# Patient Record
Sex: MALE | Race: WHITE | NOT HISPANIC OR LATINO
[De-identification: names, ages, dates, MRNs, and addresses within clinical notes are randomized per-mention and may not be internally consistent; named-entity substitution may affect disease eponyms.]

---

## 2017-02-09 ENCOUNTER — RESULT REVIEW (OUTPATIENT)
Age: 67
End: 2017-02-09

## 2017-02-09 PROBLEM — Z87.39 HISTORY OF GOUT: Status: RESOLVED | Noted: 2017-02-09 | Resolved: 2017-02-09

## 2017-02-09 PROBLEM — Z86.39 HISTORY OF HYPOTHYROIDISM: Status: RESOLVED | Noted: 2017-02-09 | Resolved: 2017-02-09

## 2017-02-09 PROBLEM — Z87.39 HISTORY OF OSTEOARTHRITIS: Status: RESOLVED | Noted: 2017-02-09 | Resolved: 2017-02-09

## 2017-02-09 PROBLEM — Z86.59 HISTORY OF ANXIETY DISORDER: Status: RESOLVED | Noted: 2017-02-09 | Resolved: 2017-02-09

## 2017-02-09 PROBLEM — Z86.39 HISTORY OF TYPE 2 DIABETES MELLITUS: Status: RESOLVED | Noted: 2017-02-09 | Resolved: 2017-02-09

## 2017-02-10 ENCOUNTER — OUTPATIENT (OUTPATIENT)
Dept: OUTPATIENT SERVICES | Facility: HOSPITAL | Age: 67
LOS: 1 days | Discharge: HOME | End: 2017-02-10

## 2017-02-10 ENCOUNTER — APPOINTMENT (OUTPATIENT)
Dept: HEMATOLOGY ONCOLOGY | Facility: CLINIC | Age: 67
End: 2017-02-10

## 2017-02-10 VITALS
OXYGEN SATURATION: 96 % | HEIGHT: 74 IN | TEMPERATURE: 98 F | BODY MASS INDEX: 40.43 KG/M2 | HEART RATE: 70 BPM | SYSTOLIC BLOOD PRESSURE: 118 MMHG | RESPIRATION RATE: 16 BRPM | WEIGHT: 315 LBS | DIASTOLIC BLOOD PRESSURE: 81 MMHG

## 2017-02-10 DIAGNOSIS — Z86.59 PERSONAL HISTORY OF OTHER MENTAL AND BEHAVIORAL DISORDERS: ICD-10-CM

## 2017-02-10 DIAGNOSIS — D72.89 OTHER SPECIFIED DISORDERS OF WHITE BLOOD CELLS: ICD-10-CM

## 2017-02-10 DIAGNOSIS — Z80.6 FAMILY HISTORY OF LEUKEMIA: ICD-10-CM

## 2017-02-10 DIAGNOSIS — Z87.39 PERSONAL HISTORY OF OTHER DISEASES OF THE MUSCULOSKELETAL SYSTEM AND CONNECTIVE TISSUE: ICD-10-CM

## 2017-02-10 DIAGNOSIS — D72.829 ELEVATED WHITE BLOOD CELL COUNT, UNSPECIFIED: ICD-10-CM

## 2017-02-10 DIAGNOSIS — Z86.39 PERSONAL HISTORY OF OTHER ENDOCRINE, NUTRITIONAL AND METABOLIC DISEASE: ICD-10-CM

## 2017-02-10 DIAGNOSIS — Z87.891 PERSONAL HISTORY OF NICOTINE DEPENDENCE: ICD-10-CM

## 2017-02-10 LAB
BASOPHILS # BLD AUTO: 0.1 K/UL — SIGNIFICANT CHANGE UP (ref 0–0.2)
BASOPHILS NFR BLD AUTO: 2 % — SIGNIFICANT CHANGE UP (ref 0–2)
EOSINOPHIL # BLD AUTO: 0.1 K/UL — SIGNIFICANT CHANGE UP (ref 0–0.5)
EOSINOPHIL NFR BLD AUTO: 1 % — SIGNIFICANT CHANGE UP (ref 0–6)
HCT VFR BLD CALC: 53.8 % — HIGH (ref 39–50)
HGB BLD-MCNC: 17.3 G/DL — HIGH (ref 13–17)
LYMPHOCYTES # BLD AUTO: 2.5 K/UL — SIGNIFICANT CHANGE UP (ref 1–3.3)
LYMPHOCYTES # BLD AUTO: 23 % — SIGNIFICANT CHANGE UP (ref 13–44)
MANUAL DIF COMMENT BLD-IMP: SIGNIFICANT CHANGE UP
MCHC RBC-ENTMCNC: 26.2 PG — LOW (ref 27–34)
MCHC RBC-ENTMCNC: 32.2 GM/DL — SIGNIFICANT CHANGE UP (ref 32–36)
MCV RBC AUTO: 81.6 FL — SIGNIFICANT CHANGE UP (ref 80–100)
MONOCYTES # BLD AUTO: 0.6 K/UL — SIGNIFICANT CHANGE UP (ref 0–0.9)
MONOCYTES NFR BLD AUTO: 6 % — SIGNIFICANT CHANGE UP (ref 2–14)
NEUTROPHILS # BLD AUTO: 6.1 K/UL — SIGNIFICANT CHANGE UP (ref 1.8–7.4)
NEUTROPHILS NFR BLD AUTO: 63 % — SIGNIFICANT CHANGE UP (ref 43–77)
NRBC # BLD: 1 /100 — HIGH (ref 0–0)
PLAT MORPH BLD: NORMAL — SIGNIFICANT CHANGE UP
PLATELET # BLD AUTO: 196 K/UL — SIGNIFICANT CHANGE UP (ref 150–400)
RBC # BLD: 6.6 M/UL — HIGH (ref 4.2–5.8)
RBC # FLD: 13.8 % — SIGNIFICANT CHANGE UP (ref 10.3–14.5)
RBC BLD AUTO: NORMAL — SIGNIFICANT CHANGE UP
VARIANT LYMPHS # BLD: 5 % — SIGNIFICANT CHANGE UP (ref 0–6)
WBC # BLD: 9.5 K/UL — SIGNIFICANT CHANGE UP (ref 3.8–10.5)
WBC # FLD AUTO: 9.5 K/UL — SIGNIFICANT CHANGE UP (ref 3.8–10.5)

## 2017-04-11 ENCOUNTER — APPOINTMENT (OUTPATIENT)
Dept: OTOLARYNGOLOGY | Facility: CLINIC | Age: 67
End: 2017-04-11

## 2017-05-03 ENCOUNTER — APPOINTMENT (OUTPATIENT)
Dept: OTOLARYNGOLOGY | Facility: CLINIC | Age: 67
End: 2017-05-03

## 2018-06-29 ENCOUNTER — APPOINTMENT (OUTPATIENT)
Dept: GASTROENTEROLOGY | Facility: CLINIC | Age: 68
End: 2018-06-29

## 2018-07-19 ENCOUNTER — APPOINTMENT (OUTPATIENT)
Dept: INTERNAL MEDICINE | Facility: CLINIC | Age: 68
End: 2018-07-19

## 2018-07-23 ENCOUNTER — INPATIENT (INPATIENT)
Facility: HOSPITAL | Age: 68
LOS: 5 days | Discharge: HOME HEALTH | DRG: 309 | End: 2018-07-29
Attending: GENERAL ACUTE CARE HOSPITAL | Admitting: GENERAL ACUTE CARE HOSPITAL
Payer: COMMERCIAL

## 2018-07-23 VITALS
WEIGHT: 315 LBS | SYSTOLIC BLOOD PRESSURE: 134 MMHG | OXYGEN SATURATION: 94 % | DIASTOLIC BLOOD PRESSURE: 87 MMHG | RESPIRATION RATE: 20 BRPM | HEART RATE: 88 BPM | TEMPERATURE: 98 F

## 2018-07-23 DIAGNOSIS — R06.02 SHORTNESS OF BREATH: ICD-10-CM

## 2018-07-23 LAB
ALBUMIN SERPL ELPH-MCNC: 4.1 G/DL — SIGNIFICANT CHANGE UP (ref 3.3–5)
ALP SERPL-CCNC: 86 U/L — SIGNIFICANT CHANGE UP (ref 40–120)
ALT FLD-CCNC: 19 U/L — SIGNIFICANT CHANGE UP (ref 10–45)
ANION GAP SERPL CALC-SCNC: 15 MMOL/L — SIGNIFICANT CHANGE UP (ref 5–17)
APTT BLD: 53.2 SEC — HIGH (ref 27.5–37.4)
AST SERPL-CCNC: 19 U/L — SIGNIFICANT CHANGE UP (ref 10–40)
BASE EXCESS BLDV CALC-SCNC: -1 MMOL/L — SIGNIFICANT CHANGE UP (ref -2–2)
BASOPHILS # BLD AUTO: 0 K/UL — SIGNIFICANT CHANGE UP (ref 0–0.2)
BASOPHILS NFR BLD AUTO: 0.2 % — SIGNIFICANT CHANGE UP (ref 0–2)
BILIRUB SERPL-MCNC: 0.6 MG/DL — SIGNIFICANT CHANGE UP (ref 0.2–1.2)
BUN SERPL-MCNC: 16 MG/DL — SIGNIFICANT CHANGE UP (ref 7–23)
CA-I SERPL-SCNC: 1.27 MMOL/L — SIGNIFICANT CHANGE UP (ref 1.12–1.3)
CALCIUM SERPL-MCNC: 9.8 MG/DL — SIGNIFICANT CHANGE UP (ref 8.4–10.5)
CHLORIDE BLDV-SCNC: 106 MMOL/L — SIGNIFICANT CHANGE UP (ref 96–108)
CHLORIDE SERPL-SCNC: 100 MMOL/L — SIGNIFICANT CHANGE UP (ref 96–108)
CO2 BLDV-SCNC: 24 MMOL/L — SIGNIFICANT CHANGE UP (ref 22–30)
CO2 SERPL-SCNC: 22 MMOL/L — SIGNIFICANT CHANGE UP (ref 22–31)
CREAT SERPL-MCNC: 0.87 MG/DL — SIGNIFICANT CHANGE UP (ref 0.5–1.3)
EOSINOPHIL # BLD AUTO: 0 K/UL — SIGNIFICANT CHANGE UP (ref 0–0.5)
EOSINOPHIL NFR BLD AUTO: 0.3 % — SIGNIFICANT CHANGE UP (ref 0–6)
GAS PNL BLDV: 134 MMOL/L — LOW (ref 136–145)
GAS PNL BLDV: SIGNIFICANT CHANGE UP
GAS PNL BLDV: SIGNIFICANT CHANGE UP
GLUCOSE BLDC GLUCOMTR-MCNC: 142 MG/DL — HIGH (ref 70–99)
GLUCOSE BLDV-MCNC: 201 MG/DL — HIGH (ref 70–99)
GLUCOSE SERPL-MCNC: 212 MG/DL — HIGH (ref 70–99)
HCO3 BLDV-SCNC: 23 MMOL/L — SIGNIFICANT CHANGE UP (ref 21–29)
HCT VFR BLD CALC: 48.7 % — SIGNIFICANT CHANGE UP (ref 39–50)
HCT VFR BLDA CALC: 51 % — HIGH (ref 39–50)
HGB BLD CALC-MCNC: 16.7 G/DL — SIGNIFICANT CHANGE UP (ref 13–17)
HGB BLD-MCNC: 16.1 G/DL — SIGNIFICANT CHANGE UP (ref 13–17)
INR BLD: 2.51 RATIO — HIGH (ref 0.88–1.16)
LACTATE BLDV-MCNC: 3.9 MMOL/L — HIGH (ref 0.7–2)
LYMPHOCYTES # BLD AUTO: 1.7 K/UL — SIGNIFICANT CHANGE UP (ref 1–3.3)
LYMPHOCYTES # BLD AUTO: 14.6 % — SIGNIFICANT CHANGE UP (ref 13–44)
MCHC RBC-ENTMCNC: 27.1 PG — SIGNIFICANT CHANGE UP (ref 27–34)
MCHC RBC-ENTMCNC: 33.2 GM/DL — SIGNIFICANT CHANGE UP (ref 32–36)
MCV RBC AUTO: 81.6 FL — SIGNIFICANT CHANGE UP (ref 80–100)
MONOCYTES # BLD AUTO: 0.6 K/UL — SIGNIFICANT CHANGE UP (ref 0–0.9)
MONOCYTES NFR BLD AUTO: 5.5 % — SIGNIFICANT CHANGE UP (ref 2–14)
NEUTROPHILS # BLD AUTO: 9.4 K/UL — HIGH (ref 1.8–7.4)
NEUTROPHILS NFR BLD AUTO: 79.4 % — HIGH (ref 43–77)
NT-PROBNP SERPL-SCNC: 529 PG/ML — HIGH (ref 0–300)
OTHER CELLS CSF MANUAL: 21 ML/DL — SIGNIFICANT CHANGE UP (ref 18–22)
PCO2 BLDV: 39 MMHG — SIGNIFICANT CHANGE UP (ref 35–50)
PH BLDV: 7.4 — SIGNIFICANT CHANGE UP (ref 7.35–7.45)
PLATELET # BLD AUTO: 231 K/UL — SIGNIFICANT CHANGE UP (ref 150–400)
PO2 BLDV: 63 MMHG — HIGH (ref 25–45)
POTASSIUM BLDV-SCNC: 4.2 MMOL/L — SIGNIFICANT CHANGE UP (ref 3.5–5.3)
POTASSIUM SERPL-MCNC: 4.5 MMOL/L — SIGNIFICANT CHANGE UP (ref 3.5–5.3)
POTASSIUM SERPL-SCNC: 4.5 MMOL/L — SIGNIFICANT CHANGE UP (ref 3.5–5.3)
PROT SERPL-MCNC: 8.3 G/DL — SIGNIFICANT CHANGE UP (ref 6–8.3)
PROTHROM AB SERPL-ACNC: 27.6 SEC — HIGH (ref 9.8–12.7)
RAPID RVP RESULT: SIGNIFICANT CHANGE UP
RBC # BLD: 5.96 M/UL — HIGH (ref 4.2–5.8)
RBC # FLD: 15.2 % — HIGH (ref 10.3–14.5)
SAO2 % BLDV: 91 % — HIGH (ref 67–88)
SODIUM SERPL-SCNC: 137 MMOL/L — SIGNIFICANT CHANGE UP (ref 135–145)
TROPONIN T, HIGH SENSITIVITY RESULT: 8 NG/L — SIGNIFICANT CHANGE UP (ref 0–51)
TROPONIN T, HIGH SENSITIVITY RESULT: 8 NG/L — SIGNIFICANT CHANGE UP (ref 0–51)
WBC # BLD: 11.9 K/UL — HIGH (ref 3.8–10.5)
WBC # FLD AUTO: 11.9 K/UL — HIGH (ref 3.8–10.5)

## 2018-07-23 PROCEDURE — 99285 EMERGENCY DEPT VISIT HI MDM: CPT

## 2018-07-23 PROCEDURE — 71275 CT ANGIOGRAPHY CHEST: CPT | Mod: 26

## 2018-07-23 PROCEDURE — 71045 X-RAY EXAM CHEST 1 VIEW: CPT | Mod: 26

## 2018-07-23 RX ORDER — ATORVASTATIN CALCIUM 80 MG/1
20 TABLET, FILM COATED ORAL AT BEDTIME
Qty: 0 | Refills: 0 | Status: DISCONTINUED | OUTPATIENT
Start: 2018-07-23 | End: 2018-07-29

## 2018-07-23 RX ORDER — DEXTROSE 50 % IN WATER 50 %
25 SYRINGE (ML) INTRAVENOUS ONCE
Qty: 0 | Refills: 0 | Status: DISCONTINUED | OUTPATIENT
Start: 2018-07-23 | End: 2018-07-29

## 2018-07-23 RX ORDER — DEXTROSE 50 % IN WATER 50 %
15 SYRINGE (ML) INTRAVENOUS ONCE
Qty: 0 | Refills: 0 | Status: DISCONTINUED | OUTPATIENT
Start: 2018-07-23 | End: 2018-07-29

## 2018-07-23 RX ORDER — ALLOPURINOL 300 MG
300 TABLET ORAL DAILY
Qty: 0 | Refills: 0 | Status: DISCONTINUED | OUTPATIENT
Start: 2018-07-23 | End: 2018-07-29

## 2018-07-23 RX ORDER — INSULIN LISPRO 100/ML
VIAL (ML) SUBCUTANEOUS
Qty: 0 | Refills: 0 | Status: DISCONTINUED | OUTPATIENT
Start: 2018-07-23 | End: 2018-07-29

## 2018-07-23 RX ORDER — DEXTROSE 50 % IN WATER 50 %
12.5 SYRINGE (ML) INTRAVENOUS ONCE
Qty: 0 | Refills: 0 | Status: DISCONTINUED | OUTPATIENT
Start: 2018-07-23 | End: 2018-07-29

## 2018-07-23 RX ORDER — SODIUM CHLORIDE 9 MG/ML
1000 INJECTION INTRAMUSCULAR; INTRAVENOUS; SUBCUTANEOUS ONCE
Qty: 0 | Refills: 0 | Status: COMPLETED | OUTPATIENT
Start: 2018-07-23 | End: 2018-07-23

## 2018-07-23 RX ORDER — WARFARIN SODIUM 2.5 MG/1
13 TABLET ORAL ONCE
Qty: 0 | Refills: 0 | Status: COMPLETED | OUTPATIENT
Start: 2018-07-23 | End: 2018-07-23

## 2018-07-23 RX ORDER — TAMSULOSIN HYDROCHLORIDE 0.4 MG/1
0.4 CAPSULE ORAL AT BEDTIME
Qty: 0 | Refills: 0 | Status: DISCONTINUED | OUTPATIENT
Start: 2018-07-23 | End: 2018-07-29

## 2018-07-23 RX ORDER — SODIUM CHLORIDE 9 MG/ML
1000 INJECTION, SOLUTION INTRAVENOUS
Qty: 0 | Refills: 0 | Status: DISCONTINUED | OUTPATIENT
Start: 2018-07-23 | End: 2018-07-29

## 2018-07-23 RX ORDER — SODIUM CHLORIDE 9 MG/ML
1000 INJECTION INTRAMUSCULAR; INTRAVENOUS; SUBCUTANEOUS
Qty: 0 | Refills: 0 | Status: DISCONTINUED | OUTPATIENT
Start: 2018-07-23 | End: 2018-07-29

## 2018-07-23 RX ORDER — INSULIN LISPRO 100/ML
VIAL (ML) SUBCUTANEOUS AT BEDTIME
Qty: 0 | Refills: 0 | Status: DISCONTINUED | OUTPATIENT
Start: 2018-07-23 | End: 2018-07-29

## 2018-07-23 RX ORDER — GLUCAGON INJECTION, SOLUTION 0.5 MG/.1ML
1 INJECTION, SOLUTION SUBCUTANEOUS ONCE
Qty: 0 | Refills: 0 | Status: DISCONTINUED | OUTPATIENT
Start: 2018-07-23 | End: 2018-07-29

## 2018-07-23 RX ORDER — NIFEDIPINE 30 MG
60 TABLET, EXTENDED RELEASE 24 HR ORAL DAILY
Qty: 0 | Refills: 0 | Status: DISCONTINUED | OUTPATIENT
Start: 2018-07-23 | End: 2018-07-29

## 2018-07-23 RX ORDER — METOPROLOL TARTRATE 50 MG
200 TABLET ORAL
Qty: 0 | Refills: 0 | Status: DISCONTINUED | OUTPATIENT
Start: 2018-07-23 | End: 2018-07-29

## 2018-07-23 RX ORDER — LEVOTHYROXINE SODIUM 125 MCG
150 TABLET ORAL DAILY
Qty: 0 | Refills: 0 | Status: DISCONTINUED | OUTPATIENT
Start: 2018-07-23 | End: 2018-07-29

## 2018-07-23 RX ORDER — CHOLECALCIFEROL (VITAMIN D3) 125 MCG
1000 CAPSULE ORAL DAILY
Qty: 0 | Refills: 0 | Status: DISCONTINUED | OUTPATIENT
Start: 2018-07-23 | End: 2018-07-29

## 2018-07-23 RX ADMIN — Medication 200 MILLIGRAM(S): at 23:45

## 2018-07-23 RX ADMIN — WARFARIN SODIUM 13 MILLIGRAM(S): 2.5 TABLET ORAL at 23:46

## 2018-07-23 RX ADMIN — Medication 60 MILLIGRAM(S): at 23:45

## 2018-07-23 RX ADMIN — ATORVASTATIN CALCIUM 20 MILLIGRAM(S): 80 TABLET, FILM COATED ORAL at 23:45

## 2018-07-23 RX ADMIN — SODIUM CHLORIDE 2000 MILLILITER(S): 9 INJECTION INTRAMUSCULAR; INTRAVENOUS; SUBCUTANEOUS at 17:30

## 2018-07-23 NOTE — ED ADULT NURSE REASSESSMENT NOTE - NS ED NURSE REASSESS COMMENT FT1
pts repeat trop obtained and sent to lab, results pending. VS stable, afebrile. denies any cp, sob, weakness, dizziness, pain or discomfort. pt tba for elevated lactate and WBC. pt pending tele bed assignment. safety and fall precautions maintained. son remains at the bedside.

## 2018-07-23 NOTE — ED ADULT NURSE NOTE - PMH
Afib    Anxiety    BPH (benign prostatic hyperplasia)    Chronic Gout    Dyslipidemia    Gout    History of Obesity    History of Transient Ischemic Attack (TIA)    Hypertension    Hypothyroid    Obstructive Sleep Apnea

## 2018-07-23 NOTE — H&P ADULT - HISTORY OF PRESENT ILLNESS
68 M w Afib, BPH, HLD, TIA, Hypothyroidism, DANIELLE, HTN sent to the ED by Dr Johnson for complaint of dyspnea for 2 days. pt is poor hisotrian and reports multiple different complaints.  he reports taht his SOB is not new and has had it on and off for sometime.. No CP /palpitaitons .. not always related to exertion or position..  and has not changed over the past two days .. when asked what changed he reports generalized weakness acutely yesterday /episode of sweating but no fever or chills ( though he says his temp was " higher than his baselin "). denies cough , but reports Rhinorrhea, mild N but no vomitting and has been having non bloody diarreah over the past few days.. no abd pain but reports frequency and urgency.  he was exposed to family members with strep but he denies any sore throat /HA or congestion .  reports occasional abd discomfort after "heavy meal " but not consistent   in ED : mild leukocytosis noted .  trop neg ,  , CTA done and pending. INR theraputic..   elevated lactate

## 2018-07-23 NOTE — H&P ADULT - ASSESSMENT
68 M w Afib, BPH, HLD, TIA, Hypothyroidism, gout , DANIELLE, HTN sent to the ED by Dr Johnson for complaint of dyspnea for 2 days. pt is poor hisotrian and reports multiple different complaints.  he reports taht his SOB is not new and has had it on and off for sometime.. No CP /palpitaitons .. not always related to exertion or position..  and has not changed over the past two days .. when asked what changed he reports generalized weakness acutely yesterday /episode of sweating but no fever or chills ( though he says his temp was " higher than his baselin "). denies cough , but reports Rhinorrhea, mild N but no vomitting and has been having non bloody diarreah over the past few days.. no abd pain but reports frequency and urgency.  he was exposed to family members with strep but he denies any sore throat /HA or congestion .  reports occasional abd discomfort after "heavy meal " but not consistent   in ED : mild leukocytosis noted .  trop neg ,  , CTA done and pending. INR theraputic..   elevated lactate   1- SOB : seems to be at baseline   doubt PE given theraputic INR   f/u CTA   SOB seems to be longstanding and not acute  .. would consider afib with rvr that pt is not able to feel .. ? consider 30 day monitor : will d/w    2- lactic acidosis : will r/o infectious etiology and still consider hypoperfusion sec to afib with rvr ( pt reports mild abd discomfort at times after food /in the lower abd )   gentle hydration for now and will monitor lactic acid   3- DANIELLE: cont CPAP   4- hypothyroid : check TSH and free 4   5- afib cont tele and cont coumadin

## 2018-07-23 NOTE — ED PROVIDER NOTE - OBJECTIVE STATEMENT
68 M w Afib, BPH, HLD, TIA, Hypothyroidism, DANIELLE, HTN sent to the ED by Dr Johnson for complaint of dyspnea for 2 days. He reports rhinorrhea this morning and a cough twice. He explains that he had a fever that he measured at 89.6 which is high for him. 68 M w Afib, BPH, HLD, TIA, Hypothyroidism, DANIELLE, HTN sent to the ED by Dr Johnson for complaint of dyspnea for 2 days. He reports rhinorrhea this morning and a cough twice. He explains that he had a fever that he measured at 98.6 which is high for him.

## 2018-07-23 NOTE — ED ADULT NURSE NOTE - OBJECTIVE STATEMENT
This grossly obese male in ED with c/o difficulty breathing and exhaustion onset last night associated with cough, runny nose, abdominal pain 4/10 and loose stool X5 episodes. Pt also reports burning on urination, denies nausea or vomiting.

## 2018-07-23 NOTE — ED PROVIDER NOTE - ATTENDING CONTRIBUTION TO CARE
67yo M with morbid obesity with sob since last pm.  NAD, morbidly obese, nontoxic, afebrile, NCAT, PERRL, CN grossly intact, S1S2, CTAB, abd soft nontender with no rebound/guarding/masses, no LE edema, no calf ttp.  EKG, cxr, labs, reassess.

## 2018-07-24 LAB
ALBUMIN SERPL ELPH-MCNC: 3.3 G/DL — SIGNIFICANT CHANGE UP (ref 3.3–5)
ALP SERPL-CCNC: 68 U/L — SIGNIFICANT CHANGE UP (ref 40–120)
ALT FLD-CCNC: 17 U/L — SIGNIFICANT CHANGE UP (ref 10–45)
ANION GAP SERPL CALC-SCNC: 15 MMOL/L — SIGNIFICANT CHANGE UP (ref 5–17)
APPEARANCE UR: CLEAR — SIGNIFICANT CHANGE UP
APTT BLD: 42.6 SEC — HIGH (ref 27.5–37.4)
AST SERPL-CCNC: 20 U/L — SIGNIFICANT CHANGE UP (ref 10–40)
BASOPHILS # BLD AUTO: 0.1 K/UL — SIGNIFICANT CHANGE UP (ref 0–0.2)
BASOPHILS NFR BLD AUTO: 0.5 % — SIGNIFICANT CHANGE UP (ref 0–2)
BILIRUB SERPL-MCNC: 0.9 MG/DL — SIGNIFICANT CHANGE UP (ref 0.2–1.2)
BILIRUB UR-MCNC: NEGATIVE — SIGNIFICANT CHANGE UP
BUN SERPL-MCNC: 16 MG/DL — SIGNIFICANT CHANGE UP (ref 7–23)
CALCIUM SERPL-MCNC: 9.5 MG/DL — SIGNIFICANT CHANGE UP (ref 8.4–10.5)
CHLORIDE SERPL-SCNC: 100 MMOL/L — SIGNIFICANT CHANGE UP (ref 96–108)
CO2 SERPL-SCNC: 22 MMOL/L — SIGNIFICANT CHANGE UP (ref 22–31)
COLOR SPEC: YELLOW — SIGNIFICANT CHANGE UP
CREAT SERPL-MCNC: 0.95 MG/DL — SIGNIFICANT CHANGE UP (ref 0.5–1.3)
DIFF PNL FLD: NEGATIVE — SIGNIFICANT CHANGE UP
EOSINOPHIL # BLD AUTO: 0.1 K/UL — SIGNIFICANT CHANGE UP (ref 0–0.5)
EOSINOPHIL NFR BLD AUTO: 0.6 % — SIGNIFICANT CHANGE UP (ref 0–6)
GLUCOSE BLDC GLUCOMTR-MCNC: 157 MG/DL — HIGH (ref 70–99)
GLUCOSE BLDC GLUCOMTR-MCNC: 158 MG/DL — HIGH (ref 70–99)
GLUCOSE BLDC GLUCOMTR-MCNC: 175 MG/DL — HIGH (ref 70–99)
GLUCOSE BLDC GLUCOMTR-MCNC: 207 MG/DL — HIGH (ref 70–99)
GLUCOSE SERPL-MCNC: 186 MG/DL — HIGH (ref 70–99)
GLUCOSE UR QL: NEGATIVE — SIGNIFICANT CHANGE UP
HBA1C BLD-MCNC: 7.5 % — HIGH (ref 4–5.6)
HCT VFR BLD CALC: 44 % — SIGNIFICANT CHANGE UP (ref 39–50)
HGB BLD-MCNC: 14.8 G/DL — SIGNIFICANT CHANGE UP (ref 13–17)
INR BLD: 2.02 RATIO — HIGH (ref 0.88–1.16)
KETONES UR-MCNC: NEGATIVE — SIGNIFICANT CHANGE UP
LACTATE SERPL-SCNC: 2.6 MMOL/L — HIGH (ref 0.7–2)
LEUKOCYTE ESTERASE UR-ACNC: NEGATIVE — SIGNIFICANT CHANGE UP
LYMPHOCYTES # BLD AUTO: 19.6 % — SIGNIFICANT CHANGE UP (ref 13–44)
LYMPHOCYTES # BLD AUTO: 2.5 K/UL — SIGNIFICANT CHANGE UP (ref 1–3.3)
MCHC RBC-ENTMCNC: 27.4 PG — SIGNIFICANT CHANGE UP (ref 27–34)
MCHC RBC-ENTMCNC: 33.6 GM/DL — SIGNIFICANT CHANGE UP (ref 32–36)
MCV RBC AUTO: 81.5 FL — SIGNIFICANT CHANGE UP (ref 80–100)
MONOCYTES # BLD AUTO: 0.8 K/UL — SIGNIFICANT CHANGE UP (ref 0–0.9)
MONOCYTES NFR BLD AUTO: 6 % — SIGNIFICANT CHANGE UP (ref 2–14)
NEUTROPHILS # BLD AUTO: 9.5 K/UL — HIGH (ref 1.8–7.4)
NEUTROPHILS NFR BLD AUTO: 73.4 % — SIGNIFICANT CHANGE UP (ref 43–77)
NITRITE UR-MCNC: NEGATIVE — SIGNIFICANT CHANGE UP
PH UR: 6 — SIGNIFICANT CHANGE UP (ref 5–8)
PLATELET # BLD AUTO: 207 K/UL — SIGNIFICANT CHANGE UP (ref 150–400)
POTASSIUM SERPL-MCNC: 4 MMOL/L — SIGNIFICANT CHANGE UP (ref 3.5–5.3)
POTASSIUM SERPL-SCNC: 4 MMOL/L — SIGNIFICANT CHANGE UP (ref 3.5–5.3)
PROCALCITONIN SERPL-MCNC: 0.05 NG/ML — SIGNIFICANT CHANGE UP (ref 0.02–0.1)
PROT SERPL-MCNC: 7 G/DL — SIGNIFICANT CHANGE UP (ref 6–8.3)
PROT UR-MCNC: 30 MG/DL
PROTHROM AB SERPL-ACNC: 22.3 SEC — HIGH (ref 9.8–12.7)
RBC # BLD: 5.4 M/UL — SIGNIFICANT CHANGE UP (ref 4.2–5.8)
RBC # FLD: 14.9 % — HIGH (ref 10.3–14.5)
RBC CASTS # UR COMP ASSIST: SIGNIFICANT CHANGE UP /HPF (ref 0–2)
S PYO AG SPEC QL IA: NEGATIVE — SIGNIFICANT CHANGE UP
SODIUM SERPL-SCNC: 137 MMOL/L — SIGNIFICANT CHANGE UP (ref 135–145)
SP GR SPEC: >1.03 — HIGH (ref 1.01–1.02)
T4 FREE SERPL-MCNC: 1.2 NG/DL — SIGNIFICANT CHANGE UP (ref 0.9–1.8)
TSH SERPL-MCNC: 2.23 UIU/ML — SIGNIFICANT CHANGE UP (ref 0.27–4.2)
UROBILINOGEN FLD QL: NEGATIVE — SIGNIFICANT CHANGE UP
WBC # BLD: 12.9 K/UL — HIGH (ref 3.8–10.5)
WBC # FLD AUTO: 12.9 K/UL — HIGH (ref 3.8–10.5)
WBC UR QL: SIGNIFICANT CHANGE UP /HPF (ref 0–5)

## 2018-07-24 RX ORDER — WARFARIN SODIUM 2.5 MG/1
18 TABLET ORAL ONCE
Qty: 0 | Refills: 0 | Status: COMPLETED | OUTPATIENT
Start: 2018-07-24 | End: 2018-07-24

## 2018-07-24 RX ADMIN — Medication 1: at 09:21

## 2018-07-24 RX ADMIN — Medication 150 MICROGRAM(S): at 05:56

## 2018-07-24 RX ADMIN — Medication 2: at 13:28

## 2018-07-24 RX ADMIN — Medication 1 TABLET(S): at 09:21

## 2018-07-24 RX ADMIN — Medication 1: at 18:15

## 2018-07-24 RX ADMIN — Medication 200 MILLIGRAM(S): at 05:55

## 2018-07-24 RX ADMIN — Medication 2 MILLIGRAM(S): at 23:11

## 2018-07-24 RX ADMIN — Medication 300 MILLIGRAM(S): at 09:21

## 2018-07-24 RX ADMIN — Medication 200 MILLIGRAM(S): at 17:36

## 2018-07-24 RX ADMIN — TAMSULOSIN HYDROCHLORIDE 0.4 MILLIGRAM(S): 0.4 CAPSULE ORAL at 23:12

## 2018-07-24 RX ADMIN — Medication 60 MILLIGRAM(S): at 05:55

## 2018-07-24 RX ADMIN — ATORVASTATIN CALCIUM 20 MILLIGRAM(S): 80 TABLET, FILM COATED ORAL at 23:12

## 2018-07-24 RX ADMIN — WARFARIN SODIUM 18 MILLIGRAM(S): 2.5 TABLET ORAL at 23:11

## 2018-07-24 RX ADMIN — Medication 1000 UNIT(S): at 09:21

## 2018-07-24 NOTE — PROGRESS NOTE ADULT - SUBJECTIVE AND OBJECTIVE BOX
SUBJECTIVE: The patient denies chest pain, shortness of breath, arm pain or jaw pain, dizziness or palpitations.  He reports feeling better today    	  MEDICATIONS:  metoprolol tartrate 200 milliGRAM(s) Oral two times a day  NIFEdipine XL 60 milliGRAM(s) Oral daily  tamsulosin 0.4 milliGRAM(s) Oral at bedtime  LORazepam     Tablet 2 milliGRAM(s) Oral at bedtime  allopurinol 300 milliGRAM(s) Oral daily  atorvastatin 20 milliGRAM(s) Oral at bedtime  dextrose 40% Gel 15 Gram(s) Oral once PRN  dextrose 50% Injectable 12.5 Gram(s) IV Push once  dextrose 50% Injectable 25 Gram(s) IV Push once  dextrose 50% Injectable 25 Gram(s) IV Push once  glucagon  Injectable 1 milliGRAM(s) IntraMuscular once PRN  insulin lispro (HumaLOG) corrective regimen sliding scale   SubCutaneous three times a day before meals  insulin lispro (HumaLOG) corrective regimen sliding scale   SubCutaneous at bedtime  levothyroxine 150 MICROGram(s) Oral daily  cholecalciferol 1000 Unit(s) Oral daily  dextrose 5%. 1000 milliLiter(s) IV Continuous <Continuous>  multivitamin 1 Tablet(s) Oral daily  sodium chloride 0.9%. 1000 milliLiter(s) IV Continuous <Continuous>      REVIEW OF SYSTEMS:    CONSTITUTIONAL: No fever, weight loss, or fatigue  EYES: No eye pain, visual disturbances, or discharge  NECK: No pain or stiffness  RESPIRATORY: No cough, wheezing, chills or hemoptysis; No Shortness of Breath  CARDIOVASCULAR: No chest pain, palpitations, dizziness, or leg swelling  GASTROINTESTINAL: No abdominal or epigastric pain. No nausea, vomiting, or hematemesis; No diarrhea or constipation. No melena or hematochezia.  GENITOURINARY: No dysuria, frequency, hematuria, or incontinence  NEUROLOGICAL: No headaches, memory loss, loss of strength, numbness, or tremors  SKIN: No itching, burning, rashes, or lesions   LYMPH Nodes: No enlarged glands  MUSCULOSKELETAL: No joint pain or swelling; No muscle, back, or extremity pain  All other review of systems are negative.  	  [ ] Unable to obtain    PHYSICAL EXAM:  T(C): 37.2 (07-24-18 @ 04:32), Max: 37.2 (07-24-18 @ 04:32)  HR: 91 (07-24-18 @ 09:13) (68 - 97)  BP: 123/65 (07-24-18 @ 04:32) (118/75 - 153/94)  RR: 19 (07-24-18 @ 04:32) (18 - 20)  SpO2: 95% (07-24-18 @ 09:13) (94% - 97%)  Wt(kg): --  I&O's Summary    23 Jul 2018 07:01  -  24 Jul 2018 07:00  --------------------------------------------------------  IN: 20 mL / OUT: 0 mL / NET: 20 mL        Weight (kg): 213.2 (07-23 @ 12:06)    PHYSICAL EXAM    Appearance: Normal	  HEENT:   Normal oral mucosa, PERRL, EOMI	  NECK: Soft and supple, No LAD, No JVD  Cardiovascular: Regular Rate and Rhythm, Normal S1 S2, No murmurs, No clicks, gallops or rubs  Respiratory: Lungs clear to auscultation	  Gastrointestinal:  Soft, Non-tender, + BS	  Skin: No rashes, No ecchymoses, No cyanosis  Neurologic: Non-focal  Extremities: No clubbing, cyanosis or edema  Vascular: Peripheral pulses palpable 2+ bilaterally    TELEMETRY:  NSR	                            14.8   12.9  )-----------( 207      ( 24 Jul 2018 05:47 )             44.0     07-24    137  |  100  |  16  ----------------------------<  186<H>  4.0   |  22  |  0.95    Ca    9.5      24 Jul 2018 05:47    TPro  7.0  /  Alb  3.3  /  TBili  0.9  /  DBili  x   /  AST  20  /  ALT  17  /  AlkPhos  68  07-24    proBNP: Serum Pro-Brain Natriuretic Peptide: 529 pg/mL (07-23 @ 14:12)    HgA1c: Hemoglobin A1C, Whole Blood: 7.5 % (07-24 @ 08:23)

## 2018-07-24 NOTE — PROGRESS NOTE ADULT - ASSESSMENT
68 M w Afib, BPH, HLD, TIA, Hypothyroidism, gout , DANIELLE, HTN sent to the ED by Dr Johnson for complaint of dyspnea for 2 days. pt is poor hisotrian and reports multiple different complaints.  he reports taht his SOB is not new and has had it on and off for sometime.. No CP /palpitaitons .. not always related to exertion or position..  and has not changed over the past two days .. when asked what changed he reports generalized weakness acutely yesterday /episode of sweating but no fever or chills ( though he says his temp was " higher than his baselin "). denies cough , but reports Rhinorrhea, mild N but no vomitting and has been having non bloody diarreah over the past few days.. no abd pain but reports frequency and urgency.  he was exposed to family members with strep but he denies any sore throat /HA or congestion .  reports occasional abd discomfort after "heavy meal " but not consistent   in ED : mild leukocytosis noted .  trop neg ,  , CTA done and pending. INR theraputic..   elevated lactate   1- SOB : seems to be at baseline   doubt PE given theraputic INR...   CTA : neg   SOB seems to be longstanding and not acute  .  2- lactic acidosis : no obvious infectious etiology  (still consider hypoperfusion sec to afib with rvr , pt reports mild abd discomfort at times after food /in the lower abd )   f/u cultures   3- DANIELLE: cont CPAP   4- hypothyroid : TSH and free 4 :WNL  5- afib :   with episodes of tachy and usha   will f/u wiht EP   cont  tele and cont coumadin

## 2018-07-24 NOTE — PROGRESS NOTE ADULT - SUBJECTIVE AND OBJECTIVE BOX
Patient is a 68y old  Male who presents with a chief complaint of SOB /weakness (23 Jul 2018 20:14)                                                               INTERVAL HPI/OVERNIGHT EVENTS: tachyarrythmia overnight     REVIEW OF SYSTEMS:     CONSTITUTIONAL: No weakness, fevers or chills  RESPIRATORY: No cough, wheezing,  No shortness of breath  CARDIOVASCULAR: No chest pain or palpitations  GASTROINTESTINAL: No abdominal pain  . No nausea, vomiting, or hematemesis; No diarrhea or constipation. No melena or hematochezia.  GENITOURINARY: No dysuria, frequency or hematuria  NEUROLOGICAL: No numbness or weakness                                                                                                                                                                                                                                                                                 Medications:  MEDICATIONS  (STANDING):  allopurinol 300 milliGRAM(s) Oral daily  atorvastatin 20 milliGRAM(s) Oral at bedtime  cholecalciferol 1000 Unit(s) Oral daily  dextrose 5%. 1000 milliLiter(s) (50 mL/Hr) IV Continuous <Continuous>  dextrose 50% Injectable 12.5 Gram(s) IV Push once  dextrose 50% Injectable 25 Gram(s) IV Push once  dextrose 50% Injectable 25 Gram(s) IV Push once  insulin lispro (HumaLOG) corrective regimen sliding scale   SubCutaneous three times a day before meals  insulin lispro (HumaLOG) corrective regimen sliding scale   SubCutaneous at bedtime  levothyroxine 150 MICROGram(s) Oral daily  LORazepam     Tablet 2 milliGRAM(s) Oral at bedtime  metoprolol tartrate 200 milliGRAM(s) Oral two times a day  multivitamin 1 Tablet(s) Oral daily  NIFEdipine XL 60 milliGRAM(s) Oral daily  sodium chloride 0.9%. 1000 milliLiter(s) (100 mL/Hr) IV Continuous <Continuous>  tamsulosin 0.4 milliGRAM(s) Oral at bedtime  warfarin 18 milliGRAM(s) Oral once    MEDICATIONS  (PRN):  dextrose 40% Gel 15 Gram(s) Oral once PRN Blood Glucose LESS THAN 70 milliGRAM(s)/deciliter  glucagon  Injectable 1 milliGRAM(s) IntraMuscular once PRN Glucose LESS THAN 70 milligrams/deciliter       Allergies    metformin (Unknown)    Intolerances      Vital Signs Last 24 Hrs  T(C): 36.9 (24 Jul 2018 12:12), Max: 37.2 (24 Jul 2018 04:32)  T(F): 98.4 (24 Jul 2018 12:12), Max: 98.9 (24 Jul 2018 04:32)  HR: 78 (24 Jul 2018 18:07) (76 - 91)  BP: 137/75 (24 Jul 2018 17:36) (109/69 - 137/75)  BP(mean): 75 (24 Jul 2018 17:36) (75 - 75)  RR: 19 (24 Jul 2018 12:12) (19 - 19)  SpO2: 95% (24 Jul 2018 18:07) (95% - 97%)  CAPILLARY BLOOD GLUCOSE      POCT Blood Glucose.: 158 mg/dL (24 Jul 2018 18:13)  POCT Blood Glucose.: 207 mg/dL (24 Jul 2018 12:52)  POCT Blood Glucose.: 175 mg/dL (24 Jul 2018 08:56)  POCT Blood Glucose.: 142 mg/dL (23 Jul 2018 23:34)      07-23 @ 07:01 - 07-24 @ 07:00  --------------------------------------------------------  IN: 20 mL / OUT: 0 mL / NET: 20 mL    07-24 @ 07:01 - 07-24 @ 21:12  --------------------------------------------------------  IN: 600 mL / OUT: 400 mL / NET: 200 mL      Physical Exam:    Daily Height in cm: 185.42 (24 Jul 2018 17:36)    General:  Well appearing, NAD, not cachetic  HEENT:  Nonicteric, PERRLA  CV:  irreg  irreg , S1S2   Lungs:  CTA B/  Abdomen:  Soft, non-tender, no distended, positive BS  Extremities:  2+ pulses, no c/c, no edema  Skin:  Warm and dry, no rashes  :  No montalvo  Neuro:  AAOx3, non-focal, grossly intact                                                                                                                                                                                                                                                                                                LABS:                               14.8   12.9  )-----------( 207      ( 24 Jul 2018 05:47 )             44.0                      07-24    137  |  100  |  16  ----------------------------<  186<H>  4.0   |  22  |  0.95    Ca    9.5      24 Jul 2018 05:47    TPro  7.0  /  Alb  3.3  /  TBili  0.9  /  DBili  x   /  AST  20  /  ALT  17  /  AlkPhos  68  07-24

## 2018-07-24 NOTE — PROGRESS NOTE ADULT - ASSESSMENT
68 M w Afib, BPH, HLD, TIA, Hypothyroidism, DANIELLE, HTN who comes in to the ER with complaints of SOB and elevated lactate in ER.  Procalcitonin is low. WBC mildly elevated.  CT scan done to r/o pulm embolus which is negative.  No evidence for ACS or CHF.  The patient is stable from a cardiovascular perspective.  Unclear as to etiology of the SOB.  No evidence of AF this admission yet.  ?AF that patient did not feel.  At discharge will discuss an event recorder to wear at home.

## 2018-07-25 LAB
-  COAGULASE NEGATIVE STAPHYLOCOCCUS: SIGNIFICANT CHANGE UP
APTT BLD: 39.1 SEC — HIGH (ref 27.5–37.4)
GLUCOSE BLDC GLUCOMTR-MCNC: 139 MG/DL — HIGH (ref 70–99)
GLUCOSE BLDC GLUCOMTR-MCNC: 172 MG/DL — HIGH (ref 70–99)
GLUCOSE BLDC GLUCOMTR-MCNC: 176 MG/DL — HIGH (ref 70–99)
GLUCOSE BLDC GLUCOMTR-MCNC: 188 MG/DL — HIGH (ref 70–99)
GLUCOSE BLDC GLUCOMTR-MCNC: 207 MG/DL — HIGH (ref 70–99)
GRAM STN FLD: SIGNIFICANT CHANGE UP
GRAM STN FLD: SIGNIFICANT CHANGE UP
INR BLD: 1.92 RATIO — HIGH (ref 0.88–1.16)
METHOD TYPE: SIGNIFICANT CHANGE UP
PROTHROM AB SERPL-ACNC: 21.2 SEC — HIGH (ref 9.8–12.7)
SPECIMEN SOURCE: SIGNIFICANT CHANGE UP

## 2018-07-25 PROCEDURE — 99222 1ST HOSP IP/OBS MODERATE 55: CPT

## 2018-07-25 RX ORDER — WARFARIN SODIUM 2.5 MG/1
18 TABLET ORAL ONCE
Qty: 0 | Refills: 0 | Status: COMPLETED | OUTPATIENT
Start: 2018-07-25 | End: 2018-07-25

## 2018-07-25 RX ADMIN — TAMSULOSIN HYDROCHLORIDE 0.4 MILLIGRAM(S): 0.4 CAPSULE ORAL at 22:18

## 2018-07-25 RX ADMIN — Medication 2 MILLIGRAM(S): at 22:17

## 2018-07-25 RX ADMIN — Medication 60 MILLIGRAM(S): at 06:00

## 2018-07-25 RX ADMIN — Medication 1000 UNIT(S): at 09:47

## 2018-07-25 RX ADMIN — Medication 150 MICROGRAM(S): at 06:00

## 2018-07-25 RX ADMIN — WARFARIN SODIUM 18 MILLIGRAM(S): 2.5 TABLET ORAL at 22:18

## 2018-07-25 RX ADMIN — Medication 200 MILLIGRAM(S): at 18:32

## 2018-07-25 RX ADMIN — Medication 1: at 13:05

## 2018-07-25 RX ADMIN — Medication 300 MILLIGRAM(S): at 09:47

## 2018-07-25 RX ADMIN — Medication 1 TABLET(S): at 09:47

## 2018-07-25 RX ADMIN — ATORVASTATIN CALCIUM 20 MILLIGRAM(S): 80 TABLET, FILM COATED ORAL at 22:18

## 2018-07-25 RX ADMIN — Medication 2: at 10:07

## 2018-07-25 RX ADMIN — Medication 200 MILLIGRAM(S): at 06:00

## 2018-07-25 NOTE — CHART NOTE - NSCHARTNOTEFT_GEN_A_CORE
Notified by RN for pt with preliminary lab result "growth in aerobic bottle GPC in clusters". Pt remains afebrile and improvement in lactate this AM from admission. f/u on final culture results. Will discuss with primary team in AM.    Thomas Zuniga PA-C  Department of Medicine  [phone]

## 2018-07-25 NOTE — PROGRESS NOTE ADULT - SUBJECTIVE AND OBJECTIVE BOX
Date of Admission:    24H hour events: No acute events. No complaints this am.     MEDICATIONS:  metoprolol tartrate 200 milliGRAM(s) Oral two times a day  NIFEdipine XL 60 milliGRAM(s) Oral daily  tamsulosin 0.4 milliGRAM(s) Oral at bedtime  LORazepam     Tablet 2 milliGRAM(s) Oral at bedtime  allopurinol 300 milliGRAM(s) Oral daily  atorvastatin 20 milliGRAM(s) Oral at bedtime  dextrose 40% Gel 15 Gram(s) Oral once PRN  dextrose 50% Injectable 12.5 Gram(s) IV Push once  dextrose 50% Injectable 25 Gram(s) IV Push once  dextrose 50% Injectable 25 Gram(s) IV Push once  glucagon  Injectable 1 milliGRAM(s) IntraMuscular once PRN  insulin lispro (HumaLOG) corrective regimen sliding scale   SubCutaneous three times a day before meals  insulin lispro (HumaLOG) corrective regimen sliding scale   SubCutaneous at bedtime  levothyroxine 150 MICROGram(s) Oral daily  cholecalciferol 1000 Unit(s) Oral daily  dextrose 5%. 1000 milliLiter(s) IV Continuous <Continuous>  multivitamin 1 Tablet(s) Oral daily  sodium chloride 0.9%. 1000 milliLiter(s) IV Continuous <Continuous>      REVIEW OF SYSTEMS:  Complete 10point ROS negative.    PHYSICAL EXAM:  T(C): 36.6 (07-25-18 @ 12:40), Max: 36.7 (07-24-18 @ 21:21)  HR: 78 (07-25-18 @ 12:40) (66 - 78)  BP: 113/72 (07-25-18 @ 12:40) (113/72 - 137/75)  RR: 18 (07-25-18 @ 12:40) (18 - 18)  SpO2: 98% (07-25-18 @ 12:40) (95% - 98%)  Wt(kg): --  I&O's Summary    24 Jul 2018 07:01  -  25 Jul 2018 07:00  --------------------------------------------------------  IN: 720 mL / OUT: 400 mL / NET: 320 mL    25 Jul 2018 07:01  -  25 Jul 2018 14:55  --------------------------------------------------------  IN: 510 mL / OUT: 0 mL / NET: 510 mL        Appearance: Normal, morbidly obese 	  HEENT:   Normal oral mucosa, PERRL, EOMI	  Lymphatic: No lymphadenopathy  Cardiovascular: Normal S1 S2, No JVD, No murmurs, No edema  Respiratory: Lungs clear to auscultation	  Psychiatry: A & O x 3, Mood & affect appropriate  Gastrointestinal:  Soft, Non-tender, + BS	  Skin: No rashes, No ecchymoses, No cyanosis	  Neurologic: Non-focal  Extremities: Normal range of motion, No clubbing, cyanosis or edema  Vascular: Peripheral pulses palpable 2+ bilaterally        LABS:	 	    CBC Full  -  ( 24 Jul 2018 05:47 )  WBC Count : 12.9 K/uL  Hemoglobin : 14.8 g/dL  Hematocrit : 44.0 %  Platelet Count - Automated : 207 K/uL  Mean Cell Volume : 81.5 fl  Mean Cell Hemoglobin : 27.4 pg  Mean Cell Hemoglobin Concentration : 33.6 gm/dL  Auto Neutrophil # : 9.5 K/uL  Auto Lymphocyte # : 2.5 K/uL  Auto Monocyte # : 0.8 K/uL  Auto Eosinophil # : 0.1 K/uL  Auto Basophil # : 0.1 K/uL  Auto Neutrophil % : 73.4 %  Auto Lymphocyte % : 19.6 %  Auto Monocyte % : 6.0 %  Auto Eosinophil % : 0.6 %  Auto Basophil % : 0.5 %    07-24    137  |  100  |  16  ----------------------------<  186<H>  4.0   |  22  |  0.95    Ca    9.5      24 Jul 2018 05:47    TPro  7.0  /  Alb  3.3  /  TBili  0.9  /  DBili  x   /  AST  20  /  ALT  17  /  AlkPhos  68  07-24      proBNP: Serum Pro-Brain Natriuretic Peptide: 529 pg/mL (07-23 @ 14:12)    TELEMETRY: 	  AF 60-80s  	  ASSESSMENT/PLAN: 	    68M w/morbid obesity,  Afib, BPH, HLD, TIA, Hypothyroidism, DANIELLE, HTN sent to the ED for complaint of dyspnea for 2 days found to be in afib with RVR now rate controlled AF 60-80s.   - continue Toprol  mg BID  - continue to monitor on tele  - c/w AC     Will d/w Dr Holm and f/u primary team     BECKI Garnica   [id number] Date of Admission:    24H hour events: No acute events. No complaints this am.     MEDICATIONS:  metoprolol tartrate 200 milliGRAM(s) Oral two times a day  NIFEdipine XL 60 milliGRAM(s) Oral daily  tamsulosin 0.4 milliGRAM(s) Oral at bedtime  LORazepam     Tablet 2 milliGRAM(s) Oral at bedtime  allopurinol 300 milliGRAM(s) Oral daily  atorvastatin 20 milliGRAM(s) Oral at bedtime  dextrose 40% Gel 15 Gram(s) Oral once PRN  dextrose 50% Injectable 12.5 Gram(s) IV Push once  dextrose 50% Injectable 25 Gram(s) IV Push once  dextrose 50% Injectable 25 Gram(s) IV Push once  glucagon  Injectable 1 milliGRAM(s) IntraMuscular once PRN  insulin lispro (HumaLOG) corrective regimen sliding scale   SubCutaneous three times a day before meals  insulin lispro (HumaLOG) corrective regimen sliding scale   SubCutaneous at bedtime  levothyroxine 150 MICROGram(s) Oral daily  cholecalciferol 1000 Unit(s) Oral daily  dextrose 5%. 1000 milliLiter(s) IV Continuous <Continuous>  multivitamin 1 Tablet(s) Oral daily  sodium chloride 0.9%. 1000 milliLiter(s) IV Continuous <Continuous>      REVIEW OF SYSTEMS:  Complete 10point ROS negative.    PHYSICAL EXAM:  T(C): 36.6 (07-25-18 @ 12:40), Max: 36.7 (07-24-18 @ 21:21)  HR: 78 (07-25-18 @ 12:40) (66 - 78)  BP: 113/72 (07-25-18 @ 12:40) (113/72 - 137/75)  RR: 18 (07-25-18 @ 12:40) (18 - 18)  SpO2: 98% (07-25-18 @ 12:40) (95% - 98%)  Wt(kg): --  I&O's Summary    24 Jul 2018 07:01  -  25 Jul 2018 07:00  --------------------------------------------------------  IN: 720 mL / OUT: 400 mL / NET: 320 mL    25 Jul 2018 07:01  -  25 Jul 2018 14:55  --------------------------------------------------------  IN: 510 mL / OUT: 0 mL / NET: 510 mL        Appearance: Normal, morbidly obese 	  HEENT:   Normal oral mucosa, PERRL, EOMI	  Lymphatic: No lymphadenopathy  Cardiovascular: Normal S1 S2, No JVD, No murmurs, No edema  Respiratory: Lungs clear to auscultation	  Psychiatry: A & O x 3, Mood & affect appropriate  Gastrointestinal:  Soft, Non-tender, + BS	  Skin: No rashes, No ecchymoses, No cyanosis	  Neurologic: Non-focal  Extremities: Normal range of motion, No clubbing, cyanosis or edema  Vascular: Peripheral pulses palpable 2+ bilaterally        LABS:	 	    CBC Full  -  ( 24 Jul 2018 05:47 )  WBC Count : 12.9 K/uL  Hemoglobin : 14.8 g/dL  Hematocrit : 44.0 %  Platelet Count - Automated : 207 K/uL  Mean Cell Volume : 81.5 fl  Mean Cell Hemoglobin : 27.4 pg  Mean Cell Hemoglobin Concentration : 33.6 gm/dL  Auto Neutrophil # : 9.5 K/uL  Auto Lymphocyte # : 2.5 K/uL  Auto Monocyte # : 0.8 K/uL  Auto Eosinophil # : 0.1 K/uL  Auto Basophil # : 0.1 K/uL  Auto Neutrophil % : 73.4 %  Auto Lymphocyte % : 19.6 %  Auto Monocyte % : 6.0 %  Auto Eosinophil % : 0.6 %  Auto Basophil % : 0.5 %    07-24    137  |  100  |  16  ----------------------------<  186<H>  4.0   |  22  |  0.95    Ca    9.5      24 Jul 2018 05:47    TPro  7.0  /  Alb  3.3  /  TBili  0.9  /  DBili  x   /  AST  20  /  ALT  17  /  AlkPhos  68  07-24      proBNP: Serum Pro-Brain Natriuretic Peptide: 529 pg/mL (07-23 @ 14:12)    TELEMETRY: 	  AF 60-80s  	  ASSESSMENT/PLAN: 	    68M w/morbid obesity,  Afib, BPH, HLD, TIA, Hypothyroidism, DANIELLE, HTN sent to the ED for complaint of dyspnea for 2 days found to be in afib with RVR now rate controlled AF 60-80s. Patient reported that he had not taken any of his BB yesterday on day of admission. Afib w/RVR likely related to missed BB doses.     - continue Toprol  mg BID  - telemetry   - c/w AC     Patient seen and discussed with Dr. Holm.     EP to sign off at this time.     J Delphin   [id number]

## 2018-07-25 NOTE — PROVIDER CONTACT NOTE (CRITICAL VALUE NOTIFICATION) - SITUATION
positive Blood culture from 07/24/2018. preliminary results gram + Cocci in clusters within the aerobic bottle x1.

## 2018-07-25 NOTE — PROGRESS NOTE ADULT - SUBJECTIVE AND OBJECTIVE BOX
Patient is a 68y old  Male who presents with a chief complaint of shortness of breath (2018 06:01)                                                               INTERVAL HPI/OVERNIGHT EVENTS:    REVIEW OF SYSTEMS:     CONSTITUTIONAL: No weakness, fevers or chills  RESPIRATORY: No cough, wheezing,  shortness of breath upon exertion   CARDIOVASCULAR: No chest pain or palpitations  GASTROINTESTINAL: No abdominal pain  . No nausea, vomiting, or hematemesis; No diarrhea or constipation. No melena or hematochezia.  GENITOURINARY: No dysuria, frequency or hematuria  NEUROLOGICAL: No numbness or weakness                                                                                                                                                                                                                                                                                Medications:  MEDICATIONS  (STANDING):  allopurinol 300 milliGRAM(s) Oral daily  atorvastatin 20 milliGRAM(s) Oral at bedtime  cholecalciferol 1000 Unit(s) Oral daily  dextrose 5%. 1000 milliLiter(s) (50 mL/Hr) IV Continuous <Continuous>  dextrose 50% Injectable 12.5 Gram(s) IV Push once  dextrose 50% Injectable 25 Gram(s) IV Push once  dextrose 50% Injectable 25 Gram(s) IV Push once  insulin lispro (HumaLOG) corrective regimen sliding scale   SubCutaneous three times a day before meals  insulin lispro (HumaLOG) corrective regimen sliding scale   SubCutaneous at bedtime  levothyroxine 150 MICROGram(s) Oral daily  LORazepam     Tablet 2 milliGRAM(s) Oral at bedtime  metoprolol tartrate 200 milliGRAM(s) Oral two times a day  multivitamin 1 Tablet(s) Oral daily  NIFEdipine XL 60 milliGRAM(s) Oral daily  sodium chloride 0.9%. 1000 milliLiter(s) (100 mL/Hr) IV Continuous <Continuous>  tamsulosin 0.4 milliGRAM(s) Oral at bedtime    MEDICATIONS  (PRN):  dextrose 40% Gel 15 Gram(s) Oral once PRN Blood Glucose LESS THAN 70 milliGRAM(s)/deciliter  glucagon  Injectable 1 milliGRAM(s) IntraMuscular once PRN Glucose LESS THAN 70 milligrams/deciliter       Allergies    metformin (Unknown)    Intolerances      Vital Signs Last 24 Hrs  T(C): 36.7 (2018 21:20), Max: 36.7 (2018 21:20)  T(F): 98 (2018 21:20), Max: 98 (2018 21:20)  HR: 63 (2018 21:20) (63 - 78)  BP: 131/81 (2018 21:20) (113/72 - 131/81)  BP(mean): --  RR: 18 (2018 21:20) (18 - 18)  SpO2: 96% (2018 21:20) (95% - 98%)  CAPILLARY BLOOD GLUCOSE      POCT Blood Glucose.: 176 mg/dL (2018 21:51)  POCT Blood Glucose.: 139 mg/dL (2018 18:06)  POCT Blood Glucose.: 188 mg/dL (2018 12:47)  POCT Blood Glucose.: 207 mg/dL (2018 09:45)  POCT Blood Glucose.: 172 mg/dL (2018 08:55)       @ 07:  -   @ 07:00  --------------------------------------------------------  IN: 720 mL / OUT: 400 mL / NET: 320 mL     @ 07: @ 22:30  --------------------------------------------------------  IN: 960 mL / OUT: 0 mL / NET: 960 mL      Physical Exam:    Daily Weight in k.9 (2018 04:35)  General: morbidly obese NAD   HEENT:  Nonicteric, PERRLA  CV:  irreg irreg S1S2   Lungs:  CTA B/L, no wheezes, rales, rhonchi  Abdomen:  Soft, non-tender, no distended, positive BS  Extremities:  2+ pulses, no c/c, no edema  Skin:  Warm and dry, no rashes  :  No montalvo  Neuro:  AAOx3, non-focal, grossly intact                                                                                                                                                                                                                                                                                                LABS:                               14.8   12.9  )-----------( 207      ( 2018 05:47 )             44.0                          137  |  100  |  16  ----------------------------<  186<H>  4.0   |  22  |  0.95    Ca    9.5      2018 05:47    TPro  7.0  /  Alb  3.3  /  TBili  0.9  /  DBili  x   /  AST  20  /  ALT  17  /  AlkPhos  68  -24

## 2018-07-25 NOTE — PROGRESS NOTE ADULT - SUBJECTIVE AND OBJECTIVE BOX
No dyspnea this AM  He feels back to baseline.  He does not ambulate more than 10-15 feet  /71  HR 60-80 (AF)  Lungs clear  Irregular rhythm 1/6 systolic murmur  No edema    INR 1.92  WBC 12.9 Hgb 14.8 Hct 44.0  Plt 207K    Imp: Unclear as to the cause of the acute dyspnea.  AF rate appears to be well controlled at present.  It is possible that acute spikes in VR may be the cause of the acute dyspnea but would work that up as an outpatient.   Would maintain INR at 2.0-2.5

## 2018-07-25 NOTE — PROGRESS NOTE ADULT - ASSESSMENT
68 M w Afib, BPH, HLD, TIA, Hypothyroidism, gout , DANIELLE, HTN sent to the ED by Dr Johnson for complaint of dyspnea for 2 days. pt is poor hisotrian and reports multiple different complaints.  he reports taht his SOB is not new and has had it on and off for sometime.. No CP /palpitaitons .. not always related to exertion or position..  and has not changed over the past two days .. when asked what changed he reports generalized weakness acutely yesterday /episode of sweating but no fever or chills ( though he says his temp was " higher than his baselin "). denies cough , but reports Rhinorrhea, mild N but no vomitting and has been having non bloody diarreah over the past few days.. no abd pain but reports frequency and urgency.  he was exposed to family members with strep but he denies any sore throat /HA or congestion .  reports occasional abd discomfort after "heavy meal " but not consistent   in ED : mild leukocytosis noted .  trop neg ,  , CTA done and pending. INR theraputic..   elevated lactate   1- SOB : seems to be at baseline   doubt PE given theraputic INR...   CTA : neg   SOB seems to be longstanding and not acute  . suspect SOB to be multifactorial including nmorbid obesity ( BMI of 62 ) / deconditioning / pul HTN and possibly element of afib with rvr   check O2 on RA on exertion with PT   f/u Echo   2- lactic acidosis : no obvious infectious etiology  (still consider hypoperfusion sec to afib with rvr , pt reports mild abd discomfort at times after food /in the lower abd )    cultures positive for coag neg bobby franco contaminat ... f/u repeat    3- DANIELLE: cont CPAP   4- hypothyroid : TSH and free 4 :WNL  5- afib :   with episodes of tachy and usha .. no further episodes however would arrange for 30 day monitor as o/p.   will f/u wiht EP   cont  tele and cont coumadin   f/u echo     jayesh grigsby in the next 24 hours  ..

## 2018-07-26 LAB
ALBUMIN SERPL ELPH-MCNC: 3.3 G/DL — SIGNIFICANT CHANGE UP (ref 3.3–5)
ALP SERPL-CCNC: 59 U/L — SIGNIFICANT CHANGE UP (ref 40–120)
ALT FLD-CCNC: 28 U/L — SIGNIFICANT CHANGE UP (ref 10–45)
ANION GAP SERPL CALC-SCNC: 15 MMOL/L — SIGNIFICANT CHANGE UP (ref 5–17)
APTT BLD: 40.6 SEC — HIGH (ref 27.5–37.4)
AST SERPL-CCNC: 36 U/L — SIGNIFICANT CHANGE UP (ref 10–40)
BASOPHILS # BLD AUTO: 0 K/UL — SIGNIFICANT CHANGE UP (ref 0–0.2)
BASOPHILS NFR BLD AUTO: 0.5 % — SIGNIFICANT CHANGE UP (ref 0–2)
BILIRUB SERPL-MCNC: 1 MG/DL — SIGNIFICANT CHANGE UP (ref 0.2–1.2)
BUN SERPL-MCNC: 12 MG/DL — SIGNIFICANT CHANGE UP (ref 7–23)
CALCIUM SERPL-MCNC: 9.2 MG/DL — SIGNIFICANT CHANGE UP (ref 8.4–10.5)
CHLORIDE SERPL-SCNC: 102 MMOL/L — SIGNIFICANT CHANGE UP (ref 96–108)
CO2 SERPL-SCNC: 22 MMOL/L — SIGNIFICANT CHANGE UP (ref 22–31)
CREAT SERPL-MCNC: 0.99 MG/DL — SIGNIFICANT CHANGE UP (ref 0.5–1.3)
CULTURE RESULTS: SIGNIFICANT CHANGE UP
EOSINOPHIL # BLD AUTO: 0.1 K/UL — SIGNIFICANT CHANGE UP (ref 0–0.5)
EOSINOPHIL NFR BLD AUTO: 1.5 % — SIGNIFICANT CHANGE UP (ref 0–6)
GLUCOSE BLDC GLUCOMTR-MCNC: 148 MG/DL — HIGH (ref 70–99)
GLUCOSE BLDC GLUCOMTR-MCNC: 153 MG/DL — HIGH (ref 70–99)
GLUCOSE BLDC GLUCOMTR-MCNC: 192 MG/DL — HIGH (ref 70–99)
GLUCOSE BLDC GLUCOMTR-MCNC: 195 MG/DL — HIGH (ref 70–99)
GLUCOSE SERPL-MCNC: 173 MG/DL — HIGH (ref 70–99)
GRAM STN FLD: SIGNIFICANT CHANGE UP
HCT VFR BLD CALC: 44.6 % — SIGNIFICANT CHANGE UP (ref 39–50)
HGB BLD-MCNC: 14.4 G/DL — SIGNIFICANT CHANGE UP (ref 13–17)
INR BLD: 2.24 RATIO — HIGH (ref 0.88–1.16)
LACTATE SERPL-SCNC: 1.7 MMOL/L — SIGNIFICANT CHANGE UP (ref 0.7–2)
LYMPHOCYTES # BLD AUTO: 2 K/UL — SIGNIFICANT CHANGE UP (ref 1–3.3)
LYMPHOCYTES # BLD AUTO: 29 % — SIGNIFICANT CHANGE UP (ref 13–44)
MCHC RBC-ENTMCNC: 26.5 PG — LOW (ref 27–34)
MCHC RBC-ENTMCNC: 32.3 GM/DL — SIGNIFICANT CHANGE UP (ref 32–36)
MCV RBC AUTO: 82 FL — SIGNIFICANT CHANGE UP (ref 80–100)
MONOCYTES # BLD AUTO: 0.6 K/UL — SIGNIFICANT CHANGE UP (ref 0–0.9)
MONOCYTES NFR BLD AUTO: 7.9 % — SIGNIFICANT CHANGE UP (ref 2–14)
NEUTROPHILS # BLD AUTO: 4.3 K/UL — SIGNIFICANT CHANGE UP (ref 1.8–7.4)
NEUTROPHILS NFR BLD AUTO: 61.1 % — SIGNIFICANT CHANGE UP (ref 43–77)
PLATELET # BLD AUTO: 185 K/UL — SIGNIFICANT CHANGE UP (ref 150–400)
POTASSIUM SERPL-MCNC: 3.5 MMOL/L — SIGNIFICANT CHANGE UP (ref 3.5–5.3)
POTASSIUM SERPL-SCNC: 3.5 MMOL/L — SIGNIFICANT CHANGE UP (ref 3.5–5.3)
PROT SERPL-MCNC: 7 G/DL — SIGNIFICANT CHANGE UP (ref 6–8.3)
PROTHROM AB SERPL-ACNC: 24.6 SEC — HIGH (ref 9.8–12.7)
RBC # BLD: 5.43 M/UL — SIGNIFICANT CHANGE UP (ref 4.2–5.8)
RBC # FLD: 14.9 % — HIGH (ref 10.3–14.5)
SODIUM SERPL-SCNC: 139 MMOL/L — SIGNIFICANT CHANGE UP (ref 135–145)
SPECIMEN SOURCE: SIGNIFICANT CHANGE UP
SPECIMEN SOURCE: SIGNIFICANT CHANGE UP
WBC # BLD: 7 K/UL — SIGNIFICANT CHANGE UP (ref 3.8–10.5)
WBC # FLD AUTO: 7 K/UL — SIGNIFICANT CHANGE UP (ref 3.8–10.5)

## 2018-07-26 PROCEDURE — 93306 TTE W/DOPPLER COMPLETE: CPT | Mod: 26

## 2018-07-26 RX ORDER — VANCOMYCIN HCL 1 G
1250 VIAL (EA) INTRAVENOUS EVERY 12 HOURS
Qty: 0 | Refills: 0 | Status: DISCONTINUED | OUTPATIENT
Start: 2018-07-27 | End: 2018-07-27

## 2018-07-26 RX ORDER — VANCOMYCIN HCL 1 G
VIAL (EA) INTRAVENOUS
Qty: 0 | Refills: 0 | Status: DISCONTINUED | OUTPATIENT
Start: 2018-07-26 | End: 2018-07-27

## 2018-07-26 RX ORDER — WARFARIN SODIUM 2.5 MG/1
18 TABLET ORAL ONCE
Qty: 0 | Refills: 0 | Status: COMPLETED | OUTPATIENT
Start: 2018-07-26 | End: 2018-07-26

## 2018-07-26 RX ORDER — VANCOMYCIN HCL 1 G
1250 VIAL (EA) INTRAVENOUS ONCE
Qty: 0 | Refills: 0 | Status: COMPLETED | OUTPATIENT
Start: 2018-07-26 | End: 2018-07-26

## 2018-07-26 RX ADMIN — Medication 200 MILLIGRAM(S): at 06:46

## 2018-07-26 RX ADMIN — Medication 150 MICROGRAM(S): at 06:45

## 2018-07-26 RX ADMIN — Medication 1000 UNIT(S): at 09:11

## 2018-07-26 RX ADMIN — Medication 200 MILLIGRAM(S): at 18:20

## 2018-07-26 RX ADMIN — Medication 1: at 09:11

## 2018-07-26 RX ADMIN — TAMSULOSIN HYDROCHLORIDE 0.4 MILLIGRAM(S): 0.4 CAPSULE ORAL at 22:31

## 2018-07-26 RX ADMIN — Medication 1: at 13:23

## 2018-07-26 RX ADMIN — Medication 1 TABLET(S): at 09:11

## 2018-07-26 RX ADMIN — Medication 166.67 MILLIGRAM(S): at 13:22

## 2018-07-26 RX ADMIN — Medication 300 MILLIGRAM(S): at 09:11

## 2018-07-26 RX ADMIN — WARFARIN SODIUM 18 MILLIGRAM(S): 2.5 TABLET ORAL at 22:31

## 2018-07-26 RX ADMIN — Medication 60 MILLIGRAM(S): at 06:46

## 2018-07-26 RX ADMIN — ATORVASTATIN CALCIUM 20 MILLIGRAM(S): 80 TABLET, FILM COATED ORAL at 22:31

## 2018-07-26 NOTE — PHYSICAL THERAPY INITIAL EVALUATION ADULT - CRITERIA FOR SKILLED THERAPEUTIC INTERVENTIONS
functional limitations in following categories/risk reduction/prevention/rehab potential/predicted duration of therapy intervention/anticipated equipment needs at discharge/impairments found/therapy frequency/anticipated discharge recommendation

## 2018-07-26 NOTE — PHYSICAL THERAPY INITIAL EVALUATION ADULT - ADDITIONAL COMMENTS
68 year old male who PTA was living in a  has ramp access and chair lift to bedroom floor. mainly wheelchair bound,. states he gets out of bed with assistance and is able to ambulate maybe 10 ft with a walker at baseline. son's he lives with assist him as needed. and Pt reports independence in BADL's

## 2018-07-26 NOTE — PHYSICAL THERAPY INITIAL EVALUATION ADULT - PRECAUTIONS/LIMITATIONS, REHAB EVAL
denies cough , but reports Rhinorrhea, mild N but no vomitting and has been having non bloody diarreah over the past few days.. no abd pain but reports frequency and urgency.  he was exposed to family members with strep but he denies any sore throat /HA or congestion, reports occasional abd discomfort after "heavy meal " but not consistent. 7/24: pt had a brief run of rapid AF 's.

## 2018-07-26 NOTE — PROGRESS NOTE ADULT - ASSESSMENT
68 M w Afib, BPH, HLD, TIA, Hypothyroidism, gout , DANIELLE, HTN sent to the ED by Dr Johnson for complaint of dyspnea for 2 days.   - improving  - Evaluated by EPS to cont medical therapy  - for Echo today 68 M w Afib, BPH, HLD, TIA, Hypothyroidism, gout , DANIELLE, HTN sent to the ED by Dr Johnson for complaint of dyspnea for 2 days.   - improving, rate control adequate today (60-70 BPM) yesterday brief 150 BPM  - Evaluated by EPS to cont medical therapy  - for Echo today

## 2018-07-26 NOTE — PROGRESS NOTE ADULT - ASSESSMENT
68 M w Afib, BPH, HLD, TIA, Hypothyroidism, gout , DANIELLE, HTN sent to the ED by Dr Johnson for complaint of dyspnea for 2 days. pt is poor hisotrian and reports multiple different complaints.  he reports taht his SOB is not new and has had it on and off for sometime.. No CP /palpitaitons .. not always related to exertion or position..  and has not changed over the past two days .. when asked what changed he reports generalized weakness acutely yesterday /episode of sweating but no fever or chills ( though he says his temp was " higher than his baselin "). denies cough , but reports Rhinorrhea, mild N but no vomitting and has been having non bloody diarreah over the past few days.. no abd pain but reports frequency and urgency.  he was exposed to family members with strep but he denies any sore throat /HA or congestion .  reports occasional abd discomfort after "heavy meal " but not consistent   in ED : mild leukocytosis noted .  trop neg ,  , CTA done and pending. INR theraputic..   elevated lactate   1- SOB : seems to be at baseline    CTA : neg   SOB seems to be longstanding and not acute  . suspect SOB to be multifactorial including nmorbid obesity ( BMI of 62 ) / deconditioning / possible  pul HTN and element of afib with rvr   check O2 on RA on exertion with PT    Echo: NL EF ;;;    NL Pul pressures ???   2- lactic acidosis : now with bacteremia ( staph coag neg )   start Vnco and called ID input       d/w Bonares : will await ID input   cont abx     3- DANIELLE: cont CPAP   4- hypothyroid : TSH and free 4 :WNL  5- afib :   with episodes of tachy and usha .. no further episodes however would arrange for 30 day monitor as o/p.   cont  tele and cont coumadin

## 2018-07-26 NOTE — PROGRESS NOTE ADULT - SUBJECTIVE AND OBJECTIVE BOX
Patient is a 68y old  Male who presents with a chief complaint of shortness of breath (2018 06:01)                                                               INTERVAL HPI/OVERNIGHT EVENTS:    REVIEW OF SYSTEMS:     CONSTITUTIONAL: No weakness, fevers or chills  RESPIRATORY: No cough, wheezing,  No shortness of breath  CARDIOVASCULAR: No chest pain or palpitations  GASTROINTESTINAL: No abdominal pain  . No nausea, vomiting, or hematemesis; No diarrhea or constipation. No melena or hematochezia.  GENITOURINARY: No dysuria, frequency or hematuria  NEUROLOGICAL: No numbness or weakness                                                                                                                                                                                                                                                                                   Medications:  MEDICATIONS  (STANDING):  allopurinol 300 milliGRAM(s) Oral daily  atorvastatin 20 milliGRAM(s) Oral at bedtime  cholecalciferol 1000 Unit(s) Oral daily  dextrose 5%. 1000 milliLiter(s) (50 mL/Hr) IV Continuous <Continuous>  dextrose 50% Injectable 12.5 Gram(s) IV Push once  dextrose 50% Injectable 25 Gram(s) IV Push once  dextrose 50% Injectable 25 Gram(s) IV Push once  insulin lispro (HumaLOG) corrective regimen sliding scale   SubCutaneous three times a day before meals  insulin lispro (HumaLOG) corrective regimen sliding scale   SubCutaneous at bedtime  levothyroxine 150 MICROGram(s) Oral daily  LORazepam     Tablet 2 milliGRAM(s) Oral at bedtime  metoprolol tartrate 200 milliGRAM(s) Oral two times a day  multivitamin 1 Tablet(s) Oral daily  NIFEdipine XL 60 milliGRAM(s) Oral daily  sodium chloride 0.9%. 1000 milliLiter(s) (100 mL/Hr) IV Continuous <Continuous>  tamsulosin 0.4 milliGRAM(s) Oral at bedtime  vancomycin  IVPB      warfarin 18 milliGRAM(s) Oral once    MEDICATIONS  (PRN):  dextrose 40% Gel 15 Gram(s) Oral once PRN Blood Glucose LESS THAN 70 milliGRAM(s)/deciliter  glucagon  Injectable 1 milliGRAM(s) IntraMuscular once PRN Glucose LESS THAN 70 milligrams/deciliter       Allergies    metformin (Unknown)    Intolerances      Vital Signs Last 24 Hrs  T(C): 37.2 (2018 10:11), Max: 37.2 (2018 10:11)  T(F): 98.9 (2018 10:11), Max: 98.9 (2018 10:11)  HR: 70 (2018 17:21) (63 - 78)  BP: 124/63 (2018 10:11) (107/70 - 131/81)  BP(mean): --  RR: 18 (2018 10:11) (18 - 18)  SpO2: 95% (2018 17:21) (95% - 98%)  CAPILLARY BLOOD GLUCOSE      POCT Blood Glucose.: 192 mg/dL (2018 13:08)  POCT Blood Glucose.: 195 mg/dL (2018 09:07)  POCT Blood Glucose.: 176 mg/dL (2018 21:51)  POCT Blood Glucose.: 139 mg/dL (2018 18:06)       @ 07:  -   @ 07:00  --------------------------------------------------------  IN: 1080 mL / OUT: 200 mL / NET: 880 mL     @ 07:  -   @ 18:05  --------------------------------------------------------  IN: 560 mL / OUT: 0 mL / NET: 560 mL      Physical Exam:       Daily Weight in k.4 (2018 05:01)  General:  NAD   HEENT:  Nonicteric, PERRLA  CV:  RRR, S1S2   Lungs:  CTA   Abdomen:  Soft, non-tender, no distended, positive BS  Extremities:  no edema  Skin:  Warm and dry, no rashes  :  No montalvo  Neuro:  AAOx3, non-focal, grossly intact                                                                                                                                                                                                                                                                                                LABS:                               14.4   7.0   )-----------( 185      ( 2018 06:31 )             44.6                          139  |  102  |  12  ----------------------------<  173<H>  3.5   |  22  |  0.99    Ca    9.2      2018 06:31    TPro  7.0  /  Alb  3.3  /  TBili  1.0  /  DBili  x   /  AST  36  /  ALT  28  /  AlkPhos  59  07-26

## 2018-07-26 NOTE — CONSULT NOTE ADULT - SUBJECTIVE AND OBJECTIVE BOX
Patient seen and evaluated at bedside    Chief Complaint: afib with RVR     HPI:  68 M w morbid obesity,  Afib, BPH, HLD, TIA, Hypothyroidism, DANIELLE, HTN sent to the ED by Dr Johnson for complaint of dyspnea for 2 days.   he reports taht his SOB is chronic.  He reports generalized weakness acutely yesterday /episode of sweating but no fever or chills ( though he says his temp was " higher than his baselin "). denies cough , but reports Rhinorrhea, mild N but no vomitting and has been having non bloody diarrhea over the past few days.  reports occasional abd discomfort after "heavy meal " but not consistent   in ED : mild leukocytosis noted .  trop neg , .     - EP consulted for atrial fibrillation and rate control. Pt on tele review appears to be as high as 180s and as low as 43, but not sustained and asymptomatic during these episodes.       PMH:   BPH (benign prostatic hyperplasia)  Hypothyroid  Gout  Obstructive Sleep Apnea  History of Obesity  History of Transient Ischemic Attack (TIA)  Anxiety  Chronic Gout  Afib  Hypertension  Dyslipidemia      PSH:   History of Tonsillectomy      Medications:   allopurinol 300 milliGRAM(s) Oral daily  atorvastatin 20 milliGRAM(s) Oral at bedtime  cholecalciferol 1000 Unit(s) Oral daily  dextrose 40% Gel 15 Gram(s) Oral once PRN  dextrose 5%. 1000 milliLiter(s) IV Continuous <Continuous>  dextrose 50% Injectable 12.5 Gram(s) IV Push once  dextrose 50% Injectable 25 Gram(s) IV Push once  dextrose 50% Injectable 25 Gram(s) IV Push once  glucagon  Injectable 1 milliGRAM(s) IntraMuscular once PRN  insulin lispro (HumaLOG) corrective regimen sliding scale   SubCutaneous three times a day before meals  insulin lispro (HumaLOG) corrective regimen sliding scale   SubCutaneous at bedtime  levothyroxine 150 MICROGram(s) Oral daily  LORazepam     Tablet 2 milliGRAM(s) Oral at bedtime  metoprolol tartrate 200 milliGRAM(s) Oral two times a day  multivitamin 1 Tablet(s) Oral daily  NIFEdipine XL 60 milliGRAM(s) Oral daily  sodium chloride 0.9%. 1000 milliLiter(s) IV Continuous <Continuous>  tamsulosin 0.4 milliGRAM(s) Oral at bedtime      Allergies:  metformin (Unknown)      FAMILY HISTORY:  No pertinent family history in first degree relatives    Review of Systems:  REVIEW OF SYSTEMS:  CONSTITUTIONAL: No weakness, fevers or chills  EYES/ENT: No visual changes;  No dysphagia  NECK: No pain or stiffness  RESPIRATORY: No cough, wheezing, hemoptysis; No shortness of breath  CARDIOVASCULAR: No chest pain or palpitations; No lower extremity edema  GASTROINTESTINAL: No abdominal or epigastric pain. No nausea, vomiting, or hematemesis; No diarrhea or constipation. No melena or hematochezia.  BACK: No back pain  GENITOURINARY: No dysuria, frequency or hematuria  NEUROLOGICAL: No numbness or weakness  SKIN: No itching, burning, rashes, or lesions   All other review of systems is negative unless indicated above.    Physical Exam:  T(F): 98.4 (07-24), Max: 98.9 (07-24)  HR: 78 (07-24) (68 - 91)  BP: 137/75 (07-24) (109/69 - 137/75)  RR: 19 (07-24)  SpO2: 95% (07-24)    GENERAL: No acute distress, obese   HEAD:  Atraumatic, Normocephalic  ENT: EOMI, PERRLA, conjunctiva and sclera clear, Neck supple, No JVD, moist mucosa  CHEST/LUNG: Clear to auscultation bilaterally; No wheeze, equal breath sounds bilaterally   BACK: No spinal tenderness  HEART: irregular rate and rhythm; No murmurs, rubs, or gallops  ABDOMEN: Soft, Nontender, Nondistended; Bowel sounds present  EXTREMITIES:  No clubbing, cyanosis, or edema  PSYCH: Nl behavior, nl affect  NEUROLOGY: AAOx3, non-focal, cranial nerves intact  SKIN: Normal color, No rashes or lesions  LINES:    Cardiovascular Diagnostic Testing:    ECG: Personally reviewed  Afib with RVR       Labs: Personally reviewed                        14.8   12.9  )-----------( 207      ( 24 Jul 2018 05:47 )             44.0     07-24    137  |  100  |  16  ----------------------------<  186<H>  4.0   |  22  |  0.95    Ca    9.5      24 Jul 2018 05:47    TPro  7.0  /  Alb  3.3  /  TBili  0.9  /  DBili  x   /  AST  20  /  ALT  17  /  AlkPhos  68  07-24    PT/INR - ( 23 Jul 2018 14:12 )   PT: 27.6 sec;   INR: 2.51 ratio         PTT - ( 23 Jul 2018 14:12 )  PTT:53.2 sec  CARDIAC MARKERS ( 23 Jul 2018 17:40 )  8 ng/L / x     / x     / x     / x     / x      CARDIAC MARKERS ( 23 Jul 2018 14:12 )  8 ng/L / x     / x     / x     / x     / x          Serum Pro-Brain Natriuretic Peptide: 529 pg/mL (07-23 @ 14:12)      Hemoglobin A1C, Whole Blood: 7.5 % (07-24 @ 08:23)    Thyroid Stimulating Hormone, Serum: 2.23 uIU/mL (07-24 @ 08:38)
CHIEF COMPLAINT: Chest Pain    HPI: 68 M w Afib, BPH, HLD, TIA, Hypothyroidism, DANIELLE, HTN sent to the ED after patient called me this morning for complaint of dyspnea for 2 days. He reports rhinorrhea this morning and a cough twice. He explains that he had a fever that he measured at 98.6 which is high for him.   Currently he denies chest pain or SOB      PAST MEDICAL & SURGICAL HISTORY:  BPH (benign prostatic hyperplasia)  Hypothyroid  Gout  Obstructive Sleep Apnea  History of Obesity  History of Transient Ischemic Attack (TIA)  Anxiety  Chronic Gout  Afib  Hypertension  Dyslipidemia  History of Tonsillectomy      Allergies    No Known Allergies    Intolerances        SOCIAL HISTORY    Smoking Hx: None  ETOH Hx: None  Marital Status:   Occupational Hx:     FAMILY HISTORY:      MEDICATIONS:      REVIEW OF SYSTEMS:    CONSTITUTIONAL: No weakness, fevers or chills  EYES/ENT: No visual changes;  No vertigo or throat pain   NECK: No pain or stiffness  RESPIRATORY: No cough, wheezing, hemoptysis; No shortness of breath  CARDIOVASCULAR: No chest pain or palpitations  GASTROINTESTINAL: No abdominal or epigastric pain. No nausea, vomiting, or hematemesis; No diarrhea or constipation. No melena or hematochezia.  GENITOURINARY: No dysuria, frequency or hematuria  NEUROLOGICAL: No numbness or weakness  SKIN: No itching, burning, rashes, or lesions   All other review of systems is negative unless indicated above    Vital Signs Last 24 Hrs  T(C): 36.5 (23 Jul 2018 17:31), Max: 36.7 (23 Jul 2018 13:17)  T(F): 97.7 (23 Jul 2018 17:31), Max: 98 (23 Jul 2018 13:17)  HR: 97 (23 Jul 2018 17:31) (77 - 97)  BP: 153/94 (23 Jul 2018 17:31) (134/87 - 153/94)  BP(mean): --  RR: 20 (23 Jul 2018 17:31) (20 - 20)  SpO2: 95% (23 Jul 2018 17:31) (94% - 95%)    I&O's Summary      PHYSICAL EXAM:    Constitutional: NAD, awake and alert, well-developed  HEENT: PERR, EOMI  Neck: soft and supple, No LAD, No JVD  Respiratory: Breath sounds are clear bilaterally, No wheezing, rales or rhonchi  Cardiovascular: Regular rate and rhythm, normal S1 and S2,  no murmurs, gallops or rubs  Gastrointestinal: Bowel Sounds present, soft, nontender.   Extremities: No peripheral edema. No clubbing or cyanosis.  Vascular: 2+ peripheral pulses  Neurological: A/O x 3, no focal deficits  Musculoskeletal: no calf tenderness.  Skin: No rashes.      LABS: All Labs Reviewed:                        16.1   11.9  )-----------( 231      ( 23 Jul 2018 14:12 )             48.7     23 Jul 2018 14:12    137    |  100    |  16     ----------------------------<  212    4.5     |  22     |  0.87     Ca    9.8        23 Jul 2018 14:12    TPro  8.3    /  Alb  4.1    /  TBili  0.6    /  DBili  x      /  AST  19     /  ALT  19     /  AlkPhos  86     23 Jul 2018 14:12    PT/INR - ( 23 Jul 2018 14:12 )   PT: 27.6 sec;   INR: 2.51 ratio         PTT - ( 23 Jul 2018 14:12 )  PTT:53.2 sec  Blood Culture: Pending    CARDIAC MARKERS:  Troponin T is 8 x 2          proBNP: Serum Pro-Brain Natriuretic Peptide: 529 pg/mL (07-23 @ 14:12)        RADIOLOGY/EKG: AF with moderate ventricular response, RAD
HPI:   Patient is a 68y male with a history of BPH,HLD,DANIELLE,HTN, morbid obesity,and hypothyroidism who was admitted a few days ago with shortness of breath and found to be in A Fib, who as a part of a general evaluation has had positive blood cultures.He tells me he has not felt well x 3 weeks,wih fatigue and night sweats.He reports a good appetite, no fever, no chills or back pain.He has been without night sweats since admission.No fever, admission blood culture with coag negative staph,repeat blood cultures sent 7/25 with GPC in clusters in one of 2 sets.Vanco was started.He has no recent hospital stays, he was seen by a podiatrist 3-4 weeks ago for a left 4th toe paronychial infection, given antibiotics.No GI or  symptoms.    REVIEW OF SYSTEMS:  All other review of systems negative (Comprehensive ROS)    PAST MEDICAL & SURGICAL HISTORY:  BPH (benign prostatic hyperplasia)  Hypothyroid  Gout  Obstructive Sleep Apnea  History of Obesity  History of Transient Ischemic Attack (TIA)  Anxiety  Chronic Gout  Afib  Hypertension  Dyslipidemia  History of Tonsillectomy      Allergies    metformin (Unknown)    Intolerances        Antimicrobials Day #  :day 1   vancomycin  IVPB        Other Medications:  allopurinol 300 milliGRAM(s) Oral daily  atorvastatin 20 milliGRAM(s) Oral at bedtime  cholecalciferol 1000 Unit(s) Oral daily  dextrose 40% Gel 15 Gram(s) Oral once PRN  dextrose 5%. 1000 milliLiter(s) IV Continuous <Continuous>  dextrose 50% Injectable 12.5 Gram(s) IV Push once  dextrose 50% Injectable 25 Gram(s) IV Push once  dextrose 50% Injectable 25 Gram(s) IV Push once  glucagon  Injectable 1 milliGRAM(s) IntraMuscular once PRN  insulin lispro (HumaLOG) corrective regimen sliding scale   SubCutaneous three times a day before meals  insulin lispro (HumaLOG) corrective regimen sliding scale   SubCutaneous at bedtime  levothyroxine 150 MICROGram(s) Oral daily  LORazepam     Tablet 2 milliGRAM(s) Oral at bedtime  metoprolol tartrate 200 milliGRAM(s) Oral two times a day  multivitamin 1 Tablet(s) Oral daily  NIFEdipine XL 60 milliGRAM(s) Oral daily  sodium chloride 0.9%. 1000 milliLiter(s) IV Continuous <Continuous>  tamsulosin 0.4 milliGRAM(s) Oral at bedtime  warfarin 18 milliGRAM(s) Oral once      FAMILY HISTORY:  No pertinent family history in first degree relatives      SOCIAL HISTORY:  Smoking: no    ETOH: no    Drug Use:   Single with girfriend    T(F): 98.9 (07-26-18 @ 10:11), Max: 98.9 (07-26-18 @ 10:11)  HR: 70 (07-26-18 @ 17:21)  BP: 124/63 (07-26-18 @ 10:11)  RR: 18 (07-26-18 @ 10:11)  SpO2: 95% (07-26-18 @ 17:21)  Wt(kg): --    PHYSICAL EXAM:  General: alert, no acute distress, morbidly obese  Eyes:  anicteric, no conjunctival injection, no discharge  Oropharynx: no lesions or injection 	  Neck: supple, without adenopathy  Lungs: clear to auscultation  Heart: regular rate and rhythm; no murmur, rubs or gallops, distant tones  Abdomen: soft, nondistended, nontender, without mass or organomegaly  Skin: no lesions  Extremities: no clubbing, cyanosis, or edema  Neurologic: alert, oriented, moves all extremities  Left foot with a dried scab on tip of 4th toe, mild paronychial inflammation  LAB RESULTS:                        14.4   7.0   )-----------( 185      ( 26 Jul 2018 06:31 )             44.6     07-26    139  |  102  |  12  ----------------------------<  173<H>  3.5   |  22  |  0.99    Ca    9.2      26 Jul 2018 06:31    TPro  7.0  /  Alb  3.3  /  TBili  1.0  /  DBili  x   /  AST  36  /  ALT  28  /  AlkPhos  59  07-26    LIVER FUNCTIONS - ( 26 Jul 2018 06:31 )  Alb: 3.3 g/dL / Pro: 7.0 g/dL / ALK PHOS: 59 U/L / ALT: 28 U/L / AST: 36 U/L / GGT: x               MICROBIOLOGY:  RECENT CULTURES:  07-25 @ 11:34 .Blood Blood-Peripheral     No growth to date.    Growth in aerobic bottle: Gram Positive Cocci in Clusters    07-24 @ 11:23 .Throat     No Strep pyogenes (Group A) isolated      07-24 @ 08:38 .Blood Blood Blood Culture PCR    Growth in aerobic and anaerobic bottles: Coag Negative Staphylococcus          RADIOLOGY REVIEWED:  < from: CT Angio Chest w/ IV Cont (07.23.18 @ 18:17) >  INTERPRETATION:  Reason for Exam: Shortness of breath    CTA of the chest was performed from the thoracic inlet to the level of   the adrenal glands following IV contrast injection of  125 cc of   Omnipaque 350. No immediate complications were reported.  MIP images were   also created and reviewed.     Comparison: Visualized portions of the lung from the abdominal CT scan   dated February 9, 2015    Tubes/Lines: None    Mediastinum and Heart: Aorta and pulmonary arteries are normal in size.   There is no pericardial effusion. No lymphadenopathy. Thyroid gland is   grossly unremarkable.    Lungs, Pleura, and Airways: Study significantly limited due to streak   artifact and image noise. No central pulmonary embolus. No   consolidations, edema, effusion or pneumothorax    Visualized Abdomen: Unremarkable    Bones and soft tissues: Degenerative changes noted throughout the spine.   No suspiciousosseous lesions    IMPRESSION:    Suboptimal evaluation as described above. No central pulmonary embolus.    < end of copied text >

## 2018-07-26 NOTE — CONSULT NOTE ADULT - ASSESSMENT
A/P:   68 M w morbid obesity,  Afib, BPH, HLD, TIA, Hypothyroidism, DANIELLE, HTN sent to the ED for complaint of dyspnea for 2 days. On tele review pt heart rates are variable with spikes to 160-180s last night and rates to 40s this AM. Pt reports previous multiple cardioversions but pt reverting back to afib.     Plan:  - obtain TTE to assess left atrium and LV function   - will discuss with EP in AM if patient would be candidate for Afib ablation vs. role of further monitoring with Event monitor.   - continue Toprol  mg BID   - if rates overnight are hard to control, would suggest starting a Diltiazem gtt.   - continue to monitor on tele    - case to be discussed in AM     Michel Hoang MD  Cardiology fellow
68 M w Afib, BPH, HLD, TIA, Hypothyroidism, DANIELLE, HTN who comes in to the ER with complaints of SOB and elevated lactate.  Patient just returned from CT scan to r/o pulm embolus.  No evidence for ACS or CHF.  The patient is stable from a cardiovascular perspective.  Will follow with you.
Morbidly obese male admitted with shortness of breath and found to have new onset A Fib.  He has very "soft" signs of infection manifested by fatigue and night sweats.  No fever or chills.Coag negative staph are often skin contaminants however when isolated on more than one day it is harder to attribute to contaminant.  I am skeptical he has an active bloodstream infection. If one believes the cultures we would have to implicate endocarditis.  Suggest:  1.Await ID and sensitivities of 7/25 isolate, will see if same as 7/23  2.Vanco okay for now.If second set of blood cultures turn positive I would advise CORNELIUS.If second set is negative maybe  we would stop antibiotics and observe

## 2018-07-26 NOTE — PHYSICAL THERAPY INITIAL EVALUATION ADULT - PERTINENT HX OF CURRENT PROBLEM, REHAB EVAL
68 M w Afib, BPH, HLD, TIA, Hypothyroidism, DANIELLE, HTN sent to the ED by Dr Johnson for complaint of dyspnea for 2 days. he reports taht his SOB is not new and has had it on and off for sometime.. No CP /palpitaitons .. not always related to exertion or position..  and has not changed over the past two days .. when asked what changed he reports generalized weakness acutely yesterday /episode of sweating but no fever or chills ( though he says his temp was " higher than his baselin ")

## 2018-07-26 NOTE — PROGRESS NOTE ADULT - SUBJECTIVE AND OBJECTIVE BOX
SUBJECTIVE:  No CP or SOB. Had some mild palpitations yesterday. No c/o today. Going for Echocardiogram at the time of interview    MEDICATIONS:  metoprolol tartrate 200 milliGRAM(s) Oral two times a day  NIFEdipine XL 60 milliGRAM(s) Oral daily  tamsulosin 0.4 milliGRAM(s) Oral at bedtime        LORazepam     Tablet 2 milliGRAM(s) Oral at bedtime      allopurinol 300 milliGRAM(s) Oral daily  atorvastatin 20 milliGRAM(s) Oral at bedtime  dextrose 40% Gel 15 Gram(s) Oral once PRN  dextrose 50% Injectable 12.5 Gram(s) IV Push once  dextrose 50% Injectable 25 Gram(s) IV Push once  dextrose 50% Injectable 25 Gram(s) IV Push once  glucagon  Injectable 1 milliGRAM(s) IntraMuscular once PRN  insulin lispro (HumaLOG) corrective regimen sliding scale   SubCutaneous three times a day before meals  insulin lispro (HumaLOG) corrective regimen sliding scale   SubCutaneous at bedtime  levothyroxine 150 MICROGram(s) Oral daily    cholecalciferol 1000 Unit(s) Oral daily  dextrose 5%. 1000 milliLiter(s) IV Continuous <Continuous>  multivitamin 1 Tablet(s) Oral daily  sodium chloride 0.9%. 1000 milliLiter(s) IV Continuous <Continuous>      REVIEW OF SYSTEMS:    CONSTITUTIONAL: No fever, weight loss, or fatigue  EYES: No eye pain, visual disturbances, or discharge  NECK: No pain or stiffness  RESPIRATORY: No cough, wheezing, chills or hemoptysis; No Shortness of Breath  CARDIOVASCULAR: No chest pain, palpitations, dizziness, or leg swelling  GASTROINTESTINAL: No abdominal or epigastric pain. No nausea, vomiting, or hematemesis; No diarrhea or constipation. No melena or hematochezia.  GENITOURINARY: No dysuria, frequency, hematuria, or incontinence  NEUROLOGICAL: No headaches, memory loss, loss of strength, numbness, or tremors  SKIN: No itching, burning, rashes, or lesions   LYMPH Nodes: No enlarged glands  MUSCULOSKELETAL: No joint pain or swelling; No muscle, back, or extremity pain  All other review of systems are negative.  	  [ ] Unable to obtain    PHYSICAL EXAM:  T(C): 36.6 (07-26-18 @ 05:01), Max: 36.7 (07-25-18 @ 21:20)  HR: 68 (07-26-18 @ 06:44) (63 - 78)  BP: 107/70 (07-26-18 @ 06:44) (107/70 - 131/81)  RR: 18 (07-26-18 @ 05:01) (18 - 18)  SpO2: 95% (07-26-18 @ 06:44) (95% - 98%)  Wt(kg): --  I&O's Summary    25 Jul 2018 07:01  -  26 Jul 2018 07:00  --------------------------------------------------------  IN: 1080 mL / OUT: 200 mL / NET: 880 mL          PHYSICAL EXAM    Appearance: Normal	  HEENT:   Normal oral mucosa, PERRL, EOMI	  NECK: Soft and supple, No LAD, No JVD  Cardiovascular: Regular Rate and Rhythm, Normal S1 S2, No murmurs, No clicks, gallops or rubs  Respiratory: diminished BS bilaterally  Gastrointestinal:  Soft, Non-tender, + BS	  Skin: No rashes, No ecchymoses, No cyanosis  Neurologic: Non-focal  Extremities: No clubbing, cyanosis or edema  Vascular: Peripheral pulses palpable 2+ bilaterally    LABS:	 	                            14.4   7.0   )-----------( 185      ( 26 Jul 2018 06:31 )             44.6     07-26    139  |  102  |  12  ----------------------------<  173<H>  3.5   |  22  |  0.99    Ca    9.2      26 Jul 2018 06:31    TPro  7.0  /  Alb  3.3  /  TBili  1.0  /  DBili  x   /  AST  36  /  ALT  28  /  AlkPhos  59  07-26    proBNP:   Lipid Profile:   HgA1c:   TSH: SUBJECTIVE:  No CP or SOB. Had some mild palpitations yesterday. No c/o today. Going for Echocardiogram at the time of interview    MEDICATIONS:  metoprolol tartrate 200 milliGRAM(s) Oral two times a day  NIFEdipine XL 60 milliGRAM(s) Oral daily  tamsulosin 0.4 milliGRAM(s) Oral at bedtime        LORazepam     Tablet 2 milliGRAM(s) Oral at bedtime      allopurinol 300 milliGRAM(s) Oral daily  atorvastatin 20 milliGRAM(s) Oral at bedtime  dextrose 40% Gel 15 Gram(s) Oral once PRN  dextrose 50% Injectable 12.5 Gram(s) IV Push once  dextrose 50% Injectable 25 Gram(s) IV Push once  dextrose 50% Injectable 25 Gram(s) IV Push once  glucagon  Injectable 1 milliGRAM(s) IntraMuscular once PRN  insulin lispro (HumaLOG) corrective regimen sliding scale   SubCutaneous three times a day before meals  insulin lispro (HumaLOG) corrective regimen sliding scale   SubCutaneous at bedtime  levothyroxine 150 MICROGram(s) Oral daily    cholecalciferol 1000 Unit(s) Oral daily  dextrose 5%. 1000 milliLiter(s) IV Continuous <Continuous>  multivitamin 1 Tablet(s) Oral daily  sodium chloride 0.9%. 1000 milliLiter(s) IV Continuous <Continuous>      REVIEW OF SYSTEMS:    CONSTITUTIONAL: No fever, weight loss, or fatigue  EYES: No eye pain, visual disturbances, or discharge  NECK: No pain or stiffness  RESPIRATORY: No cough, wheezing, chills or hemoptysis; No Shortness of Breath  CARDIOVASCULAR: No chest pain, palpitations, dizziness, or leg swelling  GASTROINTESTINAL: No abdominal or epigastric pain. No nausea, vomiting, or hematemesis; No diarrhea or constipation. No melena or hematochezia.  GENITOURINARY: No dysuria, frequency, hematuria, or incontinence  NEUROLOGICAL: No headaches, memory loss, loss of strength, numbness, or tremors  SKIN: No itching, burning, rashes, or lesions   LYMPH Nodes: No enlarged glands  MUSCULOSKELETAL: No joint pain or swelling; No muscle, back, or extremity pain  All other review of systems are negative.  	  [ ] Unable to obtain    PHYSICAL EXAM:  T(C): 36.6 (07-26-18 @ 05:01), Max: 36.7 (07-25-18 @ 21:20)  HR: 68 (07-26-18 @ 06:44) (63 - 78)  BP: 107/70 (07-26-18 @ 06:44) (107/70 - 131/81)  RR: 18 (07-26-18 @ 05:01) (18 - 18)  SpO2: 95% (07-26-18 @ 06:44) (95% - 98%)  Wt(kg): --  I&O's Summary    25 Jul 2018 07:01  -  26 Jul 2018 07:00  --------------------------------------------------------  IN: 1080 mL / OUT: 200 mL / NET: 880 mL          PHYSICAL EXAM    Appearance: Normal	  HEENT:   Normal oral mucosa, PERRL, EOMI	  NECK: Soft and supple, No LAD, No JVD  Cardiovascular: Regular Rate and Rhythm, Normal S1 S2, No murmurs, No clicks, gallops or rubs  Respiratory: diminished BS bilaterally  Gastrointestinal:  Soft, Non-tender, + BS	  Skin: No rashes, No ecchymoses, No cyanosis  Neurologic: Non-focal  Extremities: No clubbing, cyanosis or edema  Vascular: Peripheral pulses palpable 2+ bilaterally    LABS:	 	                            14.4   7.0   )-----------( 185      ( 26 Jul 2018 06:31 )             44.6     07-26    139  |  102  |  12  ----------------------------<  173<H>  3.5   |  22  |  0.99    Ca    9.2      26 Jul 2018 06:31    TPro  7.0  /  Alb  3.3  /  TBili  1.0  /  DBili  x   /  AST  36  /  ALT  28  /  AlkPhos  59  07-26

## 2018-07-27 LAB
-  AMPICILLIN/SULBACTAM: SIGNIFICANT CHANGE UP
-  CEFAZOLIN: SIGNIFICANT CHANGE UP
-  CLINDAMYCIN: SIGNIFICANT CHANGE UP
-  ERYTHROMYCIN: SIGNIFICANT CHANGE UP
-  GENTAMICIN: SIGNIFICANT CHANGE UP
-  OXACILLIN: SIGNIFICANT CHANGE UP
-  PENICILLIN: SIGNIFICANT CHANGE UP
-  RIFAMPIN: SIGNIFICANT CHANGE UP
-  TETRACYCLINE: SIGNIFICANT CHANGE UP
-  TRIMETHOPRIM/SULFAMETHOXAZOLE: SIGNIFICANT CHANGE UP
-  VANCOMYCIN: SIGNIFICANT CHANGE UP
CULTURE RESULTS: SIGNIFICANT CHANGE UP
GLUCOSE BLDC GLUCOMTR-MCNC: 159 MG/DL — HIGH (ref 70–99)
GLUCOSE BLDC GLUCOMTR-MCNC: 165 MG/DL — HIGH (ref 70–99)
GLUCOSE BLDC GLUCOMTR-MCNC: 214 MG/DL — HIGH (ref 70–99)
GRAM STN FLD: SIGNIFICANT CHANGE UP
INR BLD: 2.47 RATIO — HIGH (ref 0.88–1.16)
METHOD TYPE: SIGNIFICANT CHANGE UP
ORGANISM # SPEC MICROSCOPIC CNT: SIGNIFICANT CHANGE UP
PROTHROM AB SERPL-ACNC: 27.4 SEC — HIGH (ref 9.8–12.7)
SPECIMEN SOURCE: SIGNIFICANT CHANGE UP

## 2018-07-27 PROCEDURE — 93320 DOPPLER ECHO COMPLETE: CPT | Mod: 26

## 2018-07-27 PROCEDURE — 93312 ECHO TRANSESOPHAGEAL: CPT | Mod: 26

## 2018-07-27 PROCEDURE — 93325 DOPPLER ECHO COLOR FLOW MAPG: CPT | Mod: 26

## 2018-07-27 RX ORDER — WARFARIN SODIUM 2.5 MG/1
18 TABLET ORAL ONCE
Qty: 0 | Refills: 0 | Status: COMPLETED | OUTPATIENT
Start: 2018-07-27 | End: 2018-07-27

## 2018-07-27 RX ORDER — WARFARIN SODIUM 2.5 MG/1
12 TABLET ORAL ONCE
Qty: 0 | Refills: 0 | Status: DISCONTINUED | OUTPATIENT
Start: 2018-07-27 | End: 2018-07-27

## 2018-07-27 RX ADMIN — Medication 166.67 MILLIGRAM(S): at 05:22

## 2018-07-27 RX ADMIN — Medication 60 MILLIGRAM(S): at 05:22

## 2018-07-27 RX ADMIN — Medication 1: at 18:45

## 2018-07-27 RX ADMIN — ATORVASTATIN CALCIUM 20 MILLIGRAM(S): 80 TABLET, FILM COATED ORAL at 21:18

## 2018-07-27 RX ADMIN — Medication 200 MILLIGRAM(S): at 17:21

## 2018-07-27 RX ADMIN — Medication 200 MILLIGRAM(S): at 06:55

## 2018-07-27 RX ADMIN — Medication 1 TABLET(S): at 11:45

## 2018-07-27 RX ADMIN — Medication 2 MILLIGRAM(S): at 22:10

## 2018-07-27 RX ADMIN — Medication 150 MICROGRAM(S): at 05:22

## 2018-07-27 RX ADMIN — TAMSULOSIN HYDROCHLORIDE 0.4 MILLIGRAM(S): 0.4 CAPSULE ORAL at 21:18

## 2018-07-27 RX ADMIN — Medication 1000 UNIT(S): at 11:45

## 2018-07-27 RX ADMIN — Medication 2: at 13:47

## 2018-07-27 RX ADMIN — WARFARIN SODIUM 18 MILLIGRAM(S): 2.5 TABLET ORAL at 21:18

## 2018-07-27 RX ADMIN — Medication 2 MILLIGRAM(S): at 01:02

## 2018-07-27 RX ADMIN — Medication 300 MILLIGRAM(S): at 11:45

## 2018-07-27 RX ADMIN — Medication 166.67 MILLIGRAM(S): at 17:20

## 2018-07-27 NOTE — PROVIDER CONTACT NOTE (OTHER) - ASSESSMENT
Patient A&Ox4, VSS. HR 76, /75, RR 17, O2 saturation 96% on room air. Patient denies chest pain, lightheadedness, or shortness of breath. Patient has history of DANIELLE. BiPAP was off at time of event. BiPAP placed back on patient. Patient is asymptomatic. Patient A&Ox4, VSS. HR 76, /75, RR 17, O2 saturation 96% on room air. Patient denies chest pain, lightheadedness, or shortness of breath. Patient is asymptomatic.

## 2018-07-27 NOTE — DIETITIAN INITIAL EVALUATION ADULT. - NS AS NUTRI INTERV ED CONTENT
Weight Loss/Diabetic Heart Healthy Nutrition Therapy Weight Loss/Diabetic Heart Healthy Nutrition Therapy/coumadin

## 2018-07-27 NOTE — PROGRESS NOTE ADULT - ASSESSMENT
68 M w Afib, BPH, HLD, TIA, Hypothyroidism, gout , DANIELLE, HTN sent to the ED by Dr Johnson for complaint of dyspnea for 2 days. pt is poor hisotrian and reports multiple different complaints.  he reports taht his SOB is not new and has had it on and off for sometime.. No CP /palpitaitons .. not always related to exertion or position..  and has not changed over the past two days .. when asked what changed he reports generalized weakness acutely yesterday /episode of sweating but no fever or chills ( though he says his temp was " higher than his baselin "). denies cough , but reports Rhinorrhea, mild N but no vomitting and has been having non bloody diarreah over the past few days.. no abd pain but reports frequency and urgency.  he was exposed to family members with strep but he denies any sore throat /HA or congestion .  reports occasional abd discomfort after "heavy meal " but not consistent   in ED : mild leukocytosis noted .  trop neg ,  , CTA done and pending. INR theraputic..   elevated lactate   1- SOB : seems to be at baseline    CTA : neg   SOB seems to be longstanding and not acute  . suspect SOB to be multifactorial including nmorbid obesity ( BMI of 62 ) / deconditioning / possible  pul HTN and element of afib with rvr   check O2 on RA on exertion with PT    Echo: NL EF ;;;    NL Pul pressures ???   2- lactic acidosis : now with bacteremia ( staph coag neg )     cont vanco   CORNELIUS was done ( ordered by NP ) :  neg   f/u repeat cultures   3- DANIELLE: cont CPAP   4- hypothyroid : TSH and free 4 :WNL  5- afib :   with episodes of tachy and usha .. no further episodes however would arrange for 30 day monitor as o/p.   cont  tele and cont coumadin

## 2018-07-27 NOTE — PROGRESS NOTE ADULT - SUBJECTIVE AND OBJECTIVE BOX
CC: f/u for + bld cxs    Patient reports fatigued, weak, no recent chills    REVIEW OF SYSTEMS:  All other review of systems negative (Comprehensive ROS)    Antimicrobials Day # 2  vancomycin  IVPB 1250 milliGRAM(s) IV Intermittent every 12 hours  vancomycin  IVPB        Other Medications Reviewed    T(F): 98.1 (07-27-18 @ 12:21), Max: 98.4 (07-27-18 @ 04:30)  HR: 71 (07-27-18 @ 12:21)  BP: 124/85 (07-27-18 @ 17:07)  RR: 18 (07-27-18 @ 12:21)  SpO2: 95% (07-27-18 @ 12:21)  Wt(kg): --    PHYSICAL EXAM:  General: alert, no acute distress  Eyes:  anicteric, no conjunctival injection, no discharge  Oropharynx: no lesions or injection 	  Neck: supple, without adenopathy  Lungs: clear to auscultation  Heart: irregular rhythm; no murmur, rubs or gallops  Abdomen: obese, soft, nondistended, nontender, without mass or organomegaly  Skin: no lesions  Extremities: leg edema.  Poor IV access, L 4th toe distal dry eschar, healed, no erythema  Neurologic: alert, oriented, moves all extremities    LAB RESULTS:                        14.4   7.0   )-----------( 185      ( 26 Jul 2018 06:31 )             44.6     07-26    139  |  102  |  12  ----------------------------<  173<H>  3.5   |  22  |  0.99    Ca    9.2      26 Jul 2018 06:31    TPro  7.0  /  Alb  3.3  /  TBili  1.0  /  DBili  x   /  AST  36  /  ALT  28  /  AlkPhos  59  07-26    MICROBIOLOGY:  RECENT CULTURES:  Culture - Blood (07.25.18 @ 11:34)    Gram Stain:   Growth in aerobic bottle: Gram Positive Cocci in Clusters  Growth in anaerobic bottle:  Gram Positive Rods    Specimen Source: .Blood Blood-Peripheral    Culture Results:   Growth in aerobic bottle: Coag Negative Staphylococcus  Single set isolate, possible contaminant. Contact  Microbiology if susceptibility testing clinically  indicated.  Growth in anaerobic bottle:  Gram Positive Rods    Culture - Blood (07.25.18 @ 11:34)    Specimen Source: .Blood Blood-Peripheral    Culture Results:   No growth to date.    Culture - Blood (07.24.18 @ 08:38)  Growth in aerobic and anaerobic bottles: Coag Negative Staphylococcus      RADIOLOGY REVIEWED:  Transesophageal Echocardiogram w/o TTE (07.27.18 @ 08:21) >  1. Mitral annular dilatation with normal mitral leaflets  with normal opening. Mild mitral regurgitation.  2. Mildly calcified trileaflet aortic valve with normal  opening. Linear strands seen on the aortic valve leaflet  tips consistent with Lambl's excrescences. No aortic valve  regurgitation seen.  3. Left atrial enlargement. Dense spontaneous echo contrast  seen within the LA appendage. No definitive left atrial or  left atrial appendage thrombus. Decreased (about 35 cm/sec)  left atrial appendagevelocities noted.  4. Normal left ventricular systolic function.  5. Normal right ventricular size and function.  6. Color flow doppler demonstrates evidence of  left-to-right flow across a patent foramen ovale. Agitated  saline injection does not demonstrate right-to-left flow  across the interatrial septum.  No vegetations seen.

## 2018-07-27 NOTE — CHART NOTE - NSCHARTNOTEFT_GEN_A_CORE
Upon Nutritional Assessment by the Registered Dietitian your patient was determined to meet criteria / has evidence of the following diagnosis/diagnoses:          [ ]  Mild Protein Calorie Malnutrition        [ ]  Moderate Protein Calorie Malnutrition        [ ] Severe Protein Calorie Malnutrition        [ ] Unspecified Protein Calorie Malnutrition        [ ] Underweight / BMI <19        [x ] Morbid Obesity / BMI > 40 (62)      Findings as based on:  [x ] Comprehensive nutrition assessment   [ ] Nutrition Focused Physical Exam  [ ] Other:       Nutrition Plan/Recommendations:  Weight Loss Tips        PROVIDER Section:     By signing this assessment you are acknowledging and agree with the diagnosis/diagnoses assigned by the Registered Dietitian    Comments:

## 2018-07-27 NOTE — DIETITIAN INITIAL EVALUATION ADULT. - NS AS NUTRI INTERV COLLABORAT
Referral to community agencies/programs (specify)/support groups, sticker placed in chart for BMI >40

## 2018-07-27 NOTE — CHART NOTE - NSCHARTNOTEFT_GEN_A_CORE
Bradycardia    Patient's heart rate briefly dropped to 37 while asleep back to the 70's. Patient had previous episodes of brief episode of bradycardia to 40's. patient asymptomatic in non acute distress. Denies chest pain, sob, dizziness, palpitations    Vital Signs Last 24 Hrs  T(C): 36.8 (26 Jul 2018 21:05), Max: 37.2 (26 Jul 2018 10:11)  T(F): 98.2 (26 Jul 2018 21:05), Max: 98.9 (26 Jul 2018 10:11)  HR: 64 (27 Jul 2018 02:55) (64 - 84)  BP: 113/74 (27 Jul 2018 02:55) (107/70 - 136/84)  BP(mean): --  RR: 18 (26 Jul 2018 21:05) (18 - 18)  SpO2: 96% (26 Jul 2018 21:05) (95% - 98%)\      Plan  consider recalling EP for the intermittent bradycardia  c/w with meds as ordered    Will endorse to primary day team, attending to follow    Arely Griffith NP  [facility] [id number] Bradycardia    Patient's heart rate briefly dropped to 37 while asleep with bipap back to the 60's. Patient had previous episodes of brief episode of bradycardia to 40's. patient asymptomatic in non acute distress. Denies chest pain, sob, dizziness, palpitations    Vital Signs Last 24 Hrs  T(C): 36.8 (26 Jul 2018 21:05), Max: 37.2 (26 Jul 2018 10:11)  T(F): 98.2 (26 Jul 2018 21:05), Max: 98.9 (26 Jul 2018 10:11)  HR: 64 (27 Jul 2018 02:55) (64 - 84)  BP: 113/74 (27 Jul 2018 02:55) (107/70 - 136/84)  BP(mean): --  RR: 18 (26 Jul 2018 21:05) (18 - 18)  SpO2: 96% (26 Jul 2018 21:05) (95% - 98%)\    68M w/morbid obesity,  Afib, BPH, HLD, TIA, Hypothyroidism, DANIELLE, HTN sent to the ED for complaint of dyspnea for 2 days found to be in afib with RVR now rate controlled AF 60-80s. Patient reported that he had not taken any of his BB yesterday on day of admission. Afib w/RVR likely related to missed BB doses now with pause    review of tele strip show's afib with 2.9 sec pause    Plan  reconsult EP for the intermittent bradycardia and pause  c/w with meds as ordered for now  reassess prior to administration of am Toprol    Will endorse to primary day team, attending to follow    Arely Griffith NP  spectralink [id number]

## 2018-07-27 NOTE — PROGRESS NOTE ADULT - SUBJECTIVE AND OBJECTIVE BOX
Patient is a 68y old  Male who presents with a chief complaint of shortness of breath (2018 06:01)                                                               INTERVAL HPI/OVERNIGHT EVENTS:    REVIEW OF SYSTEMS:     CONSTITUTIONAL: No weakness, fevers or chills  RESPIRATORY: No cough, wheezing,  No shortness of breath  CARDIOVASCULAR: No chest pain or palpitations  GASTROINTESTINAL: No abdominal pain  . No nausea, vomiting, or hematemesis; No diarrhea or constipation. No melena or hematochezia.  GENITOURINARY: No dysuria, frequency or hematuria  NEUROLOGICAL: No numbness or weakness                                                                                                                                                                                                                                                                                Medications:  MEDICATIONS  (STANDING):  allopurinol 300 milliGRAM(s) Oral daily  atorvastatin 20 milliGRAM(s) Oral at bedtime  cholecalciferol 1000 Unit(s) Oral daily  dextrose 5%. 1000 milliLiter(s) (50 mL/Hr) IV Continuous <Continuous>  dextrose 50% Injectable 12.5 Gram(s) IV Push once  dextrose 50% Injectable 25 Gram(s) IV Push once  dextrose 50% Injectable 25 Gram(s) IV Push once  insulin lispro (HumaLOG) corrective regimen sliding scale   SubCutaneous three times a day before meals  insulin lispro (HumaLOG) corrective regimen sliding scale   SubCutaneous at bedtime  levothyroxine 150 MICROGram(s) Oral daily  LORazepam     Tablet 2 milliGRAM(s) Oral at bedtime  metoprolol tartrate 200 milliGRAM(s) Oral two times a day  multivitamin 1 Tablet(s) Oral daily  NIFEdipine XL 60 milliGRAM(s) Oral daily  sodium chloride 0.9%. 1000 milliLiter(s) (100 mL/Hr) IV Continuous <Continuous>  tamsulosin 0.4 milliGRAM(s) Oral at bedtime  vancomycin  IVPB 1250 milliGRAM(s) IV Intermittent every 12 hours  vancomycin  IVPB        MEDICATIONS  (PRN):  dextrose 40% Gel 15 Gram(s) Oral once PRN Blood Glucose LESS THAN 70 milliGRAM(s)/deciliter  glucagon  Injectable 1 milliGRAM(s) IntraMuscular once PRN Glucose LESS THAN 70 milligrams/deciliter       Allergies    metformin (Unknown)    Intolerances      Vital Signs Last 24 Hrs  T(C): 36.7 (2018 12:21), Max: 36.9 (2018 04:30)  T(F): 98.1 (2018 12:21), Max: 98.4 (2018 04:30)  HR: 71 (2018 12:21) (64 - 84)  BP: 120/63 (2018 12:21) (109/74 - 141/79)  BP(mean): --  RR: 18 (2018 12:21) (17 - 18)  SpO2: 95% (2018 12:21) (95% - 97%)  CAPILLARY BLOOD GLUCOSE      POCT Blood Glucose.: 214 mg/dL (2018 13:17)  POCT Blood Glucose.: 153 mg/dL (2018 21:17)  POCT Blood Glucose.: 148 mg/dL (2018 18:16)       @ 07:  -   @ 07:00  --------------------------------------------------------  IN: 1040 mL / OUT: 0 mL / NET: 1040 mL     @ : @ 16:04  --------------------------------------------------------  IN: 340 mL / OUT: 0 mL / NET: 340 mL      Physical Exam:    Daily Weight in k.8 (2018 13:30)  General: NAD   HEENT:  Nonicteric, PERRLA  CV:  RRR, S1S2   Lungs:  CTA B/L, no wheezes, rales, rhonchi  Abdomen:  Soft, non-tender, no distended, positive BS  Extremities:  2+ pulses, no c/c, no edema  Skin:  Warm and dry, no rashes  :  No montalvo  Neuro:  AAOx3, non-focal, grossly intact                                                                                                                                                                                                                                                                                                LABS:                               14.4   7.0   )-----------( 185      ( 2018 06:31 )             44.6                          139  |  102  |  12  ----------------------------<  173<H>  3.5   |  22  |  0.99    Ca    9.2      2018 06:31    TPro  7.0  /  Alb  3.3  /  TBili  1.0  /  DBili  x   /  AST  36  /  ALT  28  /  AlkPhos  59  07-26

## 2018-07-27 NOTE — DIETITIAN INITIAL EVALUATION ADULT. - OTHER INFO
seen for BMI <18. BMI 62. , admitted for SOB /weakness, consuming 50% of meals, denies nausea/vomit, denies difficulty chewing /swallow. last BM 2 days ago. NKFA. agrees to need for diet ed.

## 2018-07-27 NOTE — PROGRESS NOTE ADULT - SUBJECTIVE AND OBJECTIVE BOX
S/P CORNELIUS which revealed no vegetations  Overnight had a usha event with a 2.9 second pause  Patient uses BIPAP and is not clear wether the BIPAP was functioning correctly  At present he has no complaints  /79  HR 79 (AF)  Lungs distant BS  Irregular rhythm 1/6 systolic murmur  No edema    INR 2.47    Imp: Bacteremia which may be a contaminant  No evidence of endocarditis  As for the usha episode and the pause this may be related to sleep apnea rather than usha-tachy syndrome  Rec: Pulmonary consult and nocturnal oximetry

## 2018-07-27 NOTE — PROGRESS NOTE ADULT - ASSESSMENT
Morbidly obese male admitted with shortness of breath, weakness, and A Fib.  Afebrile since arrival, WBC normal  Coag negative staph isolated from both bottles of single set sent on admission (poor IV access); only one set   Repeat Bld Cxs now with Coag neg staph in single bottle and GPRs in second bottle; 2nd set negative  CORNELIUS no vegetations  With no minor criteria, and possibly only one major criteria, doubt endocarditis, which would be only way to explain Coag neg staph bacteremia here (no primary source evident at present)  I suspect contamination, tsephan with pt's poor access, and now report of GPRs in single bottle as well  No support for infection in general.    Plan:  D/c Vanco  Allow clinical course off abx to dictate if infection a real concern  Meaning of Coag neg staph, endocarditis, contamination, adverse effects of Vanco, d/w pt at length  d/w Dr Stanley

## 2018-07-27 NOTE — DIETITIAN INITIAL EVALUATION ADULT. - SOURCE
other (specify)/patient/family/significant other/all available charts since 11/7/2011, son at bedside

## 2018-07-27 NOTE — DIETITIAN INITIAL EVALUATION ADULT. - ADHERENCE
poor/has attempted many diet for weight loss in the past. the most recent has been low CHO and HCG. while HCG was successful- he was advised to steer away from it. he has been unable to maintain any weight loss diet

## 2018-07-27 NOTE — PROVIDER CONTACT NOTE (OTHER) - ASSESSMENT
A&O x4.  Denied pain/discomfort when woken up.  113/74, HR 64, AFib HR 60s on tele monitor.  Patient was asleep and asymptomatic during event

## 2018-07-28 LAB
GLUCOSE BLDC GLUCOMTR-MCNC: 146 MG/DL — HIGH (ref 70–99)
GLUCOSE BLDC GLUCOMTR-MCNC: 157 MG/DL — HIGH (ref 70–99)
GLUCOSE BLDC GLUCOMTR-MCNC: 178 MG/DL — HIGH (ref 70–99)
GLUCOSE BLDC GLUCOMTR-MCNC: 178 MG/DL — HIGH (ref 70–99)
HCT VFR BLD CALC: 44.1 % — SIGNIFICANT CHANGE UP (ref 39–50)
HGB BLD-MCNC: 14.7 G/DL — SIGNIFICANT CHANGE UP (ref 13–17)
INR BLD: 3.04 RATIO — HIGH (ref 0.88–1.16)
MCHC RBC-ENTMCNC: 27.6 PG — SIGNIFICANT CHANGE UP (ref 27–34)
MCHC RBC-ENTMCNC: 33.4 GM/DL — SIGNIFICANT CHANGE UP (ref 32–36)
MCV RBC AUTO: 82.8 FL — SIGNIFICANT CHANGE UP (ref 80–100)
PLATELET # BLD AUTO: 201 K/UL — SIGNIFICANT CHANGE UP (ref 150–400)
PROTHROM AB SERPL-ACNC: 33.6 SEC — HIGH (ref 9.8–12.7)
RBC # BLD: 5.33 M/UL — SIGNIFICANT CHANGE UP (ref 4.2–5.8)
RBC # FLD: 15.2 % — HIGH (ref 10.3–14.5)
WBC # BLD: 8 K/UL — SIGNIFICANT CHANGE UP (ref 3.8–10.5)
WBC # FLD AUTO: 8 K/UL — SIGNIFICANT CHANGE UP (ref 3.8–10.5)

## 2018-07-28 RX ORDER — WARFARIN SODIUM 2.5 MG/1
12 TABLET ORAL ONCE
Qty: 0 | Refills: 0 | Status: COMPLETED | OUTPATIENT
Start: 2018-07-28 | End: 2018-07-28

## 2018-07-28 RX ORDER — WARFARIN SODIUM 2.5 MG/1
17.5 TABLET ORAL ONCE
Qty: 0 | Refills: 0 | Status: DISCONTINUED | OUTPATIENT
Start: 2018-07-28 | End: 2018-07-28

## 2018-07-28 RX ADMIN — Medication 200 MILLIGRAM(S): at 05:15

## 2018-07-28 RX ADMIN — Medication 60 MILLIGRAM(S): at 05:15

## 2018-07-28 RX ADMIN — Medication 2 MILLIGRAM(S): at 23:42

## 2018-07-28 RX ADMIN — Medication 200 MILLIGRAM(S): at 17:13

## 2018-07-28 RX ADMIN — Medication 1: at 09:18

## 2018-07-28 RX ADMIN — Medication 1000 UNIT(S): at 09:18

## 2018-07-28 RX ADMIN — WARFARIN SODIUM 12 MILLIGRAM(S): 2.5 TABLET ORAL at 22:29

## 2018-07-28 RX ADMIN — Medication 1: at 13:25

## 2018-07-28 RX ADMIN — Medication 150 MICROGRAM(S): at 05:15

## 2018-07-28 RX ADMIN — Medication 300 MILLIGRAM(S): at 04:06

## 2018-07-28 RX ADMIN — Medication 1 TABLET(S): at 09:18

## 2018-07-28 RX ADMIN — ATORVASTATIN CALCIUM 20 MILLIGRAM(S): 80 TABLET, FILM COATED ORAL at 22:29

## 2018-07-28 RX ADMIN — TAMSULOSIN HYDROCHLORIDE 0.4 MILLIGRAM(S): 0.4 CAPSULE ORAL at 22:29

## 2018-07-28 NOTE — PROGRESS NOTE ADULT - ASSESSMENT
Morbidly obese male admitted with shortness of breath, weakness, and Afib.  Afebrile since arrival, WBC normal  Coag negative staph isolated from both bottles of single set sent on admission (poor IV access); only one set   Repeat Bld Cxs Coag neg staph in single bottle and GPRs in second bottle; 2nd set negative  CORNELIUS no vegetations  No primary source evident at present  Bld Cx results are best explained by contamination, stephan with pt's poor access, and report of GPRs in single bottle as well  No support for infection in general    Plan:  Observe off Vanco  Clinical course off abx to dictate if infection a real concern  Cardiology input appreciated, brief usha noted

## 2018-07-28 NOTE — PROVIDER CONTACT NOTE (OTHER) - ASSESSMENT
Patient A&Ox4, VSS. HR 70, /73, RR 18, O2 saturation 95% on bipap for DANIELLE. Patient denies chest pain, lightheadedness, or shortness of breath. Patient is asymptomatic.

## 2018-07-28 NOTE — PROGRESS NOTE ADULT - ASSESSMENT
#AF: usha overnight to 36, asymptomatic  #BPH  #HL  #Hypothyroid  #DANIELLE  #HTN  #morbid obesity  #Coag negative staph, unremarkable milly

## 2018-07-28 NOTE — PROGRESS NOTE ADULT - SUBJECTIVE AND OBJECTIVE BOX
CC: f/u for + bld cxs    Patient reports similar fatigue, no SOB at rest, no chills    REVIEW OF SYSTEMS:  All other review of systems negative (Comprehensive ROS)    Antimicrobials off  Medications Reviewed    Vital Signs Last 24 Hrs  T(F): 98.3 (28 Jul 2018 04:59), Max: 98.3 (28 Jul 2018 04:59)  HR: 65 (28 Jul 2018 10:21) (52 - 93)  BP: 124/75 (28 Jul 2018 04:59) (115/72 - 124/85)  BP(mean): --  RR: 18 (28 Jul 2018 04:59) (18 - 18)  SpO2: 95% (28 Jul 2018 10:21) (94% - 100%)    PHYSICAL EXAM:  General: alert, no acute distress  Eyes:  anicteric, no conjunctival injection, no discharge  Oropharynx: no lesions or injection 	  Neck: supple, without adenopathy  Lungs: clear to auscultation  Heart: irregular rhythm; no murmur, rubs or gallops  Abdomen: obese, soft, nondistended, nontender, without mass or organomegaly  Skin: no lesions  Extremities: leg edema.  Poor IV access, L 4th toe distal dry eschar, healed, no erythema  Neurologic: alert, oriented, moves all extremities    LAB RESULTS:                        14.7   8.0   )-----------( 201      ( 28 Jul 2018 06:56 )             44.1     MICROBIOLOGY:  RECENT CULTURES:  Culture - Blood (07.25.18 @ 11:34)    Gram Stain:   Growth in aerobic bottle: Gram Positive Cocci in Clusters  Growth in anaerobic bottle:  Gram Positive Rods    Specimen Source: .Blood Blood-Peripheral    Culture Results:   Growth in aerobic bottle: Coag Negative Staphylococcus  Single set isolate, possible contaminant. Contact  Microbiology if susceptibility testing clinically  indicated.  Growth in anaerobic bottle:  Gram Positive Rods    Culture - Blood (07.25.18 @ 11:34)    Specimen Source: .Blood Blood-Peripheral    Culture Results:   No growth to date.    Culture - Blood (07.24.18 @ 08:38)  Growth in aerobic and anaerobic bottles: Coag Negative Staphylococcus      RADIOLOGY REVIEWED:  Transesophageal Echocardiogram w/o TTE (07.27.18 @ 08:21) >  1. Mitral annular dilatation with normal mitral leaflets  with normal opening. Mild mitral regurgitation.  2. Mildly calcified trileaflet aortic valve with normal  opening. Linear strands seen on the aortic valve leaflet  tips consistent with Lambl's excrescences. No aortic valve  regurgitation seen.  3. Left atrial enlargement. Dense spontaneous echo contrast  seen within the LA appendage. No definitive left atrial or  left atrial appendage thrombus. Decreased (about 35 cm/sec)  left atrial appendagevelocities noted.  4. Normal left ventricular systolic function.  5. Normal right ventricular size and function.  6. Color flow doppler demonstrates evidence of  left-to-right flow across a patent foramen ovale. Agitated  saline injection does not demonstrate right-to-left flow  across the interatrial septum.  No vegetations seen.

## 2018-07-28 NOTE — PROGRESS NOTE ADULT - ASSESSMENT
68 M w Afib, BPH, HLD, TIA, Hypothyroidism, gout , DANIELLE, HTN sent to the ED by Dr Johnson for complaint of dyspnea for 2 days. pt is poor hisotrian and reports multiple different complaints.  he reports taht his SOB is not new and has had it on and off for sometime.. No CP /palpitaitons .. not always related to exertion or position..  and has not changed over the past two days .. when asked what changed he reports generalized weakness acutely yesterday /episode of sweating but no fever or chills ( though he says his temp was " higher than his baselin "). denies cough , but reports Rhinorrhea, mild N but no vomitting and has been having non bloody diarreah over the past few days.. no abd pain but reports frequency and urgency.  he was exposed to family members with strep but he denies any sore throat /HA or congestion .  reports occasional abd discomfort after "heavy meal " but not consistent   in ED : mild leukocytosis noted .  trop neg ,  , CTA done and pending. INR theraputic..   elevated lactate   1- SOB : seems to be at baseline    CTA : neg   SOB seems to be longstanding and not acute  . suspect SOB to be multifactorial including nmorbid obesity ( BMI of 62 ) / deconditioning / possible  pul HTN and element of afib with rvr   check O2 on RA on exertion with PT    Echo: NL EF ;;;    NL Pul pressures ???   2- lactic acidosis : now with bacteremia ( staph coag neg )     cont vanco   CORNELIUS was done ( ordered by NP ) :  neg   f/u repeat cultures   3- DANIELLE: cont CPAP   4- hypothyroid : TSH and free 4 :WNL  5- afib :   with episodes of tachy and usha .. no further episodes however would arrange for 30 day monitor as o/p.   cont  tele and cont coumadin 68 M w Afib, BPH, HLD, TIA, Hypothyroidism, gout , DANIELLE, HTN sent to the ED by Dr Johnson for complaint of dyspnea for 2 days. pt is poor hisotrian and reports multiple different complaints.  he reports taht his SOB is not new and has had it on and off for sometime.. No CP /palpitaitons .. not always related to exertion or position..  and has not changed over the past two days .. when asked what changed he reports generalized weakness acutely yesterday /episode of sweating but no fever or chills ( though he says his temp was " higher than his baselin "). denies cough , but reports Rhinorrhea, mild N but no vomitting and has been having non bloody diarreah over the past few days.. no abd pain but reports frequency and urgency.  he was exposed to family members with strep but he denies any sore throat /HA or congestion .  reports occasional abd discomfort after "heavy meal " but not consistent   in ED : mild leukocytosis noted .  trop neg ,  , CTA done and pending. INR theraputic..   elevated lactate   1- SOB : seems to be at baseline    CTA : neg   SOB seems to be longstanding and not acute  . suspect SOB to be multifactorial including nmorbid obesity ( BMI of 62 ) / deconditioning / possible  pul HTN and element of afib with rvr      Echo: NL EF     2- lactic acidosis : now with bacteremia ( staph coag neg )     CORNELIUS was done ( ordered by NP ) :  neg   repeat cultures : consistent with contaminant  3- DANIELLE: cont CPAP   4- hypothyroid : TSH and free 4 :WNL  5- afib :   with brief episode of tachy and usha ..   cont AC and rte control   recommend 30 day holter as o/p.   f/u with cardio as o/p.

## 2018-07-28 NOTE — PROGRESS NOTE ADULT - SUBJECTIVE AND OBJECTIVE BOX
SUBJECTIVE: Asymptomatic in bed. ID follow up noted.  	  MEDICATIONS:  metoprolol tartrate 200 milliGRAM(s) Oral two times a day  NIFEdipine XL 60 milliGRAM(s) Oral daily  tamsulosin 0.4 milliGRAM(s) Oral at bedtime  LORazepam     Tablet 2 milliGRAM(s) Oral at bedtime  allopurinol 300 milliGRAM(s) Oral daily  atorvastatin 20 milliGRAM(s) Oral at bedtime  dextrose 40% Gel 15 Gram(s) Oral once PRN  dextrose 50% Injectable 12.5 Gram(s) IV Push once  dextrose 50% Injectable 25 Gram(s) IV Push once  dextrose 50% Injectable 25 Gram(s) IV Push once  glucagon  Injectable 1 milliGRAM(s) IntraMuscular once PRN  insulin lispro (HumaLOG) corrective regimen sliding scale   SubCutaneous three times a day before meals  insulin lispro (HumaLOG) corrective regimen sliding scale   SubCutaneous at bedtime  levothyroxine 150 MICROGram(s) Oral daily  cholecalciferol 1000 Unit(s) Oral daily  dextrose 5%. 1000 milliLiter(s) IV Continuous <Continuous>  multivitamin 1 Tablet(s) Oral daily  sodium chloride 0.9%. 1000 milliLiter(s) IV Continuous <Continuous>      REVIEW OF SYSTEMS:    CONSTITUTIONAL: No fever, weight loss, or fatigue  EYES: No eye pain, visual disturbances, or discharge  NECK: No pain or stiffness  RESPIRATORY: No cough, wheezing, chills or hemoptysis; No Shortness of Breath  CARDIOVASCULAR: No chest pain, palpitations, dizziness, or leg swelling  GASTROINTESTINAL: No abdominal or epigastric pain. No nausea, vomiting, or hematemesis; No diarrhea or constipation. No melena or hematochezia.  GENITOURINARY: No dysuria, frequency, hematuria, or incontinence  NEUROLOGICAL: No headaches, memory loss, loss of strength, numbness, or tremors  SKIN: No itching, burning, rashes, or lesions   LYMPH Nodes: No enlarged glands  MUSCULOSKELETAL: No joint pain or swelling; No muscle, back, or extremity pain  All other review of systems are negative.  	    PHYSICAL EXAM:  T(C): 36.8 (07-28-18 @ 04:59), Max: 36.8 (07-28-18 @ 04:59)  HR: 65 (07-28-18 @ 10:21) (52 - 93)  BP: 124/75 (07-28-18 @ 04:59) (115/72 - 124/85)  RR: 18 (07-28-18 @ 04:59) (18 - 18)  SpO2: 95% (07-28-18 @ 10:21) (94% - 100%)  Wt(kg): --  I&O's Summary    27 Jul 2018 07:01  -  28 Jul 2018 07:00  --------------------------------------------------------  IN: 740 mL / OUT: 500 mL / NET: 240 mL          PHYSICAL EXAM    Appearance: Normal	  HEENT:   Normal oral mucosa, PERRL, EOMI	  NECK: Soft and supple, No LAD, No JVD  Cardiovascular: irregular 1/6 stevan  Respiratory: Lungs clear to auscultation	  Gastrointestinal:  Soft, Non-tender, + BS	  Skin: No rashes, No ecchymoses, No cyanosis    TELEMETRY:   AF 60-70 bpm        LABS:	 	                  14.7   8.0   )-----------( 201      ( 28 Jul 2018 06:56 )             44.1       INR 3.04

## 2018-07-28 NOTE — PROGRESS NOTE ADULT - SUBJECTIVE AND OBJECTIVE BOX
Patient is a 68y old  Male who presents with a chief complaint of shortness of breath (25 Jul 2018 06:01)                                                               INTERVAL HPI/OVERNIGHT EVENTS:    REVIEW OF SYSTEMS:     CONSTITUTIONAL: No weakness, fevers or chills  EYES/ENT: No visual changes , no ear ache   NECK: No pain or stiffness  RESPIRATORY: No cough, wheezing,  No shortness of breath  CARDIOVASCULAR: No chest pain or palpitations  GASTROINTESTINAL: No abdominal pain  . No nausea, vomiting, or hematemesis; No diarrhea or constipation. No melena or hematochezia.  GENITOURINARY: No dysuria, frequency or hematuria  NEUROLOGICAL: No numbness or weakness  SKIN: No itching, burning, rashes, or lesions                                                                                                                                                                                                                                                                                 Medications:  MEDICATIONS  (STANDING):  allopurinol 300 milliGRAM(s) Oral daily  atorvastatin 20 milliGRAM(s) Oral at bedtime  cholecalciferol 1000 Unit(s) Oral daily  dextrose 5%. 1000 milliLiter(s) (50 mL/Hr) IV Continuous <Continuous>  dextrose 50% Injectable 12.5 Gram(s) IV Push once  dextrose 50% Injectable 25 Gram(s) IV Push once  dextrose 50% Injectable 25 Gram(s) IV Push once  insulin lispro (HumaLOG) corrective regimen sliding scale   SubCutaneous three times a day before meals  insulin lispro (HumaLOG) corrective regimen sliding scale   SubCutaneous at bedtime  levothyroxine 150 MICROGram(s) Oral daily  LORazepam     Tablet 2 milliGRAM(s) Oral at bedtime  metoprolol tartrate 200 milliGRAM(s) Oral two times a day  multivitamin 1 Tablet(s) Oral daily  NIFEdipine XL 60 milliGRAM(s) Oral daily  sodium chloride 0.9%. 1000 milliLiter(s) (100 mL/Hr) IV Continuous <Continuous>  tamsulosin 0.4 milliGRAM(s) Oral at bedtime    MEDICATIONS  (PRN):  dextrose 40% Gel 15 Gram(s) Oral once PRN Blood Glucose LESS THAN 70 milliGRAM(s)/deciliter  glucagon  Injectable 1 milliGRAM(s) IntraMuscular once PRN Glucose LESS THAN 70 milligrams/deciliter       Allergies    metformin (Unknown)    Intolerances      Vital Signs Last 24 Hrs  T(C): 36.7 (28 Jul 2018 20:49), Max: 37.1 (28 Jul 2018 12:20)  T(F): 98 (28 Jul 2018 20:49), Max: 98.7 (28 Jul 2018 12:20)  HR: 79 (28 Jul 2018 20:49) (52 - 83)  BP: 122/68 (28 Jul 2018 20:49) (117/64 - 125/62)  BP(mean): --  RR: 18 (28 Jul 2018 20:49) (18 - 18)  SpO2: 97% (28 Jul 2018 20:49) (94% - 100%)  CAPILLARY BLOOD GLUCOSE      POCT Blood Glucose.: 178 mg/dL (28 Jul 2018 21:31)  POCT Blood Glucose.: 146 mg/dL (28 Jul 2018 18:02)  POCT Blood Glucose.: 157 mg/dL (28 Jul 2018 12:55)  POCT Blood Glucose.: 178 mg/dL (28 Jul 2018 08:32)      07-27 @ 07:01 - 07-28 @ 07:00  --------------------------------------------------------  IN: 740 mL / OUT: 500 mL / NET: 240 mL    07-28 @ 07:01 - 07-28 @ 23:06  --------------------------------------------------------  IN: 1000 mL / OUT: 800 mL / NET: 200 mL      Physical Exam:    Daily     Daily   General:  Well appearing, NAD, not cachetic  HEENT:  Nonicteric, PERRLA  CV:  RRR, S1S2   Lungs:  CTA B/L, no wheezes, rales, rhonchi  Abdomen:  Soft, non-tender, no distended, positive BS  Extremities:  2+ pulses, no c/c, no edema  Skin:  Warm and dry, no rashes  :  No montalvo  Neuro:  AAOx3, non-focal, grossly intact                                                                                                                                                                                                                                                                                                LABS:                               14.7   8.0   )-----------( 201      ( 28 Jul 2018 06:56 )             44.1                                               RADIOLOGY & ADDITIONAL TESTS         I personally reviewed: [  ]EKG   [  ]CXR    [  ] CT      A/P:         Discussed with :     Edvin consultants' Notes   Time spent : Patient is a 68y old  Male who presents with a chief complaint of shortness of breath (25 Jul 2018 06:01)                                                               INTERVAL HPI/OVERNIGHT EVENTS:    REVIEW OF SYSTEMS:     CONSTITUTIONAL: No weakness, fevers or chills  RESPIRATORY: No cough, wheezing,  No shortness of breath  CARDIOVASCULAR: No chest pain or palpitations  GASTROINTESTINAL: No abdominal pain  . No nausea, vomiting, or hematemesis; No diarrhea or constipation. No melena or hematochezia.  GENITOURINARY: No dysuria, frequency or hematuria  NEUROLOGICAL: No numbness or weakness                                                                                                                                                                                                                                                                                   Medications:  MEDICATIONS  (STANDING):  allopurinol 300 milliGRAM(s) Oral daily  atorvastatin 20 milliGRAM(s) Oral at bedtime  cholecalciferol 1000 Unit(s) Oral daily  dextrose 5%. 1000 milliLiter(s) (50 mL/Hr) IV Continuous <Continuous>  dextrose 50% Injectable 12.5 Gram(s) IV Push once  dextrose 50% Injectable 25 Gram(s) IV Push once  dextrose 50% Injectable 25 Gram(s) IV Push once  insulin lispro (HumaLOG) corrective regimen sliding scale   SubCutaneous three times a day before meals  insulin lispro (HumaLOG) corrective regimen sliding scale   SubCutaneous at bedtime  levothyroxine 150 MICROGram(s) Oral daily  LORazepam     Tablet 2 milliGRAM(s) Oral at bedtime  metoprolol tartrate 200 milliGRAM(s) Oral two times a day  multivitamin 1 Tablet(s) Oral daily  NIFEdipine XL 60 milliGRAM(s) Oral daily  sodium chloride 0.9%. 1000 milliLiter(s) (100 mL/Hr) IV Continuous <Continuous>  tamsulosin 0.4 milliGRAM(s) Oral at bedtime    MEDICATIONS  (PRN):  dextrose 40% Gel 15 Gram(s) Oral once PRN Blood Glucose LESS THAN 70 milliGRAM(s)/deciliter  glucagon  Injectable 1 milliGRAM(s) IntraMuscular once PRN Glucose LESS THAN 70 milligrams/deciliter       Allergies    metformin (Unknown)    Intolerances      Vital Signs Last 24 Hrs  T(C): 36.7 (28 Jul 2018 20:49), Max: 37.1 (28 Jul 2018 12:20)  T(F): 98 (28 Jul 2018 20:49), Max: 98.7 (28 Jul 2018 12:20)  HR: 79 (28 Jul 2018 20:49) (52 - 83)  BP: 122/68 (28 Jul 2018 20:49) (117/64 - 125/62)  BP(mean): --  RR: 18 (28 Jul 2018 20:49) (18 - 18)  SpO2: 97% (28 Jul 2018 20:49) (94% - 100%)  CAPILLARY BLOOD GLUCOSE      POCT Blood Glucose.: 178 mg/dL (28 Jul 2018 21:31)  POCT Blood Glucose.: 146 mg/dL (28 Jul 2018 18:02)  POCT Blood Glucose.: 157 mg/dL (28 Jul 2018 12:55)  POCT Blood Glucose.: 178 mg/dL (28 Jul 2018 08:32)      07-27 @ 07:01  -  07-28 @ 07:00  --------------------------------------------------------  IN: 740 mL / OUT: 500 mL / NET: 240 mL    07-28 @ 07:01 - 07-28 @ 23:06  --------------------------------------------------------  IN: 1000 mL / OUT: 800 mL / NET: 200 mL      Physical Exam:    General:  NAD  HEENT:  Nonicteric, PERRLA  CV:  RRR, S1S2   Lungs:  CTA   Abdomen:  Soft, non-tender, no distended, positive BS  Extremities:  2+ pulses, no c/c, no edema  Skin:  Warm and dry, no rashes  :  No montalvo  Neuro:  AAOx3, non-focal, grossly intact                                                                                                                                                                                                                                                                                                LABS:                               14.7   8.0   )-----------( 201      ( 28 Jul 2018 06:56 )             44.1

## 2018-07-29 ENCOUNTER — TRANSCRIPTION ENCOUNTER (OUTPATIENT)
Age: 68
End: 2018-07-29

## 2018-07-29 VITALS — OXYGEN SATURATION: 98 % | HEART RATE: 64 BPM

## 2018-07-29 LAB
ANION GAP SERPL CALC-SCNC: 13 MMOL/L — SIGNIFICANT CHANGE UP (ref 5–17)
BASOPHILS # BLD AUTO: 0.1 K/UL — SIGNIFICANT CHANGE UP (ref 0–0.2)
BASOPHILS NFR BLD AUTO: 0.9 % — SIGNIFICANT CHANGE UP (ref 0–2)
BUN SERPL-MCNC: 10 MG/DL — SIGNIFICANT CHANGE UP (ref 7–23)
CALCIUM SERPL-MCNC: 9.2 MG/DL — SIGNIFICANT CHANGE UP (ref 8.4–10.5)
CHLORIDE SERPL-SCNC: 103 MMOL/L — SIGNIFICANT CHANGE UP (ref 96–108)
CO2 SERPL-SCNC: 23 MMOL/L — SIGNIFICANT CHANGE UP (ref 22–31)
CREAT SERPL-MCNC: 0.98 MG/DL — SIGNIFICANT CHANGE UP (ref 0.5–1.3)
CULTURE RESULTS: SIGNIFICANT CHANGE UP
EOSINOPHIL # BLD AUTO: 0.1 K/UL — SIGNIFICANT CHANGE UP (ref 0–0.5)
EOSINOPHIL NFR BLD AUTO: 1.7 % — SIGNIFICANT CHANGE UP (ref 0–6)
GLUCOSE BLDC GLUCOMTR-MCNC: 168 MG/DL — HIGH (ref 70–99)
GLUCOSE BLDC GLUCOMTR-MCNC: 169 MG/DL — HIGH (ref 70–99)
GLUCOSE SERPL-MCNC: 159 MG/DL — HIGH (ref 70–99)
HCT VFR BLD CALC: 43.7 % — SIGNIFICANT CHANGE UP (ref 39–50)
HGB BLD-MCNC: 14.1 G/DL — SIGNIFICANT CHANGE UP (ref 13–17)
INR BLD: 2.95 RATIO — HIGH (ref 0.88–1.16)
LYMPHOCYTES # BLD AUTO: 2.1 K/UL — SIGNIFICANT CHANGE UP (ref 1–3.3)
LYMPHOCYTES # BLD AUTO: 31.9 % — SIGNIFICANT CHANGE UP (ref 13–44)
MCHC RBC-ENTMCNC: 26.6 PG — LOW (ref 27–34)
MCHC RBC-ENTMCNC: 32.4 GM/DL — SIGNIFICANT CHANGE UP (ref 32–36)
MCV RBC AUTO: 82.1 FL — SIGNIFICANT CHANGE UP (ref 80–100)
MONOCYTES # BLD AUTO: 0.5 K/UL — SIGNIFICANT CHANGE UP (ref 0–0.9)
MONOCYTES NFR BLD AUTO: 7.3 % — SIGNIFICANT CHANGE UP (ref 2–14)
NEUTROPHILS # BLD AUTO: 3.9 K/UL — SIGNIFICANT CHANGE UP (ref 1.8–7.4)
NEUTROPHILS NFR BLD AUTO: 58.2 % — SIGNIFICANT CHANGE UP (ref 43–77)
PLATELET # BLD AUTO: 182 K/UL — SIGNIFICANT CHANGE UP (ref 150–400)
POTASSIUM SERPL-MCNC: 3.4 MMOL/L — LOW (ref 3.5–5.3)
POTASSIUM SERPL-SCNC: 3.4 MMOL/L — LOW (ref 3.5–5.3)
PROTHROM AB SERPL-ACNC: 32.9 SEC — HIGH (ref 9.8–12.7)
RBC # BLD: 5.32 M/UL — SIGNIFICANT CHANGE UP (ref 4.2–5.8)
RBC # FLD: 15.3 % — HIGH (ref 10.3–14.5)
SODIUM SERPL-SCNC: 139 MMOL/L — SIGNIFICANT CHANGE UP (ref 135–145)
SPECIMEN SOURCE: SIGNIFICANT CHANGE UP
WBC # BLD: 6.7 K/UL — SIGNIFICANT CHANGE UP (ref 3.8–10.5)
WBC # FLD AUTO: 6.7 K/UL — SIGNIFICANT CHANGE UP (ref 3.8–10.5)

## 2018-07-29 PROCEDURE — 93320 DOPPLER ECHO COMPLETE: CPT

## 2018-07-29 PROCEDURE — 83605 ASSAY OF LACTIC ACID: CPT

## 2018-07-29 PROCEDURE — 93312 ECHO TRANSESOPHAGEAL: CPT

## 2018-07-29 PROCEDURE — 87798 DETECT AGENT NOS DNA AMP: CPT

## 2018-07-29 PROCEDURE — 83036 HEMOGLOBIN GLYCOSYLATED A1C: CPT

## 2018-07-29 PROCEDURE — 85610 PROTHROMBIN TIME: CPT

## 2018-07-29 PROCEDURE — 87880 STREP A ASSAY W/OPTIC: CPT

## 2018-07-29 PROCEDURE — 84145 PROCALCITONIN (PCT): CPT

## 2018-07-29 PROCEDURE — 84132 ASSAY OF SERUM POTASSIUM: CPT

## 2018-07-29 PROCEDURE — 84484 ASSAY OF TROPONIN QUANT: CPT

## 2018-07-29 PROCEDURE — 87040 BLOOD CULTURE FOR BACTERIA: CPT

## 2018-07-29 PROCEDURE — 80048 BASIC METABOLIC PNL TOTAL CA: CPT

## 2018-07-29 PROCEDURE — 71275 CT ANGIOGRAPHY CHEST: CPT

## 2018-07-29 PROCEDURE — 99285 EMERGENCY DEPT VISIT HI MDM: CPT

## 2018-07-29 PROCEDURE — 93005 ELECTROCARDIOGRAM TRACING: CPT

## 2018-07-29 PROCEDURE — 85730 THROMBOPLASTIN TIME PARTIAL: CPT

## 2018-07-29 PROCEDURE — 84295 ASSAY OF SERUM SODIUM: CPT

## 2018-07-29 PROCEDURE — 97530 THERAPEUTIC ACTIVITIES: CPT

## 2018-07-29 PROCEDURE — 71045 X-RAY EXAM CHEST 1 VIEW: CPT

## 2018-07-29 PROCEDURE — 87150 DNA/RNA AMPLIFIED PROBE: CPT

## 2018-07-29 PROCEDURE — 84439 ASSAY OF FREE THYROXINE: CPT

## 2018-07-29 PROCEDURE — 82803 BLOOD GASES ANY COMBINATION: CPT

## 2018-07-29 PROCEDURE — 94660 CPAP INITIATION&MGMT: CPT

## 2018-07-29 PROCEDURE — 87081 CULTURE SCREEN ONLY: CPT

## 2018-07-29 PROCEDURE — 87486 CHLMYD PNEUM DNA AMP PROBE: CPT

## 2018-07-29 PROCEDURE — 87581 M.PNEUMON DNA AMP PROBE: CPT

## 2018-07-29 PROCEDURE — 82435 ASSAY OF BLOOD CHLORIDE: CPT

## 2018-07-29 PROCEDURE — 97116 GAIT TRAINING THERAPY: CPT

## 2018-07-29 PROCEDURE — 82947 ASSAY GLUCOSE BLOOD QUANT: CPT

## 2018-07-29 PROCEDURE — 85014 HEMATOCRIT: CPT

## 2018-07-29 PROCEDURE — 82962 GLUCOSE BLOOD TEST: CPT

## 2018-07-29 PROCEDURE — 80053 COMPREHEN METABOLIC PANEL: CPT

## 2018-07-29 PROCEDURE — 93306 TTE W/DOPPLER COMPLETE: CPT

## 2018-07-29 PROCEDURE — 84443 ASSAY THYROID STIM HORMONE: CPT

## 2018-07-29 PROCEDURE — 82330 ASSAY OF CALCIUM: CPT

## 2018-07-29 PROCEDURE — 93325 DOPPLER ECHO COLOR FLOW MAPG: CPT

## 2018-07-29 PROCEDURE — 85027 COMPLETE CBC AUTOMATED: CPT

## 2018-07-29 PROCEDURE — 87186 SC STD MICRODIL/AGAR DIL: CPT

## 2018-07-29 PROCEDURE — 83880 ASSAY OF NATRIURETIC PEPTIDE: CPT

## 2018-07-29 PROCEDURE — 87633 RESP VIRUS 12-25 TARGETS: CPT

## 2018-07-29 PROCEDURE — 81001 URINALYSIS AUTO W/SCOPE: CPT

## 2018-07-29 RX ORDER — METOPROLOL TARTRATE 50 MG
2 TABLET ORAL
Qty: 0 | Refills: 0 | DISCHARGE
Start: 2018-07-29

## 2018-07-29 RX ORDER — NIFEDIPINE 30 MG
1 TABLET, EXTENDED RELEASE 24 HR ORAL
Qty: 0 | Refills: 0 | COMMUNITY

## 2018-07-29 RX ORDER — WARFARIN SODIUM 2.5 MG/1
1 TABLET ORAL
Qty: 0 | Refills: 0 | COMMUNITY

## 2018-07-29 RX ORDER — NIFEDIPINE 30 MG
1 TABLET, EXTENDED RELEASE 24 HR ORAL
Qty: 0 | Refills: 0 | DISCHARGE
Start: 2018-07-29

## 2018-07-29 RX ORDER — METOPROLOL TARTRATE 50 MG
2 TABLET ORAL
Qty: 0 | Refills: 0 | COMMUNITY

## 2018-07-29 RX ORDER — POTASSIUM CHLORIDE 20 MEQ
40 PACKET (EA) ORAL ONCE
Qty: 0 | Refills: 0 | Status: COMPLETED | OUTPATIENT
Start: 2018-07-29 | End: 2018-07-29

## 2018-07-29 RX ADMIN — Medication 200 MILLIGRAM(S): at 05:48

## 2018-07-29 RX ADMIN — Medication 300 MILLIGRAM(S): at 09:09

## 2018-07-29 RX ADMIN — Medication 1: at 13:04

## 2018-07-29 RX ADMIN — Medication 40 MILLIEQUIVALENT(S): at 09:09

## 2018-07-29 RX ADMIN — Medication 1000 UNIT(S): at 09:09

## 2018-07-29 RX ADMIN — Medication 1: at 09:09

## 2018-07-29 RX ADMIN — Medication 1 TABLET(S): at 09:09

## 2018-07-29 RX ADMIN — Medication 60 MILLIGRAM(S): at 05:47

## 2018-07-29 RX ADMIN — Medication 150 MICROGRAM(S): at 05:47

## 2018-07-29 NOTE — PROGRESS NOTE ADULT - SUBJECTIVE AND OBJECTIVE BOX
SUBJECTIVE: Asymptomatic. Anxious for discharge.  	  MEDICATIONS:  metoprolol tartrate 200 milliGRAM(s) Oral two times a day  NIFEdipine XL 60 milliGRAM(s) Oral daily  tamsulosin 0.4 milliGRAM(s) Oral at bedtime  LORazepam     Tablet 2 milliGRAM(s) Oral at bedtime  allopurinol 300 milliGRAM(s) Oral daily  atorvastatin 20 milliGRAM(s) Oral at bedtime  dextrose 40% Gel 15 Gram(s) Oral once PRN  dextrose 50% Injectable 12.5 Gram(s) IV Push once  dextrose 50% Injectable 25 Gram(s) IV Push once  dextrose 50% Injectable 25 Gram(s) IV Push once  glucagon  Injectable 1 milliGRAM(s) IntraMuscular once PRN  insulin lispro (HumaLOG) corrective regimen sliding scale   SubCutaneous three times a day before meals  insulin lispro (HumaLOG) corrective regimen sliding scale   SubCutaneous at bedtime  levothyroxine 150 MICROGram(s) Oral daily  cholecalciferol 1000 Unit(s) Oral daily  dextrose 5%. 1000 milliLiter(s) IV Continuous <Continuous>  multivitamin 1 Tablet(s) Oral daily  sodium chloride 0.9%. 1000 milliLiter(s) IV Continuous <Continuous>      REVIEW OF SYSTEMS:    CONSTITUTIONAL: No fever, weight loss, or fatigue  EYES: No eye pain, visual disturbances, or discharge  NECK: No pain or stiffness  RESPIRATORY: No cough, wheezing, chills or hemoptysis; No Shortness of Breath  CARDIOVASCULAR: No chest pain, palpitations, dizziness, or leg swelling  GASTROINTESTINAL: No abdominal or epigastric pain. No nausea, vomiting, or hematemesis; No diarrhea or constipation. No melena or hematochezia.  GENITOURINARY: No dysuria, frequency, hematuria, or incontinence  NEUROLOGICAL: No headaches, memory loss, loss of strength, numbness, or tremors  SKIN: No itching, burning, rashes, or lesions   LYMPH Nodes: No enlarged glands  MUSCULOSKELETAL: No joint pain or swelling; No muscle, back, or extremity pain  All other review of systems are negative.      PHYSICAL EXAM:  T(C): 36.7 (07-29-18 @ 04:52), Max: 37.1 (07-28-18 @ 12:20)  HR: 65 (07-29-18 @ 08:52) (63 - 79)  BP: 111/62 (07-29-18 @ 04:52) (111/62 - 125/62)  RR: 18 (07-29-18 @ 04:52) (18 - 18)  SpO2: 97% (07-29-18 @ 08:52) (95% - 98%)  Wt(kg): --  I&O's Summary    28 Jul 2018 07:01  -  29 Jul 2018 07:00  --------------------------------------------------------  IN: 1100 mL / OUT: 800 mL / NET: 300 mL          PHYSICAL EXAM    Telemetry: AF 50-70    Appearance: Normal	  HEENT:   Normal oral mucosa, PERRL, EOMI	  NECK: Soft and supple, No LAD, No JVD  Cardiovascular: irregular 1/6 stevan  Respiratory: Lungs clear to auscultation	  Gastrointestinal:  Soft, Non-tender, + BS	  Skin: No rashes, No ecchymoses, No cyanosis  Neurologic: Non-focal  Extremities: No clubbing, cyanosis or edema  Vascular: Peripheral pulses palpable 2+ bilaterally    LABS:	 	                          14.1   6.7   )-----------( 182      ( 29 Jul 2018 06:44 )             43.7     07-29    139  |  103  |  10  ----------------------------<  159<H>  3.4<L>   |  23  |  0.98    Ca    9.2      29 Jul 2018 06:44

## 2018-07-29 NOTE — DISCHARGE NOTE ADULT - CARE PLAN
Principal Discharge DX:	SOB (shortness of breath)  Goal:	Stable  Assessment and plan of treatment:	SOB seems to be longstanding and not acute. suspect SOB to be multifactorial including morbid obesity ( BMI of 62 ) / deconditioning / possible  pul HTN and element of Afib with rvr. CTA : neg   Found to have actic acidosis with bacteremia ( staph coag neg )     s/p Vanco abx treatment   CORNELIUS neg and repeat BCx negative  Echo: NL EF,   NL Pul pressures ???  Secondary Diagnosis:	Afib  Assessment and plan of treatment:	with episodes of tachy and usha .. no further episodes however would arrange for 30 day monitor as o/p.   Continue with home coumadin and follow up with cardiologist Dr. Olvera or Dr. Johnson for INR in 1-2 days and Holter monitor test as OP  Secondary Diagnosis:	Hypothyroid  Assessment and plan of treatment:	TSH and free 4 :WNL  Continue with home regimen  Follow up with PCP

## 2018-07-29 NOTE — DISCHARGE NOTE ADULT - MEDICATION SUMMARY - MEDICATIONS TO CHANGE
I will SWITCH the dose or number of times a day I take the medications listed below when I get home from the hospital:    warfarin 2.5 mg oral tablet  -- 1 tab(s) by mouth once a day  (patients takes along with 5mg & 10mg = Total dose 17.5mg)    *Depending on INR*    warfarin 5 mg oral tablet  -- 1 tab(s) by mouth once a day  (patients takes along with 2.5mg & 10mg = Total dose 17.5mg)    *Depending on INR*    warfarin 10 mg oral tablet  -- 1 tab(s) by mouth once a day  (patients takes along with 5mg&2.5= Total dose 17.5mg)    *Depending on INR    NIFEdipine 60 mg oral tablet, extended release  -- 1 tab(s) by mouth 2 times a day    metoprolol tartrate 100 mg oral tablet  -- 2 tab(s) by mouth 2 times a day

## 2018-07-29 NOTE — DISCHARGE NOTE ADULT - HOME CARE AGENCY
[facility] [place]  [phone]  A visiting nurse will call to schedule an appointment to visit you in your home

## 2018-07-29 NOTE — PROGRESS NOTE ADULT - PROVIDER SPECIALTY LIST ADULT
Cardiology
Electrophysiology
Infectious Disease
Infectious Disease
Internal Medicine

## 2018-07-29 NOTE — PROGRESS NOTE ADULT - SUBJECTIVE AND OBJECTIVE BOX
Patient is a 68y old  Male who presents with a chief complaint of shortness of breath (2018 12:12)                                                               INTERVAL HPI/OVERNIGHT EVENTS:    REVIEW OF SYSTEMS:     CONSTITUTIONAL: No weakness, fevers  RESPIRATORY: No cough, wheezing,  No shortness of breath  CARDIOVASCULAR: No chest pain or palpitations  GASTROINTESTINAL: No abdominal pain  . No nausea, vomiting, or hematemesis; No diarrhea or constipation. No melena or hematochezia.  GENITOURINARY: No dysuria, frequency or hematuria  NEUROLOGICAL: No numbness or weakness                                                                                                                                                                                                                                                                                    Medications:  MEDICATIONS  (STANDING):  allopurinol 300 milliGRAM(s) Oral daily  atorvastatin 20 milliGRAM(s) Oral at bedtime  cholecalciferol 1000 Unit(s) Oral daily  dextrose 5%. 1000 milliLiter(s) (50 mL/Hr) IV Continuous <Continuous>  dextrose 50% Injectable 12.5 Gram(s) IV Push once  dextrose 50% Injectable 25 Gram(s) IV Push once  dextrose 50% Injectable 25 Gram(s) IV Push once  insulin lispro (HumaLOG) corrective regimen sliding scale   SubCutaneous three times a day before meals  insulin lispro (HumaLOG) corrective regimen sliding scale   SubCutaneous at bedtime  levothyroxine 150 MICROGram(s) Oral daily  LORazepam     Tablet 2 milliGRAM(s) Oral at bedtime  metoprolol tartrate 200 milliGRAM(s) Oral two times a day  multivitamin 1 Tablet(s) Oral daily  NIFEdipine XL 60 milliGRAM(s) Oral daily  sodium chloride 0.9%. 1000 milliLiter(s) (100 mL/Hr) IV Continuous <Continuous>  tamsulosin 0.4 milliGRAM(s) Oral at bedtime    MEDICATIONS  (PRN):  dextrose 40% Gel 15 Gram(s) Oral once PRN Blood Glucose LESS THAN 70 milliGRAM(s)/deciliter  glucagon  Injectable 1 milliGRAM(s) IntraMuscular once PRN Glucose LESS THAN 70 milligrams/deciliter       Allergies    metformin (Unknown)    Intolerances      Vital Signs Last 24 Hrs  T(C): 36.7 (2018 04:52), Max: 36.7 (2018 04:52)  T(F): 98 (2018 04:52), Max: 98 (2018 04:52)  HR: 64 (2018 17:48) (63 - 65)  BP: 111/62 (2018 04:52) (111/62 - 111/62)  BP(mean): --  RR: 18 (2018 04:52) (18 - 18)  SpO2: 98% (2018 17:48) (96% - 98%)  CAPILLARY BLOOD GLUCOSE      POCT Blood Glucose.: 169 mg/dL (2018 12:34)  POCT Blood Glucose.: 168 mg/dL (2018 08:47)       @ 07: @ 07:00  --------------------------------------------------------  IN: 1100 mL / OUT: 800 mL / NET: 300 mL     @ : @ 21:31  --------------------------------------------------------  IN: 720 mL / OUT: 300 mL / NET: 420 mL      Physical Exam:    Daily Weight in k.7 (2018 04:52)  General: obese NAD   HEENT:  Nonicteric, PERRLA  CV:  RRR, S1S2   Lungs:  CTA B/L,  Abdomen:  Soft, non-tender, no distended, positive BS  Extremities:  2+ pulses, no c/c, no edema  Skin:  Warm and dry, no rashes  :  No selvin  Neuro:  AAOx3, non-focal, grossly intact                                                                                                                                                                                                                                                                                                LABS:                               14.1   6.7   )-----------( 182      ( 2018 06:44 )             43.7                          139  |  103  |  10  ----------------------------<  159<H>  3.4<L>   |  23  |  0.98    Ca    9.2      2018 06:44

## 2018-07-29 NOTE — PROGRESS NOTE ADULT - ASSESSMENT
68 M w Afib, BPH, HLD, TIA, Hypothyroidism, gout , DANIELLE, HTN sent to the ED by Dr Johnson for complaint of dyspnea for 2 days. pt is poor hisotrian and reports multiple different complaints.  he reports taht his SOB is not new and has had it on and off for sometime.. No CP /palpitaitons .. not always related to exertion or position..  and has not changed over the past two days .. when asked what changed he reports generalized weakness acutely yesterday /episode of sweating but no fever or chills ( though he says his temp was " higher than his baselin "). denies cough , but reports Rhinorrhea, mild N but no vomitting and has been having non bloody diarreah over the past few days.. no abd pain but reports frequency and urgency.  he was exposed to family members with strep but he denies any sore throat /HA or congestion .  reports occasional abd discomfort after "heavy meal " but not consistent   in ED : mild leukocytosis noted .  trop neg ,  , CTA done and pending. INR theraputic..   elevated lactate   1- SOB : seems to be at baseline    CTA : neg   SOB seems to be longstanding and not acute  . suspect SOB to be multifactorial including nmorbid obesity ( BMI of 62 ) / deconditioning / possible  pul HTN and element of afib with rvr      Echo: NL EF     2- lactic acidosis : now with bacteremia ( staph coag neg )     CORNELIUS was done ( ordered by NP ) :  neg   repeat cultures : consistent with contaminant..d/w    3- DANIELLE: cont CPAP .. need f/u withpul as o/p. d/w pt   4- hypothyroid : TSH and free 4 :WNL  5- afib :   with brief episode of tachy and usha ..   cont AC and rte control   recommend 30 day holter as o/p.   f/u with cardio as o/p.

## 2018-07-29 NOTE — DISCHARGE NOTE ADULT - MEDICATION SUMMARY - MEDICATIONS TO TAKE
I will START or STAY ON the medications listed below when I get home from the hospital:    tamsulosin 0.4 mg oral capsule  -- 1 cap(s) by mouth once a day  -- Indication: For BPH (benign prostatic hyperplasia)    warfarin 10 mg oral tablet  -- 1 tab(s) by mouth once a day  (patients takes along with 5mg&2.5= Total dose 13mg) daily and adjust depending on INR in 1-2 days       *Depending on INR   -- Indication: For Afib    LORazepam 2 mg oral tablet  -- 2 tab(s) by mouth once a day (at bedtime)  -- Indication: For Anxiety    glimepiride 2 mg oral tablet  -- 1 tab(s) by mouth once a day  -- Indication: For DM    allopurinol 300 mg oral tablet  -- 1 tab(s) by mouth once a day  -- Indication: For Gout    atorvastatin 20 mg oral tablet  -- 1 tab(s) by mouth once a day  -- Indication: For Antilipid    metoprolol tartrate 100 mg oral tablet  -- 2 tab(s) by mouth 2 times a day  -- Indication: For CV agent    NIFEdipine 60 mg oral tablet, extended release  -- 1 tab(s) by mouth once a day  -- Indication: For CV agent    furosemide 40 mg oral tablet  -- 1 tab(s) by mouth once a day  -- Indication: For Diuretic    potassium chloride 10 mEq oral tablet, extended release  -- 1 tab(s) by mouth once a day  -- Indication: For Supplemet    levothyroxine 150 mcg (0.15 mg) oral tablet  -- 1 tab(s) by mouth once a day  -- Indication: For Hypothyroid    Vitamin B Complex oral tablet  -- 1 tab(s) by mouth 3 times a week  -- Indication: For Supplement    Vitamin D3 5000 intl units oral tablet  -- 1 tab(s) by mouth once a day  -- Indication: For Supplement

## 2018-07-29 NOTE — DISCHARGE NOTE ADULT - ADDITIONAL INSTRUCTIONS
Please follow up with Dr. Olvera or Dr. Johnson for INR in 1-2 days and Coumadin adjustment and also for Holter monitor testing as outpatient Please follow up with Dr. Olvera or Dr. Johnson for INR in 1-2 days and Coumadin adjustment and also for Holter monitor testing as outpatient  Take 13mg of Coumadin and adjust according to INR

## 2018-07-29 NOTE — PROGRESS NOTE ADULT - ASSESSMENT
#AF  #BPH  #HL  #Hypothyroid  #DANIELLE  #HTN  #morbid obesity  #Coag negative staph, unremarkable milly.  Stable cv perspective.

## 2018-07-29 NOTE — DISCHARGE NOTE ADULT - CARE PROVIDER_API CALL
Lorne Keller), Internal Medicine  [address] [place] [address]  Phone: [phone]  Fax: [phone]    Krishan Olvera (MD), Cardiovascular Disease; Internal Medicine  [address] [facility] [place]  [address]  [place], [place] [id number]  Phone: [phone]  Fax: [phone]

## 2018-07-29 NOTE — DISCHARGE NOTE ADULT - HOSPITAL COURSE
69 y/o M w Afib, BPH, HLD, TIA, Hypothyroidism, gout , DANIELLE, HTN sent to the ED by Dr Johnson for complaint of dyspnea for 2 days. Pt is poor historian and reports multiple different complaints.  He reports that his SOB is not new and has had it on and off for sometime.. No CP palpitations .. not always related to exertion or position..  and has not changed over the past two days .. when asked what changed he reports generalized weakness acutely yesterday /episode of sweating but no fever or chills ( though he says his temp was " higher than his baseline "). Denies cough , but reports Rhinorrhea, mild N but no vomiting and has been having non bloody diarrhea over the past few days.. no abd pain but reports frequency and urgency.  He was exposed to family members with strep but he denies any sore throat /HA or congestion .  reports occasional abd discomfort after "heavy meal " but not consistent   in ED : mild leukocytosis noted .  trop neg ,  , CTA done and pending. INR theraputic..   elevated lactate   1- SOB : seems to be at baseline    CTA : neg   SOB seems to be longstanding and not acute. suspect SOB to be multifactorial including morbid obesity ( BMI of 62 ) / deconditioning / possible  pul HTN and element of Afib with rvr   check O2 on RA on exertion with PT    Echo: NL EF ;;;    NL Pul pressures ???   2- lactic acidosis : now with bacteremia ( staph coag neg )     cont vanco   CORNELIUS neg and repeat cultures negative  3- DANIELLE: cont CPAP   4- hypothyroid : TSH and free 4 :WNL  5- Afib:   with episodes of tachy and usha .. no further episodes however would arrange for 30 day monitor as o/p with Dr. Olvera (Cards)  Pt will be discharge with follow up with Dr. Olvera (cardiologist) for INR check in 1-2 days and Holter monitor test as OP and also Holter monitor testing

## 2018-07-29 NOTE — DISCHARGE NOTE ADULT - PATIENT PORTAL LINK FT
You can access the [facility] Patient Portal, offered by [facility], by registering with the following website: [email or web address]

## 2018-07-29 NOTE — DISCHARGE NOTE ADULT - PLAN OF CARE
TSH and free 4 :WNL  Continue with home regimen  Follow up with PCP Stable SOB seems to be longstanding and not acute. suspect SOB to be multifactorial including morbid obesity ( BMI of 62 ) / deconditioning / possible  pul HTN and element of Afib with rvr. CTA : neg   Found to have actic acidosis with bacteremia ( staph coag neg )     s/p Vanco abx treatment   CORNELIUS neg and repeat BCx negative  Echo: NL EF,   NL Pul pressures ??? with episodes of tachy and usha .. no further episodes however would arrange for 30 day monitor as o/p.   Continue with home coumadin and follow up with cardiologist Dr. Olvera or Dr. Johnson for INR in 1-2 days and Holter monitor test as OP

## 2018-07-30 LAB
CULTURE RESULTS: SIGNIFICANT CHANGE UP
SPECIMEN SOURCE: SIGNIFICANT CHANGE UP

## 2018-08-02 LAB
CULTURE RESULTS: SIGNIFICANT CHANGE UP
CULTURE RESULTS: SIGNIFICANT CHANGE UP
SPECIMEN SOURCE: SIGNIFICANT CHANGE UP
SPECIMEN SOURCE: SIGNIFICANT CHANGE UP

## 2018-10-29 PROBLEM — N40.0 BENIGN PROSTATIC HYPERPLASIA WITHOUT LOWER URINARY TRACT SYMPTOMS: Chronic | Status: ACTIVE | Noted: 2018-07-23

## 2018-10-29 PROBLEM — E03.9 HYPOTHYROIDISM, UNSPECIFIED: Chronic | Status: ACTIVE | Noted: 2018-07-23

## 2018-11-03 NOTE — PATIENT PROFILE ADULT. - BILL OF RIGHTS/ADMISSION INFORMATION PROVIDED TO:
accompanied by father with c/o   dog bite  pt father states its a shitzu and they own the dog. immunization up to date.pt is mentally challenge but calm and cooperative
Patient

## 2018-11-07 ENCOUNTER — APPOINTMENT (OUTPATIENT)
Dept: OTOLARYNGOLOGY | Facility: CLINIC | Age: 68
End: 2018-11-07
Payer: COMMERCIAL

## 2018-11-07 VITALS — WEIGHT: 315 LBS | HEIGHT: 74 IN | BODY MASS INDEX: 40.43 KG/M2

## 2018-11-07 DIAGNOSIS — I10 ESSENTIAL (PRIMARY) HYPERTENSION: ICD-10-CM

## 2018-11-07 DIAGNOSIS — J30.9 ALLERGIC RHINITIS, UNSPECIFIED: ICD-10-CM

## 2018-11-07 DIAGNOSIS — R09.82 POSTNASAL DRIP: ICD-10-CM

## 2018-11-07 PROCEDURE — 31575 DIAGNOSTIC LARYNGOSCOPY: CPT

## 2018-11-07 PROCEDURE — 99204 OFFICE O/P NEW MOD 45 MIN: CPT | Mod: 25

## 2018-11-28 ENCOUNTER — APPOINTMENT (OUTPATIENT)
Dept: PULMONOLOGY | Facility: CLINIC | Age: 68
End: 2018-11-28

## 2018-12-06 ENCOUNTER — APPOINTMENT (OUTPATIENT)
Dept: PULMONOLOGY | Facility: CLINIC | Age: 68
End: 2018-12-06

## 2018-12-14 ENCOUNTER — APPOINTMENT (OUTPATIENT)
Dept: PULMONOLOGY | Facility: CLINIC | Age: 68
End: 2018-12-14

## 2019-01-03 ENCOUNTER — APPOINTMENT (OUTPATIENT)
Dept: GASTROENTEROLOGY | Facility: CLINIC | Age: 69
End: 2019-01-03
Payer: COMMERCIAL

## 2019-01-03 VITALS
BODY MASS INDEX: 40.43 KG/M2 | SYSTOLIC BLOOD PRESSURE: 124 MMHG | RESPIRATION RATE: 12 BRPM | OXYGEN SATURATION: 94 % | DIASTOLIC BLOOD PRESSURE: 92 MMHG | WEIGHT: 315 LBS | HEIGHT: 74 IN | HEART RATE: 66 BPM

## 2019-01-03 DIAGNOSIS — R10.13 EPIGASTRIC PAIN: ICD-10-CM

## 2019-01-03 PROCEDURE — 99204 OFFICE O/P NEW MOD 45 MIN: CPT

## 2019-01-03 RX ORDER — LEVOTHYROXINE SODIUM 0.11 MG/1
112 TABLET ORAL
Qty: 30 | Refills: 0 | Status: DISCONTINUED | COMMUNITY
Start: 2016-11-09 | End: 2019-01-03

## 2019-01-03 NOTE — CONSULT LETTER
[Dear  ___] : Dear  [unfilled], [Consult Letter:] : I had the pleasure of evaluating your patient, [unfilled]. [Please see my note below.] : Please see my note below. [Consult Closing:] : Thank you very much for allowing me to participate in the care of this patient.  If you have any questions, please do not hesitate to contact me. [Sincerely,] : Sincerely, [FreeTextEntry1] : Super morbid obesity. A small TX. Dyspepsia. Gallstones, asymptomatic. Internal hemorrhoids and rectal polyp. 2. Many comorbidities to justify endoscopic procedures. Empiric trial of PPI medication for possible LPR advised.   [FreeTextEntry3] : Juan Armstrong MD FACG\par

## 2019-01-03 NOTE — ASSESSMENT
[FreeTextEntry1] : Super obese male, with atrial fibrillation, and on chronic anticoagulation with multiple other comorbidities, including diabetes, hypertension, hyperlipidemia, and gout. Distant endoscopy done 15 years ago, had shown small hiatus hernia and esophagitis. Now, having symptoms that may represent LPR. Recent ENT evaluation and tri-scopy examination negative. The response to nasal steroids.\par Patient remains a considerable high risk for elective endoscopic procedures and sedation. Endoscopy and LPR patients is usually negative. Will to be gained by performance of an endoscopy at this juncture. Advised antireflux diet and trial of PPI medication, pantoprazole 40 mg p.o. q.d. for 6 months. Office followup here in 3 months. Weight reduction advisable.\par Regarding postprandial symptom of fullness and dyspepsia. No prior history of H. pylori. Small hiatus hernia had been noted on endoscopy 13 years ago. CAT scan in 2014 had shown the presence of gallstones. Patient is not having symptoms to suggest active biliary colic or pancreatitis. All recent blood work done one month ago is normal, including LFTs. Not an endoscopy candidate.\par Personal history of rectal polyp and internal hemorrhoids. Currently asymptomatic from a lower GI point of view. Due to morbid obesity and  obstructive sleep apnea, patient is not a candidate for a surveillance colonoscopy.

## 2019-01-03 NOTE — PHYSICAL EXAM
[General Appearance - Alert] : alert [General Appearance - In No Acute Distress] : in no acute distress [Sclera] : the sclera and conjunctiva were normal [PERRL With Normal Accommodation] : pupils were equal in size, round, and reactive to light [Extraocular Movements] : extraocular movements were intact [Outer Ear] : the ears and nose were normal in appearance [Oropharynx] : the oropharynx was normal [Neck Appearance] : the appearance of the neck was normal [Neck Cervical Mass (___cm)] : no neck mass was observed [Jugular Venous Distention Increased] : there was no jugular-venous distention [Thyroid Diffuse Enlargement] : the thyroid was not enlarged [Thyroid Nodule] : there were no palpable thyroid nodules [Auscultation Breath Sounds / Voice Sounds] : lungs were clear to auscultation bilaterally [Heart Rate And Rhythm] : heart rate was normal and rhythm regular [Heart Sounds] : normal S1 and S2 [Heart Sounds Gallop] : no gallops [Murmurs] : no murmurs [Heart Sounds Pericardial Friction Rub] : no pericardial rub [Bowel Sounds] : normal bowel sounds [Abdomen Soft] : soft [Abdomen Tenderness] : non-tender [] : no hepato-splenomegaly [Abdomen Mass (___ Cm)] : no abdominal mass palpated [FreeTextEntry1] : massively protuberant. No HSM. No MTR. Patient examined, and wheelchair, and unable to get onto examining table. Therefore, no rectal exam performed.

## 2019-01-03 NOTE — HISTORY OF PRESENT ILLNESS
[FreeTextEntry1] : 68-year-old white male, with super obesity, atrial fibrillation, on chronic anticoagulation, hypertension, hyperlipidemia, hypothyroidism, type 2 diabetes, osteoarthritis, obstructive sleep apnea, gout, hemorrhoids, and distally, documented to have esophagitis and small hiatus hernia on upper endoscopy in 2005 by Dr. Watkins. Soft to biopsy consistent with reflux disease and gastric biopsy negative for H. pylori i.e., saud negative. \par Patient currently comes presents with complaints of throat clearing and pharyngitis. Notes some cough and some low-grade wheezing. Denies acid burning of the esophagus or reflux into the mouth. Denies any voice changes. Denies any laryngitis. Patient was seen by ENT doctor felt to months ago and had flexible fiberoptic tri-scope examination, which was normal. Patient was started on Flonase, and is due for followup.\par Patient subsequently denies any heartburn or regurgitation. There is no dysphagia or odynophagia. He is also complaining of epigastric fullness in the postprandial state and shortness of breath with eating. Weight has been consistently between 480 and 500 pounds. Unable to weigh patient today.\par Patient states he is able to sleep on his side and briefly onto his back. No nocturnal regurgitation symptoms. No GI bleeding. Patient is on chronic anticoagulation for atrial fibrillation, and monitors his finger stick INR levels. He denies postprandial coughing, shortness of breath, chest pain or pleuritic pain. No right upper quadrant pain. A distant CAT scan in 2014 had shown the presence of gallstones, and an uninflamed gallbladder. No other recent imaging. No PPI or H2 RA trials.\par Patient previously been seen for hematochezia in 2016. His last colonoscopy was in 2007 by Dr. Ortiz and internal hemorrhoids and a small rectal polyp were identified. No pathology report available. No family history for colorectal neoplasia. Currently, passing regular formed bowel movements without bleeding. No diarrhea.

## 2019-01-05 ENCOUNTER — EMERGENCY (EMERGENCY)
Facility: HOSPITAL | Age: 69
LOS: 1 days | Discharge: HOME | End: 2019-01-05
Attending: EMERGENCY MEDICINE
Payer: COMMERCIAL

## 2019-01-05 VITALS
HEART RATE: 62 BPM | TEMPERATURE: 98 F | RESPIRATION RATE: 20 BRPM | OXYGEN SATURATION: 96 % | DIASTOLIC BLOOD PRESSURE: 84 MMHG | SYSTOLIC BLOOD PRESSURE: 131 MMHG | WEIGHT: 315 LBS | HEIGHT: 74 IN

## 2019-01-05 VITALS
TEMPERATURE: 98 F | SYSTOLIC BLOOD PRESSURE: 152 MMHG | DIASTOLIC BLOOD PRESSURE: 88 MMHG | OXYGEN SATURATION: 96 % | HEART RATE: 67 BPM | RESPIRATION RATE: 20 BRPM

## 2019-01-05 PROCEDURE — 99283 EMERGENCY DEPT VISIT LOW MDM: CPT | Mod: 25

## 2019-01-05 PROCEDURE — 99282 EMERGENCY DEPT VISIT SF MDM: CPT

## 2019-01-05 RX ORDER — ASPIRIN/CALCIUM CARB/MAGNESIUM 324 MG
81 TABLET ORAL ONCE
Qty: 0 | Refills: 0 | Status: DISCONTINUED | OUTPATIENT
Start: 2019-01-05 | End: 2019-01-05

## 2019-01-05 NOTE — ED PROVIDER NOTE - DISCHARGE REVIEW MATERIAL PRESENTED
Zulma Bernard is a 53 year old female presenting for   Chief Complaint   Patient presents with   • Follow-up     med check     Reports Latex allergy or sensitivity.    Medication verified and med list updated  Refills needed today: Yes    Health Maintenance Summary     Topic Due On Due Status Completed On    Colorectal Cancer Screening - Colonoscopy Dec 13, 2024 Not Due Dec 13, 2014    MAMMOGRAM - BREAST CANCER SCREENING Jan 18, 2012 Overdue Jan 18, 2011    Pap Smear - Cervical Cancer Screening  Nov 21, 2019 Not Due Nov 21, 2014    Immunization - TDAP Pregnancy  Hidden      May 2, 1983 Overdue     IMMUNIZATION - DTaP/Tdap/Td Jan 6, 2019 Not Due Jan 6, 2009    Immunization-Influenza Sep 1, 2018 Not Due     Hepatitis C Screening May 2, 2015 Overdue         Patient is due for topics as listed above, she wishes to discuss with provider.    Last lab results:   No results found for: HGBA1C  CHOLESTEROL (mg/dL)   Date Value   05/05/2017 249 (H)     HDL (mg/dL)   Date Value   05/05/2017 120     TRIGLYCERIDE (mg/dL)   Date Value   05/05/2017 48     CALCULATED LDL (mg/dL)   Date Value   05/05/2017 119     No results found for: URMIC, UCR, MALBCR  No results found for: IFOB                   .

## 2019-01-05 NOTE — ED PROVIDER NOTE - PHYSICAL EXAMINATION
General: well appearing, interactive, well nourished, no apparent distress, speaking full sentences  HEENT: EOMI, PERRLA, normal mucosa, normal oropharynx, no lesions on the lips or on oral mucosa, normal external ear  Neck: supple, no lymphadenopathy, full range of motion  CV: RRR, normal S1 and S2 with no murmur, capillary refill less than two seconds  Resp: lungs CTA b/l, good aeration bilaterally, symmetric chest wall   Abd: non-distended, soft, non-tender, normoactive bowel sounds  : no CVA tenderness  MSK: full range of motion, no cyanosis, no edema, no clubbing, no immobility  Neuro: CN2-12 intact b/l. EOMI. 5/5 strength in UE and LE b/l.  Sensation intact in UE/LE b/l b/l.  Able to perform NIKKY/FTN/HTS. Gait nl   Skin: no rashes, skin intact

## 2019-01-05 NOTE — ED PROVIDER NOTE - MEDICAL DECISION MAKING DETAILS
69 yo m presents after resolved episode of difficulty swallowing. pt well appearing, benign physical exam. reassess.

## 2019-01-05 NOTE — ED PROVIDER NOTE - PROGRESS NOTE DETAILS
engaged in shared decision making with pt, decided not to ct head at this time. pt will f/u with tia clinic and will begin taking baby asa.  - Medhat Tirado D.O. PGY1 engaged in shared decision making with pt, decided not to ct head at this time. pt will f/u with tia clinic and will not take asa 81 mg due to being on warfarin already.  - Medhat Tirado D.O. PGY1

## 2019-01-05 NOTE — ED PROVIDER NOTE - ATTENDING CONTRIBUTION TO CARE
Afebrile. Awake and Alert. Morbidly obese. Lungs CTA. Heart RRR. Abdomen soft NTND. Neurologic exam: A&O x3, speech clear, JUANA, CN II-XII intact, motor strength +5/5 in all extremities, sensation equal bilaterally, finger-to-nose normal.    PMH of TIA which presented with dysphagia, facial and hand numbness. Pt currently on statin, beta blocker and coumadin.    Dysphagia currently resolved and neuro exam non-focal. Discussed limitations of Afebrile. Awake and Alert. Morbidly obese. Lungs CTA. Heart RRR. Abdomen soft NTND. Neurologic exam: A&O x3, speech clear, JUANA, CN II-XII intact, motor strength +5/5 in all extremities, sensation equal bilaterally, finger-to-nose normal.    PMH of TIA which presented with dysphagia, facial and hand numbness. Pt currently on statin, beta blocker and coumadin.    Dysphagia currently resolved and neuro exam non-focal. Discussed limitations of CT Head in setting of TIA/resolved Sx. Recommended f/u with [facility] TIA clinic and strict return to ED if Sx reoccur. Pt currently on appropriate TIA ppx meds. Pt understood and choose to defer CT Head. Will f/u with TIA clinic.

## 2019-01-05 NOTE — ED ADULT NURSE NOTE - NSIMPLEMENTINTERV_GEN_ALL_ED
Implemented All Fall Risk Interventions:  [place] to call system. Call bell, personal items and telephone within reach. Instruct patient to call for assistance. Room bathroom lighting operational. Non-slip footwear when patient is off stretcher. Physically safe environment: no spills, clutter or unnecessary equipment. Stretcher in lowest position, wheels locked, appropriate side rails in place. Provide visual cue, wrist band, yellow gown, etc. Monitor gait and stability. Monitor for mental status changes and reorient to person, place, and time. Review medications for side effects contributing to fall risk. Reinforce activity limits and safety measures with patient and family.

## 2019-01-05 NOTE — ED PROVIDER NOTE - OBJECTIVE STATEMENT
69 yo pmh htn, hld, dm, tia, obesity, presents after episode of difficulty swallowing. pt states this morning at 01:30 he used nasal glucocorticoid spray, laid down to sleep and had one minute episode where he attempted to swallow five times and was unable. states during episode his tongue felt a numb sensation. pt reports similar hx many years ago when he was dx with a TIA. pt reports episode has fully resolved. pt reports recent b/l leg weakness. denies ha, cp, sob, n/v, trauma. 69 yo pmh htn, hld, dm, tia, obesity, presents after episode of difficulty swallowing. pt states this morning at 01:30 he used nasal glucocorticoid spray, laid down to sleep on his CPAP and had one minute episode where he attempted to swallow five times and was unable. states during episode his tongue felt a numb sensation. pt reports similar hx many years ago when he was dx with a TIA. pt reports episode has fully resolved. pt reports recent b/l leg weakness. denies ha, cp, sob, n/v, trauma.

## 2019-01-05 NOTE — ED PROVIDER NOTE - NS ED ROS FT
GENERAL: No fever or chills, //             EYES: no change in vision, //             HEENT: no trouble swallowing or speaking, //             CARDIAC: no chest pain, //              PULMONARY: no cough or SOB, //             GI: no abdominal pain, no nausea or no vomiting, no diarrhea or constipation, //             : No changes in urination,  //            SKIN: no rashes,  //            NEURO: no headache,  //             MSK: No joint pain otherwise as HPI or negative. ~Medhat Tirado DO PGY1

## 2019-01-05 NOTE — ED ADULT NURSE NOTE - OBJECTIVE STATEMENT
pt is 68 year old male c/o one episode of difficulty swallowing that happened around 130am tonight, pt just used nasal spray (which he used before) and thinks he may have used too much, pt tried to swallow his metoprolol 5 times with difficulty and had a weird sensation in his mouth, pt also reports b/l leg weakness, pt has history of tia 2o years ago and is on coumadin for afib, no fevers, no headache, no blurred vision, no facial droop or slurred speech +equal sensation present

## 2019-01-05 NOTE — ED PROVIDER NOTE - NSFOLLOWUPINSTRUCTIONS_ED_ALL_ED_FT
1) Please follow up with your Primary Care Provider in 24-48 hours  2) Seek immediate medical care for any new or returning symptoms including but not limited to continued  symptoms, numbness, difficulty walking  3) Take Aspirin 81 mg once a day unless otherwise directed by your physician  4) Please follow up with [facility]. They can be contacted at: [phone]. Their address is:  [address] [place] [address].  [address]  [facility] [address] 1) Please follow up with your Primary Care Provider in 24-48 hours  2) Seek immediate medical care for any new or returning symptoms including but not limited to continued  symptoms, numbness, difficulty walking  3) Please follow up with [facility]. They can be contacted at: [phone]. Their address is:  [address] [place] [address].  [address]  [facility] [address]

## 2019-01-09 ENCOUNTER — APPOINTMENT (OUTPATIENT)
Dept: GASTROENTEROLOGY | Facility: CLINIC | Age: 69
End: 2019-01-09

## 2019-01-10 ENCOUNTER — APPOINTMENT (OUTPATIENT)
Dept: NEUROLOGY | Facility: CLINIC | Age: 69
End: 2019-01-10

## 2019-01-28 ENCOUNTER — RX CHANGE (OUTPATIENT)
Age: 69
End: 2019-01-28

## 2019-01-28 DIAGNOSIS — K64.9 UNSPECIFIED HEMORRHOIDS: ICD-10-CM

## 2019-01-29 ENCOUNTER — INPATIENT (INPATIENT)
Facility: HOSPITAL | Age: 69
LOS: 0 days | Discharge: HOME | DRG: 394 | End: 2019-01-30
Attending: STUDENT IN AN ORGANIZED HEALTH CARE EDUCATION/TRAINING PROGRAM | Admitting: INTERNAL MEDICINE
Payer: COMMERCIAL

## 2019-01-29 VITALS
SYSTOLIC BLOOD PRESSURE: 154 MMHG | WEIGHT: 315 LBS | DIASTOLIC BLOOD PRESSURE: 87 MMHG | OXYGEN SATURATION: 95 % | HEIGHT: 74 IN | RESPIRATION RATE: 20 BRPM | HEART RATE: 84 BPM | TEMPERATURE: 98 F

## 2019-01-29 LAB
BASOPHILS # BLD AUTO: 0.1 K/UL — SIGNIFICANT CHANGE UP (ref 0–0.2)
BASOPHILS NFR BLD AUTO: 0.6 % — SIGNIFICANT CHANGE UP (ref 0–2)
EOSINOPHIL # BLD AUTO: 0.1 K/UL — SIGNIFICANT CHANGE UP (ref 0–0.5)
EOSINOPHIL NFR BLD AUTO: 1.1 % — SIGNIFICANT CHANGE UP (ref 0–6)
HCT VFR BLD CALC: 50.2 % — HIGH (ref 39–50)
HGB BLD-MCNC: 17.9 G/DL — HIGH (ref 13–17)
LYMPHOCYTES # BLD AUTO: 17.8 % — SIGNIFICANT CHANGE UP (ref 13–44)
LYMPHOCYTES # BLD AUTO: 2.1 K/UL — SIGNIFICANT CHANGE UP (ref 1–3.3)
MCHC RBC-ENTMCNC: 29.5 PG — SIGNIFICANT CHANGE UP (ref 27–34)
MCHC RBC-ENTMCNC: 35.7 GM/DL — SIGNIFICANT CHANGE UP (ref 32–36)
MCV RBC AUTO: 82.9 FL — SIGNIFICANT CHANGE UP (ref 80–100)
MONOCYTES # BLD AUTO: 0.7 K/UL — SIGNIFICANT CHANGE UP (ref 0–0.9)
MONOCYTES NFR BLD AUTO: 5.9 % — SIGNIFICANT CHANGE UP (ref 2–14)
NEUTROPHILS # BLD AUTO: 9 K/UL — HIGH (ref 1.8–7.4)
NEUTROPHILS NFR BLD AUTO: 74.6 % — SIGNIFICANT CHANGE UP (ref 43–77)
PLATELET # BLD AUTO: 213 K/UL — SIGNIFICANT CHANGE UP (ref 150–400)
RBC # BLD: 6.06 M/UL — HIGH (ref 4.2–5.8)
RBC # FLD: 15 % — HIGH (ref 10.3–14.5)
WBC # BLD: 12 K/UL — HIGH (ref 3.8–10.5)
WBC # FLD AUTO: 12 K/UL — HIGH (ref 3.8–10.5)

## 2019-01-29 PROCEDURE — 99285 EMERGENCY DEPT VISIT HI MDM: CPT

## 2019-01-29 NOTE — ED ADULT TRIAGE NOTE - CHIEF COMPLAINT QUOTE
L abd pain, rectal bleeding since tonight "I think I burst a hemorrhoid from straining"  on Coumadin

## 2019-01-29 NOTE — ED PROVIDER NOTE - NS ED ROS FT
General: No fevers / chills  HENT: No head trauma, ear pain, runny nose, or sore throat  Eyes: No visual changes  CP: No chest pain, palpitations, or light headedness  Resp: No shortness of breath, no cough  GI: No abdominal pain, +rectal bleeding   : No urinary fz, dysuria, or hematuria  Neuro: No numbness, tingling, or weakness  Endo: No hx of diabetes  Heme: No hx of easy bleeding or bruising

## 2019-01-29 NOTE — ED PROVIDER NOTE - ATTENDING CONTRIBUTION TO CARE
Patient presenting complaining of rectal bleeding.  On coumadin for A fib.  No prior history of rectal bleeding.  Last colonoscopy approx 5 years ago and no abnormalities noted per patient.  Reporting was sitting down and felt something wet, wiped with a tissue and found bright red blood.  No associated nausea or vomiting.    A 14 point review of systems is negative except as in HPI or otherwise documented.    Exam:  General: Patient well appearing, vital signs within normal limits, morbidly obese  HEENT: airway patent with moist mucous membranes  Cardiac: irregularly irregular S1/S2 with strong peripheral pulses  Respiratory: lungs clear without respiratory distress  GI: abdomen soft, non tender, non distended, rectal exam with bright red blood externally with small clot present, no noted hemorrhoids externally, stool brown  Neuro: no gross neurologic deficits  Skin: warm, well perfused  Psych: normal mood and affect    Patient presenting with rectal bleeding most consistent with lower source, vital signs normal - plan for labs, INR, monitor for rebleeding, discuss with his gastroenterologist

## 2019-01-29 NOTE — ED ADULT NURSE NOTE - NSIMPLEMENTINTERV_GEN_ALL_ED
Implemented All Fall with Harm Risk Interventions:  [place] to call system. Call bell, personal items and telephone within reach. Instruct patient to call for assistance. Room bathroom lighting operational. Non-slip footwear when patient is off stretcher. Physically safe environment: no spills, clutter or unnecessary equipment. Stretcher in lowest position, wheels locked, appropriate side rails in place. Provide visual cue, wrist band, yellow gown, etc. Monitor gait and stability. Monitor for mental status changes and reorient to person, place, and time. Review medications for side effects contributing to fall risk. Reinforce activity limits and safety measures with patient and family. Provide visual clues: red socks.

## 2019-01-29 NOTE — ED PROVIDER NOTE - MEDICAL DECISION MAKING DETAILS
Pt p/w brbpr, rectal exam with brown stool, though residual blood on the skin, pt on warfarin, will check INR, basic labs, pending occult, no urgent need for imaging at this time, abd soft NT, hd stable well appearing otherwise  Marianela Xie, PGY-2 EM

## 2019-01-29 NOTE — ED ADULT NURSE NOTE - OBJECTIVE STATEMENT
67yo male with hx of TIA (on Coumadin), presents with BRBPR today. Pt states that he has hemorrhoids and he thought it was from that. Reports that bleeding lasted approx 3 minutes. +Dizzy. Pt A&Ox3. Nonambulatory. Pt wheelchair bound. Denies cp/sob. Respirations even/unlabored. Abd obese. 67yo male with hx of TIA (on Coumadin), presents with BRBPR today. Pt states that he has hemorrhoids and he thought it was from that. Reports that bleeding lasted approx 3 minutes. +Dizzy. Pt A&Ox3. Nonambulatory. Pt wheelchair bound. Denies cp/sob. Respirations even/unlabored. Abd obese. Guaiac positive in ED. Denies urinary symptoms.

## 2019-01-29 NOTE — ED PROVIDER NOTE - OBJECTIVE STATEMENT
67 yo M hx of htn, hld, TIA, on AF warfarin presents with BRBPR. Pt reports he started a new diet, has been constipated, believes he has hemorrhoids. Today was laying down and felt moisture around the rectum-found to have bright red blood that saturated tissue paper. Denies ever having rectal bleeding or any GI bleed in the past. Last colonoscopy 5 years ago but believes he is due for one now.

## 2019-01-30 ENCOUNTER — INBOUND DOCUMENT (OUTPATIENT)
Age: 69
End: 2019-01-30

## 2019-01-30 ENCOUNTER — TRANSCRIPTION ENCOUNTER (OUTPATIENT)
Age: 69
End: 2019-01-30

## 2019-01-30 VITALS
SYSTOLIC BLOOD PRESSURE: 156 MMHG | RESPIRATION RATE: 18 BRPM | DIASTOLIC BLOOD PRESSURE: 77 MMHG | HEART RATE: 83 BPM | OXYGEN SATURATION: 93 %

## 2019-01-30 DIAGNOSIS — R09.89 OTHER SPECIFIED SYMPTOMS AND SIGNS INVOLVING THE CIRCULATORY AND RESPIRATORY SYSTEMS: ICD-10-CM

## 2019-01-30 DIAGNOSIS — Z29.9 ENCOUNTER FOR PROPHYLACTIC MEASURES, UNSPECIFIED: ICD-10-CM

## 2019-01-30 DIAGNOSIS — M10.9 GOUT, UNSPECIFIED: ICD-10-CM

## 2019-01-30 DIAGNOSIS — K62.5 HEMORRHAGE OF ANUS AND RECTUM: ICD-10-CM

## 2019-01-30 DIAGNOSIS — E03.9 HYPOTHYROIDISM, UNSPECIFIED: ICD-10-CM

## 2019-01-30 DIAGNOSIS — I10 ESSENTIAL (PRIMARY) HYPERTENSION: ICD-10-CM

## 2019-01-30 DIAGNOSIS — I48.91 UNSPECIFIED ATRIAL FIBRILLATION: ICD-10-CM

## 2019-01-30 DIAGNOSIS — E11.8 TYPE 2 DIABETES MELLITUS WITH UNSPECIFIED COMPLICATIONS: ICD-10-CM

## 2019-01-30 DIAGNOSIS — N40.0 BENIGN PROSTATIC HYPERPLASIA WITHOUT LOWER URINARY TRACT SYMPTOMS: ICD-10-CM

## 2019-01-30 DIAGNOSIS — F41.9 ANXIETY DISORDER, UNSPECIFIED: ICD-10-CM

## 2019-01-30 LAB
ALBUMIN SERPL ELPH-MCNC: 4.1 G/DL — SIGNIFICANT CHANGE UP (ref 3.3–5)
ALP SERPL-CCNC: 64 U/L — SIGNIFICANT CHANGE UP (ref 40–120)
ALT FLD-CCNC: 28 U/L — SIGNIFICANT CHANGE UP (ref 10–45)
ANION GAP SERPL CALC-SCNC: 14 MMOL/L — SIGNIFICANT CHANGE UP (ref 5–17)
ANION GAP SERPL CALC-SCNC: 14 MMOL/L — SIGNIFICANT CHANGE UP (ref 5–17)
APTT BLD: 40.6 SEC — HIGH (ref 27.5–36.3)
AST SERPL-CCNC: 37 U/L — SIGNIFICANT CHANGE UP (ref 10–40)
BASOPHILS # BLD AUTO: 0.02 K/UL — SIGNIFICANT CHANGE UP (ref 0–0.2)
BASOPHILS NFR BLD AUTO: 0.2 % — SIGNIFICANT CHANGE UP (ref 0–2)
BILIRUB SERPL-MCNC: 0.8 MG/DL — SIGNIFICANT CHANGE UP (ref 0.2–1.2)
BLD GP AB SCN SERPL QL: NEGATIVE — SIGNIFICANT CHANGE UP
BUN SERPL-MCNC: 14 MG/DL — SIGNIFICANT CHANGE UP (ref 7–23)
BUN SERPL-MCNC: 15 MG/DL — SIGNIFICANT CHANGE UP (ref 7–23)
CALCIUM SERPL-MCNC: 9.6 MG/DL — SIGNIFICANT CHANGE UP (ref 8.4–10.5)
CALCIUM SERPL-MCNC: 9.9 MG/DL — SIGNIFICANT CHANGE UP (ref 8.4–10.5)
CHLORIDE SERPL-SCNC: 101 MMOL/L — SIGNIFICANT CHANGE UP (ref 96–108)
CHLORIDE SERPL-SCNC: 102 MMOL/L — SIGNIFICANT CHANGE UP (ref 96–108)
CO2 SERPL-SCNC: 19 MMOL/L — LOW (ref 22–31)
CO2 SERPL-SCNC: 21 MMOL/L — LOW (ref 22–31)
CREAT SERPL-MCNC: 1.02 MG/DL — SIGNIFICANT CHANGE UP (ref 0.5–1.3)
CREAT SERPL-MCNC: 1.07 MG/DL — SIGNIFICANT CHANGE UP (ref 0.5–1.3)
EOSINOPHIL # BLD AUTO: 0.08 K/UL — SIGNIFICANT CHANGE UP (ref 0–0.5)
EOSINOPHIL NFR BLD AUTO: 0.9 % — SIGNIFICANT CHANGE UP (ref 0–6)
GLUCOSE SERPL-MCNC: 163 MG/DL — HIGH (ref 70–99)
GLUCOSE SERPL-MCNC: 168 MG/DL — HIGH (ref 70–99)
HCT VFR BLD CALC: 48.9 % — SIGNIFICANT CHANGE UP (ref 39–50)
HGB BLD-MCNC: 16.9 G/DL — SIGNIFICANT CHANGE UP (ref 13–17)
IMM GRANULOCYTES NFR BLD AUTO: 0.3 % — SIGNIFICANT CHANGE UP (ref 0–1.5)
INR BLD: 1.92 RATIO — HIGH (ref 0.88–1.16)
INR BLD: 1.94 RATIO — HIGH (ref 0.88–1.16)
LYMPHOCYTES # BLD AUTO: 1.92 K/UL — SIGNIFICANT CHANGE UP (ref 1–3.3)
LYMPHOCYTES # BLD AUTO: 21.2 % — SIGNIFICANT CHANGE UP (ref 13–44)
MAGNESIUM SERPL-MCNC: 1.8 MG/DL — SIGNIFICANT CHANGE UP (ref 1.6–2.6)
MCHC RBC-ENTMCNC: 28.9 PG — SIGNIFICANT CHANGE UP (ref 27–34)
MCHC RBC-ENTMCNC: 34.6 GM/DL — SIGNIFICANT CHANGE UP (ref 32–36)
MCV RBC AUTO: 83.6 FL — SIGNIFICANT CHANGE UP (ref 80–100)
MONOCYTES # BLD AUTO: 0.71 K/UL — SIGNIFICANT CHANGE UP (ref 0–0.9)
MONOCYTES NFR BLD AUTO: 7.8 % — SIGNIFICANT CHANGE UP (ref 2–14)
NEUTROPHILS # BLD AUTO: 6.31 K/UL — SIGNIFICANT CHANGE UP (ref 1.8–7.4)
NEUTROPHILS NFR BLD AUTO: 69.6 % — SIGNIFICANT CHANGE UP (ref 43–77)
OB PNL STL: POSITIVE
PLATELET # BLD AUTO: 184 K/UL — SIGNIFICANT CHANGE UP (ref 150–400)
POTASSIUM SERPL-MCNC: 3.9 MMOL/L — SIGNIFICANT CHANGE UP (ref 3.5–5.3)
POTASSIUM SERPL-MCNC: 5.1 MMOL/L — SIGNIFICANT CHANGE UP (ref 3.5–5.3)
POTASSIUM SERPL-SCNC: 3.9 MMOL/L — SIGNIFICANT CHANGE UP (ref 3.5–5.3)
POTASSIUM SERPL-SCNC: 5.1 MMOL/L — SIGNIFICANT CHANGE UP (ref 3.5–5.3)
PROT SERPL-MCNC: 8.1 G/DL — SIGNIFICANT CHANGE UP (ref 6–8.3)
PROTHROM AB SERPL-ACNC: 22.3 SEC — HIGH (ref 10–13.1)
PROTHROM AB SERPL-ACNC: 22.6 SEC — HIGH (ref 10–12.9)
RBC # BLD: 5.85 M/UL — HIGH (ref 4.2–5.8)
RBC # FLD: 15.2 % — HIGH (ref 10.3–14.5)
RH IG SCN BLD-IMP: NEGATIVE — SIGNIFICANT CHANGE UP
SODIUM SERPL-SCNC: 134 MMOL/L — LOW (ref 135–145)
SODIUM SERPL-SCNC: 137 MMOL/L — SIGNIFICANT CHANGE UP (ref 135–145)
WBC # BLD: 9.07 K/UL — SIGNIFICANT CHANGE UP (ref 3.8–10.5)
WBC # FLD AUTO: 9.07 K/UL — SIGNIFICANT CHANGE UP (ref 3.8–10.5)

## 2019-01-30 PROCEDURE — 99285 EMERGENCY DEPT VISIT HI MDM: CPT | Mod: 25

## 2019-01-30 PROCEDURE — 82272 OCCULT BLD FECES 1-3 TESTS: CPT

## 2019-01-30 PROCEDURE — 85610 PROTHROMBIN TIME: CPT

## 2019-01-30 PROCEDURE — 80048 BASIC METABOLIC PNL TOTAL CA: CPT

## 2019-01-30 PROCEDURE — 12345: CPT | Mod: NC

## 2019-01-30 PROCEDURE — 99223 1ST HOSP IP/OBS HIGH 75: CPT

## 2019-01-30 PROCEDURE — 85730 THROMBOPLASTIN TIME PARTIAL: CPT

## 2019-01-30 PROCEDURE — 86901 BLOOD TYPING SEROLOGIC RH(D): CPT

## 2019-01-30 PROCEDURE — 85027 COMPLETE CBC AUTOMATED: CPT

## 2019-01-30 PROCEDURE — 82962 GLUCOSE BLOOD TEST: CPT

## 2019-01-30 PROCEDURE — 86900 BLOOD TYPING SEROLOGIC ABO: CPT

## 2019-01-30 PROCEDURE — 99222 1ST HOSP IP/OBS MODERATE 55: CPT | Mod: GC

## 2019-01-30 PROCEDURE — 83735 ASSAY OF MAGNESIUM: CPT

## 2019-01-30 PROCEDURE — 86850 RBC ANTIBODY SCREEN: CPT

## 2019-01-30 PROCEDURE — 80053 COMPREHEN METABOLIC PANEL: CPT

## 2019-01-30 RX ORDER — PSYLLIUM SEED (WITH DEXTROSE)
1 POWDER (GRAM) ORAL
Qty: 0 | Refills: 0 | Status: DISCONTINUED | OUTPATIENT
Start: 2019-01-30 | End: 2019-01-30

## 2019-01-30 RX ORDER — INSULIN LISPRO 100/ML
VIAL (ML) SUBCUTANEOUS AT BEDTIME
Qty: 0 | Refills: 0 | Status: DISCONTINUED | OUTPATIENT
Start: 2019-01-30 | End: 2019-01-30

## 2019-01-30 RX ORDER — INSULIN LISPRO 100/ML
VIAL (ML) SUBCUTANEOUS
Qty: 0 | Refills: 0 | Status: DISCONTINUED | OUTPATIENT
Start: 2019-01-30 | End: 2019-01-30

## 2019-01-30 RX ORDER — LEVOTHYROXINE SODIUM 125 MCG
1 TABLET ORAL
Qty: 0 | Refills: 0 | COMMUNITY

## 2019-01-30 RX ORDER — TAMSULOSIN HYDROCHLORIDE 0.4 MG/1
1 CAPSULE ORAL
Qty: 0 | Refills: 0 | COMMUNITY

## 2019-01-30 RX ORDER — TAMSULOSIN HYDROCHLORIDE 0.4 MG/1
0.4 CAPSULE ORAL AT BEDTIME
Qty: 0 | Refills: 0 | Status: DISCONTINUED | OUTPATIENT
Start: 2019-01-30 | End: 2019-01-30

## 2019-01-30 RX ORDER — ALLOPURINOL 300 MG
300 TABLET ORAL DAILY
Qty: 0 | Refills: 0 | Status: DISCONTINUED | OUTPATIENT
Start: 2019-01-30 | End: 2019-01-30

## 2019-01-30 RX ORDER — AMOXICILLIN 250 MG/5ML
0 SUSPENSION, RECONSTITUTED, ORAL (ML) ORAL
Qty: 0 | Refills: 0 | COMMUNITY

## 2019-01-30 RX ORDER — NIFEDIPINE 30 MG
60 TABLET, EXTENDED RELEASE 24 HR ORAL DAILY
Qty: 0 | Refills: 0 | Status: DISCONTINUED | OUTPATIENT
Start: 2019-01-30 | End: 2019-01-30

## 2019-01-30 RX ORDER — TAMSULOSIN HYDROCHLORIDE 0.4 MG/1
1 CAPSULE ORAL
Qty: 0 | Refills: 0 | DISCHARGE
Start: 2019-01-30

## 2019-01-30 RX ORDER — SODIUM CHLORIDE 9 MG/ML
1000 INJECTION, SOLUTION INTRAVENOUS
Qty: 0 | Refills: 0 | Status: DISCONTINUED | OUTPATIENT
Start: 2019-01-30 | End: 2019-01-30

## 2019-01-30 RX ORDER — FUROSEMIDE 40 MG
40 TABLET ORAL DAILY
Qty: 0 | Refills: 0 | Status: DISCONTINUED | OUTPATIENT
Start: 2019-01-30 | End: 2019-01-30

## 2019-01-30 RX ORDER — PSYLLIUM SEED (WITH DEXTROSE)
3.4 POWDER (GRAM) ORAL
Qty: 3.4 | Refills: 0
Start: 2019-01-30 | End: 2019-02-13

## 2019-01-30 RX ORDER — POLYETHYLENE GLYCOL 3350 17 G/17G
17 POWDER, FOR SOLUTION ORAL
Qty: 0 | Refills: 0 | Status: DISCONTINUED | OUTPATIENT
Start: 2019-01-30 | End: 2019-01-30

## 2019-01-30 RX ORDER — LEVOTHYROXINE SODIUM 125 MCG
1 TABLET ORAL
Qty: 0 | Refills: 0 | DISCHARGE
Start: 2019-01-30

## 2019-01-30 RX ORDER — DEXTROSE 50 % IN WATER 50 %
12.5 SYRINGE (ML) INTRAVENOUS ONCE
Qty: 0 | Refills: 0 | Status: DISCONTINUED | OUTPATIENT
Start: 2019-01-30 | End: 2019-01-30

## 2019-01-30 RX ORDER — DEXTROSE 50 % IN WATER 50 %
15 SYRINGE (ML) INTRAVENOUS ONCE
Qty: 0 | Refills: 0 | Status: DISCONTINUED | OUTPATIENT
Start: 2019-01-30 | End: 2019-01-30

## 2019-01-30 RX ORDER — WARFARIN SODIUM 2.5 MG/1
1 TABLET ORAL
Qty: 0 | Refills: 0 | COMMUNITY

## 2019-01-30 RX ORDER — POLYETHYLENE GLYCOL 3350 17 G/17G
17 POWDER, FOR SOLUTION ORAL DAILY
Qty: 0 | Refills: 0 | Status: DISCONTINUED | OUTPATIENT
Start: 2019-01-30 | End: 2019-01-30

## 2019-01-30 RX ORDER — POTASSIUM CHLORIDE 20 MEQ
10 PACKET (EA) ORAL DAILY
Qty: 0 | Refills: 0 | Status: DISCONTINUED | OUTPATIENT
Start: 2019-01-30 | End: 2019-01-30

## 2019-01-30 RX ORDER — DEXTROSE 50 % IN WATER 50 %
25 SYRINGE (ML) INTRAVENOUS ONCE
Qty: 0 | Refills: 0 | Status: DISCONTINUED | OUTPATIENT
Start: 2019-01-30 | End: 2019-01-30

## 2019-01-30 RX ORDER — LEVOTHYROXINE SODIUM 125 MCG
50 TABLET ORAL DAILY
Qty: 0 | Refills: 0 | Status: DISCONTINUED | OUTPATIENT
Start: 2019-01-30 | End: 2019-01-30

## 2019-01-30 RX ORDER — SODIUM CHLORIDE 9 MG/ML
1000 INJECTION INTRAMUSCULAR; INTRAVENOUS; SUBCUTANEOUS ONCE
Qty: 0 | Refills: 0 | Status: COMPLETED | OUTPATIENT
Start: 2019-01-30 | End: 2019-01-30

## 2019-01-30 RX ORDER — POLYETHYLENE GLYCOL 3350 17 G/17G
17 POWDER, FOR SOLUTION ORAL
Qty: 17 | Refills: 0
Start: 2019-01-30 | End: 2019-02-13

## 2019-01-30 RX ORDER — GLIMEPIRIDE 1 MG
1 TABLET ORAL
Qty: 0 | Refills: 0 | COMMUNITY

## 2019-01-30 RX ORDER — METOPROLOL TARTRATE 50 MG
200 TABLET ORAL EVERY 12 HOURS
Qty: 0 | Refills: 0 | Status: DISCONTINUED | OUTPATIENT
Start: 2019-01-30 | End: 2019-01-30

## 2019-01-30 RX ORDER — TOPIRAMATE 25 MG
0.5 TABLET ORAL
Qty: 0 | Refills: 0 | COMMUNITY

## 2019-01-30 RX ORDER — ATORVASTATIN CALCIUM 80 MG/1
20 TABLET, FILM COATED ORAL AT BEDTIME
Qty: 0 | Refills: 0 | Status: DISCONTINUED | OUTPATIENT
Start: 2019-01-30 | End: 2019-01-30

## 2019-01-30 RX ORDER — PANTOPRAZOLE SODIUM 20 MG/1
40 TABLET, DELAYED RELEASE ORAL
Qty: 0 | Refills: 0 | Status: DISCONTINUED | OUTPATIENT
Start: 2019-01-30 | End: 2019-01-30

## 2019-01-30 RX ADMIN — Medication 300 MILLIGRAM(S): at 12:22

## 2019-01-30 RX ADMIN — SODIUM CHLORIDE 500 MILLILITER(S): 9 INJECTION INTRAMUSCULAR; INTRAVENOUS; SUBCUTANEOUS at 01:40

## 2019-01-30 RX ADMIN — Medication 10 MILLIEQUIVALENT(S): at 12:22

## 2019-01-30 RX ADMIN — Medication 40 MILLIGRAM(S): at 12:22

## 2019-01-30 NOTE — DISCHARGE NOTE ADULT - HOSPITAL COURSE
68y Male with past medical history atrial fibrillation on Coumadin, HTN, morbid obesity who presented with 1 episode of rectal bleeding. Patient changed his diet recently, eating a high protein diet over the last ~1 week. During this time, he has noted constipation which is new for him as he previously moved his bowels daily with brown stool. Over the last 2-3 days, he has been straining to pass stool. Pt presented to the ED, hemodynamically stable, H/H 17.9/50.2, + FOBT, admitted to be evaluated for GIB in the setting of Coumadin use for AF. During hospital course, H/H remained stable, was seen by GI, found with palpable hemorrhoid on rectal exam, and was recommended for bowel regimen, high fiber diet, increased water intake, and outpatient GI follow-up for a colonoscopy. Pt deemed clinically stable for discharge home with follow-up with PCP & GI in 1-2 weeks.

## 2019-01-30 NOTE — H&P ADULT - PROBLEM SELECTOR PLAN 2
-EKG  -PT/INR  -Cont. lopressor 200mg BID for now with hold parameters  -Pt took own coumadin tonight, will defer to GI if coumadin should be continued -EKG  -PT/INR  -Cont. lopressor 200mg BID for now with hold parameters  -Pt took own coumadin tonight, will defer to GI if coumadin should be continued  -Need med rec

## 2019-01-30 NOTE — H&P ADULT - PROBLEM SELECTOR PLAN 1
Rectal bleeding appears to have stopped. Possibly hemorrhoids vs diverticulosis? Hgb appears relatively stable. Pt would strongly like a GI evaluation in patient and consideration regarding need for colonoscopy.  -GI consult e-mailed  -Trend cbc  -Will cont. normal diet for now Rectal bleeding appears to have stopped. Possibly hemorrhoids vs diverticulosis? Hgb appears relatively stable. Pt would strongly like a GI evaluation in patient and consideration regarding need for colonoscopy.  -GI consult e-mailed  -Trend cbc  -Will cont. normal diet for now  -Need med rec

## 2019-01-30 NOTE — CONSULT NOTE ADULT - ASSESSMENT
Impression:  1) Rectal bleeding: patient with blood on the tissue paper when wiping after being constipated/straining over the last 3 days in the setting of dietary changes. Patient has stable Hgb  2) Atrial fibrillation on Coumadin  3) Morbid obesity    Recommendations: Impression:  1) Rectal bleeding: patient with blood on the tissue paper when wiping after being constipated/straining over the last 3 days in the setting of dietary changes. Patient has stable Hgb, palpable hemorrhoid on rectal exam.   2) Atrial fibrillation on Coumadin  3) Morbid obesity    Recommendations:  - high fiber diet   - psyllium husk twice daily before breakfast/dinner  - Miralax twice daily  - encouraged increased water intake  - outpatient GI follow-up for colonoscopy (last colonoscopy ~5 year ago with 2 polyps per patient)   - no GI contraindication to discharge to home

## 2019-01-30 NOTE — DISCHARGE NOTE ADULT - SECONDARY DIAGNOSIS.
Atrial fibrillation, unspecified type Essential hypertension Type 2 diabetes mellitus with complication, without long-term current use of insulin Benign prostatic hyperplasia, unspecified whether lower urinary tract symptoms present

## 2019-01-30 NOTE — CONSULT NOTE ADULT - ATTENDING COMMENTS
Patient seen and examined. Agree with above. Patient has no further signs of bleeding and has a normal hemoglobin. Most likely related to recent straining, but he should have colonoscopy done to rule out any other lesions that could bleed, which can be done as outpatient. In the meantime, continue fiber, laxatives as needed to avoid straining.

## 2019-01-30 NOTE — DISCHARGE NOTE ADULT - PLAN OF CARE
Stable Palpable hemorrhoid on rectal exam with stable H/H  Seen by GI, recommends:   1.) High fiber diet  2.) Psyllium husk twice daily before breakfast/dinner  3.) Miralax twice daily  4.) Increase water intake  5.) Please call outpatient GI follow-up for colonoscopy Atrial fibrillation is the most common heart rhythm problem.  The condition puts you at risk for has stroke and heart attack  It helps if you control your blood pressure, not drink more than 1-2 alcohol drinks per day, cut down on caffeine, getting treatment for over active thyroid gland, and get regular exercise  Call your doctor if you feel your heart racing or beating unusually, chest tightness or pain, lightheaded, faint, shortness of breath especially with exercise  It is important to take your heart medication as prescribed  Monitor INR levels with your PCP outpatient Low salt diet  Activity as tolerated.  Take all medication as prescribed.  Follow up with your medical doctor for routine blood pressure monitoring at your next visit.  Notify your doctor if you have any of the following symptoms:   Dizziness, Lightheadedness, Blurry vision, Headache, Chest pain, Shortness of breath Follow-up outpatient for your HgA1C level.  Make sure you get your HgA1c checked every three months.  If you take oral diabetes medications, check your blood glucose two times a day.  It's important not to skip any meals.  Keep a log of your blood glucose results and always take it with you to your doctor appointments.  Keep a list of your current medications including injectables and over the counter medications and bring this medication list with you to all your doctor appointments.  If you have not seen your ophthalmologist this year call for appointment.  Check your feet daily for redness, sores, or openings. Do not self treat. If no improvement in two days call your primary care physician for an appointment.  Low blood sugar (hypoglycemia) is a blood sugar below 70mg/dl. Check your blood sugar if you feel signs/symptoms of hypoglycemia. If your blood sugar is below 70 take 15 grams of carbohydrates (ex 4 oz of apple juice, 3-4 glucose tablets, or 4-6 oz of regular soda) wait 15 minutes and repeat blood sugar to make sure it comes up above 70.  If your blood sugar is above 70 and you are due for a meal, have a meal.  If you are not due for a meal have a snack.  This snack helps keeps your blood sugar at a safe range. Continue your Flomax and follow-up with PCP outpatient

## 2019-01-30 NOTE — DISCHARGE NOTE ADULT - MEDICATION SUMMARY - MEDICATIONS TO TAKE
I will START or STAY ON the medications listed below when I get home from the hospital:    tamsulosin 0.4 mg oral capsule  -- 1 cap(s) by mouth once a day (at bedtime)  -- Indication: For Benign prostatic hyperplasia, unspecified whether lower urinary tract symptoms present    warfarin 2.5 mg oral tablet  -- 5 tab(s) by mouth once a day  -- Indication: For Atrial fibrillation, unspecified type    topiramate 25 mg oral tablet  -- 0.5 tab(s) by mouth once a day  -- Indication: For Anxiety    LORazepam 2 mg oral tablet  -- 2 tab(s) by mouth once a day (at bedtime)  -- Indication: For Anxiety    glimepiride 2 mg oral tablet  -- 1 tab(s) by mouth once a day  -- Indication: For Type 2 diabetes mellitus with complication, without long-term current use of insulin    allopurinol 300 mg oral tablet  -- 1 tab(s) by mouth once a day  -- Indication: For Gout, unspecified cause, unspecified chronicity, unspecified site    atorvastatin 20 mg oral tablet  -- 1 tab(s) by mouth once a day  -- Indication: For HLD    metoprolol tartrate 100 mg oral tablet  -- 2 tab(s) by mouth 2 times a day  -- Indication: For Atrial fibrillation, unspecified type    NIFEdipine 60 mg oral tablet, extended release  -- 1 tab(s) by mouth once a day  -- Indication: For Essential hypertension    furosemide 40 mg oral tablet  -- 1 tab(s) by mouth once a day  -- Indication: For Edema    psyllium 3.4 g/3.7 g oral powder for reconstitution  -- 3.4 gram(s) by mouth every 12 hours   -- Dilute this medication with liquid before administration.    -- Indication: For Hemorrhoid-bowel regimen    polyethylene glycol 3350 oral powder for reconstitution  -- 17 gram(s) by mouth 2 times a day  -- Indication: For Hemorrhoid-bowel regimen    potassium chloride 10 mEq oral tablet, extended release  -- 1 tab(s) by mouth once a day  -- Indication: For Prophylactic measure    pantoprazole 40 mg oral delayed release tablet  -- 1 tab(s) by mouth once a day  -- Indication: For Prophylactic measure    levothyroxine 50 mcg (0.05 mg) oral tablet  -- 1 tab(s) by mouth once a day  -- Indication: For Hypothyroidism, unspecified type    Vitamin B Complex oral tablet  -- 1 tab(s) by mouth 3 times a week  -- Indication: For Prophylactic measure    Vitamin D3 5000 intl units oral tablet  -- 1 tab(s) by mouth once a day  -- Indication: For Prophylactic measure

## 2019-01-30 NOTE — H&P ADULT - PROBLEM SELECTOR PLAN 6
Reportedly with only borderline diabetes. On glimeperide at home  -Fingersticks and sliding scale for now

## 2019-01-30 NOTE — H&P ADULT - HISTORY OF PRESENT ILLNESS
68M w/ afib on coumadin, TIA, HTN, anxiety p/w BRBPR. Pt states he has been trying a new high protein diet associated with an increased about of constipation. He has been straining significantly the past week. Last BM this AM was solid and normal. Earlier tonight he laid down to go to sleep and felt wetness by his bottom. He noticed a small but steady amount of bright red blood every time he put tissues there. He became concerned and came to the ER for further evaluation. He denies any prior hx of GIB and thinks he has hemorrhoids. States last colonoscopy was 5 years ago where they found 2 polyps but otherwise unremarkable. He is concerned and would strongly prefer a colonoscopy inpatient. Denies chest pain, dizziness, SOB, LE edema. Has been taking amoxicillin recently for URI as per patient. He cannot remember doses of all his medications. He took 15mg of coumadin when he found out his INR is low. Endorsing occasional LLQ abd pain.

## 2019-01-30 NOTE — DISCHARGE NOTE ADULT - CARE PLAN
Principal Discharge DX:	Rectal bleeding  Goal:	Stable  Assessment and plan of treatment:	Palpable hemorrhoid on rectal exam with stable H/H  Seen by GI, recommends:   1.) High fiber diet  2.) Psyllium husk twice daily before breakfast/dinner  3.) Miralax twice daily  4.) Increase water intake  5.) Please call outpatient GI follow-up for colonoscopy  Secondary Diagnosis:	Atrial fibrillation, unspecified type  Assessment and plan of treatment:	Atrial fibrillation is the most common heart rhythm problem.  The condition puts you at risk for has stroke and heart attack  It helps if you control your blood pressure, not drink more than 1-2 alcohol drinks per day, cut down on caffeine, getting treatment for over active thyroid gland, and get regular exercise  Call your doctor if you feel your heart racing or beating unusually, chest tightness or pain, lightheaded, faint, shortness of breath especially with exercise  It is important to take your heart medication as prescribed  Monitor INR levels with your PCP outpatient  Secondary Diagnosis:	Essential hypertension  Assessment and plan of treatment:	Low salt diet  Activity as tolerated.  Take all medication as prescribed.  Follow up with your medical doctor for routine blood pressure monitoring at your next visit.  Notify your doctor if you have any of the following symptoms:   Dizziness, Lightheadedness, Blurry vision, Headache, Chest pain, Shortness of breath  Secondary Diagnosis:	Type 2 diabetes mellitus with complication, without long-term current use of insulin  Assessment and plan of treatment:	Follow-up outpatient for your HgA1C level.  Make sure you get your HgA1c checked every three months.  If you take oral diabetes medications, check your blood glucose two times a day.  It's important not to skip any meals.  Keep a log of your blood glucose results and always take it with you to your doctor appointments.  Keep a list of your current medications including injectables and over the counter medications and bring this medication list with you to all your doctor appointments.  If you have not seen your ophthalmologist this year call for appointment.  Check your feet daily for redness, sores, or openings. Do not self treat. If no improvement in two days call your primary care physician for an appointment.  Low blood sugar (hypoglycemia) is a blood sugar below 70mg/dl. Check your blood sugar if you feel signs/symptoms of hypoglycemia. If your blood sugar is below 70 take 15 grams of carbohydrates (ex 4 oz of apple juice, 3-4 glucose tablets, or 4-6 oz of regular soda) wait 15 minutes and repeat blood sugar to make sure it comes up above 70.  If your blood sugar is above 70 and you are due for a meal, have a meal.  If you are not due for a meal have a snack.  This snack helps keeps your blood sugar at a safe range.  Secondary Diagnosis:	Benign prostatic hyperplasia, unspecified whether lower urinary tract symptoms present  Assessment and plan of treatment:	Continue your Flomax and follow-up with PCP outpatient

## 2019-01-30 NOTE — DISCHARGE NOTE ADULT - CARE PROVIDERS DIRECT ADDRESSES
,[email or web address]

## 2019-01-30 NOTE — H&P ADULT - NSHPPHYSICALEXAM_GEN_ALL_CORE
Vital Signs Last 24 Hrs  T(C): 36.9 (01-30-19 @ 03:34), Max: 36.9 (01-30-19 @ 03:34)  T(F): 98.4 (01-30-19 @ 03:34), Max: 98.4 (01-30-19 @ 03:34)  HR: 67 (01-30-19 @ 03:34) (67 - 84)  BP: 136/86 (01-30-19 @ 03:34) (136/86 - 154/87)  BP(mean): --  RR: 20 (01-30-19 @ 03:34) (20 - 20)  SpO2: 97% (01-30-19 @ 03:34) (95% - 97%)

## 2019-01-30 NOTE — H&P ADULT - PROBLEM SELECTOR PLAN 5
Pt states he takes ativan 2mg in daytime and 4mg at bedtime? ISTOP Reference #: [id number].  -Will cont. ativan 4mg PO QHS PRN only for now  -Reportedly trying to switch to topiramate and takes 12.5mg daily??

## 2019-01-30 NOTE — CONSULT NOTE ADULT - SUBJECTIVE AND OBJECTIVE BOX
GASTROENTEROLOGY INITIAL CONSULT NOTE    Chief Complaint:  Patient is a 68y old  Male who presents with a chief complaint of BRBPR (30 Jan 2019 04:44)      HPI: 68y Male    Allergies:  metformin (Unknown)      Hospital Medications:  allopurinol 300 milliGRAM(s) Oral daily  atorvastatin 20 milliGRAM(s) Oral at bedtime  dextrose 40% Gel 15 Gram(s) Oral once PRN  dextrose 5%. 1000 milliLiter(s) IV Continuous <Continuous>  dextrose 50% Injectable 12.5 Gram(s) IV Push once  dextrose 50% Injectable 25 Gram(s) IV Push once  furosemide    Tablet 40 milliGRAM(s) Oral daily  insulin lispro (HumaLOG) corrective regimen sliding scale   SubCutaneous three times a day before meals  insulin lispro (HumaLOG) corrective regimen sliding scale   SubCutaneous at bedtime  levothyroxine 50 MICROGram(s) Oral daily  LORazepam     Tablet 4 milliGRAM(s) Oral at bedtime PRN  metoprolol tartrate 200 milliGRAM(s) Oral every 12 hours  NIFEdipine XL 60 milliGRAM(s) Oral daily  pantoprazole    Tablet 40 milliGRAM(s) Oral before breakfast  polyethylene glycol 3350 17 Gram(s) Oral daily  potassium chloride    Tablet ER 10 milliEquivalent(s) Oral daily  tamsulosin 0.4 milliGRAM(s) Oral at bedtime      PMHX/PSHX:  BPH (benign prostatic hyperplasia)  Hypothyroid  Gout  Obstructive Sleep Apnea  History of Obesity  History of Transient Ischemic Attack (TIA)  Anxiety  Chronic Gout  Afib  Hypertension  Dyslipidemia  History of Tonsillectomy      Family history:  No pertinent family history in first degree relatives      Social History:     ROS:     General:  No wt loss, fevers, chills, night sweats, fatigue,   Eyes:  Good vision, no reported pain  ENT:  No sore throat, pain, runny nose, dysphagia  CV:  No pain, palpitations, hypo/hypertension  Resp:  No dyspnea, cough, tachypnea, wheezing  GI:  see HPI  :  No pain, bleeding, incontinence, nocturia  Muscle:  No pain, weakness  Neuro:  No weakness, tingling, memory problems  Psych:  No fatigue, insomnia, mood problems, depression  Endocrine:  No polyuria, polydipsia, cold/heat intolerance  Heme:  No petechiae, ecchymosis, easy bruisability  Skin:  No rash, tattoos, scars, edema      PHYSICAL EXAM:   Vital Signs:  Vital Signs Last 24 Hrs  T(C): 36.9 (30 Jan 2019 03:34), Max: 36.9 (30 Jan 2019 03:34)  T(F): 98.4 (30 Jan 2019 03:34), Max: 98.4 (30 Jan 2019 03:34)  HR: 74 (30 Jan 2019 05:49) (67 - 84)  BP: 109/68 (30 Jan 2019 05:49) (109/68 - 154/87)  BP(mean): --  RR: 20 (30 Jan 2019 05:49) (20 - 20)  SpO2: 95% (30 Jan 2019 05:49) (95% - 97%)  Daily Height in cm: 187.96 (29 Jan 2019 21:39)    Daily     GENERAL:  no acute distress  HEENT:  -icterus   CHEST:  clear bilaterally, no wheezes or rales  HEART:  RRR, S1S2  ABDOMEN:  soft, non-tender, non-distended, normoactive bowel sounds,  no hepato-splenomegaly  EXTEREMITIES:  no  edema  SKIN:  No rash/erythema/ecchymoses/petechiae/wounds/abscess/warm/dry  NEURO:  alert, oriented, conversant     LABS:                        17.9   12.0  )-----------( 213      ( 29 Jan 2019 23:42 )             50.2     01-30    137  |  102  |  15  ----------------------------<  168<H>  3.9   |  21<L>  |  1.07    Ca    9.6      30 Jan 2019 05:47  Mg     1.8     01-30    TPro  8.1  /  Alb  4.1  /  TBili  0.8  /  DBili  x   /  AST  37  /  ALT  28  /  AlkPhos  64  01-29    LIVER FUNCTIONS - ( 29 Jan 2019 23:42 )  Alb: 4.1 g/dL / Pro: 8.1 g/dL / ALK PHOS: 64 U/L / ALT: 28 U/L / AST: 37 U/L / GGT: x           PT/INR - ( 29 Jan 2019 23:42 )   PT: 22.6 sec;   INR: 1.94 ratio         PTT - ( 29 Jan 2019 23:42 )  PTT:40.6 sec      Imaging: GASTROENTEROLOGY INITIAL CONSULT NOTE    Chief Complaint:  Patient is a 68y old  Male who presents with a chief complaint of BRBPR (30 Jan 2019 04:44)      HPI: 68y Male with past medical history atrial fibrillation on Coumadin, HTN, morbid obesity who presented with 1 episode of rectal bleeding. Patient changed his diet recently, eating a high protein diet over the last ~1 week. During this time, he has noted constipation which is new for him as he previously moved his bowels daily with brown stool. Over the last 2-3 days, he has been straining to pass stool    Allergies:  metformin (Unknown)      Hospital Medications:  allopurinol 300 milliGRAM(s) Oral daily  atorvastatin 20 milliGRAM(s) Oral at bedtime  dextrose 40% Gel 15 Gram(s) Oral once PRN  dextrose 5%. 1000 milliLiter(s) IV Continuous <Continuous>  dextrose 50% Injectable 12.5 Gram(s) IV Push once  dextrose 50% Injectable 25 Gram(s) IV Push once  furosemide    Tablet 40 milliGRAM(s) Oral daily  insulin lispro (HumaLOG) corrective regimen sliding scale   SubCutaneous three times a day before meals  insulin lispro (HumaLOG) corrective regimen sliding scale   SubCutaneous at bedtime  levothyroxine 50 MICROGram(s) Oral daily  LORazepam     Tablet 4 milliGRAM(s) Oral at bedtime PRN  metoprolol tartrate 200 milliGRAM(s) Oral every 12 hours  NIFEdipine XL 60 milliGRAM(s) Oral daily  pantoprazole    Tablet 40 milliGRAM(s) Oral before breakfast  polyethylene glycol 3350 17 Gram(s) Oral daily  potassium chloride    Tablet ER 10 milliEquivalent(s) Oral daily  tamsulosin 0.4 milliGRAM(s) Oral at bedtime      PMHX/PSHX:  BPH (benign prostatic hyperplasia)  Hypothyroid  Gout  Obstructive Sleep Apnea  History of Obesity  History of Transient Ischemic Attack (TIA)  Anxiety  Chronic Gout  Afib  Hypertension  Dyslipidemia  History of Tonsillectomy      Family history:  No pertinent family history in first degree relatives      Social History:     ROS:     General:  No wt loss, fevers, chills, night sweats, fatigue,   Eyes:  Good vision, no reported pain  ENT:  No sore throat, pain, runny nose, dysphagia  CV:  No pain, palpitations, hypo/hypertension  Resp:  No dyspnea, cough, tachypnea, wheezing  GI:  see HPI  :  No pain, bleeding, incontinence, nocturia  Muscle:  No pain, weakness  Neuro:  No weakness, tingling, memory problems  Psych:  No fatigue, insomnia, mood problems, depression  Endocrine:  No polyuria, polydipsia, cold/heat intolerance  Heme:  No petechiae, ecchymosis, easy bruisability  Skin:  No rash, tattoos, scars, edema      PHYSICAL EXAM:   Vital Signs:  Vital Signs Last 24 Hrs  T(C): 36.9 (30 Jan 2019 03:34), Max: 36.9 (30 Jan 2019 03:34)  T(F): 98.4 (30 Jan 2019 03:34), Max: 98.4 (30 Jan 2019 03:34)  HR: 74 (30 Jan 2019 05:49) (67 - 84)  BP: 109/68 (30 Jan 2019 05:49) (109/68 - 154/87)  BP(mean): --  RR: 20 (30 Jan 2019 05:49) (20 - 20)  SpO2: 95% (30 Jan 2019 05:49) (95% - 97%)  Daily Height in cm: 187.96 (29 Jan 2019 21:39)    Daily     GENERAL:  no acute distress  HEENT:  -icterus   CHEST:  clear bilaterally, no wheezes or rales  HEART:  RRR, S1S2  ABDOMEN:  soft, non-tender, non-distended, rectal brown stool, palpable internal hemorrhoid   EXTEREMITIES:  no  edema  SKIN:  No rash/erythema/ecchymoses/petechiae/wounds/abscess/warm/dry  NEURO:  alert, oriented, conversant     LABS:                        17.9   12.0  )-----------( 213      ( 29 Jan 2019 23:42 )             50.2     01-30    137  |  102  |  15  ----------------------------<  168<H>  3.9   |  21<L>  |  1.07    Ca    9.6      30 Jan 2019 05:47  Mg     1.8     01-30    TPro  8.1  /  Alb  4.1  /  TBili  0.8  /  DBili  x   /  AST  37  /  ALT  28  /  AlkPhos  64  01-29    LIVER FUNCTIONS - ( 29 Jan 2019 23:42 )  Alb: 4.1 g/dL / Pro: 8.1 g/dL / ALK PHOS: 64 U/L / ALT: 28 U/L / AST: 37 U/L / GGT: x           PT/INR - ( 29 Jan 2019 23:42 )   PT: 22.6 sec;   INR: 1.94 ratio         PTT - ( 29 Jan 2019 23:42 )  PTT:40.6 sec GASTROENTEROLOGY INITIAL CONSULT NOTE    Chief Complaint:  Patient is a 68y old  Male who presents with a chief complaint of BRBPR (30 Jan 2019 04:44)      HPI: 68y Male with past medical history atrial fibrillation on Coumadin, HTN, morbid obesity who presented with 1 episode of rectal bleeding. Patient changed his diet recently, eating a high protein diet over the last ~1 week. During this time, he has noted constipation which is new for him as he previously moved his bowels daily with brown stool. Over the last 2-3 days, he has been straining to pass stool. He denies any abdominal pain, nausea/vomiting or abnormal weight loss. He had a colonoscopy about 5 years ago that he reports to be unremarkable.     Allergies:  metformin (Unknown)      Hospital Medications:  allopurinol 300 milliGRAM(s) Oral daily  atorvastatin 20 milliGRAM(s) Oral at bedtime  dextrose 40% Gel 15 Gram(s) Oral once PRN  dextrose 5%. 1000 milliLiter(s) IV Continuous <Continuous>  dextrose 50% Injectable 12.5 Gram(s) IV Push once  dextrose 50% Injectable 25 Gram(s) IV Push once  furosemide    Tablet 40 milliGRAM(s) Oral daily  insulin lispro (HumaLOG) corrective regimen sliding scale   SubCutaneous three times a day before meals  insulin lispro (HumaLOG) corrective regimen sliding scale   SubCutaneous at bedtime  levothyroxine 50 MICROGram(s) Oral daily  LORazepam     Tablet 4 milliGRAM(s) Oral at bedtime PRN  metoprolol tartrate 200 milliGRAM(s) Oral every 12 hours  NIFEdipine XL 60 milliGRAM(s) Oral daily  pantoprazole    Tablet 40 milliGRAM(s) Oral before breakfast  polyethylene glycol 3350 17 Gram(s) Oral daily  potassium chloride    Tablet ER 10 milliEquivalent(s) Oral daily  tamsulosin 0.4 milliGRAM(s) Oral at bedtime      PMHX/PSHX:  BPH (benign prostatic hyperplasia)  Hypothyroid  Gout  Obstructive Sleep Apnea  History of Obesity  History of Transient Ischemic Attack (TIA)  Anxiety  Chronic Gout  Afib  Hypertension  Dyslipidemia  History of Tonsillectomy      Family history:  No pertinent family history in first degree relatives      Social History: No illicit drugs, ETOH, smoking    ROS:     General:  No wt loss, fevers, chills, night sweats, fatigue,   Eyes:  Good vision, no reported pain  ENT:  No sore throat, pain, runny nose, dysphagia  CV:  No pain, palpitations, hypo/hypertension  Resp:  No dyspnea, cough, tachypnea, wheezing  GI:  see HPI  :  No pain, bleeding, incontinence, nocturia  Muscle:  No pain, weakness  Neuro:  No weakness, tingling, memory problems  Psych:  No fatigue, insomnia, mood problems, depression  Endocrine:  No polyuria, polydipsia, cold/heat intolerance  Heme:  No petechiae, ecchymosis, easy bruisability  Skin:  No rash, tattoos, scars, edema      PHYSICAL EXAM:   Vital Signs:  Vital Signs Last 24 Hrs  T(C): 36.9 (30 Jan 2019 03:34), Max: 36.9 (30 Jan 2019 03:34)  T(F): 98.4 (30 Jan 2019 03:34), Max: 98.4 (30 Jan 2019 03:34)  HR: 74 (30 Jan 2019 05:49) (67 - 84)  BP: 109/68 (30 Jan 2019 05:49) (109/68 - 154/87)  BP(mean): --  RR: 20 (30 Jan 2019 05:49) (20 - 20)  SpO2: 95% (30 Jan 2019 05:49) (95% - 97%)  Daily Height in cm: 187.96 (29 Jan 2019 21:39)    Daily     GENERAL:  no acute distress  HEENT:  -icterus   CHEST:  clear bilaterally, no wheezes or rales  HEART:  RRR, S1S2  ABDOMEN:  soft, non-tender, non-distended, morbidly obese  RECTAL: rectal brown stool, palpable internal hemorrhoid   EXTEREMITIES:  no  edema  SKIN:  No rash/erythema/ecchymoses/petechiae/wounds/abscess/warm/dry  NEURO:  alert, oriented, conversant     LABS:                        17.9   12.0  )-----------( 213      ( 29 Jan 2019 23:42 )             50.2     01-30    137  |  102  |  15  ----------------------------<  168<H>  3.9   |  21<L>  |  1.07    Ca    9.6      30 Jan 2019 05:47  Mg     1.8     01-30    TPro  8.1  /  Alb  4.1  /  TBili  0.8  /  DBili  x   /  AST  37  /  ALT  28  /  AlkPhos  64  01-29    LIVER FUNCTIONS - ( 29 Jan 2019 23:42 )  Alb: 4.1 g/dL / Pro: 8.1 g/dL / ALK PHOS: 64 U/L / ALT: 28 U/L / AST: 37 U/L / GGT: x           PT/INR - ( 29 Jan 2019 23:42 )   PT: 22.6 sec;   INR: 1.94 ratio         PTT - ( 29 Jan 2019 23:42 )  PTT:40.6 sec

## 2019-01-30 NOTE — DISCHARGE NOTE ADULT - PATIENT PORTAL LINK FT
You can access the [facility] Portal, offered by [facility], by registering with the following website: [email or web address]

## 2019-01-30 NOTE — DISCHARGE NOTE ADULT - ADDITIONAL INSTRUCTIONS
Follow-up at [facility] at [place] ([address] Call for an appointment: [phone] as you need a routine colonoscopy.

## 2019-01-30 NOTE — DISCHARGE NOTE ADULT - CARE PROVIDER_API CALL
Lorne Keller (MD)  Internal Medicine  [address] [place] [address]  Phone: [phone]  Fax: [phone]    Ernst Pride)  Internal Medicine; Pulmonary Disease  Pulmonary Medicine at [place], [address] [place] [address]  Phone: [phone]  Fax: [phone]

## 2019-02-06 ENCOUNTER — APPOINTMENT (OUTPATIENT)
Dept: GASTROENTEROLOGY | Facility: CLINIC | Age: 69
End: 2019-02-06
Payer: COMMERCIAL

## 2019-02-06 VITALS
DIASTOLIC BLOOD PRESSURE: 80 MMHG | OXYGEN SATURATION: 95 % | WEIGHT: 315 LBS | HEART RATE: 63 BPM | HEIGHT: 74 IN | BODY MASS INDEX: 40.43 KG/M2 | SYSTOLIC BLOOD PRESSURE: 124 MMHG | RESPIRATION RATE: 14 BRPM

## 2019-02-06 DIAGNOSIS — Z87.19 PERSONAL HISTORY OF OTHER DISEASES OF THE DIGESTIVE SYSTEM: ICD-10-CM

## 2019-02-06 DIAGNOSIS — Z86.39 PERSONAL HISTORY OF OTHER ENDOCRINE, NUTRITIONAL AND METABOLIC DISEASE: ICD-10-CM

## 2019-02-06 DIAGNOSIS — G47.30 SLEEP APNEA, UNSPECIFIED: ICD-10-CM

## 2019-02-06 DIAGNOSIS — E66.01 MORBID (SEVERE) OBESITY DUE TO EXCESS CALORIES: ICD-10-CM

## 2019-02-06 DIAGNOSIS — K62.5 HEMORRHAGE OF ANUS AND RECTUM: ICD-10-CM

## 2019-02-06 PROCEDURE — 82272 OCCULT BLD FECES 1-3 TESTS: CPT

## 2019-02-06 PROCEDURE — 99214 OFFICE O/P EST MOD 30 MIN: CPT

## 2019-02-06 RX ORDER — WARFARIN 5 MG/1
5 TABLET ORAL
Qty: 60 | Refills: 0 | Status: DISCONTINUED | COMMUNITY
Start: 2016-11-21 | End: 2019-02-06

## 2019-02-06 RX ORDER — GLIMEPIRIDE 2 MG/1
2 TABLET ORAL
Qty: 30 | Refills: 0 | Status: DISCONTINUED | COMMUNITY
Start: 2016-11-04 | End: 2019-02-06

## 2019-02-06 NOTE — PHYSICAL EXAM
[General Appearance - Alert] : alert [General Appearance - In No Acute Distress] : in no acute distress [Sclera] : the sclera and conjunctiva were normal [PERRL With Normal Accommodation] : pupils were equal in size, round, and reactive to light [Extraocular Movements] : extraocular movements were intact [Outer Ear] : the ears and nose were normal in appearance [Oropharynx] : the oropharynx was normal [Neck Appearance] : the appearance of the neck was normal [Neck Cervical Mass (___cm)] : no neck mass was observed [Jugular Venous Distention Increased] : there was no jugular-venous distention [Thyroid Diffuse Enlargement] : the thyroid was not enlarged [Thyroid Nodule] : there were no palpable thyroid nodules [Auscultation Breath Sounds / Voice Sounds] : lungs were clear to auscultation bilaterally [Heart Rate And Rhythm] : heart rate was normal and rhythm regular [Heart Sounds] : normal S1 and S2 [Heart Sounds Gallop] : no gallops [Murmurs] : no murmurs [Heart Sounds Pericardial Friction Rub] : no pericardial rub [Bowel Sounds] : normal bowel sounds [Abdomen Soft] : soft [Abdomen Tenderness] : non-tender [] : no hepato-splenomegaly [Abdomen Mass (___ Cm)] : no abdominal mass palpated [Normal Sphincter Tone] : normal sphincter tone [No Rectal Mass] : no rectal mass [Internal Hemorrhoid] : no internal hemorrhoids [External Hemorrhoid] : no external hemorrhoids [Occult Blood Positive] : stool was negative for occult blood [FreeTextEntry1] : No lesions. No masses. No hemorrhoids. No fissures. Soft, brown stool. Occult blood negative.

## 2019-02-06 NOTE — HISTORY OF PRESENT ILLNESS
[FreeTextEntry1] : 69-year-old white male with super-morbid obesity, hypertension, hyperlipidemia, gout, obstructive sleep apnea, distant hemorrhoids on colonoscopy done more than 10 years ago. Most recently seen here. One month ago with LPR type complaints. Started on PPI medication, pantoprazole. Symptoms gradually came under fairly good control. Currently denying any heartburn, regurgitation, or cough or hoarseness or wheezing. He has not experienced any medication side effects. No dyspepsia. No dysphagia.\par Patient had been attempting to lose weight with a high-protein diet and developed constipation. He was straining at his stools for several days and subsequently developed painless gross hematochezia. Recall the patient is on chronic Coumadin therapy for atrial fibrillation. Bleeding ceased spontaneously. Pain in the left mid abdominal region was noted. Mild and intermittent and not sustained. No associated fever, distention, chills, or flank pain. Constipation alternating with diarrhea. Patient was evaluated and potentially [facility] emergency room on 1/29 through 30. Blood work there had initially shown a hemoglobin of 17.9, and on repeat, 16.9. INR was 1.92 and electrolytes were normal. This was treated and released. He was advised use of Anusol-HC suppositories by ER and was my office staff. Rectal bleeding. He notes some soreness around the anal region, but no external lesions are palpable. Most bowel movements are relatively loose. Denies anal incontinence. Duct disease, weak, lightheaded. Patient does have suitable exertional dyspnea due to his morbid obesity. Most recent weight is 480#.

## 2019-02-06 NOTE — ASSESSMENT
[FreeTextEntry1] : LPR symptoms are significantly improved with the use of PPI medication.  Continue same dose and continue an antireflux diet. Since there are no upper GI alarm symptoms, Endoscopy will be deferred.\par Isn't on of gross hematochezia while straining. Symptoms suggest internal hemorrhoids with bleeding versus diverticular origin. Hemodynamically stable. Most recent hemoglobin 16.9. INR 1.9. Due to morbid obesity and severe obstructive sleep apnea, and exertional dyspnea, patient is a poor candidate for an optical colonoscopy at this time. A virtual colonoscopy was offered to the patient. The procedure, its risks, benefits, and prepped, were explained to the patient, who understands and is agreeable to proceed. ASA #3. Arrangements made. Patient will call for results. Procedure be performed at [facility]. Office followup in 3 months; patient will call for results.

## 2019-02-20 ENCOUNTER — FORM ENCOUNTER (OUTPATIENT)
Age: 69
End: 2019-02-20

## 2019-02-21 ENCOUNTER — APPOINTMENT (OUTPATIENT)
Dept: CT IMAGING | Facility: CLINIC | Age: 69
End: 2019-02-21
Payer: COMMERCIAL

## 2019-02-21 ENCOUNTER — OUTPATIENT (OUTPATIENT)
Dept: OUTPATIENT SERVICES | Facility: HOSPITAL | Age: 69
LOS: 1 days | End: 2019-02-21
Payer: COMMERCIAL

## 2019-02-21 DIAGNOSIS — Z00.8 ENCOUNTER FOR OTHER GENERAL EXAMINATION: ICD-10-CM

## 2019-02-21 PROCEDURE — 74261 CT COLONOGRAPHY DX: CPT | Mod: 26

## 2019-02-21 PROCEDURE — 74261 CT COLONOGRAPHY DX: CPT

## 2019-04-23 ENCOUNTER — APPOINTMENT (OUTPATIENT)
Dept: RHEUMATOLOGY | Facility: CLINIC | Age: 69
End: 2019-04-23

## 2019-05-07 ENCOUNTER — APPOINTMENT (OUTPATIENT)
Dept: GASTROENTEROLOGY | Facility: CLINIC | Age: 69
End: 2019-05-07

## 2019-06-25 NOTE — PROVIDER CONTACT NOTE (CRITICAL VALUE NOTIFICATION) - NS PROVIDER READ BACK
Patient: Lupe Vásquez MRN: [id number]  SSN: [id number] YOB: 1961  Age: 60 y.o. Sex: female Patient is status post Procedure(s): LAPAROSCOPIC CHOLECYSTECTOMY with Cholangiogram. 
 
Surgeon(s) and Role: 
   Dakota Suarez MD - Primary Local/Dose/Irrigation:  See emar. 0.9% normal saline for irrigation. Peripheral IV 06/15/19 Left Hand (Active) Site Assessment Clean, dry, & intact 6/25/2019  7:59 AM  
Phlebitis Assessment 0 6/25/2019  7:59 AM  
Infiltration Assessment 0 6/25/2019  7:59 AM  
Dressing Status Clean, dry, & intact 6/25/2019  7:59 AM  
Dressing Type Transparent;Tape 6/25/2019  7:59 AM  
Hub Color/Line Status Infusing 6/25/2019  7:59 AM  
Action Taken Open ports on tubing capped 6/25/2019  7:59 AM  
Alcohol Cap Used Yes 6/25/2019  7:59 AM  
        
Airway - Endotracheal Tube 06/25/19 Oral (Active) Dressing/Packing:  Wound Abdomen 4 trocar sites-Dressing Type: Topical skin adhesive/glue(Exofin) (06/25/19 1118) Splint/Cast:  ] Other:  SCDs yes

## 2020-01-13 NOTE — PROGRESS NOTE ADULT - MINUTES
Patient:   STEFANO SMITH            MRN: [id number]            FIN: [id number]              Age:   89 years     Sex:  FEMALE     :  27   Associated Diagnoses:   None   Author:   PAULINA BRAVO   20   Patient complains of shortness of breath today       Health Status   Allergies:    Allergies (2) Active Reaction  No Known Medication Allergies None Documented  Seasonal None Documented    Current medications: Medications (27) Active  Scheduled: (23)  Albuterol-ipratropium 2.5-0.5 mg/3 mL nebulizer soln  3 mL, Nebulizer, Q6H (variable)  Allopurinol 100 mg tab  100 mg 1 tab, Oral, Q Other Day  Aspirin 81 mg chew tab  81 mg 1 tab, Oral, Daily  Atorvastatin 40 mg tab  40 mg 1 tab, Oral, Daily  Brimonidine 0.2% ophth soln 5 mL  1 drop, Each Eye, BID  Cefdinir 300 mg cap  300 mg 1 cap, Oral, Q24H  Cetirizine 10 mg tab  10 mg 1 tab, Oral, Daily  Cholecalciferol 1,000 unit (25 mcg) tab  2,000 unit 2 tab, Oral, Daily  DiSOPyramide 100 mg cap  100 mg 1 cap, Oral, Q12H  Doxycycline hyclate 100 mg cap  100 mg 1 cap, Oral, Q12H  Enoxaparin 40 mg/0.4 mL syringe  30 mg 0.3 mL, Subcutaneous, Daily  Ferrous sulfate 325 mg tab [Fe 65 mg]  325 mg 1 tab, Oral, BID  Fluticasone 50 mcg nasal spray 16 gm  50 mcg 1 spray, Each Nostril, Daily  Fluticasone furoate 200 mcg inhalation powder 14s  1 puff, Inhaled, Q Bedtime  Furosemide 40 mg tab  40 mg 1 tab, Oral, Daily  HydrALAZINE 10 mg tab  10 mg 1 tab, Oral, Q8H  Latanoprost 0.005% ophth soln 2.5 mL  1 drop, Each Eye, Q Evening  Levothyroxine 75 mcg tab  75 mcg 1 tab, Oral, QAM at 6  Metoprolol tartrate 100 mg tab  100 mg 1 tab, Oral, Q12H  Pantoprazole 40 mg DR tab  40 mg 1 tab, Oral, Daily  PredniSONE 20 mg tab  40 mg 2 tab, Oral, Daily [with breakfast]  Sertraline 50 mg tab  50 mg 1 tab, Oral, Daily  Verapamil 240 mg SR tab  240 mg 1 tab, Oral, Daily  Continuous: (0)  PRN: (4)  Acetaminophen 325 mg tab  650 mg 2 tab, Oral, Q6H  Albuterol-ipratropium  2.5-0.5 mg/3 mL nebulizer soln  3 mL, Nebulizer, Q6H  Colchicine 0.6 mg tab  0.6 mg 1 tab, Oral, Q12H  Docusate sodium 100 mg cap  100 mg 1 cap, Oral, BID        Objective   Intake and Output   I & O between:  11-JAN-2020 22:01 TO 12-JAN-2020 22:01  Med Dosing Weight:  66.9  kg   11-JAN-2020  24 Hour Intake:   1561.00  ( 23.33 mL/kg )  24 Hour Output:   300.00           24 Hour Urine/Stool Output:   0.0  24 Hour Balance:   1261.00           24 Hour Urine Output:   300.00  ( 0.19 mL/kg/hr )                   Urine Count:  4.00         VS/Measurements   Vitals between:   11-JAN-2020 22:01:47   TO   12-JAN-2020 22:01:47                   LAST RESULT MINIMUM MAXIMUM  Temperature 36.6 36.3 36.7  Heart Rate 70 68 71  Respiratory Rate 18 16 20  NISBP           145 145 183  NIDBP           55 55 69  NIMBP           85 85 97  SpO2                    96 96 98  FiO2                    0.40 0.40 0.40      Lines and Tubes:    NO DATA QUALIFIED FOR THIS PATIENT: LINES, TUBES AND DRAINS.   .    General:  Alert and oriented, No acute distress.    Eye:  Pupils are equal, round and reactive to light.    Neck:  No carotid bruit, No jugular venous distention.    Respiratory:  Diminished ir entry bilateral, Asymmetrical chest wall expansion.   Cardiovascular:  Normal rate, Regular rhythm, Good pulses equal in all extremities.   Gastrointestinal:  Soft, Normal bowel sounds.    Integumentary:  Warm, Dry.    Neurologic:  Alert, Oriented, Normal sensory, Normal motor function.      Results Review   General results   Interpretation:   No Qualifying Labs are resulted on this patient in the last 24 hours                     Impression and Plan   Assessment and Plan   1.  Bilateral pneumonia      Continue IV antibiotics     Pulmonary to follow  2.  CHF exacerbation      Cardiac evaluation  3.  Acute hypoxemic hypercapnic respiratory failure      Continue oxygen by nasal cannula  Discussed the case with the family by the   45

## 2020-03-16 ENCOUNTER — APPOINTMENT (OUTPATIENT)
Dept: GASTROENTEROLOGY | Facility: CLINIC | Age: 70
End: 2020-03-16

## 2020-05-08 ENCOUNTER — EMERGENCY (EMERGENCY)
Facility: HOSPITAL | Age: 70
LOS: 0 days | Discharge: HOME | End: 2020-05-08
Attending: STUDENT IN AN ORGANIZED HEALTH CARE EDUCATION/TRAINING PROGRAM
Payer: COMMERCIAL

## 2020-05-08 VITALS
TEMPERATURE: 98 F | HEART RATE: 107 BPM | SYSTOLIC BLOOD PRESSURE: 135 MMHG | HEIGHT: 72 IN | DIASTOLIC BLOOD PRESSURE: 83 MMHG | WEIGHT: 315 LBS | OXYGEN SATURATION: 95 % | RESPIRATION RATE: 19 BRPM

## 2020-05-08 VITALS
HEART RATE: 88 BPM | SYSTOLIC BLOOD PRESSURE: 115 MMHG | OXYGEN SATURATION: 97 % | RESPIRATION RATE: 17 BRPM | DIASTOLIC BLOOD PRESSURE: 68 MMHG | TEMPERATURE: 99 F

## 2020-05-08 DIAGNOSIS — G47.33 OBSTRUCTIVE SLEEP APNEA (ADULT) (PEDIATRIC): ICD-10-CM

## 2020-05-08 DIAGNOSIS — E66.01 MORBID (SEVERE) OBESITY DUE TO EXCESS CALORIES: ICD-10-CM

## 2020-05-08 DIAGNOSIS — E78.5 HYPERLIPIDEMIA, UNSPECIFIED: ICD-10-CM
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
## 2020-05-08 DIAGNOSIS — E03.9 HYPOTHYROIDISM, UNSPECIFIED: ICD-10-CM

## 2020-05-08 DIAGNOSIS — Z74.01 BED CONFINEMENT STATUS: ICD-10-CM

## 2020-05-08 DIAGNOSIS — R19.7 DIARRHEA, UNSPECIFIED: ICD-10-CM

## 2020-05-08 DIAGNOSIS — I10 ESSENTIAL (PRIMARY) HYPERTENSION: ICD-10-CM

## 2020-05-08 DIAGNOSIS — I48.91 UNSPECIFIED ATRIAL FIBRILLATION: ICD-10-CM

## 2020-05-08 DIAGNOSIS — U07.1 COVID-19: ICD-10-CM

## 2020-05-08 DIAGNOSIS — R53.1 WEAKNESS: ICD-10-CM
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
## 2020-05-08 DIAGNOSIS — F41.9 ANXIETY DISORDER, UNSPECIFIED: ICD-10-CM

## 2020-05-08 DIAGNOSIS — Z79.01 LONG TERM (CURRENT) USE OF ANTICOAGULANTS: ICD-10-CM

## 2020-05-08 DIAGNOSIS — Z86.73 PERSONAL HISTORY OF TRANSIENT ISCHEMIC ATTACK (TIA), AND CEREBRAL INFARCTION WITHOUT RESIDUAL DEFICITS: ICD-10-CM

## 2020-05-08 DIAGNOSIS — Z91.81 HISTORY OF FALLING: ICD-10-CM

## 2020-05-08 DIAGNOSIS — E11.9 TYPE 2 DIABETES MELLITUS WITHOUT COMPLICATIONS: ICD-10-CM

## 2020-05-08 DIAGNOSIS — M10.9 GOUT, UNSPECIFIED: ICD-10-CM

## 2020-05-08 DIAGNOSIS — R41.0 DISORIENTATION, UNSPECIFIED: ICD-10-CM

## 2020-05-08 DIAGNOSIS — R32 UNSPECIFIED URINARY INCONTINENCE: ICD-10-CM

## 2020-05-08 DIAGNOSIS — N40.0 BENIGN PROSTATIC HYPERPLASIA WITHOUT LOWER URINARY TRACT SYMPTOMS: ICD-10-CM

## 2020-05-08 LAB
ALBUMIN SERPL ELPH-MCNC: 3.3 G/DL — SIGNIFICANT CHANGE UP (ref 3.3–5)
ALP SERPL-CCNC: 70 U/L — SIGNIFICANT CHANGE UP (ref 40–120)
ALT FLD-CCNC: 47 U/L — SIGNIFICANT CHANGE UP (ref 12–78)
ANION GAP SERPL CALC-SCNC: 9 MMOL/L — SIGNIFICANT CHANGE UP (ref 5–17)
APTT BLD: 53.1 SEC — HIGH (ref 27.5–36.3)
AST SERPL-CCNC: 46 U/L — HIGH (ref 15–37)
BASOPHILS # BLD AUTO: 0.08 K/UL — SIGNIFICANT CHANGE UP (ref 0–0.2)
BASOPHILS NFR BLD AUTO: 0.8 % — SIGNIFICANT CHANGE UP (ref 0–2)
BILIRUB SERPL-MCNC: 1.5 MG/DL — HIGH (ref 0.2–1.2)
BUN SERPL-MCNC: 10 MG/DL — SIGNIFICANT CHANGE UP (ref 7–23)
CALCIUM SERPL-MCNC: 9.6 MG/DL — SIGNIFICANT CHANGE UP (ref 8.5–10.1)
CHLORIDE SERPL-SCNC: 105 MMOL/L — SIGNIFICANT CHANGE UP (ref 96–108)
CK SERPL-CCNC: 152 U/L — SIGNIFICANT CHANGE UP (ref 26–308)
CO2 SERPL-SCNC: 24 MMOL/L — SIGNIFICANT CHANGE UP (ref 22–31)
CREAT SERPL-MCNC: 1.31 MG/DL — HIGH (ref 0.5–1.3)
EOSINOPHIL # BLD AUTO: 0.05 K/UL — SIGNIFICANT CHANGE UP (ref 0–0.5)
EOSINOPHIL NFR BLD AUTO: 0.5 % — SIGNIFICANT CHANGE UP (ref 0–6)
GLUCOSE SERPL-MCNC: 154 MG/DL — HIGH (ref 70–99)
HCT VFR BLD CALC: 48.2 % — SIGNIFICANT CHANGE UP (ref 39–50)
HGB BLD-MCNC: 17 G/DL — SIGNIFICANT CHANGE UP (ref 13–17)
IMM GRANULOCYTES NFR BLD AUTO: 1 % — SIGNIFICANT CHANGE UP (ref 0–1.5)
INR BLD: 3.04 RATIO — HIGH (ref 0.88–1.16)
LYMPHOCYTES # BLD AUTO: 3.19 K/UL — SIGNIFICANT CHANGE UP (ref 1–3.3)
LYMPHOCYTES # BLD AUTO: 30.3 % — SIGNIFICANT CHANGE UP (ref 13–44)
MCHC RBC-ENTMCNC: 29.3 PG — SIGNIFICANT CHANGE UP (ref 27–34)
MCHC RBC-ENTMCNC: 35.3 GM/DL — SIGNIFICANT CHANGE UP (ref 32–36)
MCV RBC AUTO: 83.1 FL — SIGNIFICANT CHANGE UP (ref 80–100)
MONOCYTES # BLD AUTO: 0.8 K/UL — SIGNIFICANT CHANGE UP (ref 0–0.9)
MONOCYTES NFR BLD AUTO: 7.6 % — SIGNIFICANT CHANGE UP (ref 2–14)
NEUTROPHILS # BLD AUTO: 6.3 K/UL — SIGNIFICANT CHANGE UP (ref 1.8–7.4)
NEUTROPHILS NFR BLD AUTO: 59.8 % — SIGNIFICANT CHANGE UP (ref 43–77)
PLATELET # BLD AUTO: 288 K/UL — SIGNIFICANT CHANGE UP (ref 150–400)
POTASSIUM SERPL-MCNC: 3.3 MMOL/L — LOW (ref 3.5–5.3)
POTASSIUM SERPL-SCNC: 3.3 MMOL/L — LOW (ref 3.5–5.3)
PROT SERPL-MCNC: 8 GM/DL — SIGNIFICANT CHANGE UP (ref 6–8.3)
PROTHROM AB SERPL-ACNC: 34.9 SEC — HIGH (ref 10–12.9)
RBC # BLD: 5.8 M/UL — SIGNIFICANT CHANGE UP (ref 4.2–5.8)
RBC # FLD: 16.7 % — HIGH (ref 10.3–14.5)
SODIUM SERPL-SCNC: 138 MMOL/L — SIGNIFICANT CHANGE UP (ref 135–145)
TROPONIN I SERPL-MCNC: <0.015 NG/ML — SIGNIFICANT CHANGE UP (ref 0.01–0.04)
TROPONIN I SERPL-MCNC: <0.015 NG/ML — SIGNIFICANT CHANGE UP (ref 0.01–0.04)
WBC # BLD: 10.53 K/UL — HIGH (ref 3.8–10.5)
WBC # FLD AUTO: 10.53 K/UL — HIGH (ref 3.8–10.5)

## 2020-05-08 PROCEDURE — 97162 PT EVAL MOD COMPLEX 30 MIN: CPT | Mod: GP

## 2020-05-08 PROCEDURE — 82550 ASSAY OF CK (CPK): CPT

## 2020-05-08 PROCEDURE — 70450 CT HEAD/BRAIN W/O DYE: CPT | Mod: 26

## 2020-05-08 PROCEDURE — 74176 CT ABD & PELVIS W/O CONTRAST: CPT

## 2020-05-08 PROCEDURE — 99284 EMERGENCY DEPT VISIT MOD MDM: CPT | Mod: 25

## 2020-05-08 PROCEDURE — 71250 CT THORAX DX C-: CPT | Mod: 26

## 2020-05-08 PROCEDURE — 99284 EMERGENCY DEPT VISIT MOD MDM: CPT

## 2020-05-08 PROCEDURE — 72125 CT NECK SPINE W/O DYE: CPT | Mod: 26

## 2020-05-08 PROCEDURE — 70450 CT HEAD/BRAIN W/O DYE: CPT

## 2020-05-08 PROCEDURE — 71250 CT THORAX DX C-: CPT

## 2020-05-08 PROCEDURE — 84484 ASSAY OF TROPONIN QUANT: CPT

## 2020-05-08 PROCEDURE — 72125 CT NECK SPINE W/O DYE: CPT

## 2020-05-08 PROCEDURE — 74176 CT ABD & PELVIS W/O CONTRAST: CPT | Mod: 26

## 2020-05-08 PROCEDURE — 85730 THROMBOPLASTIN TIME PARTIAL: CPT

## 2020-05-08 PROCEDURE — 36415 COLL VENOUS BLD VENIPUNCTURE: CPT

## 2020-05-08 PROCEDURE — 80053 COMPREHEN METABOLIC PANEL: CPT

## 2020-05-08 PROCEDURE — 85610 PROTHROMBIN TIME: CPT

## 2020-05-08 PROCEDURE — 85025 COMPLETE CBC W/AUTO DIFF WBC: CPT

## 2020-05-08 RX ORDER — POTASSIUM CHLORIDE 20 MEQ
40 PACKET (EA) ORAL ONCE
Refills: 0 | Status: COMPLETED | OUTPATIENT
Start: 2020-05-08 | End: 2020-05-08

## 2020-05-08 RX ADMIN — Medication 40 MILLIEQUIVALENT(S): at 13:49

## 2020-05-08 NOTE — ED PROVIDER NOTE - ATTENDING CONTRIBUTION TO CARE
I, Gaby Schumacher DO,  performed the initial face to face bedside interview with this patient regarding history of present illness, review of symptoms and relevant past medical, social and family history.  I completed an independent physical examination.  I was the initial provider who evaluated this patient. I have signed out the follow up of any pending tests (i.e. labs, radiological studies) to the ACP.  I have communicated the patient’s plan of care and disposition with the ACP.  The history, relevant review of systems, past medical and surgical history, medical decision making, and physical examination was documented by the scribe in my presence and I attest to the accuracy of the documentation.

## 2020-05-08 NOTE — ED PROVIDER NOTE - PROGRESS NOTE DETAILS
69 y/o Morbidly obese Male brought to the ED by EMS/.  Per family in the ED, pt having increased confusion, incontinence s/p Covid diagnosis in April (4/25).  Pt was having cough at that time but has now resolved.  Some diarrhea persisting.  Pt was also started on Zpack 4-5 days ago with increased diarrhea.  No blood in stool.  Pt was sleeping in recliner and per family, was found on floor this morning s/p falling trying to get up to go to bathroom.  Deny evidence of head trauma.  Needed Fire dept to help pick pt up.   CT scan shows patchy left opacity consistent with recent Covid diagnosis.  Cervical CT with multilevel degenerative disease. Neg Fx.  K= was 3.3.  Replacement given.  Social work now involved for placement services.  Covid reordered.  Edelmira Schofield PA-C Pt will be DC home.   Medically stable with covid.    Socail Work was unable to Arrange placement in Rehab for pt.  Pt was given PT consult here and is basically found to be bed bound.  Will not benefit from REhab transfer.  Pt will return to family.  Social Work confirmed that pt services for Home PT and visiting nurse are in place.  Bed will be delivered tomorrow.

## 2020-05-08 NOTE — ED PROVIDER NOTE - SKIN, MLM
Skin normal color for race, warm, dry and intact. No evidence of rash. +Avulsion of nail of left great toe. No evidence of rash.

## 2020-05-08 NOTE — ED ADULT NURSE NOTE - OBJECTIVE STATEMENT
Pt presents with + COVID 3/26 with weakness, diarrhea and AMS since diagnosis.  PT currently A/0X3, hx obesity on coumadin.  PT sp multiple falls including today, pt fell out of chair today, son states pt did not hit head.  No bruises noted.  SOn states pt has homecare nurse and PT who come to the house as pt is bedbound.  VSS, pt denies any pain or SOB at this time.

## 2020-05-08 NOTE — ED PROVIDER NOTE - CARE PLAN
Principal Discharge DX:	COVID-19  Secondary Diagnosis:	Weakness  Secondary Diagnosis:	Fall, initial encounter

## 2020-05-08 NOTE — ED PROVIDER NOTE - OBJECTIVE STATEMENT
69 y/o male with a PMHx of BPH, Hypothyroidism, Gout, DANIELLE, obesity, TIA, anxiety, Afib, HTN, HLD, Type II DM, presents to the ED BIBEMS from home s/p fall out of lounge chair last night. Pt's family put pt in a lounge chair overnight and found pt this morning on the ground next to the chair. Family reports multiple falls recently and state they usually call EMS who helps put the pt into the bed but this morning EMS suggested to come to the ED for evaluation. Pt has a visiting nurse and was recently started on Z-krishna. Family also reports an increase in confusion since pt tested COVID + on 4/25. Pt also with urinary incontinence and increased generalized weakness. No other complaints at this time. Allergies: Metformin. PCP: Lorne Keller.

## 2020-05-08 NOTE — PHYSICAL THERAPY INITIAL EVALUATION ADULT - GENERAL OBSERVATIONS, REHAB EVAL
Patient received in a bariatric bed in th ED. +Airborne Precautions, +masked, No O2, +ICU monitors. Patient denied pain.

## 2020-05-08 NOTE — ED PROVIDER NOTE - PATIENT PORTAL LINK FT
You can access the FollowMyHealth Patient Portal offered by [facility] by registering at the following website: [email or web address]. By joining [facility]’s FollowMyHealth portal, you will also be able to view your health information using other applications (apps) compatible with our system.

## 2020-05-08 NOTE — PHYSICAL THERAPY INITIAL EVALUATION ADULT - ADDITIONAL COMMENTS
Patient denied HHA, reported sons assist with transfers and ADL.  Patient reported he would "like to walk again".  Has not walked, h/o falls.  Last EMR entry in 07/2018, able to ambulate 25 feet with RW.

## 2020-05-08 NOTE — ED PROVIDER NOTE - NSFOLLOWUPINSTRUCTIONS_ED_ALL_ED_FT
COVID-19  COVID-19, also known as coronavirus disease or novel coronavirus, is caused by a type of virus that causes respiratory illness. This may lead to inflammation and the buildup of mucus and fluids in the airway of the lungs (pneumonia). There are many different coronaviruses. Most of these viruses only affect animals, but sometimes these viruses can change and infect people.  What are the causes?  This illness is caused by a virus. You may catch the virus by:  Breathing in droplets from an infected person's cough or sneeze.Touching something, like a table or a doorknob, that was exposed to the virus (contaminated) and then touching your mouth, nose, or eyes.Being around animals that carry the virus, or eating uncooked or undercooked meat or animal products that contain the virus.What increases the risk?  You are more likely to develop this condition if you:  Live in or travel to an area with a COVID-19 outbreak.Come in contact with a sick person who recently traveled to an area with a COVID-19 outbreak.Provide care for or live with a person who is infected with COVID-19.What are the signs or symptoms?  COVID-19 causes respiratory illness that can lead to pneumonia. Symptoms of pneumonia may include:  A fever.A cough.Difficulty breathing.How is this diagnosed?  This condition may be diagnosed based on:  Your signs and symptoms, especially if:  You live in an area with a COVID-19 outbreak.You recently traveled to or from an area where the virus is common.You provide care for or live with a person who was diagnosed with COVID-19.A physical exam.Lab tests, which may include:  A nasal swab to take a sample of fluid from your nose.A throat swab to take a sample of fluid from your throat.A sample of mucus from your lungs (sputum).Blood tests.How is this treated?  There is no medicine to treat COVID-19. Your health care provider will talk with you about ways to treat your symptoms. This may include rest, fluids, and over-the-counter medicines.  Follow these instructions at home:  Lifestyle     Use a cool-mist humidifier to add moisture to the air. This can help you breathe more easily.Do not use any products that contain nicotine or tobacco, such as cigarettes, e-cigarettes, and chewing tobacco. If you need help quitting, ask your health care provider.Rest at home as told by your health care provider.Return to your normal activities as told by your health care provider. Ask your health care provider what activities are safe for you.General instructions     Take over-the-counter and prescription medicines only as told by your health care provider.Drink enough fluid to keep your urine pale yellow.Keep all follow-up visits as told by your health care provider. This is important.How is this prevented?     There is no vaccine to help prevent COVID-19 infection. However, there are steps you can take to protect yourself and others from this virus.  To protect yourself:     Do not travel to areas where COVID-19 is a risk. The areas where COVID-19 is reported change often. To identify high-risk areas, check the CDC travel website: wwwnc.cdc.gov/travel/noticesIf you live in, or must travel to, an area where COVID-19 is a risk, take precautions to avoid infection.  Stay away from people who are sick.Stay away from places where there are animals that may carry the virus. This includes places where animals and animal products are sold. Note that both living and dead animals can carry the virus.Wash your hands often with soap and water. If soap and water are not available, use an alcohol-based hand .Avoid touching your mouth, face, eyes, or nose.To protect others:     If you have symptoms, take steps to prevent the virus from spreading to others.  If you think you have a COVID-19 infection, contact your health care provider right away. Tell your health care team that you think you may have a COVID-19 infection.Stay home. Leave your house only to seek medical care.Do not travel while you are sick.Wash your hands often with soap and water. If soap and water are not available, use alcohol-based hand .Stay away from other members of your household. If possible, stay in your own room, separate from others. Use a different bathroom.Make sure that all people in your household wash their hands well and often.Cough or sneeze into a tissue or your sleeve or elbow. Do not cough or sneeze into your hand or into the air.Wear a face mask.Where to find more information  Centers for Disease Control and Prevention: www.cdc.gov/coronavirus/2019-ncov/index.htmlWSwedish [facility]: www.who.int/health-topics/coronavirusContact a health care provider if:  You have traveled to an area where COVID-19 is a risk and you have symptoms of the infection.You have contact with someone who has traveled to an area where COVID-19 is a risk and you have symptoms of the infection.Get help right away if:  You have trouble breathing.You have chest pain.Summary  COVID-19 is caused by a type of virus that causes respiratory illness. This may lead to inflammation and the buildup of mucus and fluids in the airway of the lungs (pneumonia).You are more likely to develop this condition if you live in or travel to an area with a COVID-19 outbreak.There is no medicine to treat COVID-19. Your health care provider will talk with you about ways to treat your symptoms.Take steps to protect yourself and others from infection. Wash your hands often. Stay away from other people who are sick and wear a mask if you are sick.This information is not intended to replace advice given to you by your health care provider. Make sure you discuss any questions you have with your health care provider.    Document Released: 01/23/2020 Document Revised: 04/14/2020 Document Reviewed: 01/23/2020

## 2020-05-08 NOTE — PHYSICAL THERAPY INITIAL EVALUATION ADULT - PHYSICAL ASSIST/NONPHYSICAL ASSIST, REHAB EVAL
patient not able to roll back onto his back from semi L side lying./2 person assist Has The Growth Been Previously Biopsied?: has not been previously biopsied

## 2020-05-08 NOTE — PHYSICAL THERAPY INITIAL EVALUATION ADULT - PERTINENT HX OF CURRENT PROBLEM, REHAB EVAL
69 yo M presents to the ED post a fall at home. Patient slipped out of his recliner chair overnight and was found on the floor by family this am.  H/O falls, EMS usually puts patient back to bed. Patient tested covid (+) on 4/25.  Pt also with AMS, urinary incontinence and increased generalized weakness.

## 2020-05-08 NOTE — ED PROVIDER NOTE - CONSTITUTIONAL, MLM
normal... Well appearing, awake, alert, oriented to person, place, time/situation and in no apparent distress. Morbidly obese, awake, alert, oriented to person, place, time/situation and in no apparent distress.

## 2020-05-08 NOTE — PHYSICAL THERAPY INITIAL EVALUATION ADULT - MANUAL MUSCLE TESTING RESULTS, REHAB EVAL
B UE's at least 3/5, B shoulders to 90*.  B LEs: able to move B ankles, R hip and @ knee 3-/5; L hip and knee @ 2-/5

## 2020-06-12 ENCOUNTER — APPOINTMENT (OUTPATIENT)
Dept: GASTROENTEROLOGY | Facility: CLINIC | Age: 70
End: 2020-06-12
Payer: COMMERCIAL

## 2020-06-12 DIAGNOSIS — K21.9 GASTRO-ESOPHAGEAL REFLUX DISEASE W/OUT ESOPHAGITIS: ICD-10-CM

## 2020-06-12 DIAGNOSIS — R10.9 UNSPECIFIED ABDOMINAL PAIN: ICD-10-CM

## 2020-06-12 PROCEDURE — 99442: CPT

## 2020-06-12 RX ORDER — HYDROCORTISONE ACETATE 25 MG/1
25 SUPPOSITORY RECTAL TWICE DAILY
Qty: 14 | Refills: 0 | Status: DISCONTINUED | COMMUNITY
Start: 2019-01-28 | End: 2020-06-12

## 2020-06-12 RX ORDER — ATORVASTATIN CALCIUM 20 MG/1
20 TABLET, FILM COATED ORAL
Qty: 30 | Refills: 0 | Status: ACTIVE | COMMUNITY
Start: 2016-10-01

## 2020-06-12 RX ORDER — FUROSEMIDE 40 MG/1
40 TABLET ORAL
Qty: 30 | Refills: 0 | Status: ACTIVE | COMMUNITY
Start: 2016-12-14

## 2020-06-12 RX ORDER — METOPROLOL SUCCINATE 200 MG/1
200 TABLET, EXTENDED RELEASE ORAL
Qty: 60 | Refills: 0 | Status: ACTIVE | COMMUNITY
Start: 2016-10-15

## 2020-06-12 RX ORDER — GLIMEPIRIDE 1 MG/1
1 TABLET ORAL
Qty: 30 | Refills: 0 | Status: ACTIVE | COMMUNITY
Start: 2016-08-08

## 2020-06-12 RX ORDER — NIFEDIPINE 60 MG/1
60 TABLET, FILM COATED, EXTENDED RELEASE ORAL
Qty: 60 | Refills: 0 | Status: ACTIVE | COMMUNITY
Start: 2016-11-29

## 2020-06-12 RX ORDER — FLUTICASONE PROPIONATE 50 UG/1
50 SPRAY, METERED NASAL DAILY
Qty: 1 | Refills: 5 | Status: DISCONTINUED | COMMUNITY
Start: 2018-11-07 | End: 2020-06-12

## 2020-06-12 RX ORDER — LORAZEPAM 2 MG/1
2 TABLET ORAL
Qty: 90 | Refills: 0 | Status: ACTIVE | COMMUNITY
Start: 2016-11-25

## 2020-06-12 RX ORDER — TAMSULOSIN HYDROCHLORIDE 0.4 MG/1
0.4 CAPSULE ORAL
Qty: 30 | Refills: 0 | Status: ACTIVE | COMMUNITY
Start: 2016-04-28

## 2020-06-12 RX ORDER — CHOLECALCIFEROL (VITAMIN D3) 1250 MCG
1.25 MG CAPSULE ORAL
Qty: 4 | Refills: 0 | Status: ACTIVE | COMMUNITY
Start: 2017-01-20

## 2020-06-12 RX ORDER — POTASSIUM CHLORIDE 750 MG/1
10 TABLET, FILM COATED, EXTENDED RELEASE ORAL
Qty: 30 | Refills: 0 | Status: ACTIVE | COMMUNITY
Start: 2015-12-10

## 2020-06-12 RX ORDER — WARFARIN 2.5 MG/1
2.5 TABLET ORAL
Qty: 30 | Refills: 0 | Status: ACTIVE | COMMUNITY
Start: 2016-11-21

## 2020-06-12 RX ORDER — PANTOPRAZOLE 40 MG/1
40 TABLET, DELAYED RELEASE ORAL
Qty: 90 | Refills: 1 | Status: ACTIVE | COMMUNITY
Start: 2019-01-03

## 2020-06-12 RX ORDER — WARFARIN 10 MG/1
10 TABLET ORAL
Qty: 30 | Refills: 0 | Status: ACTIVE | COMMUNITY
Start: 2016-11-09

## 2020-06-12 RX ORDER — ALLOPURINOL 300 MG/1
300 TABLET ORAL
Qty: 30 | Refills: 0 | Status: ACTIVE | COMMUNITY
Start: 2016-07-14

## 2020-06-12 RX ORDER — LEVOTHYROXINE SODIUM 0.12 MG/1
125 TABLET ORAL
Qty: 30 | Refills: 0 | Status: ACTIVE | COMMUNITY
Start: 2016-11-23

## 2020-06-12 NOTE — REASON FOR VISIT
[Home] : at home, [unfilled] , at the time of the visit. [Verbal consent obtained from patient] : the patient, [unfilled] [Medical Office: ([facility])___] : at the medical office located in  [Follow-Up: _____] : a [unfilled] follow-up visit

## 2020-06-12 NOTE — HISTORY OF PRESENT ILLNESS
[FreeTextEntry1] : 70-year-old white male, with super morbid obesity, diabetes, hypertension, hyperlipidemia, atrial fibrillation. Weights had been running between 480 and 500 pounds initially. Patient was initially seen in 1/2019 for Symptoms suggestive of LPR. He was started on PPI medication at that time and overall has done better. Telephone contact one year ago with NP, MW for rectal bleeding & hydrocortisone suppositories were advised, and taken with success.\par He was subsequently seen in the office last April, 2019 by me and felt not to be a candidate for a colonoscopy due to morbid obesity and immobility. A virtual colonoscopy was subsequently performed and colon part of the examination was negative. Incidental findings of gallstones and renal cysts were noted.\par Patient remains on anticoagulation for atrial fibrillation, blood pressure medications, and diabetes medications persist. Coumadin is down to 10 mg per day. There has been 3 different falls over the past year and patient has remained bedbound/homebound for the past year. Patient apparently was diagnosed with covid with a positive nasal swab on 4/24. No hospitalization. No pneumonia. Continues to be followed by Dr. Keller, PCP. \par Patient had a slip and fall on 5/8/20 and presented to [facility] for evaluation. There blood work was performed showing normal CBC and CMP in INR of 3. LFTs were normal. CT of head and neck were unremarkable except for degenerative changes of the cervical spine. Chest CT an abdominal CT were unremarkable. Renal cysts still noted. No current mention of gallstones.\par Around the same time. Patient was started on an hCG diet with severe calorie retractions of about 500 calories per day along with daily injections of hCG. This persisted for 40 days. Patient allegedly lost 50 pounds and current weight is 445. Program has just been completed.  Patient alleges to have numbness in the abdomen where the injections have occurred. He has minor abdominal discomfort, but no discrete abdominal pain. There is not been nausea or vomiting. Bowel movements remain okay. He still has some mild epigastric discomfort. He notes no chest pain or shortness of breath.\par He denies any fever or jaundice. There is no prior history of gallbladder symptoms. There has been no history of pancreatitis. He has been seen once in consultation for bariatric surgery, but declined same. He is requesting repeat blood work for general checkup and Covid Status.

## 2020-06-12 NOTE — ASSESSMENT
[FreeTextEntry1] : No physical exam as telephone contact only.\par \par Prior evaluation had revealed the presence of gallstones. He does not appear to have any gallstone symptoms of biliary colic. There has been recent blood work from 5/8 ER evaluation at [facility], where blood work was all unremarkable. INR was 3. LFTs were normal\par Prior LPR symptoms appear to have resolved. He has only occasional heartburn and no choking or cough or throat clearing. He is only sporadically taking the PPI medication now. Advised to persist at taking PPI medication at least every other day.\par Recent covid positive in late April by nasal swab.\par Recent 40 day diet program with hCG injections. Considerable weight loss has ensued.\par By history. Patient remains on down to and bed bound and physical therapy persists very\par As per patient request. Repeat blood work was requested to include CBC, CMP, whitish profiles, Covid antibody test. Call for results\par GI followup in 2-3 months, telephonically. \par \par Duration of contact:   25 minutes.

## 2020-09-25 ENCOUNTER — APPOINTMENT (OUTPATIENT)
Dept: ORTHOPEDIC SURGERY | Facility: CLINIC | Age: 70
End: 2020-09-25

## 2021-02-23 ENCOUNTER — APPOINTMENT (OUTPATIENT)
Dept: GASTROENTEROLOGY | Facility: CLINIC | Age: 71
End: 2021-02-23

## 2021-06-24 ENCOUNTER — APPOINTMENT (OUTPATIENT)
Dept: ORTHOPEDIC SURGERY | Facility: CLINIC | Age: 71
End: 2021-06-24

## 2022-07-12 NOTE — ED PROVIDER NOTE - RESPIRATORY, MLM
[Time Spent: ___ minutes] : I have spent [unfilled] minutes of time on the encounter. Breath sounds clear and equal bilaterally.

## 2022-08-18 ENCOUNTER — INPATIENT (INPATIENT)
Facility: HOSPITAL | Age: 72
LOS: 5 days | Discharge: HOME | DRG: 871 | End: 2022-08-24
Attending: INTERNAL MEDICINE | Admitting: STUDENT IN AN ORGANIZED HEALTH CARE EDUCATION/TRAINING PROGRAM
Payer: COMMERCIAL

## 2022-08-18 VITALS
OXYGEN SATURATION: 94 % | HEIGHT: 72 IN | HEART RATE: 111 BPM | RESPIRATION RATE: 24 BRPM | SYSTOLIC BLOOD PRESSURE: 81 MMHG | WEIGHT: 315 LBS | TEMPERATURE: 99 F | DIASTOLIC BLOOD PRESSURE: 60 MMHG

## 2022-08-18 LAB
ALBUMIN SERPL ELPH-MCNC: 3.8 G/DL — SIGNIFICANT CHANGE UP (ref 3.3–5)
ALP SERPL-CCNC: 256 U/L — HIGH (ref 40–120)
ALT FLD-CCNC: 426 U/L — HIGH (ref 10–45)
ANION GAP SERPL CALC-SCNC: 16 MMOL/L — SIGNIFICANT CHANGE UP (ref 5–17)
APPEARANCE UR: CLEAR — SIGNIFICANT CHANGE UP
APTT BLD: 34.3 SEC — SIGNIFICANT CHANGE UP (ref 27.5–35.5)
AST SERPL-CCNC: 438 U/L — HIGH (ref 10–40)
BASOPHILS # BLD AUTO: 0 K/UL — SIGNIFICANT CHANGE UP (ref 0–0.2)
BASOPHILS NFR BLD AUTO: 0 % — SIGNIFICANT CHANGE UP (ref 0–2)
BILIRUB SERPL-MCNC: 6.4 MG/DL — HIGH (ref 0.2–1.2)
BILIRUB UR-MCNC: ABNORMAL
BUN SERPL-MCNC: 12 MG/DL — SIGNIFICANT CHANGE UP (ref 7–23)
CALCIUM SERPL-MCNC: 10.1 MG/DL — SIGNIFICANT CHANGE UP (ref 8.4–10.5)
CHLORIDE SERPL-SCNC: 98 MMOL/L — SIGNIFICANT CHANGE UP (ref 96–108)
CK SERPL-CCNC: 30 U/L — SIGNIFICANT CHANGE UP (ref 30–200)
CO2 SERPL-SCNC: 19 MMOL/L — LOW (ref 22–31)
COLOR SPEC: ABNORMAL
CREAT SERPL-MCNC: 1.04 MG/DL — SIGNIFICANT CHANGE UP (ref 0.5–1.3)
DIFF PNL FLD: NEGATIVE — SIGNIFICANT CHANGE UP
EGFR: 76 ML/MIN/1.73M2 — SIGNIFICANT CHANGE UP
EOSINOPHIL # BLD AUTO: 0 K/UL — SIGNIFICANT CHANGE UP (ref 0–0.5)
EOSINOPHIL NFR BLD AUTO: 0 % — SIGNIFICANT CHANGE UP (ref 0–6)
GAS PNL BLDV: SIGNIFICANT CHANGE UP
GLUCOSE SERPL-MCNC: 190 MG/DL — HIGH (ref 70–99)
GLUCOSE UR QL: NEGATIVE — SIGNIFICANT CHANGE UP
HCT VFR BLD CALC: 50.4 % — HIGH (ref 39–50)
HGB BLD-MCNC: 17.3 G/DL — HIGH (ref 13–17)
INR BLD: 1.87 RATIO — HIGH (ref 0.88–1.16)
KETONES UR-MCNC: NEGATIVE — SIGNIFICANT CHANGE UP
LACTATE SERPL-SCNC: 1.9 MMOL/L — SIGNIFICANT CHANGE UP (ref 0.5–2)
LEUKOCYTE ESTERASE UR-ACNC: NEGATIVE — SIGNIFICANT CHANGE UP
LYMPHOCYTES # BLD AUTO: 0.76 K/UL — LOW (ref 1–3.3)
LYMPHOCYTES # BLD AUTO: 3.3 % — LOW (ref 13–44)
MCHC RBC-ENTMCNC: 29 PG — SIGNIFICANT CHANGE UP (ref 27–34)
MCHC RBC-ENTMCNC: 34.3 GM/DL — SIGNIFICANT CHANGE UP (ref 32–36)
MCV RBC AUTO: 84.6 FL — SIGNIFICANT CHANGE UP (ref 80–100)
MONOCYTES # BLD AUTO: 0.95 K/UL — HIGH (ref 0–0.9)
MONOCYTES NFR BLD AUTO: 4.1 % — SIGNIFICANT CHANGE UP (ref 2–14)
NEUTROPHILS # BLD AUTO: 21.38 K/UL — HIGH (ref 1.8–7.4)
NEUTROPHILS NFR BLD AUTO: 86.1 % — HIGH (ref 43–77)
NITRITE UR-MCNC: NEGATIVE — SIGNIFICANT CHANGE UP
PH UR: 7.5 — SIGNIFICANT CHANGE UP (ref 5–8)
PLATELET # BLD AUTO: 273 K/UL — SIGNIFICANT CHANGE UP (ref 150–400)
POTASSIUM SERPL-MCNC: 4.6 MMOL/L — SIGNIFICANT CHANGE UP (ref 3.5–5.3)
POTASSIUM SERPL-SCNC: 4.6 MMOL/L — SIGNIFICANT CHANGE UP (ref 3.5–5.3)
PROT SERPL-MCNC: 7.4 G/DL — SIGNIFICANT CHANGE UP (ref 6–8.3)
PROT UR-MCNC: ABNORMAL
PROTHROM AB SERPL-ACNC: 21.6 SEC — HIGH (ref 10.5–13.4)
RAPID RVP RESULT: SIGNIFICANT CHANGE UP
RBC # BLD: 5.96 M/UL — HIGH (ref 4.2–5.8)
RBC # FLD: 13.2 % — SIGNIFICANT CHANGE UP (ref 10.3–14.5)
SARS-COV-2 RNA SPEC QL NAA+PROBE: SIGNIFICANT CHANGE UP
SODIUM SERPL-SCNC: 133 MMOL/L — LOW (ref 135–145)
SP GR SPEC: 1.02 — SIGNIFICANT CHANGE UP (ref 1.01–1.02)
UROBILINOGEN FLD QL: ABNORMAL
WBC # BLD: 23.09 K/UL — HIGH (ref 3.8–10.5)
WBC # FLD AUTO: 23.09 K/UL — HIGH (ref 3.8–10.5)

## 2022-08-18 PROCEDURE — 99285 EMERGENCY DEPT VISIT HI MDM: CPT

## 2022-08-18 PROCEDURE — 93010 ELECTROCARDIOGRAM REPORT: CPT | Mod: 59

## 2022-08-18 PROCEDURE — 74177 CT ABD & PELVIS W/CONTRAST: CPT | Mod: 26,MA

## 2022-08-18 PROCEDURE — 71045 X-RAY EXAM CHEST 1 VIEW: CPT | Mod: 26

## 2022-08-18 PROCEDURE — 76705 ECHO EXAM OF ABDOMEN: CPT | Mod: 26

## 2022-08-18 RX ORDER — SODIUM CHLORIDE 9 MG/ML
1000 INJECTION INTRAMUSCULAR; INTRAVENOUS; SUBCUTANEOUS ONCE
Refills: 0 | Status: COMPLETED | OUTPATIENT
Start: 2022-08-18 | End: 2022-08-18

## 2022-08-18 RX ORDER — PIPERACILLIN AND TAZOBACTAM 4; .5 G/20ML; G/20ML
3.38 INJECTION, POWDER, LYOPHILIZED, FOR SOLUTION INTRAVENOUS ONCE
Refills: 0 | Status: COMPLETED | OUTPATIENT
Start: 2022-08-18 | End: 2022-08-18

## 2022-08-18 RX ORDER — ACETAMINOPHEN 500 MG
650 TABLET ORAL ONCE
Refills: 0 | Status: COMPLETED | OUTPATIENT
Start: 2022-08-18 | End: 2022-08-18

## 2022-08-18 RX ADMIN — SODIUM CHLORIDE 1000 MILLILITER(S): 9 INJECTION INTRAMUSCULAR; INTRAVENOUS; SUBCUTANEOUS at 20:23

## 2022-08-18 RX ADMIN — SODIUM CHLORIDE 1000 MILLILITER(S): 9 INJECTION INTRAMUSCULAR; INTRAVENOUS; SUBCUTANEOUS at 18:53

## 2022-08-18 RX ADMIN — Medication 650 MILLIGRAM(S): at 20:29

## 2022-08-18 RX ADMIN — PIPERACILLIN AND TAZOBACTAM 200 GRAM(S): 4; .5 INJECTION, POWDER, LYOPHILIZED, FOR SOLUTION INTRAVENOUS at 20:23

## 2022-08-18 NOTE — ED PROVIDER NOTE - OBJECTIVE STATEMENT
72 Y M H/O morbid obesity, afib, htn, presenting with intermittent fever, RUQ abdominal pain. States on 8/9, experienced new fever, ruq pain, lasted for 5 days with fever, treated only with tylenol, new relapse of ruq pain over the last 2-3 days, with fever to 102. Denies any chest pain, SOB, leg swelling, dysuria, + dark colored urine.

## 2022-08-18 NOTE — ED PROVIDER NOTE - NSICDXPASTMEDICALHX_GEN_ALL_CORE_FT
PAST MEDICAL HISTORY:  Afib     Anxiety     BPH (benign prostatic hyperplasia)     Chronic Gout     Dyslipidemia     Gout     History of Obesity     History of Transient Ischemic Attack (TIA)     Hypertension     Hypothyroid     Obstructive Sleep Apnea

## 2022-08-18 NOTE — CONSULT NOTE ADULT - ASSESSMENT
73yo M h/o obesity (BMI 50.4), Afib on coumadin, BPH, hypothyroid, gout, presenting with 1 week of intermittent fevers and jaundice, found to be hypotensive with elevated bilirubin, with findings concerning for cholangitis.    Plan:  - GI consulted for ERCP  - Hemodynamics improved s/p fluids  - C/w IVF, Abx  - Pt counseled regarding bariatric center, would like to think about it before considering bariatric surgery eval  - No acute surgical intervention at this time  - Discussed with Dr. Arvizu    ACS/Trauma Surgery  [id number]

## 2022-08-18 NOTE — ED ADULT NURSE REASSESSMENT NOTE - NS ED NURSE REASSESS COMMENT FT1
Pt unable to provide urine specimen.  Straight cath ordered by MD Saucedo. Procedure explained to pt. Straight cath done with DAYANNA Vogt to ensure sterile technique. Pt tolerated procedure well, 150 cc  of orange urine drained.

## 2022-08-18 NOTE — ED ADULT NURSE NOTE - OBJECTIVE STATEMENT
Pt is a 73 y/o male pmhx of obesity, HTN & Afibb (on Warfarin) presents to the ED c/o of fever. Pt states about a week ago his skin was looking "jaundice" but since resolved. He today was having all over sharp abdominal pain and noticed his urine to be whiskey colored, he was febrile at home. took Tylenol @ 1pm. Also states he overall "wasn't feeling well". Pt presents alert & oriented x 4, calm, able to follow commands, speech clear, pallor noted. Breathing spontaneous & nonlabored. On cardiac monitor, afibb 110's. Abdomen soft & nondistended, slightly tender to palpation. Ambulates with walker/wheelchair. Strong peripheral pulses noted b/l, no edema noted. Skin warm, clean, dry & intact. Denies chest pain, SOB, back pain, n/v/d, sick contacts, changes in vision. Call bell within reach and pt instructed on how to use, bed in lowest position, side rails up, wheels locked. Pt is a 71 y/o male pmhx of obesity, HTN & Afibb (on Warfarin) presents to the ED c/o of fever. Pt states about a week ago his skin was looking "jaundice" but since resolved. He today was having all over sharp abdominal pain and noticed his urine to be whiskey colored, he was febrile at home. took Tylenol @ 1pm. Also states he overall "wasn't feeling well". Pt presents alert & oriented x 4, calm, able to follow commands, speech clear, pallor noted. Breathing spontaneous & nonlabored. On cardiac monitor, afibb 110's. Abdomen soft & nondistended, slightly tender to palpation. Ambulates with walker/wheelchair. Strong peripheral pulses noted b/l, mild peripheral edema noted. Skin warm, clean, dry & intact. Denies chest pain, SOB, back pain, n/v/d, sick contacts, changes in vision. Call bell within reach and pt instructed on how to use, bed in lowest position, side rails up, wheels locked.

## 2022-08-18 NOTE — ED ADULT NURSE NOTE - CHIEF COMPLAINT QUOTE
fever since August 9 resolved came back yesterday  whisky colored urine today  Pt observed to be pale looking

## 2022-08-18 NOTE — ED ADULT NURSE NOTE - NS ED NURSE RECORD ANOTHER HT AND WT
Good FM, no bleeding no ucs. Declines pertussis, discussed risks.    
Nathalie is a 31 year old female here for an obstetrics check.  She is      . Gestational age: 28w6d     Pt declines Pertussis Vaccine.     She reports fetal movement  She denies contractions  She denies bleeding  She denies headaches  She denies edema  She denies abdominal pain  She denies rupture of membranes  She denies vision changes    See Prenatal Flowsheet for additional comments.  
Nathalie is a 31 year old female here for an obstetrics check.  She is      . Gestational age: 28w6d     She {COMPLAINS/DENIES/REPORTS:414716} fetal movement  She {COMPLAINS/DENIES/REPORTS:805092} contractions  She {COMPLAINS/DENIES/REPORTS:822146} bleeding  She {COMPLAINS/DENIES/REPORTS:598720} headaches  She {COMPLAINS/DENIES/REPORTS:899087} edema  She {COMPLAINS/DENIES/REPORTS:833909} abdominal pain  She {COMPLAINS/DENIES/REPORTS:112268} rupture of membranes  She {COMPLAINS/DENIES/REPORTS:460435} vision changes    See Prenatal Flowsheet for additional comments.  
Yes

## 2022-08-18 NOTE — ED PROVIDER NOTE - NS ED ROS FT
Constitutional: + fevers + chills.   CV: no chest pain, no palpitations   Respiratory: no shortness of breath, no cough   GI: +abdominal pain, no nausea no vomiting   Neuro: no headache, no weakness, no numbness  all other ROS is negative except as documented as HPI. Constitutional: + fevers + chills.   CV: no chest pain, no palpitations   Respiratory: no shortness of breath, no cough   GI: +abdominal pain, no nausea  Neuro: no headache, no weakness, no numbness  all other ROS is negative except as documented as HPI.

## 2022-08-18 NOTE — ED PROVIDER NOTE - CLINICAL SUMMARY MEDICAL DECISION MAKING FREE TEXT BOX
ap- 72 M w/ BPH, HLD, afib on coumadin, gout, presents to the ER w/ fever started last week and then went away, came back today pt w/ 2 negative covid tests. No cp, reports abd pain, no nausea no vomiting, no diarrhea, tolerating PO. Pt was jaundiced. pt uses a wheelchair to move around. on exam, pt is awake and alert in no distress oriented x2, pt is not jaundiced, no scleral icterus,  has diminished breath sounds bilaterally, pt w/ generalized abd tenderness, obese abd no lower extremity edema, pt able to move arms/legs. Plan for labs imaging and admission. concern for cholecysittis, vs intraabdominal infection vs less likely pna- no report of cough, no hypoxia, pt w/ known hx of afib likely afib rvr compensatory reaction to pt fever. ap- 72 M w/ BPH, HLD, afib on coumadin, gout, presents to the ER w/ fever started last week and then went away, came back today pt w/ 2 negative covid tests. No cp, reports abd pain, no nausea no vomiting, no diarrhea, tolerating PO. Pt was jaundiced. pt uses a wheelchair to move around. on exam, pt is awake and alert in no distress oriented x2, pt mildly jaundiced, w/ scleral icterus,  has diminished breath sounds bilaterally, pt w/ generalized abd tenderness, obese abd no lower extremity edema, pt able to move arms/legs. Plan for labs imaging and admission. concern for cholecystitis, vs intraabdominal infection vs less likely pna- no report of cough, no hypoxia, pt w/ known hx of afib likely afib rvr compensatory reaction to pt fever.

## 2022-08-18 NOTE — CONSULT NOTE ADULT - SUBJECTIVE AND OBJECTIVE BOX
GENERAL SURGERY CONSULT NOTE  Consulting surgical team: ACS  Consulting attending: Dr. Franklyn Arvizu    HPI:  73yo M h/o obesity (BMI 50.4), Afib on coumadin, BPH, hypothyroid, gout, presenting with 1 week of intermittent fevers and jaundice. First noticed the fever Aug 9 which was to 102.5F and since then has been having fevers and noticed some yellowing of the skin. Wife reports some confusion, which has now resolved. He reports mild abdominal pain as well.     Pt reports previously being ~540lb in weight and has since lost weight (intentionally) through dieting to his current weight. Pt has never seen a bariatric surgeon and is hesitant to do so because he thinks he can continue to lose weight via diet.       PAST MEDICAL HISTORY:  Dyslipidemia  Hypertension  Afib  Chronic Gout  Anxiety  History of Transient Ischemic Attack (TIA)  History of Obesity  Obstructive Sleep Apnea  Gout  Hypothyroid  BPH (benign prostatic hyperplasia)    PAST SURGICAL HISTORY:  History of Tonsillectomy    MEDICATIONS:    ALLERGIES:  metformin (Unknown)    VITALS & I/Os:  Vital Signs Last 24 Hrs  T(C): 38.8 (18 Aug 2022 19:14), Max: 38.8 (18 Aug 2022 19:14)  T(F): 101.8 (18 Aug 2022 19:14), Max: 101.8 (18 Aug 2022 19:14)  HR: 120 (18 Aug 2022 19:14) (111 - 120)  BP: 108/61 (18 Aug 2022 19:14) (81/60 - 108/61)  BP(mean): 70 (18 Aug 2022 19:14) (70 - 70)  RR: 22 (18 Aug 2022 19:14) (22 - 24)  SpO2: 98% (18 Aug 2022 19:14) (94% - 98%)    Parameters below as of 18 Aug 2022 19:14  Patient On (Oxygen Delivery Method): room air      PHYSICAL EXAM:  GEN: resting in bed  CHEST: no increased WOB  ABD: obese, soft, mildly TTP in RUQ/epigastrium   EXT: warm, well perfused  NEURO: A&Ox3    LABS:                        17.3   23.09 )-----------( 273      ( 18 Aug 2022 19:30 )             50.4     08-18    133<L>  |  98  |  12  ----------------------------<  190<H>  4.6   |  19<L>  |  1.04    Ca    10.1      18 Aug 2022 19:30    TPro  7.4  /  Alb  3.8  /  TBili  6.4<H>  /  DBili  x   /  AST  438<H>  /  ALT  426<H>  /  AlkPhos  256<H>      Lactate:   @ 19:10  3.3    PT/INR - ( 18 Aug 2022 19:30 )   PT: 21.6 sec;   INR: 1.87 ratio         PTT - ( 18 Aug 2022 19:30 )  PTT:34.3 sec    CARDIAC MARKERS ( 18 Aug 2022 19:30 )  x     / x     / 30 U/L / x     / x            Urinalysis Basic - ( 18 Aug 2022 19:42 )    Color: Dark Yellow / Appearance: Clear / S.020 / pH: x  Gluc: x / Ketone: Negative  / Bili: Moderate / Urobili: 8 mg/dL   Blood: x / Protein: Trace / Nitrite: Negative   Leuk Esterase: Negative / RBC: 3 /hpf / WBC 2 /HPF   Sq Epi: x / Non Sq Epi: 1 /hpf / Bacteria: Negative      IMAGING:  CT Abdomen and Pelvis No Cont (20 @ 13:51)  FINDINGS:    CHEST:     LUNGS AND LARGE AIRWAYS: Patchy opacities in the left lung, possibly infection.  PLEURA: No pleural effusion.  VESSELS: Atherosclerotic changes of the aorta.  HEART: Heart size is normal. No pericardial effusion.  MEDIASTINUM AND ZARINA: No lymphadenopathy.  CHEST WALL AND LOWER NECK: Within normal limits.    ABDOMEN AND PELVIS:    LIVER: Within normal limits.  BILE DUCTS: Normal caliber.  GALLBLADDER: Within normal limits.  SPLEEN: Within normal limits.  PANCREAS: Within normal limits.  ADRENALS: Within normal limits.  KIDNEYS/URETERS: No hydronephrosis. Bilateral renal cysts. Punctate nonobstructing left renal calculi.    BLADDER: Within normal limits.  REPRODUCTIVE ORGANS: Prostate within normal limits.    BOWEL: No bowel obstruction.  PERITONEUM: No ascites.  VESSELS: Atherosclerotic changes.  RETROPERITONEUM/LYMPH NODES: No lymphadenopathy.    ABDOMINAL WALL: Within normal limits.  BONES: Degenerative changes.    IMPRESSION:     Patchy opacities in left lung, possibly infection.    No sequela to trauma in the chest, abdomen, or pelvis.

## 2022-08-18 NOTE — ED PROVIDER NOTE - PROGRESS NOTE DETAILS
Oneil Melendez MD:  GI emailed for evaluation of cholangitis in setting of elevated AST/ALT, bilirubin, RUQ pain, fever. sx consulted No surgery per general team at this time, GI consulted, will admit to Dr. negron for Jose Johnson [phone]. No surgery per general team at this time, GI consulted, will admit to Dr. negron for Jose Johnson [phone]; case discussed w/ Dr. Stephens from GI awaiting their final recs

## 2022-08-18 NOTE — ED PROVIDER NOTE - PHYSICAL EXAMINATION
Physical Exam:  General: NAD, Conversive  Eyes: EOMI, Conjunctiva clear, mild scleral icterus  Neck: No JVD  Lungs: Clear to auscultation bilaterally, no wheeze, no rhonchi  Heart: Normal S1, S2, no murmurs  Abdomen: Soft, tender to palpation over RUQ   Extremities: 2+ peripheral pulses, no edema  Psych: AAO X3  Neurologic: Non-focal

## 2022-08-19 DIAGNOSIS — R50.9 FEVER, UNSPECIFIED: ICD-10-CM

## 2022-08-19 DIAGNOSIS — I48.20 CHRONIC ATRIAL FIBRILLATION, UNSPECIFIED: ICD-10-CM

## 2022-08-19 DIAGNOSIS — R10.11 RIGHT UPPER QUADRANT PAIN: ICD-10-CM

## 2022-08-19 DIAGNOSIS — E03.9 HYPOTHYROIDISM, UNSPECIFIED: ICD-10-CM

## 2022-08-19 DIAGNOSIS — D72.829 ELEVATED WHITE BLOOD CELL COUNT, UNSPECIFIED: ICD-10-CM

## 2022-08-19 DIAGNOSIS — I10 ESSENTIAL (PRIMARY) HYPERTENSION: ICD-10-CM

## 2022-08-19 DIAGNOSIS — N40.0 BENIGN PROSTATIC HYPERPLASIA WITHOUT LOWER URINARY TRACT SYMPTOMS: ICD-10-CM

## 2022-08-19 DIAGNOSIS — A41.9 SEPSIS, UNSPECIFIED ORGANISM: ICD-10-CM

## 2022-08-19 DIAGNOSIS — K83.09 OTHER CHOLANGITIS: ICD-10-CM

## 2022-08-19 DIAGNOSIS — R79.89 OTHER SPECIFIED ABNORMAL FINDINGS OF BLOOD CHEMISTRY: ICD-10-CM

## 2022-08-19 LAB
ALBUMIN SERPL ELPH-MCNC: 3.4 G/DL — SIGNIFICANT CHANGE UP (ref 3.3–5)
ALP SERPL-CCNC: 214 U/L — HIGH (ref 40–120)
ALT FLD-CCNC: 348 U/L — HIGH (ref 10–45)
ANION GAP SERPL CALC-SCNC: 12 MMOL/L — SIGNIFICANT CHANGE UP (ref 5–17)
AST SERPL-CCNC: 292 U/L — HIGH (ref 10–40)
BASOPHILS # BLD AUTO: 0.03 K/UL — SIGNIFICANT CHANGE UP (ref 0–0.2)
BASOPHILS NFR BLD AUTO: 0.2 % — SIGNIFICANT CHANGE UP (ref 0–2)
BILIRUB SERPL-MCNC: 6.7 MG/DL — HIGH (ref 0.2–1.2)
BUN SERPL-MCNC: 12 MG/DL — SIGNIFICANT CHANGE UP (ref 7–23)
CALCIUM SERPL-MCNC: 9.3 MG/DL — SIGNIFICANT CHANGE UP (ref 8.4–10.5)
CHLORIDE SERPL-SCNC: 101 MMOL/L — SIGNIFICANT CHANGE UP (ref 96–108)
CO2 SERPL-SCNC: 22 MMOL/L — SIGNIFICANT CHANGE UP (ref 22–31)
CREAT SERPL-MCNC: 0.95 MG/DL — SIGNIFICANT CHANGE UP (ref 0.5–1.3)
EGFR: 85 ML/MIN/1.73M2 — SIGNIFICANT CHANGE UP
EOSINOPHIL # BLD AUTO: 0.02 K/UL — SIGNIFICANT CHANGE UP (ref 0–0.5)
EOSINOPHIL NFR BLD AUTO: 0.1 % — SIGNIFICANT CHANGE UP (ref 0–6)
GLUCOSE SERPL-MCNC: 125 MG/DL — HIGH (ref 70–99)
HAV IGM SER-ACNC: SIGNIFICANT CHANGE UP
HBV CORE IGM SER-ACNC: SIGNIFICANT CHANGE UP
HBV SURFACE AG SER-ACNC: SIGNIFICANT CHANGE UP
HCT VFR BLD CALC: 44.9 % — SIGNIFICANT CHANGE UP (ref 39–50)
HCV AB S/CO SERPL IA: 0.27 S/CO — SIGNIFICANT CHANGE UP (ref 0–0.99)
HCV AB SERPL-IMP: SIGNIFICANT CHANGE UP
HGB BLD-MCNC: 15.3 G/DL — SIGNIFICANT CHANGE UP (ref 13–17)
IMM GRANULOCYTES NFR BLD AUTO: 0.6 % — SIGNIFICANT CHANGE UP (ref 0–1.5)
INR BLD: 1.93 RATIO — HIGH (ref 0.88–1.16)
LYMPHOCYTES # BLD AUTO: 1.12 K/UL — SIGNIFICANT CHANGE UP (ref 1–3.3)
LYMPHOCYTES # BLD AUTO: 8 % — LOW (ref 13–44)
MCHC RBC-ENTMCNC: 28.8 PG — SIGNIFICANT CHANGE UP (ref 27–34)
MCHC RBC-ENTMCNC: 34.1 GM/DL — SIGNIFICANT CHANGE UP (ref 32–36)
MCV RBC AUTO: 84.6 FL — SIGNIFICANT CHANGE UP (ref 80–100)
MONOCYTES # BLD AUTO: 0.61 K/UL — SIGNIFICANT CHANGE UP (ref 0–0.9)
MONOCYTES NFR BLD AUTO: 4.3 % — SIGNIFICANT CHANGE UP (ref 2–14)
NEUTROPHILS # BLD AUTO: 12.21 K/UL — HIGH (ref 1.8–7.4)
NEUTROPHILS NFR BLD AUTO: 86.8 % — HIGH (ref 43–77)
NRBC # BLD: 0 /100 WBCS — SIGNIFICANT CHANGE UP (ref 0–0)
PLATELET # BLD AUTO: 204 K/UL — SIGNIFICANT CHANGE UP (ref 150–400)
POTASSIUM SERPL-MCNC: 4.1 MMOL/L — SIGNIFICANT CHANGE UP (ref 3.5–5.3)
POTASSIUM SERPL-SCNC: 4.1 MMOL/L — SIGNIFICANT CHANGE UP (ref 3.5–5.3)
PROT SERPL-MCNC: 6.9 G/DL — SIGNIFICANT CHANGE UP (ref 6–8.3)
PROTHROM AB SERPL-ACNC: 22.3 SEC — HIGH (ref 10.5–13.4)
RBC # BLD: 5.31 M/UL — SIGNIFICANT CHANGE UP (ref 4.2–5.8)
RBC # FLD: 13.5 % — SIGNIFICANT CHANGE UP (ref 10.3–14.5)
SODIUM SERPL-SCNC: 135 MMOL/L — SIGNIFICANT CHANGE UP (ref 135–145)
WBC # BLD: 14.07 K/UL — HIGH (ref 3.8–10.5)
WBC # FLD AUTO: 14.07 K/UL — HIGH (ref 3.8–10.5)

## 2022-08-19 PROCEDURE — 43274 ERCP DUCT STENT PLACEMENT: CPT | Mod: GC

## 2022-08-19 PROCEDURE — 99223 1ST HOSP IP/OBS HIGH 75: CPT

## 2022-08-19 PROCEDURE — 99223 1ST HOSP IP/OBS HIGH 75: CPT | Mod: 25,GC

## 2022-08-19 DEVICE — AUTOTOME CANNULATING SPHINCTEROTOME RX 44 20MM: Type: IMPLANTABLE DEVICE | Status: FUNCTIONAL

## 2022-08-19 DEVICE — BLLN EXTRACT FUSION QUATRO 8.5 10 12 15MM: Type: IMPLANTABLE DEVICE | Status: FUNCTIONAL

## 2022-08-19 DEVICE — STENT ADVANIX BILIARY CTR BEND 10FRX7CM: Type: IMPLANTABLE DEVICE | Status: FUNCTIONAL

## 2022-08-19 DEVICE — CATH BLLN EXTRACT DIST GUIDE WIRE 15MM 3LUM: Type: IMPLANTABLE DEVICE | Status: FUNCTIONAL

## 2022-08-19 DEVICE — HYDRATOME 44: Type: IMPLANTABLE DEVICE | Status: FUNCTIONAL

## 2022-08-19 RX ORDER — ACETAMINOPHEN 500 MG
650 TABLET ORAL EVERY 6 HOURS
Refills: 0 | Status: DISCONTINUED | OUTPATIENT
Start: 2022-08-19 | End: 2022-08-24

## 2022-08-19 RX ORDER — ALLOPURINOL 300 MG
300 TABLET ORAL DAILY
Refills: 0 | Status: DISCONTINUED | OUTPATIENT
Start: 2022-08-19 | End: 2022-08-24

## 2022-08-19 RX ORDER — SODIUM CHLORIDE 9 MG/ML
500 INJECTION INTRAMUSCULAR; INTRAVENOUS; SUBCUTANEOUS
Refills: 0 | Status: DISCONTINUED | OUTPATIENT
Start: 2022-08-19 | End: 2022-08-20

## 2022-08-19 RX ORDER — PANTOPRAZOLE SODIUM 20 MG/1
1 TABLET, DELAYED RELEASE ORAL
Qty: 0 | Refills: 0 | DISCHARGE

## 2022-08-19 RX ORDER — WARFARIN SODIUM 2.5 MG/1
5 TABLET ORAL
Qty: 0 | Refills: 0 | DISCHARGE

## 2022-08-19 RX ORDER — ATORVASTATIN CALCIUM 80 MG/1
20 TABLET, FILM COATED ORAL AT BEDTIME
Refills: 0 | Status: DISCONTINUED | OUTPATIENT
Start: 2022-08-19 | End: 2022-08-21

## 2022-08-19 RX ORDER — PIPERACILLIN AND TAZOBACTAM 4; .5 G/20ML; G/20ML
3.38 INJECTION, POWDER, LYOPHILIZED, FOR SOLUTION INTRAVENOUS EVERY 8 HOURS
Refills: 0 | Status: DISCONTINUED | OUTPATIENT
Start: 2022-08-19 | End: 2022-08-23

## 2022-08-19 RX ORDER — TRAZODONE HCL 50 MG
300 TABLET ORAL AT BEDTIME
Refills: 0 | Status: DISCONTINUED | OUTPATIENT
Start: 2022-08-19 | End: 2022-08-24

## 2022-08-19 RX ORDER — SODIUM CHLORIDE 9 MG/ML
1000 INJECTION INTRAMUSCULAR; INTRAVENOUS; SUBCUTANEOUS
Refills: 0 | Status: COMPLETED | OUTPATIENT
Start: 2022-08-19 | End: 2022-08-19

## 2022-08-19 RX ORDER — LEVOTHYROXINE SODIUM 125 MCG
100 TABLET ORAL DAILY
Refills: 0 | Status: DISCONTINUED | OUTPATIENT
Start: 2022-08-19 | End: 2022-08-24

## 2022-08-19 RX ORDER — LANOLIN ALCOHOL/MO/W.PET/CERES
3 CREAM (GRAM) TOPICAL AT BEDTIME
Refills: 0 | Status: DISCONTINUED | OUTPATIENT
Start: 2022-08-19 | End: 2022-08-24

## 2022-08-19 RX ORDER — TAMSULOSIN HYDROCHLORIDE 0.4 MG/1
0.4 CAPSULE ORAL AT BEDTIME
Refills: 0 | Status: DISCONTINUED | OUTPATIENT
Start: 2022-08-19 | End: 2022-08-24

## 2022-08-19 RX ORDER — GLIMEPIRIDE 1 MG
1 TABLET ORAL
Qty: 0 | Refills: 0 | DISCHARGE

## 2022-08-19 RX ORDER — TOPIRAMATE 25 MG
0.5 TABLET ORAL
Qty: 0 | Refills: 0 | DISCHARGE

## 2022-08-19 RX ORDER — CHLORHEXIDINE GLUCONATE 213 G/1000ML
1 SOLUTION TOPICAL DAILY
Refills: 0 | Status: DISCONTINUED | OUTPATIENT
Start: 2022-08-19 | End: 2022-08-24

## 2022-08-19 RX ORDER — SODIUM CHLORIDE 9 MG/ML
500 INJECTION, SOLUTION INTRAVENOUS
Refills: 0 | Status: DISCONTINUED | OUTPATIENT
Start: 2022-08-19 | End: 2022-08-20

## 2022-08-19 RX ORDER — ONDANSETRON 8 MG/1
4 TABLET, FILM COATED ORAL EVERY 8 HOURS
Refills: 0 | Status: DISCONTINUED | OUTPATIENT
Start: 2022-08-19 | End: 2022-08-24

## 2022-08-19 RX ADMIN — TAMSULOSIN HYDROCHLORIDE 0.4 MILLIGRAM(S): 0.4 CAPSULE ORAL at 05:22

## 2022-08-19 RX ADMIN — SODIUM CHLORIDE 150 MILLILITER(S): 9 INJECTION INTRAMUSCULAR; INTRAVENOUS; SUBCUTANEOUS at 05:24

## 2022-08-19 RX ADMIN — TAMSULOSIN HYDROCHLORIDE 0.4 MILLIGRAM(S): 0.4 CAPSULE ORAL at 22:15

## 2022-08-19 RX ADMIN — Medication 300 MILLIGRAM(S): at 22:17

## 2022-08-19 RX ADMIN — PIPERACILLIN AND TAZOBACTAM 25 GRAM(S): 4; .5 INJECTION, POWDER, LYOPHILIZED, FOR SOLUTION INTRAVENOUS at 05:24

## 2022-08-19 RX ADMIN — Medication 100 MICROGRAM(S): at 05:22

## 2022-08-19 RX ADMIN — PIPERACILLIN AND TAZOBACTAM 25 GRAM(S): 4; .5 INJECTION, POWDER, LYOPHILIZED, FOR SOLUTION INTRAVENOUS at 22:15

## 2022-08-19 RX ADMIN — ATORVASTATIN CALCIUM 20 MILLIGRAM(S): 80 TABLET, FILM COATED ORAL at 22:15

## 2022-08-19 NOTE — CONSULT NOTE ADULT - PROBLEM SELECTOR RECOMMENDATION 9
-from cholangitis  -cont zosyn 3.375 gm iv q8  -monitor temps/wbc  -trend LFTs  -f/u blood cx  -GI following

## 2022-08-19 NOTE — H&P ADULT - PROBLEM SELECTOR PLAN 2
presumed biliary source ; febrile w/ leukocytosis , initially hypotensive  responded to IV fluids s/p 2L bolus .   - continue ABX per above   - hold lasix   - hold antihypertensives   - additional 1L NS , further fluids as needed for hypotension   - f/u blood cultures

## 2022-08-19 NOTE — CONSULT NOTE ADULT - ATTENDING COMMENTS
Impression:  # Acute Cholangitis: Given hyperbilirubinemia/abnormal liver eznymes, fevers, leukocytosis, hypotension and cholelithiasis on imaging  # Cholelithiasis  # Obesity  # Coagulopathy: 2/2 anticoagulation    Recommendation:  - NPO  - Antibiotics  - Urgent ERCP given Grade III cholangitis  - Supportive care    Eneida Sharma MD  Advanced Endoscopy / GI
seen and examined 08-18-22 @ 2220    on coumadin for afib with prior TIAs    Tm 101.8  108/61 (but 81/60 @ 1848)  120  A+Ox3  soft / NT / ND  morbidly obese  no surgical scars on abdomen  mild jaundice    WBC = 23  INR = 1.8  T bili = 6.4  lactate = 3.3    CT abd/pelvis w/ contrast - radiopaque cholelithiasis in a gallbladder which is positioned abnormally in the left lobe of the liver. 12 mm CBD with radiopaque choledocholithiases in distal CBD. large left renal cyst. small right renal cyst. no dextrocardia.    RUQ U/S - gallbladder sludge. CBD = 7 mm.      sepsis secondary to ascending cholangitis  -ABx  -consult GI for ERCP  -f/u with bariatric surgery as an outpatient to consider laparoscopic cholecystectomy and surgical weight loss      I reviewed his labs and radiologic images.  care discussed with his wife at bedside in the ER.  I discussed his care with ER Dr. Darnell.

## 2022-08-19 NOTE — PROGRESS NOTE ADULT - SUBJECTIVE AND OBJECTIVE BOX
SUBJECTIVE:   Seen and examined at bedside. No acute events overnight.     OBJECTIVE: T(C): 36.6 (22 @ 08:47), Max: 38.8 (22 @ 19:14)  HR: 74 (22 @ 08:47) (74 - 120)  BP: 104/66 (22 @ 08:47) (81/60 - 118/71)  RR: 18 (22 @ 08:47) (18 - 24)  SpO2: 95% (22 @ 08:47) (94% - 98%)  Wt(kg): --  I&O's Summary    18 Aug 2022 07:  -  19 Aug 2022 07:00  --------------------------------------------------------  IN: 670 mL / OUT: 0 mL / NET: 670 mL      I&O's Detail    18 Aug 2022 07:01  -  19 Aug 2022 07:00  --------------------------------------------------------  IN:    IV PiggyBack: 100 mL    Oral Fluid: 120 mL    sodium chloride 0.9%: 450 mL  Total IN: 670 mL    OUT:  Total OUT: 0 mL    Total NET: 670 mL        GEN: resting in bed  CHEST: no increased WOB  ABD: obese, soft, nontender   EXT: warm, well perfused  NEURO: A&Ox3    MEDICATIONS  (STANDING):  allopurinol 300 milliGRAM(s) Oral daily  atorvastatin 20 milliGRAM(s) Oral at bedtime  levothyroxine 100 MICROGram(s) Oral daily  piperacillin/tazobactam IVPB.. 3.375 Gram(s) IV Intermittent every 8 hours  tamsulosin 0.4 milliGRAM(s) Oral at bedtime  traZODone 300 milliGRAM(s) Oral at bedtime    MEDICATIONS  (PRN):  acetaminophen     Tablet .. 650 milliGRAM(s) Oral every 6 hours PRN Temp greater or equal to 38C (100.4F), Mild Pain (1 - 3)  aluminum hydroxide/magnesium hydroxide/simethicone Suspension 30 milliLiter(s) Oral every 4 hours PRN Dyspepsia  melatonin 3 milliGRAM(s) Oral at bedtime PRN Insomnia  ondansetron Injectable 4 milliGRAM(s) IV Push every 8 hours PRN Nausea and/or Vomiting      LABS:                        15.3   14.07 )-----------( 204      ( 19 Aug 2022 07:05 )             44.9         135  |  101  |  12  ----------------------------<  125<H>  4.1   |  22  |  0.95    Ca    9.3      19 Aug 2022 07:05    TPro  6.9  /  Alb  3.4  /  TBili  6.7<H>  /  DBili  x   /  AST  292<H>  /  ALT  348<H>  /  AlkPhos  214<H>      PT/INR - ( 19 Aug 2022 07:05 )   PT: 22.3 sec;   INR: 1.93 ratio         PTT - ( 18 Aug 2022 19:30 )  PTT:34.3 sec  Urinalysis Basic - ( 18 Aug 2022 19:42 )    Color: Dark Yellow / Appearance: Clear / S.020 / pH: x  Gluc: x / Ketone: Negative  / Bili: Moderate / Urobili: 8 mg/dL   Blood: x / Protein: Trace / Nitrite: Negative   Leuk Esterase: Negative / RBC: 3 /hpf / WBC 2 /HPF   Sq Epi: x / Non Sq Epi: 1 /hpf / Bacteria: Negative

## 2022-08-19 NOTE — PATIENT PROFILE ADULT - FALL HARM RISK - HARM RISK INTERVENTIONS

## 2022-08-19 NOTE — CONSULT NOTE ADULT - ASSESSMENT
Impression:   #Cholangitis: pw fever Tmax 101.8, hypotension (80/60s --> 100/60s with IVF), RUQ abd pain, WBC 23, Tbili 6.4-> 6.7. CT (+) gallstones, normal bile ducts. [place] Grade III cholangitis.  #Coagulopathy: INR 1.8 in setting of coumadin use      Recommendations:  - NPO  - Emergent ERCP today for cholangitis  - Continue abx (on zosyn)  - F/u BCx  - Trend CBC, WBC count, fever curve  - Full note to follow      *Note not final unless signed by attending    Thank you for involving us in the care of this patient, please reach out if any further questions.     Corey Sterling MD  Gastroenterology/Hepatology Fellow, PGY6    Available on [employer] Teams  [phone] ([facility])  [phone] ([facility])  Please contact on call fellow weekdays after 5pm-7am and weekends: [phone]   72M Hx morbidly obese (BMI 50), atrial fibrillation (on coumadin), HTN, BPH, hypothyroidism for fever and RUQ abdominal pain meets criteria for cholangitis.     Impression:   #Cholangitis: pw fever Tmax 101.8, hypotension (80/60s --> 100/60s with IVF), RUQ abd pain, WBC 23, Tbili 6.4-> 6.7. CT (+) gallstones, normal bile ducts. [place] Grade III cholangitis.  #Coagulopathy: INR 1.8 in setting of coumadin use      Recommendations:  - NPO  - Emergent ERCP today for cholangitis  - Continue abx (currently on zosyn)  - F/u BCx  - Trend CBC, WBC count, fever curve        *Note not final unless signed by attending    Thank you for involving us in the care of this patient, please reach out if any further questions.     Corey Sterling MD  Gastroenterology/Hepatology Fellow, PGY6    Available on [employer] Teams  [phone] ([facility])  [phone] ([facility])  Please contact on call fellow weekdays after 5pm-7am and weekends: [phone]

## 2022-08-19 NOTE — CONSULT NOTE ADULT - NSCONSULTADDITIONALINFOA_GEN_ALL_CORE
D/w Dr. Araujo  D/w son at bedside     Please call the ID service [phone] with questions or concerns over the weekend.

## 2022-08-19 NOTE — PRE PROCEDURE NOTE - PRE PROCEDURE EVALUATION
Attending Physician:    Dr. Sharma                         Procedure:   ERCP    Indication for Procedure:   Suspected Cholangitis  ________________________________________________________  PAST MEDICAL & SURGICAL HISTORY:    Dyslipidemia  Hypertension  Afib  Chronic Gout  Anxiety  History of Transient Ischemic Attack (TIA)  History of Obesity  Obstructive Sleep Apnea  Gout  Hypothyroid  BPH (benign prostatic hyperplasia)    History of Tonsillectomy    ALLERGIES:  metformin (Unknown)    HOME MEDICATIONS:  allopurinol 300 mg oral tablet: 1 tab(s) orally once a day  atorvastatin 20 mg oral tablet: 1 tab(s) orally once a day  furosemide 40 mg oral tablet: 1 tab(s) orally once a day  levothyroxine 100 mcg (0.1 mg) oral tablet: 1 tab(s) orally once a day  metoprolol tartrate 100 mg oral tablet: 2 tab(s) orally 2 times a day  Ozempic (1 mg dose) 4 mg/3 mL subcutaneous solution:   potassium chloride 10 mEq oral tablet, extended release: 1 tab(s) orally once a day  tamsulosin 0.4 mg oral capsule: 1 cap(s) orally once a day  traZODone 300 mg oral tablet: 1 tab(s) orally once a day (at bedtime)  Vitamin B Complex oral tablet: 1 tab(s) orally 3 times a week  Vitamin D3 5000 intl units oral tablet: 1 tab(s) orally once a day  warfarin: 12.75 milligram(s) orally once a day    AICD/PPM: [ ] yes   [X ] no    PERTINENT LAB DATA:                        15.3   14.07 )-----------( 204      ( 19 Aug 2022 07:05 )             44.9     08-19    135  |  101  |  12  ----------------------------<  125<H>  4.1   |  22  |  0.95    Ca    9.3      19 Aug 2022 07:05  TPro  6.9  /  Alb  3.4  /  TBili  6.7<H>  /  DBili  x   /  AST  292<H>  /  ALT  348<H>  /  AlkPhos  214<H>  08-19  PT/INR - ( 19 Aug 2022 07:05 )   PT: 22.3 sec;   INR: 1.93 ratio    PTT - ( 18 Aug 2022 19:30 )  PTT:34.3 sec  CARDIAC MARKERS ( 18 Aug 2022 19:30 )  x     / x     / 30 U/L / x     / x        PHYSICAL EXAMINATION:    Height (cm): 182.9  Weight (kg): 182.9  BMI (kg/m2): 54.7  BSA (m2): 2.87T(C): 36.6  HR: 74  BP: 104/66  RR: 18  SpO2: 95%    Constitutional: NAD    Neck:  No JVD  Respiratory: CTAB/L  Cardiovascular: S1 and S2  Gastrointestinal: BS+, soft, NT/ND  Extremities: No peripheral edema  Neurological: A/O x 4      COMMENTS:    The patient is a suitable candidate for the planned procedure unless box checked [ ]  No, explain:

## 2022-08-19 NOTE — CONSULT NOTE ADULT - SUBJECTIVE AND OBJECTIVE BOX
Chief Complaint:  Patient is a 72y old  Male who presents with a chief complaint of abdominal pain x 5 days (19 Aug 2022 09:44)      HPI:    Allergies:  metformin (Unknown)      Home Medications:    Hospital Medications:  acetaminophen     Tablet .. 650 milliGRAM(s) Oral every 6 hours PRN  allopurinol 300 milliGRAM(s) Oral daily  aluminum hydroxide/magnesium hydroxide/simethicone Suspension 30 milliLiter(s) Oral every 4 hours PRN  atorvastatin 20 milliGRAM(s) Oral at bedtime  levothyroxine 100 MICROGram(s) Oral daily  melatonin 3 milliGRAM(s) Oral at bedtime PRN  ondansetron Injectable 4 milliGRAM(s) IV Push every 8 hours PRN  piperacillin/tazobactam IVPB.. 3.375 Gram(s) IV Intermittent every 8 hours  tamsulosin 0.4 milliGRAM(s) Oral at bedtime  traZODone 300 milliGRAM(s) Oral at bedtime      PMHX/PSHX:  Dyslipidemia    Hypertension    Afib    Chronic Gout    Anxiety    History of Transient Ischemic Attack (TIA)    History of Obesity    Obstructive Sleep Apnea    Gout    Hypothyroid    BPH (benign prostatic hyperplasia)    History of Tonsillectomy        Family history:  No pertinent family history in first degree relatives    FH: myocardial infarction (Father)        Denies family history of colon cancer/polyps, stomach cancer/polyps, pancreatic cancer/masses, liver cancer/disease, ovarian cancer and endometrial cancer.    Social History:     Tob: Denies  EtOH: Denies  Illicit Drugs: Denies    ROS:     General:  No wt loss, fevers, chills, night sweats, fatigue  Eyes:  Good vision, no reported pain  ENT:  No sore throat, pain, runny nose, dysphagia  CV:  No pain, palpitations, hypo/hypertension  Pulm:  No dyspnea, cough, tachypnea, wheezing  GI:  No pain, No nausea, No vomiting, No diarrhea, No constipation, No weight loss, No fever, No pruritis, No rectal bleeding, No tarry stools, No dysphagia,  :  No pain, bleeding, incontinence, nocturia  Muscle:  No pain, weakness  Neuro:  No weakness, tingling, memory problems  Psych:  No fatigue, insomnia, mood problems, depression  Endocrine:  No polyuria, polydipsia, cold/heat intolerance  Heme:  No petechiae, ecchymosis, easy bruisability  Skin:  No rash, tattoos, scars, edema    PHYSICAL EXAM:     GENERAL:  No acute distress  HEENT:  Normocephalic/atraumatic, no scleral icterus  CHEST:  Clear to auscultation bilaterally, no wheezes/rales/ronchi, no accessory muscle use  HEART:  Regular rate and rhythm, no murmurs/rubs/gallops  ABDOMEN:  Soft, non-tender, non-distended, normoactive bowel sounds,  no masses, no hepato-splenomegaly, no signs of chronic liver disease  EXTREMITIES: No cyanosis, clubbing, or edema  SKIN:  No rash/erythema/ecchymoses/petechiae/wounds/abscess/warm/dry  NEURO:  Alert and oriented x 3, no asterixis    Vital Signs:  Vital Signs Last 24 Hrs  T(C): 36.6 (19 Aug 2022 08:47), Max: 38.8 (18 Aug 2022 19:14)  T(F): 97.9 (19 Aug 2022 08:47), Max: 101.8 (18 Aug 2022 19:14)  HR: 74 (19 Aug 2022 08:47) (74 - 120)  BP: 104/66 (19 Aug 2022 08:47) (81/60 - 118/71)  BP(mean): 81 (19 Aug 2022 03:54) (70 - 81)  RR: 18 (19 Aug 2022 08:47) (18 - 24)  SpO2: 95% (19 Aug 2022 08:47) (94% - 98%)    Parameters below as of 19 Aug 2022 08:47  Patient On (Oxygen Delivery Method): room air      Daily Height in cm: 182.88 (18 Aug 2022 18:48)    Daily     LABS:                        15.3   14.07 )-----------( 204      ( 19 Aug 2022 07:05 )             44.9     Mean Cell Volume: 84.6 fl (22 @ 07:05)        135  |  101  |  12  ----------------------------<  125<H>  4.1   |  22  |  0.95    Ca    9.3      19 Aug 2022 07:05    TPro  6.9  /  Alb  3.4  /  TBili  6.7<H>  /  DBili  x   /  AST  292<H>  /  ALT  348<H>  /  AlkPhos  214<H>  08-19    LIVER FUNCTIONS - ( 19 Aug 2022 07:05 )  Alb: 3.4 g/dL / Pro: 6.9 g/dL / ALK PHOS: 214 U/L / ALT: 348 U/L / AST: 292 U/L / GGT: x           PT/INR - ( 19 Aug 2022 07:05 )   PT: 22.3 sec;   INR: 1.93 ratio         PTT - ( 18 Aug 2022 19:30 )  PTT:34.3 sec  Urinalysis Basic - ( 18 Aug 2022 19:42 )    Color: Dark Yellow / Appearance: Clear / S.020 / pH: x  Gluc: x / Ketone: Negative  / Bili: Moderate / Urobili: 8 mg/dL   Blood: x / Protein: Trace / Nitrite: Negative   Leuk Esterase: Negative / RBC: 3 /hpf / WBC 2 /HPF   Sq Epi: x / Non Sq Epi: 1 /hpf / Bacteria: Negative                              15.3   14.07 )-----------( 204      ( 19 Aug 2022 07:05 )             44.9                         17.3   23.09 )-----------( 273      ( 18 Aug 2022 19:30 )             50.4       Imaging:           HPI: 72M Hx morbidly obese (BMI 50), atrial fibrillation (on coumadin), HTN, BPH, hypothyroidism for fever and RUQ abdominal pain meets criteria for cholangitis for which advanced GI is consulted.    Pt reports intermittent but worsening RUQ abd pain x 2-3 days, associated with a Tmax 102 at home. Reports having similar presentation w/ RUQ pain and fever on  which he managed with Tylenol. Associated with jaundice, dark colored urine. Denies n/v/d, weight loss.     In ED, Tmax 101.8 -- initially hypotensive 80/60s --> IVF (2L bolus, then 150cc/hr) --> 100/60s. Initiated on zosyn, BCx drawn.    Labs notable for:   WBC 23 --> 14  INR 1.87 --> 1.93   Tbili 6.4 --> 6.7     CT A/P:   --BILE DUCTS: Normal caliber.  --GALLBLADDER: Distended gallbladder with multiple gallstones within the   fundus.        Allergies:  metformin (Unknown)      Home Medications:    Hospital Medications:  acetaminophen     Tablet .. 650 milliGRAM(s) Oral every 6 hours PRN  allopurinol 300 milliGRAM(s) Oral daily  aluminum hydroxide/magnesium hydroxide/simethicone Suspension 30 milliLiter(s) Oral every 4 hours PRN  atorvastatin 20 milliGRAM(s) Oral at bedtime  levothyroxine 100 MICROGram(s) Oral daily  melatonin 3 milliGRAM(s) Oral at bedtime PRN  ondansetron Injectable 4 milliGRAM(s) IV Push every 8 hours PRN  piperacillin/tazobactam IVPB.. 3.375 Gram(s) IV Intermittent every 8 hours  tamsulosin 0.4 milliGRAM(s) Oral at bedtime  traZODone 300 milliGRAM(s) Oral at bedtime      PMHX/PSHX:  Dyslipidemia    Hypertension    Afib    Chronic Gout    Anxiety    History of Transient Ischemic Attack (TIA)    History of Obesity    Obstructive Sleep Apnea    Gout    Hypothyroid    BPH (benign prostatic hyperplasia)    History of Tonsillectomy        Family history:  No pertinent family history in first degree relatives    FH: myocardial infarction (Father)        Denies family history of colon cancer/polyps, stomach cancer/polyps, pancreatic cancer/masses, liver cancer/disease, ovarian cancer and endometrial cancer.    Social History:     Tob: Denies  EtOH: Denies  Illicit Drugs: Denies    ROS:     General:  No wt loss, +fevers, chills, night sweats, fatigue  Eyes:  Good vision, no reported pain  ENT:  No sore throat, pain, runny nose, dysphagia  CV:  No pain, palpitations, hypo/hypertension  Pulm:  No dyspnea, cough, tachypnea, wheezing  GI: see above   :  No pain, bleeding, incontinence, nocturia  Muscle:  No pain, weakness  Neuro:  No weakness, tingling, memory problems  Psych:  No fatigue, insomnia, mood problems, depression  Endocrine:  No polyuria, polydipsia, cold/heat intolerance  Heme:  No petechiae, ecchymosis, easy bruisability  Skin:  No rash, tattoos, scars, edema, +jaundice    PHYSICAL EXAM:     GENERAL:  No acute distress  HEENT:  Normocephalic/atraumatic, +scleral icterus  CHEST:  no accessory muscle use  HEART:  Regular rate and rhythm  ABDOMEN:  Soft, non-tender, non-distended  EXTREMITIES: No cyanosis, clubbing, or edema  SKIN:  No rash/warm/dry, +jaundice  NEURO:  Alert and oriented x 3      Vital Signs:  Vital Signs Last 24 Hrs  T(C): 36.6 (19 Aug 2022 08:47), Max: 38.8 (18 Aug 2022 19:14)  T(F): 97.9 (19 Aug 2022 08:47), Max: 101.8 (18 Aug 2022 19:14)  HR: 74 (19 Aug 2022 08:47) (74 - 120)  BP: 104/66 (19 Aug 2022 08:47) (81/60 - 118/71)  BP(mean): 81 (19 Aug 2022 03:54) (70 - 81)  RR: 18 (19 Aug 2022 08:47) (18 - 24)  SpO2: 95% (19 Aug 2022 08:47) (94% - 98%)    Parameters below as of 19 Aug 2022 08:47  Patient On (Oxygen Delivery Method): room air      Daily Height in cm: 182.88 (18 Aug 2022 18:48)    Daily     LABS:                        15.3   14.07 )-----------( 204      ( 19 Aug 2022 07:05 )             44.9     Mean Cell Volume: 84.6 fl (-22 @ 07:05)        135  |  101  |  12  ----------------------------<  125<H>  4.1   |  22  |  0.95    Ca    9.3      19 Aug 2022 07:05    TPro  6.9  /  Alb  3.4  /  TBili  6.7<H>  /  DBili  x   /  AST  292<H>  /  ALT  348<H>  /  AlkPhos  214<H>  08-    LIVER FUNCTIONS - ( 19 Aug 2022 07:05 )  Alb: 3.4 g/dL / Pro: 6.9 g/dL / ALK PHOS: 214 U/L / ALT: 348 U/L / AST: 292 U/L / GGT: x           PT/INR - ( 19 Aug 2022 07:05 )   PT: 22.3 sec;   INR: 1.93 ratio         PTT - ( 18 Aug 2022 19:30 )  PTT:34.3 sec  Urinalysis Basic - ( 18 Aug 2022 19:42 )    Color: Dark Yellow / Appearance: Clear / S.020 / pH: x  Gluc: x / Ketone: Negative  / Bili: Moderate / Urobili: 8 mg/dL   Blood: x / Protein: Trace / Nitrite: Negative   Leuk Esterase: Negative / RBC: 3 /hpf / WBC 2 /HPF   Sq Epi: x / Non Sq Epi: 1 /hpf / Bacteria: Negative                              15.3   14.07 )-----------( 204      ( 19 Aug 2022 07:05 )             44.9                         17.3   23.09 )-----------( 273      ( 18 Aug 2022 19:30 )             50.4       Imaging:

## 2022-08-19 NOTE — H&P ADULT - PROBLEM SELECTOR PLAN 3
- hold metoprolol in setting of sepsis/ hypotension , can resume once clinically stable  - holding warfarin in anticipation for GI intervention , if procedure delayed for prolonged time  , can treat with heparin infusion   - monitor INR

## 2022-08-19 NOTE — H&P ADULT - NSHPLABSRESULTS_GEN_ALL_CORE
Labs personally reviewed:                          17.3   23.09 )-----------( 273      ( 18 Aug 2022 19:30 )             50.4     08    133<L>  |  98  |  12  ----------------------------<  190<H>  4.6   |  19<L>  |  1.04    Ca    10.1      18 Aug 2022 19:30    TPro  7.4  /  Alb  3.8  /  TBili  6.4<H>  /  DBili  x   /  AST  438<H>  /  ALT  426<H>  /  AlkPhos  256<H>      CARDIAC MARKERS ( 18 Aug 2022 19:30 )  x     / x     / 30 U/L / x     / x          LIVER FUNCTIONS - ( 18 Aug 2022 19:30 )  Alb: 3.8 g/dL / Pro: 7.4 g/dL / ALK PHOS: 256 U/L / ALT: 426 U/L / AST: 438 U/L / GGT: x           PT/INR - ( 18 Aug 2022 19:30 )   PT: 21.6 sec;   INR: 1.87 ratio         PTT - ( 18 Aug 2022 19:30 )  PTT:34.3 sec  Urinalysis Basic - ( 18 Aug 2022 19:42 )    Color: Dark Yellow / Appearance: Clear / S.020 / pH: x  Gluc: x / Ketone: Negative  / Bili: Moderate / Urobili: 8 mg/dL   Blood: x / Protein: Trace / Nitrite: Negative   Leuk Esterase: Negative / RBC: 3 /hpf / WBC 2 /HPF   Sq Epi: x / Non Sq Epi: 1 /hpf / Bacteria: Negative      CAPILLARY BLOOD GLUCOSE          Imaging:  CXR personally reviewed: no focal opacity  CT abdomen:   LOWER CHEST: Aortic and coronary artery calcifications. Bilateral   subsegmental atelectasis. Right upper lobe 2 mm pulmonary nodule (4:4).    LIVER: Within normal limits.  BILE DUCTS: Normal caliber.  GALLBLADDER: Distended gallbladder with multiple gallstones within the   fundus.  SPLEEN: Within normal limits.  PANCREAS: Within normal limits.  ADRENALS: Within normal limits.  KIDNEYS/URETERS: Bilateral renal cysts. Left nonobstructing renal   calculus.    BLADDER: Within normal limits.  REPRODUCTIVE ORGANS: Prostate within normal limits.    BOWEL: No bowel obstruction. Appendix is normal.  PERITONEUM: No ascites.  VESSELS: Within normal limits.  RETROPERITONEUM/LYMPH NODES: No lymphadenopathy.  ABDOMINAL WALL: Within normal limits.  BONES: Degenerative changes.    IMPRESSION:  Cholelithiasis.      US abdomen: Cholelithiasis without radiographic evidence of cholecystitis.    EKG personally reviewed: Atrial fibrillation at 118 bpm

## 2022-08-19 NOTE — H&P ADULT - PROBLEM SELECTOR PLAN 1
elevated Lfts , fever , leukocytosis ; cholelithiasis noted on CT w/ no cholecystitis , liver wnl ;  will likely require ERCP.   - continue zosyn   - Surgery consult appreciated, day team to follow up further recommendations  - GI consult - emailed  by ED provider , day team to f/u recommendations   - ERCP - pending  GI Reccs   - Clear liquid diet in anticipation for procedure   - hold anticoagulation in anticipation for procedure   - MRCP -ordered , unnecessary if goes straight to ERCP   - acute hepatitis panel

## 2022-08-19 NOTE — CONSULT NOTE ADULT - SUBJECTIVE AND OBJECTIVE BOX
JOSSUE KEVIN 72y Male  MRN-[id number]    Patient is a 72y old  Male who presents with a chief complaint of abdominal pain x 5 days (19 Aug 2022 09:48)      HPI:  Patient is a 72 year old male w/pmh morbid obesity, afib, htn, hypothyroidism, BPH,  presenting with fever and abdominal pain for the past  five days.   Patient reports intermittent right upper quadrant abdominal pain  associated with fever home temperature of 102, symptoms relieved with Tylenol.  He reports no nausea or vomiting , no diarrhea. He has light colored stool and dark urine, some dysuria . Patient reports no cough , no runny nose or sore throat , no sick contacts.     Planned for ERCP this AM.    PAST MEDICAL & SURGICAL HISTORY:  Dyslipidemia      Hypertension      Afib      Chronic Gout      Anxiety      History of Transient Ischemic Attack (TIA)      History of Obesity      Obstructive Sleep Apnea      Gout      Hypothyroid      BPH (benign prostatic hyperplasia)      History of Tonsillectomy          Allergies    metformin (Unknown)    Intolerances        ANTIMICROBIALS:  piperacillin/tazobactam IVPB.. 3.375 every 8 hours      MEDICATIONS  (STANDING):  allopurinol 300 milliGRAM(s) Oral daily  atorvastatin 20 milliGRAM(s) Oral at bedtime  levothyroxine 100 MICROGram(s) Oral daily  piperacillin/tazobactam IVPB.. 3.375 Gram(s) IV Intermittent every 8 hours  tamsulosin 0.4 milliGRAM(s) Oral at bedtime  traZODone 300 milliGRAM(s) Oral at bedtime      Social History  Smoking: no  Etoh: no  Drug use: no      FAMILY HISTORY:  FH: myocardial infarction (Father)        Vital Signs Last 24 Hrs  T(C): 36.6 (19 Aug 2022 08:47), Max: 38.8 (18 Aug 2022 19:14)  T(F): 97.9 (19 Aug 2022 08:47), Max: 101.8 (18 Aug 2022 19:14)  HR: 74 (19 Aug 2022 08:47) (74 - 120)  BP: 104/66 (19 Aug 2022 08:47) (81/60 - 118/71)  BP(mean): 81 (19 Aug 2022 03:54) (70 - 81)  RR: 18 (19 Aug 2022 08:47) (18 - 24)  SpO2: 95% (19 Aug 2022 08:47) (94% - 98%)    Parameters below as of 19 Aug 2022 08:47  Patient On (Oxygen Delivery Method): room air        CBC Full  -  ( 19 Aug 2022 07:05 )  WBC Count : 14.07 K/uL  RBC Count : 5.31 M/uL  Hemoglobin : 15.3 g/dL  Hematocrit : 44.9 %  Platelet Count - Automated : 204 K/uL  Mean Cell Volume : 84.6 fl  Mean Cell Hemoglobin : 28.8 pg  Mean Cell Hemoglobin Concentration : 34.1 gm/dL  Auto Neutrophil # : 12.21 K/uL  Auto Lymphocyte # : 1.12 K/uL  Auto Monocyte # : 0.61 K/uL  Auto Eosinophil # : 0.02 K/uL  Auto Basophil # : 0.03 K/uL  Auto Neutrophil % : 86.8 %  Auto Lymphocyte % : 8.0 %  Auto Monocyte % : 4.3 %  Auto Eosinophil % : 0.1 %  Auto Basophil % : 0.2 %        135  |  101  |  12  ----------------------------<  125<H>  4.1   |  22  |  0.95    Ca    9.3      19 Aug 2022 07:05    TPro  6.9  /  Alb  3.4  /  TBili  6.7<H>  /  DBili  x   /  AST  292<H>  /  ALT  348<H>  /  AlkPhos  214<H>      LIVER FUNCTIONS - ( 19 Aug 2022 07:05 )  Alb: 3.4 g/dL / Pro: 6.9 g/dL / ALK PHOS: 214 U/L / ALT: 348 U/L / AST: 292 U/L / GGT: x           Urinalysis Basic - ( 18 Aug 2022 19:42 )    Color: Dark Yellow / Appearance: Clear / S.020 / pH: x  Gluc: x / Ketone: Negative  / Bili: Moderate / Urobili: 8 mg/dL   Blood: x / Protein: Trace / Nitrite: Negative   Leuk Esterase: Negative / RBC: 3 /hpf / WBC 2 /HPF   Sq Epi: x / Non Sq Epi: 1 /hpf / Bacteria: Negative        MICROBIOLOGY:      Rapid RVP Result: NotDetec ( @ 19:40)      RADIOLOGY  < from: CT Abdomen and Pelvis w/ IV Cont (22 @ 23:57) >  Cholelithiasis.    < end of copied text >  < from: US Abdomen Upper Quadrant Right (22 @ 22:06) >  Cholelithiasis without radiographic evidence ofcholecystitis.    < end of copied text >  < from: Xray Chest 1 View- PORTABLE-Urgent (22 @ 19:24) >  INTERPRETATION:  left lower lung hazy opacity, small left pleural effusion    < end of copied text >

## 2022-08-19 NOTE — H&P ADULT - ASSESSMENT
72M w/pmh morbid obesity, afib, htn, hypothyroidism, BPH,  presenting with fever and abdominal pain for the past  five days. Elevated liver tests

## 2022-08-19 NOTE — H&P ADULT - NSICDXFAMILYHX_GEN_ALL_CORE_FT
FAMILY HISTORY:  No pertinent family history in first degree relatives     FAMILY HISTORY:  Father  Still living? Unknown  FH: myocardial infarction, Age at diagnosis: Age Unknown

## 2022-08-19 NOTE — H&P ADULT - NSHPPHYSICALEXAM_GEN_ALL_CORE
Vital Signs Last 24 Hrs  T(C): 36.9 (18 Aug 2022 23:04), Max: 38.8 (18 Aug 2022 19:14)  T(F): 98.4 (18 Aug 2022 23:04), Max: 101.8 (18 Aug 2022 19:14)  HR: 76 (19 Aug 2022 03:00) (76 - 120)  BP: 97/68 (19 Aug 2022 03:00) (81/60 - 117/79)  BP(mean): 76 (19 Aug 2022 03:00) (70 - 76)  RR: 20 (19 Aug 2022 03:00) (20 - 24)  SpO2: 98% (19 Aug 2022 03:00) (94% - 98%)    Parameters below as of 19 Aug 2022 03:00  Patient On (Oxygen Delivery Method): room air Vital Signs Last 24 Hrs  T(C): 36.9 (18 Aug 2022 23:04), Max: 38.8 (18 Aug 2022 19:14)  T(F): 98.4 (18 Aug 2022 23:04), Max: 101.8 (18 Aug 2022 19:14)  HR: 76 (19 Aug 2022 03:00) (76 - 120)  BP: 97/68 (19 Aug 2022 03:00) (81/60 - 117/79)  BP(mean): 76 (19 Aug 2022 03:00) (70 - 76)  RR: 20 (19 Aug 2022 03:00) (20 - 24)  SpO2: 98% (19 Aug 2022 03:00) (94% - 98%)    Parameters below as of 19 Aug 2022 03:00  Patient On (Oxygen Delivery Method): room air    GENERAL: No acute distress, well-developed , obese   HEAD:  Atraumatic, Normocephalic  ENT: EOMI, PERRLA, conjunctiva and sclera clear,  moist mucosa no pharyngeal erythema or exudates   NECK: supple , no JVD   CHEST/LUNG: Clear to auscultation bilaterally; No wheeze, equal breath sounds bilaterally   BACK: No spinal tenderness,  No CVA tenderness   HEART: Regular rate and rhythm; No murmurs, rubs, or gallops  ABDOMEN: Soft, Nontender, Nondistended; Bowel sounds present  EXTREMITIES:  No clubbing, cyanosis, trace edema  MSK: No joint swelling or effusions, ROM intact   PSYCH: Normal behavior/affect  NEUROLOGY: AAOx3, non-focal, cranial nerves intact  SKIN: Normal color, No rashes or lesions

## 2022-08-19 NOTE — H&P ADULT - NSHPREVIEWOFSYSTEMS_GEN_ALL_CORE
CONSTITUTIONAL: + weakness, + fevers +chills  EYES/ENT: No visual changes;  No dysphagia  NECK: No pain or stiffness  RESPIRATORY: No cough, wheezing, hemoptysis; No shortness of breath  CARDIOVASCULAR: No chest pain or palpitations; + chronic  lower extremity edema  EXTREMITIES: +  le edema, no cyanosis, clubbing  GASTROINTESTINAL: No abdominal or epigastric pain. No nausea, vomiting, or hematemesis; No diarrhea or constipation. No melena or hematochezia.  BACK: No back pain  GENITOURINARY: No dysuria, frequency or hematuria  NEUROLOGICAL: No numbness or weakness  MSK: no joint swelling or pain  SKIN: No itching, burning, rashes, or lesions   PSYCH: no agitation  All other review of systems is negative unless indicated above.

## 2022-08-19 NOTE — H&P ADULT - HISTORY OF PRESENT ILLNESS
Patient is a 72 year old male w/pmh morbid obesity, afib, htn, hypothyroidism, BPH,  presenting with fever and abdominal pain for the past  five days.    Patient is a 72 year old male w/pmh morbid obesity, afib, htn, hypothyroidism, BPH,  presenting with fever and abdominal pain for the past  five days.   Patient reports intermittent right upper quadrant abdominal pain  associated with fever home temperature of 102, symptoms relieved with Tylenol.  He reports no nausea or vomiting , no diarrhea. He has light colored stool and dark urine, some dysuria . Patient reports no cough , no runny nose or sore throat , no sick contacts.

## 2022-08-19 NOTE — CONSULT NOTE ADULT - ASSESSMENT
72 year old male w/pmh morbid obesity, afib, htn, hypothyroidism, BPH,  presenting with fever and abdominal pain for the past  five days with cholangitis, elevated LFTs, fever, leukocytosis, sepsis planned for ERCP today    Braulio Cervantes  Attending Physician   Division of Infectious Disease  Office #[phone]  Available on [employer] Teams also  After 5pm/weekend or no response, call #[phone]

## 2022-08-19 NOTE — PROGRESS NOTE ADULT - SUBJECTIVE AND OBJECTIVE BOX
patient not known to me, assigned this AM to assume care  chart reviewed and events thus far noted  admitted overnight by hospitalist colleague    plan per HnP  sepsis already resolving  ID consultation appreciated  continue to follow recommendations  GI evaluation noted    discussed with patient in detail, expresses understanding of treatment plans.  discussed with patient's family at bedside in detail

## 2022-08-20 DIAGNOSIS — D69.6 THROMBOCYTOPENIA, UNSPECIFIED: ICD-10-CM

## 2022-08-20 LAB
ALBUMIN SERPL ELPH-MCNC: 3.2 G/DL — LOW (ref 3.3–5)
ALP SERPL-CCNC: 177 U/L — HIGH (ref 40–120)
ALT FLD-CCNC: 222 U/L — HIGH (ref 10–45)
AMYLASE P1 CFR SERPL: 674 U/L — HIGH (ref 25–125)
ANION GAP SERPL CALC-SCNC: 12 MMOL/L — SIGNIFICANT CHANGE UP (ref 5–17)
AST SERPL-CCNC: 126 U/L — HIGH (ref 10–40)
BILIRUB SERPL-MCNC: 6.2 MG/DL — HIGH (ref 0.2–1.2)
BUN SERPL-MCNC: 10 MG/DL — SIGNIFICANT CHANGE UP (ref 7–23)
CALCIUM SERPL-MCNC: 9.2 MG/DL — SIGNIFICANT CHANGE UP (ref 8.4–10.5)
CHLORIDE SERPL-SCNC: 104 MMOL/L — SIGNIFICANT CHANGE UP (ref 96–108)
CO2 SERPL-SCNC: 21 MMOL/L — LOW (ref 22–31)
CREAT SERPL-MCNC: 0.87 MG/DL — SIGNIFICANT CHANGE UP (ref 0.5–1.3)
CULTURE RESULTS: SIGNIFICANT CHANGE UP
E COLI DNA BLD POS QL NAA+NON-PROBE: SIGNIFICANT CHANGE UP
EGFR: 91 ML/MIN/1.73M2 — SIGNIFICANT CHANGE UP
GLUCOSE SERPL-MCNC: 136 MG/DL — HIGH (ref 70–99)
GRAM STN FLD: SIGNIFICANT CHANGE UP
HCT VFR BLD CALC: 43.1 % — SIGNIFICANT CHANGE UP (ref 39–50)
HCV AB S/CO SERPL IA: 0.29 S/CO — SIGNIFICANT CHANGE UP (ref 0–0.99)
HCV AB SERPL-IMP: SIGNIFICANT CHANGE UP
HGB BLD-MCNC: 14.6 G/DL — SIGNIFICANT CHANGE UP (ref 13–17)
INR BLD: 1.46 RATIO — HIGH (ref 0.88–1.16)
LIDOCAIN IGE QN: >3000 U/L — HIGH (ref 7–60)
MCHC RBC-ENTMCNC: 28.9 PG — SIGNIFICANT CHANGE UP (ref 27–34)
MCHC RBC-ENTMCNC: 33.9 GM/DL — SIGNIFICANT CHANGE UP (ref 32–36)
MCV RBC AUTO: 85.3 FL — SIGNIFICANT CHANGE UP (ref 80–100)
METHOD TYPE: SIGNIFICANT CHANGE UP
MRSA PCR RESULT.: SIGNIFICANT CHANGE UP
NRBC # BLD: 0 /100 WBCS — SIGNIFICANT CHANGE UP (ref 0–0)
PLATELET # BLD AUTO: 143 K/UL — LOW (ref 150–400)
POTASSIUM SERPL-MCNC: 3.6 MMOL/L — SIGNIFICANT CHANGE UP (ref 3.5–5.3)
POTASSIUM SERPL-SCNC: 3.6 MMOL/L — SIGNIFICANT CHANGE UP (ref 3.5–5.3)
PROT SERPL-MCNC: 6.5 G/DL — SIGNIFICANT CHANGE UP (ref 6–8.3)
PROTHROM AB SERPL-ACNC: 17 SEC — HIGH (ref 10.5–13.4)
RBC # BLD: 5.05 M/UL — SIGNIFICANT CHANGE UP (ref 4.2–5.8)
RBC # FLD: 13.5 % — SIGNIFICANT CHANGE UP (ref 10.3–14.5)
S AUREUS DNA NOSE QL NAA+PROBE: SIGNIFICANT CHANGE UP
SODIUM SERPL-SCNC: 137 MMOL/L — SIGNIFICANT CHANGE UP (ref 135–145)
SPECIMEN SOURCE: SIGNIFICANT CHANGE UP
WBC # BLD: 7.63 K/UL — SIGNIFICANT CHANGE UP (ref 3.8–10.5)
WBC # FLD AUTO: 7.63 K/UL — SIGNIFICANT CHANGE UP (ref 3.8–10.5)

## 2022-08-20 PROCEDURE — 99232 SBSQ HOSP IP/OBS MODERATE 35: CPT | Mod: GC

## 2022-08-20 PROCEDURE — 99232 SBSQ HOSP IP/OBS MODERATE 35: CPT

## 2022-08-20 RX ORDER — HEPARIN SODIUM 5000 [USP'U]/ML
5000 INJECTION INTRAVENOUS; SUBCUTANEOUS EVERY 8 HOURS
Refills: 0 | Status: DISCONTINUED | OUTPATIENT
Start: 2022-08-20 | End: 2022-08-24

## 2022-08-20 RX ORDER — KETOROLAC TROMETHAMINE 30 MG/ML
30 SYRINGE (ML) INJECTION ONCE
Refills: 0 | Status: DISCONTINUED | OUTPATIENT
Start: 2022-08-20 | End: 2022-08-20

## 2022-08-20 RX ORDER — KETOROLAC TROMETHAMINE 30 MG/ML
15 SYRINGE (ML) INJECTION ONCE
Refills: 0 | Status: DISCONTINUED | OUTPATIENT
Start: 2022-08-20 | End: 2022-08-20

## 2022-08-20 RX ORDER — HYDROMORPHONE HYDROCHLORIDE 2 MG/ML
1 INJECTION INTRAMUSCULAR; INTRAVENOUS; SUBCUTANEOUS EVERY 4 HOURS
Refills: 0 | Status: DISCONTINUED | OUTPATIENT
Start: 2022-08-20 | End: 2022-08-24

## 2022-08-20 RX ORDER — SODIUM CHLORIDE 9 MG/ML
1000 INJECTION, SOLUTION INTRAVENOUS
Refills: 0 | Status: DISCONTINUED | OUTPATIENT
Start: 2022-08-20 | End: 2022-08-22

## 2022-08-20 RX ADMIN — PIPERACILLIN AND TAZOBACTAM 25 GRAM(S): 4; .5 INJECTION, POWDER, LYOPHILIZED, FOR SOLUTION INTRAVENOUS at 05:01

## 2022-08-20 RX ADMIN — HYDROMORPHONE HYDROCHLORIDE 1 MILLIGRAM(S): 2 INJECTION INTRAMUSCULAR; INTRAVENOUS; SUBCUTANEOUS at 17:38

## 2022-08-20 RX ADMIN — ATORVASTATIN CALCIUM 20 MILLIGRAM(S): 80 TABLET, FILM COATED ORAL at 21:27

## 2022-08-20 RX ADMIN — Medication 300 MILLIGRAM(S): at 11:39

## 2022-08-20 RX ADMIN — Medication 300 MILLIGRAM(S): at 21:27

## 2022-08-20 RX ADMIN — Medication 30 MILLIGRAM(S): at 08:40

## 2022-08-20 RX ADMIN — PIPERACILLIN AND TAZOBACTAM 25 GRAM(S): 4; .5 INJECTION, POWDER, LYOPHILIZED, FOR SOLUTION INTRAVENOUS at 13:44

## 2022-08-20 RX ADMIN — CHLORHEXIDINE GLUCONATE 1 APPLICATION(S): 213 SOLUTION TOPICAL at 11:51

## 2022-08-20 RX ADMIN — PIPERACILLIN AND TAZOBACTAM 25 GRAM(S): 4; .5 INJECTION, POWDER, LYOPHILIZED, FOR SOLUTION INTRAVENOUS at 21:27

## 2022-08-20 RX ADMIN — Medication 15 MILLIGRAM(S): at 05:35

## 2022-08-20 RX ADMIN — HYDROMORPHONE HYDROCHLORIDE 1 MILLIGRAM(S): 2 INJECTION INTRAMUSCULAR; INTRAVENOUS; SUBCUTANEOUS at 12:49

## 2022-08-20 RX ADMIN — HEPARIN SODIUM 5000 UNIT(S): 5000 INJECTION INTRAVENOUS; SUBCUTANEOUS at 21:27

## 2022-08-20 RX ADMIN — HYDROMORPHONE HYDROCHLORIDE 1 MILLIGRAM(S): 2 INJECTION INTRAMUSCULAR; INTRAVENOUS; SUBCUTANEOUS at 13:19

## 2022-08-20 RX ADMIN — Medication 30 MILLIGRAM(S): at 09:10

## 2022-08-20 RX ADMIN — Medication 30 MILLILITER(S): at 06:38

## 2022-08-20 RX ADMIN — HYDROMORPHONE HYDROCHLORIDE 1 MILLIGRAM(S): 2 INJECTION INTRAMUSCULAR; INTRAVENOUS; SUBCUTANEOUS at 17:08

## 2022-08-20 RX ADMIN — TAMSULOSIN HYDROCHLORIDE 0.4 MILLIGRAM(S): 0.4 CAPSULE ORAL at 21:27

## 2022-08-20 RX ADMIN — Medication 100 MICROGRAM(S): at 05:39

## 2022-08-20 NOTE — PHYSICAL THERAPY INITIAL EVALUATION ADULT - LIVES WITH, PROFILE
children Pt is poor historian. Pt and family at b/s, reports he lives with son in private home with no steps to enter and bedroom on first level. Prior to admission pt independent with transfers to/from wheelchair, ambulation limited. Pt performing ADL's independently with difficulty. Pt with 2-wheeled walker, wheelchair, and motorized-wheelchair, and shower bench in home./children

## 2022-08-20 NOTE — PHARMACOTHERAPY INTERVENTION NOTE - COMMENTS
Recommended to de-escalate from piperacillin-tazobactam to ceftriaxone 2g IV q24h and metronidazole 500mg PO q8h in the setting of 8/18 blood cultures demonstrating the presence of E. coli (identified via PCR). Provider decided to continue therapy as prescribed and will monitor and reassess.

## 2022-08-20 NOTE — PROGRESS NOTE ADULT - SUBJECTIVE AND OBJECTIVE BOX
General Question     Subject: [facility] Cover  Patient and /or Facility Request: [facility]  Contact Number:  [phone]    PATIENT IS RETURNING A CALL TO SCHEDULE SURGERY PATIENT CAN BE REACHED AT THE NUMBER LISTED ABOVE Patient is a 72y old  Male who presents with a chief complaint of abdominal pain x 5 days (20 Aug 2022 11:26)      DATE OF SERVICE: 22 @ 14:55    SUBJECTIVE / OVERNIGHT EVENTS: overnight events noted  c/o increased abdominal pain    ROS:  Resp: No cough no sputum production  CVS: No chest pain no palpitations no orthopnea  GI: no N/V/D + abdominal pain  : no dysuria, no hematuria  Neuro: no weakness no paresthesias  Heme: No petechiae no easy bruising  Msk: No joint pain no swelling  Skin: No rash no itching        MEDICATIONS  (STANDING):  allopurinol 300 milliGRAM(s) Oral daily  atorvastatin 20 milliGRAM(s) Oral at bedtime  chlorhexidine 2% Cloths 1 Application(s) Topical daily  lactated ringers. 1000 milliLiter(s) (125 mL/Hr) IV Continuous <Continuous>  levothyroxine 100 MICROGram(s) Oral daily  piperacillin/tazobactam IVPB.. 3.375 Gram(s) IV Intermittent every 8 hours  tamsulosin 0.4 milliGRAM(s) Oral at bedtime  traZODone 300 milliGRAM(s) Oral at bedtime    MEDICATIONS  (PRN):  acetaminophen     Tablet .. 650 milliGRAM(s) Oral every 6 hours PRN Temp greater or equal to 38C (100.4F), Mild Pain (1 - 3)  aluminum hydroxide/magnesium hydroxide/simethicone Suspension 30 milliLiter(s) Oral every 4 hours PRN Dyspepsia  HYDROmorphone  Injectable 1 milliGRAM(s) IV Push every 4 hours PRN Severe Pain (7 - 10)  melatonin 3 milliGRAM(s) Oral at bedtime PRN Insomnia  ondansetron Injectable 4 milliGRAM(s) IV Push every 8 hours PRN Nausea and/or Vomiting        CAPILLARY BLOOD GLUCOSE        I&O's Summary    19 Aug 2022 07:  -  20 Aug 2022 07:00  --------------------------------------------------------  IN: 0 mL / OUT: 800 mL / NET: -800 mL    20 Aug 2022 07:01  -  20 Aug 2022 14:55  --------------------------------------------------------  IN: 400 mL / OUT: 0 mL / NET: 400 mL        Vital Signs Last 24 Hrs  T(C): 36.9 (20 Aug 2022 12:28), Max: 36.9 (20 Aug 2022 12:28)  T(F): 98.4 (20 Aug 2022 12:28), Max: 98.4 (20 Aug 2022 12:28)  HR: 95 (20 Aug 2022 12:) (76 - 95)  BP: 145/81 (20 Aug 2022 12:) (123/87 - 156/89)  BP(mean): --  RR: 18 (20 Aug 2022 12:) (18 - 18)  SpO2: 96% (20 Aug 2022 12:) (95% - 97%)    PHYSICAL EXAM:  GENERAL: in no apparent distress  HEAD:  Atraumatic, Normocephalic  EYES: EOMI, PERRLA, sclera clear  NECK: Supple, No JVD  CHEST/LUNG: clear b/l, no wheeze  HEART: S1 S2; no murmurs appreciated  ABDOMEN: Soft, mild deep tenderness across epigastrium  obese +++  EXTREMITIES:  No clubbing or cyanosis,  no edema  NEUROLOGY: AO x 3 non-focal  SKIN: No rashes or lesions    LABS:                        14.6   7.63  )-----------( 143      ( 20 Aug 2022 06:25 )             43.1     08-20    137  |  104  |  10  ----------------------------<  136<H>  3.6   |  21<L>  |  0.87    Ca    9.2      20 Aug 2022 06:25    TPro  6.5  /  Alb  3.2<L>  /  TBili  6.2<H>  /  DBili  x   /  AST  126<H>  /  ALT  222<H>  /  AlkPhos  177<H>  08-20    PT/INR - ( 20 Aug 2022 06:25 )   PT: 17.0 sec;   INR: 1.46 ratio         PTT - ( 18 Aug 2022 19:30 )  PTT:34.3 sec  CARDIAC MARKERS ( 18 Aug 2022 19:30 )  x     / x     / 30 U/L / x     / x          Urinalysis Basic - ( 18 Aug 2022 19:42 )    Color: Dark Yellow / Appearance: Clear / S.020 / pH: x  Gluc: x / Ketone: Negative  / Bili: Moderate / Urobili: 8 mg/dL   Blood: x / Protein: Trace / Nitrite: Negative   Leuk Esterase: Negative / RBC: 3 /hpf / WBC 2 /HPF   Sq Epi: x / Non Sq Epi: 1 /hpf / Bacteria: Negative          All consultant(s) notes reviewed and care discussed with other providers        Contact Number, Dr Araujo [phone]

## 2022-08-20 NOTE — PROGRESS NOTE ADULT - PROBLEM SELECTOR PLAN 4
hold for now   resume as tolerated overnight drop  likely secondary to sepsis and maybe antibiotics  will continue to monitor   bilateral venous duplex to r/o DVT   continue heparin SQ

## 2022-08-20 NOTE — CONSULT NOTE ADULT - SUBJECTIVE AND OBJECTIVE BOX
72 year old male with chronic AF on Coumadin as an out patient, HTN, morbid obesity presents with abdominal pain and was found to have cholangitis.  He is now S/P ERCP with stent placement in the CBD.  This morning he complains of significant abdominal pain but no dyspnea    Meds: IV Zosyn            Atorvastatin 20 mg qd            Levothyroxine 100 mcg qd            Allopurinol 300 mg qd            Flomax 0.4 mg qd            Trazodone 300 mg qd    /99  HR 76  Lungs clear  Irregular rhythm  No edema    WBC 7.63  Hgb 14.6  Hct 43.1  Plt 143K  BUN 10  Crt 0.87  Lipase > 3000   Amylase 674  INR 1.46  SGOT 126  SGPT 222  Alk Phos 177  Bili 6.2    Imp:  Chronic AF - rate is well controlled off Beta blockers.  No evidence of CHF   The elevated INR was due to Coumadin use  Rec:  IV fluids / analgesia  as per Surgery service.   Hold Coumadin for now   Monitor HR off Toprol ( his out patient medication )

## 2022-08-20 NOTE — PROGRESS NOTE ADULT - SUBJECTIVE AND OBJECTIVE BOX
Chief Complaint:  Patient is a 72y old  Male who presents with a chief complaint of abdominal pain x 5 days (20 Aug 2022 10:00)      Interval Events:   Afebrile with slight decrease in bili, the rest of the liver chemistries are downtrending   Worsening epigastric pain this AM  Lipase > 3000    Hospital Medications:  acetaminophen     Tablet .. 650 milliGRAM(s) Oral every 6 hours PRN  allopurinol 300 milliGRAM(s) Oral daily  aluminum hydroxide/magnesium hydroxide/simethicone Suspension 30 milliLiter(s) Oral every 4 hours PRN  atorvastatin 20 milliGRAM(s) Oral at bedtime  chlorhexidine 2% Cloths 1 Application(s) Topical daily  lactated ringers. 500 milliLiter(s) IV Continuous <Continuous>  levothyroxine 100 MICROGram(s) Oral daily  melatonin 3 milliGRAM(s) Oral at bedtime PRN  ondansetron Injectable 4 milliGRAM(s) IV Push every 8 hours PRN  piperacillin/tazobactam IVPB.. 3.375 Gram(s) IV Intermittent every 8 hours  sodium chloride 0.9%. 500 milliLiter(s) IV Continuous <Continuous>  tamsulosin 0.4 milliGRAM(s) Oral at bedtime  traZODone 300 milliGRAM(s) Oral at bedtime      ROS:   Complete and normal except as mentioned above.    PHYSICAL EXAM:   Vital Signs:  Vital Signs Last 24 Hrs  T(C): 36.8 (20 Aug 2022 08:52), Max: 36.8 (19 Aug 2022 13:13)  T(F): 98.2 (20 Aug 2022 08:52), Max: 98.2 (19 Aug 2022 13:13)  HR: 76 (20 Aug 2022 10:23) (76 - 107)  BP: 136/99 (20 Aug 2022 10:23) (107/62 - 156/89)  BP(mean): --  RR: 18 (20 Aug 2022 08:52) (11 - 20)  SpO2: 97% (20 Aug 2022 10:23) (94% - 97%)    Parameters below as of 20 Aug 2022 10:23  Patient On (Oxygen Delivery Method): room air      Daily Height in cm: 182.88 (19 Aug 2022 11:15)    Daily     GENERAL:  No acute distress  HEENT:  Normocephalic/atraumatic, +scleral icterus  CHEST:  no accessory muscle use  HEART:  Regular rate and rhythm  ABDOMEN:  Soft, epigastric tenderness, non-distended  EXTREMITIES: No cyanosis, clubbing, or edema  SKIN:  No rash/warm/dry, +jaundice  NEURO:  Alert and oriented x 3    LABS: reviewed                        14.6   7.63  )-----------( 143      ( 20 Aug 2022 06:25 )             43.1     08-20    137  |  104  |  10  ----------------------------<  136<H>  3.6   |  21<L>  |  0.87    Ca    9.2      20 Aug 2022 06:25    TPro  6.5  /  Alb  3.2<L>  /  TBili  6.2<H>  /  DBili  x   /  AST  126<H>  /  ALT  222<H>  /  AlkPhos  177<H>  08-20    LIVER FUNCTIONS - ( 20 Aug 2022 06:25 )  Alb: 3.2 g/dL / Pro: 6.5 g/dL / ALK PHOS: 177 U/L / ALT: 222 U/L / AST: 126 U/L / GGT: x             Interval Diagnostic Studies: see sunrise for full report

## 2022-08-20 NOTE — PHYSICAL THERAPY INITIAL EVALUATION ADULT - PERTINENT HX OF CURRENT PROBLEM, REHAB EVAL
72 year old male w/pmhx morbid obesity, AFib, HTN, hypothyroidism, BPH, p/w fever and abdominal pain for the past five days. Pt reports intermittent right upper quadrant abdominal pain associated with fever. A/w elevated LFTs, fever, leukocytosis, Sepsis due to Cholangitis S/P ERCP 8/19. 8/18/2022 CT ABDOMEN/PELVIS: Cholelithiasis without radiographic evidence of cholecystitis. XR CHEST: left lower lung hazy opacity, small left pleural effusion.

## 2022-08-20 NOTE — PHYSICAL THERAPY INITIAL EVALUATION ADULT - ACTIVE RANGE OF MOTION EXAMINATION, REHAB EVAL
BLE limited 2/2 body habitus/bilateral upper extremity Active ROM was WFL (within functional limits)/bilateral  lower extremity Active ROM was WFL (within functional limits)

## 2022-08-20 NOTE — PROGRESS NOTE ADULT - SUBJECTIVE AND OBJECTIVE BOX
Patient is a 72y old  Male who presents with a chief complaint of abdominal pain x 5 days (19 Aug 2022 15:29)    Being followed by ID for fevers, pancreatitis         Interval history:  pt c/o left sided abd pain  fevers resolved  remainder 10 point ROS negative  No other acute events        PAST MEDICAL & SURGICAL HISTORY:  Dyslipidemia      Hypertension      Afib      Chronic Gout      Anxiety      History of Transient Ischemic Attack (TIA)      History of Obesity      Obstructive Sleep Apnea      Gout      Hypothyroid      BPH (benign prostatic hyperplasia)      History of Tonsillectomy        Allergies    metformin (Unknown)    Intolerances      Antimicrobials:    piperacillin/tazobactam IVPB.. 3.375 Gram(s) IV Intermittent every 8 hours    MEDICATIONS  (STANDING):  allopurinol 300 milliGRAM(s) Oral daily  atorvastatin 20 milliGRAM(s) Oral at bedtime  chlorhexidine 2% Cloths 1 Application(s) Topical daily  lactated ringers. 500 milliLiter(s) (30 mL/Hr) IV Continuous <Continuous>  levothyroxine 100 MICROGram(s) Oral daily  piperacillin/tazobactam IVPB.. 3.375 Gram(s) IV Intermittent every 8 hours  sodium chloride 0.9%. 500 milliLiter(s) (30 mL/Hr) IV Continuous <Continuous>  tamsulosin 0.4 milliGRAM(s) Oral at bedtime  traZODone 300 milliGRAM(s) Oral at bedtime      Vital Signs Last 24 Hrs  T(C): 36.8 (22 @ 08:52), Max: 36.8 (22 @ 13:13)  T(F): 98.2 (22 @ 08:52), Max: 98.2 (22 @ 13:13)  HR: 85 (22 @ 08:52) (77 - 107)  BP: 123/87 (22 @ 08:52) (107/62 - 156/89)  BP(mean): --  RR: 18 (22 @ 08:52) (11 - 20)  SpO2: 95% (22 @ 08:52) (94% - 97%)    Physical Exam:    Constitutional obese, uncomfortable    HEENT PERRLA EOMI,No pallor    No oral exudate or erythema    Neck supple no JVD or LN    Chest Good AE,CTA    CVS  S1 S2     Abd soft BS normal No tenderness     Ext No cyanosis clubbing or edema    IV site no erythema tenderness or discharge    Joints no swelling or LOM    CNS AAO X 3 no focal    Lab Data:                          14.6   7.63  )-----------( 143      ( 20 Aug 2022 06:25 )             43.1           137  |  104  |  10  ----------------------------<  136<H>  3.6   |  21<L>  |  0.87    Ca    9.2      20 Aug 2022 06:25    TPro  6.5  /  Alb  3.2<L>  /  TBili  6.2<H>  /  DBili  x   /  AST  126<H>  /  ALT  222<H>  /  AlkPhos  177<H>      Amylase, Serum Total (22 @ 06:25)    Amylase, Serum Total: 674 U/L    Lipase, Serum (22 @ 06:25)    Lipase, Serum: >3000 U/L      Urinalysis Basic - ( 18 Aug 2022 19:42 )    Color: Dark Yellow / Appearance: Clear / S.020 / pH: x  Gluc: x / Ketone: Negative  / Bili: Moderate / Urobili: 8 mg/dL   Blood: x / Protein: Trace / Nitrite: Negative   Leuk Esterase: Negative / RBC: 3 /hpf / WBC 2 /HPF   Sq Epi: x / Non Sq Epi: 1 /hpf / Bacteria: Negative        Clean Catch Clean Catch (Midstream)  22   <10,000 CFU/mL Normal Urogenital Ira  --  --      .Blood Blood-Peripheral  22   No growth to date.  --  --      .Blood Blood-Peripheral  22   No growth to date.  --  --        < from: ERCP (22 @ 10:57) >                                              Impression:          - A moderate amount of food in stomach + duodenum concerning for possible                        gastroparesis                       - PD injected. Suspicion for pancreatic divisium. All contrast drained from                        the PD                       - Choledocholithiasis                       - One plastic stent (10F x 7cm straight ) placed in setting of cholangitis,                        good bile flow from stent    < end of copied text >              WBC Count: 7.63 (22 @ 06:25)  WBC Count: 14.07 (22 @ 07:05)  WBC Count: 23.09 (22 @ 19:30)

## 2022-08-21 LAB
ALBUMIN SERPL ELPH-MCNC: 2.7 G/DL — LOW (ref 3.3–5)
ALP SERPL-CCNC: 177 U/L — HIGH (ref 40–120)
ALT FLD-CCNC: 145 U/L — HIGH (ref 10–45)
AMYLASE P1 CFR SERPL: 477 U/L — HIGH (ref 25–125)
ANION GAP SERPL CALC-SCNC: 11 MMOL/L — SIGNIFICANT CHANGE UP (ref 5–17)
AST SERPL-CCNC: 64 U/L — HIGH (ref 10–40)
BILIRUB SERPL-MCNC: 7.6 MG/DL — HIGH (ref 0.2–1.2)
BUN SERPL-MCNC: 9 MG/DL — SIGNIFICANT CHANGE UP (ref 7–23)
CALCIUM SERPL-MCNC: 9.2 MG/DL — SIGNIFICANT CHANGE UP (ref 8.4–10.5)
CHLORIDE SERPL-SCNC: 102 MMOL/L — SIGNIFICANT CHANGE UP (ref 96–108)
CO2 SERPL-SCNC: 22 MMOL/L — SIGNIFICANT CHANGE UP (ref 22–31)
CREAT SERPL-MCNC: 0.85 MG/DL — SIGNIFICANT CHANGE UP (ref 0.5–1.3)
EGFR: 92 ML/MIN/1.73M2 — SIGNIFICANT CHANGE UP
GLUCOSE SERPL-MCNC: 130 MG/DL — HIGH (ref 70–99)
HCT VFR BLD CALC: 41.9 % — SIGNIFICANT CHANGE UP (ref 39–50)
HEPARIN-PF4 AB RESULT: SIGNIFICANT CHANGE UP U/ML (ref 0–0.9)
HGB BLD-MCNC: 14.1 G/DL — SIGNIFICANT CHANGE UP (ref 13–17)
LIDOCAIN IGE QN: 872 U/L — HIGH (ref 7–60)
MCHC RBC-ENTMCNC: 29 PG — SIGNIFICANT CHANGE UP (ref 27–34)
MCHC RBC-ENTMCNC: 33.7 GM/DL — SIGNIFICANT CHANGE UP (ref 32–36)
MCV RBC AUTO: 86.2 FL — SIGNIFICANT CHANGE UP (ref 80–100)
NRBC # BLD: 0 /100 WBCS — SIGNIFICANT CHANGE UP (ref 0–0)
PF4 HEPARIN CMPLX AB SER-ACNC: NEGATIVE — SIGNIFICANT CHANGE UP
PLATELET # BLD AUTO: 144 K/UL — LOW (ref 150–400)
POTASSIUM SERPL-MCNC: 4 MMOL/L — SIGNIFICANT CHANGE UP (ref 3.5–5.3)
POTASSIUM SERPL-SCNC: 4 MMOL/L — SIGNIFICANT CHANGE UP (ref 3.5–5.3)
PROT SERPL-MCNC: 5.8 G/DL — LOW (ref 6–8.3)
RBC # BLD: 4.86 M/UL — SIGNIFICANT CHANGE UP (ref 4.2–5.8)
RBC # FLD: 14.1 % — SIGNIFICANT CHANGE UP (ref 10.3–14.5)
SODIUM SERPL-SCNC: 135 MMOL/L — SIGNIFICANT CHANGE UP (ref 135–145)
WBC # BLD: 12.98 K/UL — HIGH (ref 3.8–10.5)
WBC # FLD AUTO: 12.98 K/UL — HIGH (ref 3.8–10.5)

## 2022-08-21 PROCEDURE — 99232 SBSQ HOSP IP/OBS MODERATE 35: CPT | Mod: GC

## 2022-08-21 PROCEDURE — 99232 SBSQ HOSP IP/OBS MODERATE 35: CPT

## 2022-08-21 RX ORDER — METOPROLOL TARTRATE 50 MG
50 TABLET ORAL
Refills: 0 | Status: DISCONTINUED | OUTPATIENT
Start: 2022-08-21 | End: 2022-08-24

## 2022-08-21 RX ADMIN — HEPARIN SODIUM 5000 UNIT(S): 5000 INJECTION INTRAVENOUS; SUBCUTANEOUS at 13:11

## 2022-08-21 RX ADMIN — HEPARIN SODIUM 5000 UNIT(S): 5000 INJECTION INTRAVENOUS; SUBCUTANEOUS at 21:04

## 2022-08-21 RX ADMIN — HEPARIN SODIUM 5000 UNIT(S): 5000 INJECTION INTRAVENOUS; SUBCUTANEOUS at 05:15

## 2022-08-21 RX ADMIN — Medication 50 MILLIGRAM(S): at 17:39

## 2022-08-21 RX ADMIN — Medication 300 MILLIGRAM(S): at 21:05

## 2022-08-21 RX ADMIN — PIPERACILLIN AND TAZOBACTAM 25 GRAM(S): 4; .5 INJECTION, POWDER, LYOPHILIZED, FOR SOLUTION INTRAVENOUS at 05:17

## 2022-08-21 RX ADMIN — Medication 100 MICROGRAM(S): at 05:15

## 2022-08-21 RX ADMIN — PIPERACILLIN AND TAZOBACTAM 25 GRAM(S): 4; .5 INJECTION, POWDER, LYOPHILIZED, FOR SOLUTION INTRAVENOUS at 21:05

## 2022-08-21 RX ADMIN — HYDROMORPHONE HYDROCHLORIDE 1 MILLIGRAM(S): 2 INJECTION INTRAMUSCULAR; INTRAVENOUS; SUBCUTANEOUS at 18:09

## 2022-08-21 RX ADMIN — SODIUM CHLORIDE 125 MILLILITER(S): 9 INJECTION, SOLUTION INTRAVENOUS at 13:05

## 2022-08-21 RX ADMIN — TAMSULOSIN HYDROCHLORIDE 0.4 MILLIGRAM(S): 0.4 CAPSULE ORAL at 21:05

## 2022-08-21 RX ADMIN — PIPERACILLIN AND TAZOBACTAM 25 GRAM(S): 4; .5 INJECTION, POWDER, LYOPHILIZED, FOR SOLUTION INTRAVENOUS at 13:04

## 2022-08-21 RX ADMIN — CHLORHEXIDINE GLUCONATE 1 APPLICATION(S): 213 SOLUTION TOPICAL at 13:05

## 2022-08-21 RX ADMIN — Medication 300 MILLIGRAM(S): at 13:05

## 2022-08-21 RX ADMIN — HYDROMORPHONE HYDROCHLORIDE 1 MILLIGRAM(S): 2 INJECTION INTRAMUSCULAR; INTRAVENOUS; SUBCUTANEOUS at 17:39

## 2022-08-21 NOTE — PROGRESS NOTE ADULT - SUBJECTIVE AND OBJECTIVE BOX
Chief Complaint:  Patient is a 72y old  Male who presents with a chief complaint of abdominal pain x 5 days (21 Aug 2022 14:23)         Interval Events:   Mild improvement in abdominal pain, but hoping to advance diet to something with a thicker consistency  Uptrending bili and ALP stable at 177, otherwise AST/ALT downtrending  Afebrile, white count 13 from 7, from 13 from 23    Hospital Medications:  acetaminophen     Tablet .. 650 milliGRAM(s) Oral every 6 hours PRN  allopurinol 300 milliGRAM(s) Oral daily  aluminum hydroxide/magnesium hydroxide/simethicone Suspension 30 milliLiter(s) Oral every 4 hours PRN  chlorhexidine 2% Cloths 1 Application(s) Topical daily  heparin   Injectable 5000 Unit(s) SubCutaneous every 8 hours  HYDROmorphone  Injectable 1 milliGRAM(s) IV Push every 4 hours PRN  lactated ringers. 1000 milliLiter(s) IV Continuous <Continuous>  levothyroxine 100 MICROGram(s) Oral daily  melatonin 3 milliGRAM(s) Oral at bedtime PRN  metoprolol tartrate 50 milliGRAM(s) Oral two times a day  ondansetron Injectable 4 milliGRAM(s) IV Push every 8 hours PRN  piperacillin/tazobactam IVPB.. 3.375 Gram(s) IV Intermittent every 8 hours  tamsulosin 0.4 milliGRAM(s) Oral at bedtime  traZODone 300 milliGRAM(s) Oral at bedtime      ROS:   Complete and normal except as mentioned above.    PHYSICAL EXAM:   Vital Signs:  Vital Signs Last 24 Hrs  T(C): 37.1 (21 Aug 2022 13:17), Max: 37.8 (21 Aug 2022 08:36)  T(F): 98.7 (21 Aug 2022 13:17), Max: 100 (21 Aug 2022 08:36)  HR: 85 (21 Aug 2022 13:17) (85 - 118)  BP: 134/83 (21 Aug 2022 13:17) (131/77 - 160/67)  BP(mean): --  RR: 18 (21 Aug 2022 13:17) (18 - 20)  SpO2: 95% (21 Aug 2022 13:17) (93% - 97%)    Parameters below as of 21 Aug 2022 13:17  Patient On (Oxygen Delivery Method): room air      Daily     Daily     GENERAL:  No acute distress  HEENT:  Normocephalic/atraumatic, +scleral icterus  CHEST:  no accessory muscle use  HEART:  Regular rate and rhythm  ABDOMEN:  Soft, epigastric tenderness (mild improvement), non-distended  EXTREMITIES: No cyanosis, clubbing, or edema  SKIN:  No rash/warm/dry, +jaundice  NEURO:  Alert and oriented x 3    LABS: reviewed                        14.1   12.98 )-----------( 144      ( 21 Aug 2022 07:42 )             41.9     08-21    135  |  102  |  9   ----------------------------<  130<H>  4.0   |  22  |  0.85    Ca    9.2      21 Aug 2022 07:41    TPro  5.8<L>  /  Alb  2.7<L>  /  TBili  7.6<H>  /  DBili  x   /  AST  64<H>  /  ALT  145<H>  /  AlkPhos  177<H>  08-21    LIVER FUNCTIONS - ( 21 Aug 2022 07:41 )  Alb: 2.7 g/dL / Pro: 5.8 g/dL / ALK PHOS: 177 U/L / ALT: 145 U/L / AST: 64 U/L / GGT: x             Interval Diagnostic Studies: see sunrise for full report

## 2022-08-21 NOTE — OCCUPATIONAL THERAPY INITIAL EVALUATION ADULT - ADL RETRAINING, OT EVAL
Pt will perform UB dressing with independence within 4 weeks. Pt will perform toileting independently within 4 weeks.

## 2022-08-21 NOTE — OCCUPATIONAL THERAPY INITIAL EVALUATION ADULT - PERTINENT HX OF CURRENT PROBLEM, REHAB EVAL
72 year old male with pmhx morbid obesity, AFib, HTN, hypothyroidism, BPH, p/w fever and abdominal pain for the past five days. Pt reports intermittent right upper quadrant abdominal pain associated with fever. A/w elevated LFTs, fever, leukocytosis, sepsis due to cholangitis S/P ERCP 8/19. 8/18/2022 CT ABDOMEN/PELVIS: Cholelithiasis without radiographic evidence of cholecystitis. XR CHEST: left lower lung hazy opacity, small left pleural effusion.

## 2022-08-21 NOTE — PROGRESS NOTE ADULT - SUBJECTIVE AND OBJECTIVE BOX
Patient is a 72y old  Male who presents with a chief complaint of abdominal pain x 5 days (20 Aug 2022 12:55)    Being followed by ID for fevers        Interval history:  pt feeling improved  afraid to sit up- bed bound at home  pain is less  team is advancing to liquid diet  no bowel movment  No other acute events      PAST MEDICAL & SURGICAL HISTORY:  Dyslipidemia      Hypertension      Afib      Chronic Gout      Anxiety      History of Transient Ischemic Attack (TIA)      History of Obesity      Obstructive Sleep Apnea      Gout      Hypothyroid      BPH (benign prostatic hyperplasia)      History of Tonsillectomy        Allergies    metformin (Unknown)    Intolerances      Antimicrobials:    piperacillin/tazobactam IVPB.. 3.375 Gram(s) IV Intermittent every 8 hours    MEDICATIONS  (STANDING):  allopurinol 300 milliGRAM(s) Oral daily  atorvastatin 20 milliGRAM(s) Oral at bedtime  chlorhexidine 2% Cloths 1 Application(s) Topical daily  heparin   Injectable 5000 Unit(s) SubCutaneous every 8 hours  lactated ringers. 1000 milliLiter(s) (125 mL/Hr) IV Continuous <Continuous>  levothyroxine 100 MICROGram(s) Oral daily  piperacillin/tazobactam IVPB.. 3.375 Gram(s) IV Intermittent every 8 hours  tamsulosin 0.4 milliGRAM(s) Oral at bedtime  traZODone 300 milliGRAM(s) Oral at bedtime      Vital Signs Last 24 Hrs  T(C): 37.8 (08-21-22 @ 08:36), Max: 37.8 (08-21-22 @ 08:36)  T(F): 100 (08-21-22 @ 08:36), Max: 100 (08-21-22 @ 08:36)  HR: 92 (08-21-22 @ 08:36) (76 - 110)  BP: 160/67 (08-21-22 @ 08:36) (131/77 - 160/67)  BP(mean): --  RR: 20 (08-21-22 @ 08:36) (18 - 20)  SpO2: 96% (08-21-22 @ 08:36) (93% - 97%)    Physical Exam:    Constitutional  morbidly obese pleasant     HEENT PERRLA EOMI,No pallor or icterus    No oral exudate or erythema    Neck supple no JVD or LN    Chest Good AE,CTA    CVS S1 S2     Abd soft BS normal No tenderness    Ext No cyanosis clubbing or edema    IV site no erythema tenderness or discharge    Joints no swelling or LOM    CNS AAO X 3 no focal    Lab Data:                          14.1   12.98 )-----------( 144      ( 21 Aug 2022 07:42 )             41.9       08-21    135  |  102  |  9   ----------------------------<  130<H>  4.0   |  22  |  0.85    Ca    9.2      21 Aug 2022 07:41    TPro  5.8<L>  /  Alb  2.7<L>  /  TBili  7.6<H>  /  DBili  x   /  AST  64<H>  /  ALT  145<H>  /  AlkPhos  177<H>  08-21          Clean Catch Clean Catch (Midstream)  08-18-22   <10,000 CFU/mL Normal Urogenital Ira  --  --      .Blood Blood-Peripheral  08-18-22   No growth to date.  --  --      .Blood Blood-Peripheral  08-18-22   Growth in anaerobic bottle: Gram Negative Rods  ***Blood Panel PCR results on this specimen are available  approximately 3 hours after the Gram stain result.***  Gram stain, PCR, and/or culture results may not always  correspond due to difference in methodologies.  ************************************************************  This PCR assay was performed by multiplex PCR. This  Assay tests for 66 bacterial and resistance gene targets.  Please refer to the [facility] test directory  at [email or web address] for details.  --  Blood Culture PCR      Amylase, Serum Total in AM (08.21.22 @ 07:41)    Amylase, Serum Total: 477 U/L    Lipase, Serum in AM (08.21.22 @ 07:41)    Lipase, Serum: 872: test repeated U/L    Lipase, Serum (08.20.22 @ 06:25)    Lipase, Serum: >3000 U/L                  WBC Count: 12.98 (08-21-22 @ 07:42)  WBC Count: 7.63 (08-20-22 @ 06:25)  WBC Count: 14.07 (08-19-22 @ 07:05)  WBC Count: 23.09 (08-18-22 @ 19:30)

## 2022-08-21 NOTE — PROGRESS NOTE ADULT - PROBLEM SELECTOR PLAN 4
overnight drop but stable today  likely secondary to sepsis and maybe antibiotics  will continue to monitor   HIT panel ordered  bilateral venous duplex to r/o DVT   continue heparin SQ

## 2022-08-21 NOTE — OCCUPATIONAL THERAPY INITIAL EVALUATION ADULT - ADDITIONAL COMMENTS
Pt reports he is primarily w/c bound, but will occasionally walk short distances in the home with his RW. Pt reports his son assists him with bathing, dressing and toileting.

## 2022-08-21 NOTE — PROGRESS NOTE ADULT - SUBJECTIVE AND OBJECTIVE BOX
Patient is a 72y old  Male who presents with a chief complaint of abdominal pain x 5 days (21 Aug 2022 11:58)      DATE OF SERVICE: 08-21-22 @ 14:23    SUBJECTIVE / OVERNIGHT EVENTS: overnight events noted  i feel 20% better"    ROS:  Resp: No cough no sputum production  CVS: No chest pain no palpitations no orthopnea  GI: no N/V/D  : no dysuria, no hematuria  Neuro: no weakness no paresthesias  Heme: No petechiae no easy bruising  Msk: No joint pain no swelling  Skin: No rash no itching        MEDICATIONS  (STANDING):  allopurinol 300 milliGRAM(s) Oral daily  chlorhexidine 2% Cloths 1 Application(s) Topical daily  heparin   Injectable 5000 Unit(s) SubCutaneous every 8 hours  lactated ringers. 1000 milliLiter(s) (125 mL/Hr) IV Continuous <Continuous>  levothyroxine 100 MICROGram(s) Oral daily  piperacillin/tazobactam IVPB.. 3.375 Gram(s) IV Intermittent every 8 hours  tamsulosin 0.4 milliGRAM(s) Oral at bedtime  traZODone 300 milliGRAM(s) Oral at bedtime    MEDICATIONS  (PRN):  acetaminophen     Tablet .. 650 milliGRAM(s) Oral every 6 hours PRN Temp greater or equal to 38C (100.4F), Mild Pain (1 - 3)  aluminum hydroxide/magnesium hydroxide/simethicone Suspension 30 milliLiter(s) Oral every 4 hours PRN Dyspepsia  HYDROmorphone  Injectable 1 milliGRAM(s) IV Push every 4 hours PRN Severe Pain (7 - 10)  melatonin 3 milliGRAM(s) Oral at bedtime PRN Insomnia  ondansetron Injectable 4 milliGRAM(s) IV Push every 8 hours PRN Nausea and/or Vomiting        CAPILLARY BLOOD GLUCOSE        I&O's Summary    20 Aug 2022 07:01  -  21 Aug 2022 07:00  --------------------------------------------------------  IN: 2320 mL / OUT: 250 mL / NET: 2070 mL    21 Aug 2022 07:01  -  21 Aug 2022 14:23  --------------------------------------------------------  IN: 1180 mL / OUT: 400 mL / NET: 780 mL        Vital Signs Last 24 Hrs  T(C): 37.1 (21 Aug 2022 13:17), Max: 37.8 (21 Aug 2022 08:36)  T(F): 98.7 (21 Aug 2022 13:17), Max: 100 (21 Aug 2022 08:36)  HR: 85 (21 Aug 2022 13:17) (85 - 118)  BP: 134/83 (21 Aug 2022 13:17) (131/77 - 160/67)  BP(mean): --  RR: 18 (21 Aug 2022 13:17) (18 - 20)  SpO2: 95% (21 Aug 2022 13:17) (93% - 97%)    PHYSICAL EXAM:  GENERAL: in no apparent distress  EYES: EOMI, PERRLA,  NECK: Supple, No JVD  CHEST/LUNG: clear   HEART: S1 S2; no murmurs   ABDOMEN: Soft, less tenderness across epigastrium  EXTREMITIES:  no edema  NEUROLOGY: AO x 3 non-focal  SKIN: No rashes or lesions    LABS:                        14.1   12.98 )-----------( 144      ( 21 Aug 2022 07:42 )             41.9     08-21    135  |  102  |  9   ----------------------------<  130<H>  4.0   |  22  |  0.85    Ca    9.2      21 Aug 2022 07:41    TPro  5.8<L>  /  Alb  2.7<L>  /  TBili  7.6<H>  /  DBili  x   /  AST  64<H>  /  ALT  145<H>  /  AlkPhos  177<H>  08-21    PT/INR - ( 20 Aug 2022 06:25 )   PT: 17.0 sec;   INR: 1.46 ratio                     All consultant(s) notes reviewed and care discussed with other providers        Contact Number, Dr Araujo [phone]

## 2022-08-21 NOTE — PROGRESS NOTE ADULT - SUBJECTIVE AND OBJECTIVE BOX
Abdominal pain is much better   Tolerating PO liquids  /94   ( AF )  Lungs clear  Tachy irregular rhythm  No edema    WBC 12.98  Hgb 14.1  Hct 41.9  Plt 144K  BUN 9  Crt 0.85  Amylase 477  Lipase 872  SGOT 64  SGPT 145  Alk Phos 177  Bili 7.6    Imp:  Gallstone pancreatitis S/P ERCP and stent placement  Rapid AF  No CHF    Rec:  Start Metoprolol 50 mg PO bid   Note:  Patient normally takes 200 mg bid

## 2022-08-22 DIAGNOSIS — R78.81 BACTEREMIA: ICD-10-CM

## 2022-08-22 LAB
-  AMIKACIN: SIGNIFICANT CHANGE UP
-  AMPICILLIN/SULBACTAM: SIGNIFICANT CHANGE UP
-  AMPICILLIN: SIGNIFICANT CHANGE UP
-  AZTREONAM: SIGNIFICANT CHANGE UP
-  CEFAZOLIN: SIGNIFICANT CHANGE UP
-  CEFEPIME: SIGNIFICANT CHANGE UP
-  CEFOXITIN: SIGNIFICANT CHANGE UP
-  CEFTRIAXONE: SIGNIFICANT CHANGE UP
-  CIPROFLOXACIN: SIGNIFICANT CHANGE UP
-  ERTAPENEM: SIGNIFICANT CHANGE UP
-  GENTAMICIN: SIGNIFICANT CHANGE UP
-  IMIPENEM: SIGNIFICANT CHANGE UP
-  LEVOFLOXACIN: SIGNIFICANT CHANGE UP
-  MEROPENEM: SIGNIFICANT CHANGE UP
-  PIPERACILLIN/TAZOBACTAM: SIGNIFICANT CHANGE UP
-  TOBRAMYCIN: SIGNIFICANT CHANGE UP
-  TRIMETHOPRIM/SULFAMETHOXAZOLE: SIGNIFICANT CHANGE UP
ALBUMIN SERPL ELPH-MCNC: 2.4 G/DL — LOW (ref 3.3–5)
ALP SERPL-CCNC: 162 U/L — HIGH (ref 40–120)
ALT FLD-CCNC: 84 U/L — HIGH (ref 10–45)
AMYLASE P1 CFR SERPL: 97 U/L — SIGNIFICANT CHANGE UP (ref 25–125)
ANION GAP SERPL CALC-SCNC: 10 MMOL/L — SIGNIFICANT CHANGE UP (ref 5–17)
AST SERPL-CCNC: 28 U/L — SIGNIFICANT CHANGE UP (ref 10–40)
BILIRUB SERPL-MCNC: 7.1 MG/DL — HIGH (ref 0.2–1.2)
BUN SERPL-MCNC: 10 MG/DL — SIGNIFICANT CHANGE UP (ref 7–23)
CALCIUM SERPL-MCNC: 8.8 MG/DL — SIGNIFICANT CHANGE UP (ref 8.4–10.5)
CHLORIDE SERPL-SCNC: 101 MMOL/L — SIGNIFICANT CHANGE UP (ref 96–108)
CO2 SERPL-SCNC: 22 MMOL/L — SIGNIFICANT CHANGE UP (ref 22–31)
CREAT SERPL-MCNC: 0.8 MG/DL — SIGNIFICANT CHANGE UP (ref 0.5–1.3)
CULTURE RESULTS: SIGNIFICANT CHANGE UP
EGFR: 93 ML/MIN/1.73M2 — SIGNIFICANT CHANGE UP
GLUCOSE SERPL-MCNC: 150 MG/DL — HIGH (ref 70–99)
HCT VFR BLD CALC: 40 % — SIGNIFICANT CHANGE UP (ref 39–50)
HGB BLD-MCNC: 13.4 G/DL — SIGNIFICANT CHANGE UP (ref 13–17)
LIDOCAIN IGE QN: 135 U/L — HIGH (ref 7–60)
MCHC RBC-ENTMCNC: 28.6 PG — SIGNIFICANT CHANGE UP (ref 27–34)
MCHC RBC-ENTMCNC: 33.5 GM/DL — SIGNIFICANT CHANGE UP (ref 32–36)
MCV RBC AUTO: 85.5 FL — SIGNIFICANT CHANGE UP (ref 80–100)
METHOD TYPE: SIGNIFICANT CHANGE UP
NRBC # BLD: 0 /100 WBCS — SIGNIFICANT CHANGE UP (ref 0–0)
ORGANISM # SPEC MICROSCOPIC CNT: SIGNIFICANT CHANGE UP
PLATELET # BLD AUTO: 150 K/UL — SIGNIFICANT CHANGE UP (ref 150–400)
POTASSIUM SERPL-MCNC: 3.8 MMOL/L — SIGNIFICANT CHANGE UP (ref 3.5–5.3)
POTASSIUM SERPL-SCNC: 3.8 MMOL/L — SIGNIFICANT CHANGE UP (ref 3.5–5.3)
PROT SERPL-MCNC: 5.8 G/DL — LOW (ref 6–8.3)
RBC # BLD: 4.68 M/UL — SIGNIFICANT CHANGE UP (ref 4.2–5.8)
RBC # FLD: 14.3 % — SIGNIFICANT CHANGE UP (ref 10.3–14.5)
SODIUM SERPL-SCNC: 133 MMOL/L — LOW (ref 135–145)
SPECIMEN SOURCE: SIGNIFICANT CHANGE UP
WBC # BLD: 12.2 K/UL — HIGH (ref 3.8–10.5)
WBC # FLD AUTO: 12.2 K/UL — HIGH (ref 3.8–10.5)

## 2022-08-22 PROCEDURE — 99232 SBSQ HOSP IP/OBS MODERATE 35: CPT

## 2022-08-22 PROCEDURE — 93970 EXTREMITY STUDY: CPT | Mod: 26

## 2022-08-22 PROCEDURE — 99232 SBSQ HOSP IP/OBS MODERATE 35: CPT | Mod: GC

## 2022-08-22 RX ADMIN — Medication 50 MILLIGRAM(S): at 04:57

## 2022-08-22 RX ADMIN — HYDROMORPHONE HYDROCHLORIDE 1 MILLIGRAM(S): 2 INJECTION INTRAMUSCULAR; INTRAVENOUS; SUBCUTANEOUS at 15:31

## 2022-08-22 RX ADMIN — HEPARIN SODIUM 5000 UNIT(S): 5000 INJECTION INTRAVENOUS; SUBCUTANEOUS at 15:30

## 2022-08-22 RX ADMIN — HEPARIN SODIUM 5000 UNIT(S): 5000 INJECTION INTRAVENOUS; SUBCUTANEOUS at 21:30

## 2022-08-22 RX ADMIN — PIPERACILLIN AND TAZOBACTAM 25 GRAM(S): 4; .5 INJECTION, POWDER, LYOPHILIZED, FOR SOLUTION INTRAVENOUS at 13:21

## 2022-08-22 RX ADMIN — HEPARIN SODIUM 5000 UNIT(S): 5000 INJECTION INTRAVENOUS; SUBCUTANEOUS at 04:55

## 2022-08-22 RX ADMIN — Medication 100 MICROGRAM(S): at 04:56

## 2022-08-22 RX ADMIN — CHLORHEXIDINE GLUCONATE 1 APPLICATION(S): 213 SOLUTION TOPICAL at 12:03

## 2022-08-22 RX ADMIN — SODIUM CHLORIDE 125 MILLILITER(S): 9 INJECTION, SOLUTION INTRAVENOUS at 07:40

## 2022-08-22 RX ADMIN — HYDROMORPHONE HYDROCHLORIDE 1 MILLIGRAM(S): 2 INJECTION INTRAMUSCULAR; INTRAVENOUS; SUBCUTANEOUS at 22:35

## 2022-08-22 RX ADMIN — HYDROMORPHONE HYDROCHLORIDE 1 MILLIGRAM(S): 2 INJECTION INTRAMUSCULAR; INTRAVENOUS; SUBCUTANEOUS at 21:35

## 2022-08-22 RX ADMIN — Medication 50 MILLIGRAM(S): at 18:20

## 2022-08-22 RX ADMIN — HYDROMORPHONE HYDROCHLORIDE 1 MILLIGRAM(S): 2 INJECTION INTRAMUSCULAR; INTRAVENOUS; SUBCUTANEOUS at 16:17

## 2022-08-22 RX ADMIN — Medication 300 MILLIGRAM(S): at 12:01

## 2022-08-22 RX ADMIN — TAMSULOSIN HYDROCHLORIDE 0.4 MILLIGRAM(S): 0.4 CAPSULE ORAL at 21:29

## 2022-08-22 RX ADMIN — PIPERACILLIN AND TAZOBACTAM 25 GRAM(S): 4; .5 INJECTION, POWDER, LYOPHILIZED, FOR SOLUTION INTRAVENOUS at 21:30

## 2022-08-22 RX ADMIN — PIPERACILLIN AND TAZOBACTAM 25 GRAM(S): 4; .5 INJECTION, POWDER, LYOPHILIZED, FOR SOLUTION INTRAVENOUS at 04:56

## 2022-08-22 RX ADMIN — Medication 300 MILLIGRAM(S): at 21:29

## 2022-08-22 NOTE — PROGRESS NOTE ADULT - SUBJECTIVE AND OBJECTIVE BOX
Chief Complaint:  Patient is a 72y old  Male who presents with a chief complaint of abdominal pain x 5 days (22 Aug 2022 08:23)      Interval Events: s/p ERCP 8/19 for cholangitis with difficult cannulation - large stone in CBD with plastic stent placed  - course c/b PEP  - leukocytosis and transaminases improving however bili uptrending yesterday, labs pending today      Hospital Medications:  acetaminophen     Tablet .. 650 milliGRAM(s) Oral every 6 hours PRN  allopurinol 300 milliGRAM(s) Oral daily  aluminum hydroxide/magnesium hydroxide/simethicone Suspension 30 milliLiter(s) Oral every 4 hours PRN  chlorhexidine 2% Cloths 1 Application(s) Topical daily  heparin   Injectable 5000 Unit(s) SubCutaneous every 8 hours  HYDROmorphone  Injectable 1 milliGRAM(s) IV Push every 4 hours PRN  lactated ringers. 1000 milliLiter(s) IV Continuous <Continuous>  levothyroxine 100 MICROGram(s) Oral daily  melatonin 3 milliGRAM(s) Oral at bedtime PRN  metoprolol tartrate 50 milliGRAM(s) Oral two times a day  ondansetron Injectable 4 milliGRAM(s) IV Push every 8 hours PRN  piperacillin/tazobactam IVPB.. 3.375 Gram(s) IV Intermittent every 8 hours  tamsulosin 0.4 milliGRAM(s) Oral at bedtime  traZODone 300 milliGRAM(s) Oral at bedtime      PMHX/PSHX:  Dyslipidemia    Hypertension    Afib    Chronic Gout    Anxiety    History of Transient Ischemic Attack (TIA)    History of Obesity    Obstructive Sleep Apnea    Gout    Hypothyroid    BPH (benign prostatic hyperplasia)    History of Tonsillectomy            ROS:     General:  No weight loss, fevers, chills, night sweats, fatigue   Eyes:  No vision changes  ENT:  No sore throat, pain, runny nose  CV:  No chest pain, palpitations, dizziness   Resp:  No SOB, cough, wheezing  GI:  See HPI  :  No burning with urination, hematuria  Muscle:  No pain, weakness  Neuro:  No weakness/tingling, memory problems  Psych:  No fatigue, insomnia, mood problems, depression  Heme:  No easy bruisability  Skin:  No rash, edema      PHYSICAL EXAM:     GENERAL:  Well developed, no distress  HEENT:  NC/AT,  conjunctivae clear, sclera anicteric  CHEST:  Full & symmetric excursion, no increased effort w/ respirations  HEART:  Regular rhythm & rate  ABDOMEN:  Soft, non-tender, non-distended  EXTREMITIES:  no LE  edema  SKIN:  No rash/erythema/ecchymoses/petechiae/wounds/jaundice  NEURO:  Alert, oriented    Vital Signs:  Vital Signs Last 24 Hrs  T(C): 36.9 (22 Aug 2022 04:41), Max: 37.2 (21 Aug 2022 17:42)  T(F): 98.4 (22 Aug 2022 04:41), Max: 99 (21 Aug 2022 17:42)  HR: 92 (22 Aug 2022 04:41) (81 - 118)  BP: 152/97 (22 Aug 2022 04:41) (134/83 - 170/86)  BP(mean): --  RR: 19 (22 Aug 2022 04:41) (16 - 19)  SpO2: 98% (22 Aug 2022 04:41) (95% - 98%)    Parameters below as of 22 Aug 2022 04:41  Patient On (Oxygen Delivery Method): room air      Daily     Daily     LABS:                        14.1   12.98 )-----------( 144      ( 21 Aug 2022 07:42 )             41.9     08-21    135  |  102  |  9   ----------------------------<  130<H>  4.0   |  22  |  0.85    Ca    9.2      21 Aug 2022 07:41    TPro  5.8<L>  /  Alb  2.7<L>  /  TBili  7.6<H>  /  DBili  x   /  AST  64<H>  /  ALT  145<H>  /  AlkPhos  177<H>  08-21    LIVER FUNCTIONS - ( 21 Aug 2022 07:41 )  Alb: 2.7 g/dL / Pro: 5.8 g/dL / ALK PHOS: 177 U/L / ALT: 145 U/L / AST: 64 U/L / GGT: x                   Imaging:             Chief Complaint:  Patient is a 72y old  Male who presents with a chief complaint of abdominal pain x 5 days (22 Aug 2022 08:23)      Interval Events: s/p ERCP 8/19 for cholangitis with difficult cannulation - large stone in CBD with plastic stent placed  - course c/b PEP  - leukocytosis and transaminases improving however bili uptrending yesterday, labs pending today      Hospital Medications:  acetaminophen     Tablet .. 650 milliGRAM(s) Oral every 6 hours PRN  allopurinol 300 milliGRAM(s) Oral daily  aluminum hydroxide/magnesium hydroxide/simethicone Suspension 30 milliLiter(s) Oral every 4 hours PRN  chlorhexidine 2% Cloths 1 Application(s) Topical daily  heparin   Injectable 5000 Unit(s) SubCutaneous every 8 hours  HYDROmorphone  Injectable 1 milliGRAM(s) IV Push every 4 hours PRN  lactated ringers. 1000 milliLiter(s) IV Continuous <Continuous>  levothyroxine 100 MICROGram(s) Oral daily  melatonin 3 milliGRAM(s) Oral at bedtime PRN  metoprolol tartrate 50 milliGRAM(s) Oral two times a day  ondansetron Injectable 4 milliGRAM(s) IV Push every 8 hours PRN  piperacillin/tazobactam IVPB.. 3.375 Gram(s) IV Intermittent every 8 hours  tamsulosin 0.4 milliGRAM(s) Oral at bedtime  traZODone 300 milliGRAM(s) Oral at bedtime      PMHX/PSHX:  Dyslipidemia    Hypertension    Afib    Chronic Gout    Anxiety    History of Transient Ischemic Attack (TIA)    History of Obesity    Obstructive Sleep Apnea    Gout    Hypothyroid    BPH (benign prostatic hyperplasia)    History of Tonsillectomy            ROS:     General:  No weight loss, fevers, chills, night sweats, fatigue   Eyes:  No vision changes  ENT:  No sore throat, pain, runny nose  CV:  No chest pain, palpitations, dizziness   Resp:  No SOB, cough, wheezing  GI:  See HPI  :  No burning with urination, hematuria  Muscle:  No pain, weakness  Neuro:  No weakness/tingling, memory problems  Psych:  No fatigue, insomnia, mood problems, depression  Heme:  No easy bruisability  Skin:  No rash, edema      PHYSICAL EXAM:     GENERAL:  no distress, obese  HEENT:  NC/AT,  conjunctivae clear, sclera anicteric  CHEST:  Full & symmetric excursion, no increased effort w/ respirations  HEART:  Regular rhythm & rate  ABDOMEN:  Soft, non-tender, non-distended  EXTREMITIES:  no LE  edema  SKIN:  No rash/erythema/ecchymoses/petechiae/wounds/jaundice  NEURO:  Alert, oriented    Vital Signs:  Vital Signs Last 24 Hrs  T(C): 36.9 (22 Aug 2022 04:41), Max: 37.2 (21 Aug 2022 17:42)  T(F): 98.4 (22 Aug 2022 04:41), Max: 99 (21 Aug 2022 17:42)  HR: 92 (22 Aug 2022 04:41) (81 - 118)  BP: 152/97 (22 Aug 2022 04:41) (134/83 - 170/86)  BP(mean): --  RR: 19 (22 Aug 2022 04:41) (16 - 19)  SpO2: 98% (22 Aug 2022 04:41) (95% - 98%)    Parameters below as of 22 Aug 2022 04:41  Patient On (Oxygen Delivery Method): room air      Daily     Daily     LABS:                        14.1   12.98 )-----------( 144      ( 21 Aug 2022 07:42 )             41.9     08-21    135  |  102  |  9   ----------------------------<  130<H>  4.0   |  22  |  0.85    Ca    9.2      21 Aug 2022 07:41    TPro  5.8<L>  /  Alb  2.7<L>  /  TBili  7.6<H>  /  DBili  x   /  AST  64<H>  /  ALT  145<H>  /  AlkPhos  177<H>  08-21    LIVER FUNCTIONS - ( 21 Aug 2022 07:41 )  Alb: 2.7 g/dL / Pro: 5.8 g/dL / ALK PHOS: 177 U/L / ALT: 145 U/L / AST: 64 U/L / GGT: x                   Imaging:

## 2022-08-22 NOTE — PROGRESS NOTE ADULT - SUBJECTIVE AND OBJECTIVE BOX
JOSSUE KEVIN 72y [id number]    Patient is a 72y old  Male who presents with a chief complaint of abdominal pain x 5 days (21 Aug 2022 14:59)      Follow Up/CC:  ID following for    Interval History/ROS:    Allergies    metformin (Unknown)    Intolerances        ANTIMICROBIALS:  piperacillin/tazobactam IVPB.. 3.375 every 8 hours      MEDICATIONS  (STANDING):  allopurinol 300 milliGRAM(s) Oral daily  chlorhexidine 2% Cloths 1 Application(s) Topical daily  heparin   Injectable 5000 Unit(s) SubCutaneous every 8 hours  lactated ringers. 1000 milliLiter(s) (125 mL/Hr) IV Continuous <Continuous>  levothyroxine 100 MICROGram(s) Oral daily  metoprolol tartrate 50 milliGRAM(s) Oral two times a day  piperacillin/tazobactam IVPB.. 3.375 Gram(s) IV Intermittent every 8 hours  tamsulosin 0.4 milliGRAM(s) Oral at bedtime  traZODone 300 milliGRAM(s) Oral at bedtime    MEDICATIONS  (PRN):  acetaminophen     Tablet .. 650 milliGRAM(s) Oral every 6 hours PRN Temp greater or equal to 38C (100.4F), Mild Pain (1 - 3)  aluminum hydroxide/magnesium hydroxide/simethicone Suspension 30 milliLiter(s) Oral every 4 hours PRN Dyspepsia  HYDROmorphone  Injectable 1 milliGRAM(s) IV Push every 4 hours PRN Severe Pain (7 - 10)  melatonin 3 milliGRAM(s) Oral at bedtime PRN Insomnia  ondansetron Injectable 4 milliGRAM(s) IV Push every 8 hours PRN Nausea and/or Vomiting        Vital Signs Last 24 Hrs  T(C): 36.9 (22 Aug 2022 04:41), Max: 37.8 (21 Aug 2022 08:36)  T(F): 98.4 (22 Aug 2022 04:41), Max: 100 (21 Aug 2022 08:36)  HR: 92 (22 Aug 2022 04:41) (81 - 118)  BP: 152/97 (22 Aug 2022 04:41) (134/83 - 170/86)  BP(mean): --  RR: 19 (22 Aug 2022 04:41) (16 - 20)  SpO2: 98% (22 Aug 2022 04:41) (95% - 98%)    Parameters below as of 22 Aug 2022 04:41  Patient On (Oxygen Delivery Method): room air        CBC Full  -  ( 21 Aug 2022 07:42 )  WBC Count : 12.98 K/uL  RBC Count : 4.86 M/uL  Hemoglobin : 14.1 g/dL  Hematocrit : 41.9 %  Platelet Count - Automated : 144 K/uL  Mean Cell Volume : 86.2 fl  Mean Cell Hemoglobin : 29.0 pg  Mean Cell Hemoglobin Concentration : 33.7 gm/dL  Auto Neutrophil # : x  Auto Lymphocyte # : x  Auto Monocyte # : x  Auto Eosinophil # : x  Auto Basophil # : x  Auto Neutrophil % : x  Auto Lymphocyte % : x  Auto Monocyte % : x  Auto Eosinophil % : x  Auto Basophil % : x    08-21    135  |  102  |  9   ----------------------------<  130<H>  4.0   |  22  |  0.85    Ca    9.2      21 Aug 2022 07:41    TPro  5.8<L>  /  Alb  2.7<L>  /  TBili  7.6<H>  /  DBili  x   /  AST  64<H>  /  ALT  145<H>  /  AlkPhos  177<H>  08-21    LIVER FUNCTIONS - ( 21 Aug 2022 07:41 )  Alb: 2.7 g/dL / Pro: 5.8 g/dL / ALK PHOS: 177 U/L / ALT: 145 U/L / AST: 64 U/L / GGT: x               MICROBIOLOGY:  Clean Catch Clean Catch (Midstream)  08-18-22   <10,000 CFU/mL Normal Urogenital Ira  --  --      .Blood Blood-Peripheral  08-18-22   No growth to date.  --  --      .Blood Blood-Peripheral  08-18-22   Growth in anaerobic bottle: Escherichia coli  ***Blood Panel PCR results on this specimen are available  approximately 3 hours after the Gram stain result.***  Gram stain, PCR, and/or culture results may not always  correspond due to difference in methodologies.  ************************************************************  This PCR assay was performed by multiplex PCR. This  Assay tests for 66 bacterial and resistance gene targets.  Please refer to the [facility] test directory  at [email or web address] for details.  --  Blood Culture PCR              v    Rapid RVP Result: Juanis (08-18 @ 19:40)          RADIOLOGY     JOSSUE KEVIN 72y [id number]    Patient is a 72y old  Male who presents with a chief complaint of abdominal pain x 5 days (21 Aug 2022 14:59)      Follow Up/CC:  ID following for bacteremia    Interval History/ROS: no fever    Allergies    metformin (Unknown)    Intolerances        ANTIMICROBIALS:  piperacillin/tazobactam IVPB.. 3.375 every 8 hours      MEDICATIONS  (STANDING):  allopurinol 300 milliGRAM(s) Oral daily  chlorhexidine 2% Cloths 1 Application(s) Topical daily  heparin   Injectable 5000 Unit(s) SubCutaneous every 8 hours  lactated ringers. 1000 milliLiter(s) (125 mL/Hr) IV Continuous <Continuous>  levothyroxine 100 MICROGram(s) Oral daily  metoprolol tartrate 50 milliGRAM(s) Oral two times a day  piperacillin/tazobactam IVPB.. 3.375 Gram(s) IV Intermittent every 8 hours  tamsulosin 0.4 milliGRAM(s) Oral at bedtime  traZODone 300 milliGRAM(s) Oral at bedtime    MEDICATIONS  (PRN):  acetaminophen     Tablet .. 650 milliGRAM(s) Oral every 6 hours PRN Temp greater or equal to 38C (100.4F), Mild Pain (1 - 3)  aluminum hydroxide/magnesium hydroxide/simethicone Suspension 30 milliLiter(s) Oral every 4 hours PRN Dyspepsia  HYDROmorphone  Injectable 1 milliGRAM(s) IV Push every 4 hours PRN Severe Pain (7 - 10)  melatonin 3 milliGRAM(s) Oral at bedtime PRN Insomnia  ondansetron Injectable 4 milliGRAM(s) IV Push every 8 hours PRN Nausea and/or Vomiting        Vital Signs Last 24 Hrs  T(C): 36.9 (22 Aug 2022 04:41), Max: 37.8 (21 Aug 2022 08:36)  T(F): 98.4 (22 Aug 2022 04:41), Max: 100 (21 Aug 2022 08:36)  HR: 92 (22 Aug 2022 04:41) (81 - 118)  BP: 152/97 (22 Aug 2022 04:41) (134/83 - 170/86)  BP(mean): --  RR: 19 (22 Aug 2022 04:41) (16 - 20)  SpO2: 98% (22 Aug 2022 04:41) (95% - 98%)    Parameters below as of 22 Aug 2022 04:41  Patient On (Oxygen Delivery Method): room air        CBC Full  -  ( 21 Aug 2022 07:42 )  WBC Count : 12.98 K/uL  RBC Count : 4.86 M/uL  Hemoglobin : 14.1 g/dL  Hematocrit : 41.9 %  Platelet Count - Automated : 144 K/uL  Mean Cell Volume : 86.2 fl  Mean Cell Hemoglobin : 29.0 pg  Mean Cell Hemoglobin Concentration : 33.7 gm/dL  Auto Neutrophil # : x  Auto Lymphocyte # : x  Auto Monocyte # : x  Auto Eosinophil # : x  Auto Basophil # : x  Auto Neutrophil % : x  Auto Lymphocyte % : x  Auto Monocyte % : x  Auto Eosinophil % : x  Auto Basophil % : x    08-21    135  |  102  |  9   ----------------------------<  130<H>  4.0   |  22  |  0.85    Ca    9.2      21 Aug 2022 07:41    TPro  5.8<L>  /  Alb  2.7<L>  /  TBili  7.6<H>  /  DBili  x   /  AST  64<H>  /  ALT  145<H>  /  AlkPhos  177<H>  08-21    LIVER FUNCTIONS - ( 21 Aug 2022 07:41 )  Alb: 2.7 g/dL / Pro: 5.8 g/dL / ALK PHOS: 177 U/L / ALT: 145 U/L / AST: 64 U/L / GGT: x               MICROBIOLOGY:  Clean Catch Clean Catch (Midstream)  08-18-22   <10,000 CFU/mL Normal Urogenital Ira  --  --      .Blood Blood-Peripheral  08-18-22   No growth to date.  --  --      .Blood Blood-Peripheral  08-18-22   Growth in anaerobic bottle: Escherichia coli  ***Blood Panel PCR results on this specimen are available  approximately 3 hours after the Gram stain result.***  Gram stain, PCR, and/or culture results may not always  correspond due to difference in methodologies.  ************************************************************  This PCR assay was performed by multiplex PCR. This  Assay tests for 66 bacterial and resistance gene targets.  Please refer to the [facility] test directory  at [email or web address] for details.  --  Blood Culture PCR          Rapid RVP Result: NotDetec (08-18 @ 19:40)          RADIOLOGY    < from: [facility] Duplex Lower Ext Vein Scan, Bilat (08.22.22 @ 11:29) >  No evidence of deep venous thrombosis in either lower extremity.    < end of copied text >  < from: CT Abdomen and Pelvis w/ IV Cont (08.18.22 @ 23:57) >  Cholelithiasis.    < end of copied text >

## 2022-08-22 NOTE — PROGRESS NOTE ADULT - SUBJECTIVE AND OBJECTIVE BOX
SUBJECTIVE: Resting comfortably in bed.  Still appears jaundice  	  MEDICATIONS:  metoprolol tartrate 50 milliGRAM(s) Oral two times a day  tamsulosin 0.4 milliGRAM(s) Oral at bedtime    piperacillin/tazobactam IVPB.. 3.375 Gram(s) IV Intermittent every 8 hours      acetaminophen     Tablet .. 650 milliGRAM(s) Oral every 6 hours PRN  HYDROmorphone  Injectable 1 milliGRAM(s) IV Push every 4 hours PRN  melatonin 3 milliGRAM(s) Oral at bedtime PRN  ondansetron Injectable 4 milliGRAM(s) IV Push every 8 hours PRN  traZODone 300 milliGRAM(s) Oral at bedtime    aluminum hydroxide/magnesium hydroxide/simethicone Suspension 30 milliLiter(s) Oral every 4 hours PRN    allopurinol 300 milliGRAM(s) Oral daily  levothyroxine 100 MICROGram(s) Oral daily    chlorhexidine 2% Cloths 1 Application(s) Topical daily  heparin   Injectable 5000 Unit(s) SubCutaneous every 8 hours      REVIEW OF SYSTEMS:    CONSTITUTIONAL: No fever, weight loss, or fatigue  EYES: No eye pain, visual disturbances, or discharge  NECK: No pain or stiffness  RESPIRATORY: No cough, wheezing, chills or hemoptysis; No Shortness of Breath  CARDIOVASCULAR: No chest pain, palpitations, dizziness, or leg swelling  GASTROINTESTINAL: No abdominal or epigastric pain. No nausea, vomiting, or hematemesis; No diarrhea or constipation. No melena or hematochezia.  GENITOURINARY: No dysuria, frequency, hematuria, or incontinence  NEUROLOGICAL: No headaches, memory loss, loss of strength, numbness, or tremors  SKIN: No itching, burning, rashes, or lesions   LYMPH Nodes: No enlarged glands  MUSCULOSKELETAL: No joint pain or swelling; No muscle, back, or extremity pain  All other review of systems are negative.  	  [ ] Unable to obtain    PHYSICAL EXAM:  T(C): 36.9 (08-22-22 @ 09:37), Max: 37.2 (08-21-22 @ 17:42)  HR: 81 (08-22-22 @ 09:37) (81 - 102)  BP: 127/84 (08-22-22 @ 09:37) (127/84 - 170/86)  RR: 18 (08-22-22 @ 09:37) (16 - 19)  SpO2: 97% (08-22-22 @ 09:37) (95% - 98%)  Wt(kg): --  I&O's Summary    21 Aug 2022 07:01  -  22 Aug 2022 07:00  --------------------------------------------------------  IN: 4620 mL / OUT: 850 mL / NET: 3770 mL          PHYSICAL EXAM    Appearance: Normal	  HEENT:   Normal oral mucosa, PERRL, EOMI	  NECK: Soft and supple, No LAD, No JVD  Cardiovascular: Irregular Rate and Rhythm, Normal S1 S2, No murmurs, No clicks, gallops or rubs  Respiratory: Lungs clear to auscultation	  Gastrointestinal:  Soft, Non-tender, + BS	  Skin: No rashes, No ecchymoses, No cyanosis  Neurologic: Non-focal  Extremities: No clubbing, cyanosis or edema  Vascular: Peripheral pulses palpable 2+ bilaterally    TELEMETRY: 	    ECG:  	  RADIOLOGY  DIAGNOSTIC TESTING:  [ ] Echocardiogram:  [ ] Catheterization:  [ ] Stress Test:    OTHER: 	    LABS:	 	                            13.4   12.20 )-----------( 150      ( 22 Aug 2022 10:26 )             40.0     08-21    135  |  102  |  9   ----------------------------<  130<H>  4.0   |  22  |  0.85    Ca    9.2      21 Aug 2022 07:41    TPro  5.8<L>  /  Alb  2.7<L>  /  TBili  7.6<H>  /  DBili  x   /  AST  64<H>  /  ALT  145<H>  /  AlkPhos  177<H>  08-21

## 2022-08-22 NOTE — PROGRESS NOTE ADULT - PROBLEM SELECTOR PLAN 4
likely secondary to sepsis and maybe antibiotics  today's pending   will continue to monitor   HIT panel negative   bilateral venous duplex to r/o DVT   continue heparin SQ

## 2022-08-22 NOTE — PROGRESS NOTE ADULT - ATTENDING COMMENTS
Feeling better to date. Epigastric pain decreased. No nausea or vomiting. Continue IV hydration and conjunction with analgesics as needed. Repeat blood culture with positive finding from 8/18. Continue current antibiotic regimen. Slowly advance diet as tolerated.
Patient seen/examined. Family at bedside. Continues to experience epigastric pain consistent with post ERCP pancreatitis. Recommendations as noted-IV hydration in conjunction with continued antibiotic regimen. Liquid diet for now. Analgesics p.r.n.
Pt is feeling better with improved abdominal pain.  Reviewed ERCP findings and plan going forward.  Surgical follow-up for interval cholecystectomy following which repeat ERCP w/ stent and stone removal should occur. Advance diet as tolerated.

## 2022-08-22 NOTE — PROGRESS NOTE ADULT - SUBJECTIVE AND OBJECTIVE BOX
Patient is a 72y old  Male who presents with a chief complaint of abdominal pain x 5 days (22 Aug 2022 08:35)      DATE OF SERVICE: 08-22-22 @ 10:14    SUBJECTIVE / OVERNIGHT EVENTS: overnight events noted  "I feel much better"     ROS:  Resp: No cough no sputum production  CVS: No chest pain no palpitations no orthopnea  GI: no N/V/D  : no dysuria, no hematuria  Neuro: no weakness no paresthesias          MEDICATIONS  (STANDING):  allopurinol 300 milliGRAM(s) Oral daily  chlorhexidine 2% Cloths 1 Application(s) Topical daily  heparin   Injectable 5000 Unit(s) SubCutaneous every 8 hours  levothyroxine 100 MICROGram(s) Oral daily  metoprolol tartrate 50 milliGRAM(s) Oral two times a day  piperacillin/tazobactam IVPB.. 3.375 Gram(s) IV Intermittent every 8 hours  tamsulosin 0.4 milliGRAM(s) Oral at bedtime  traZODone 300 milliGRAM(s) Oral at bedtime    MEDICATIONS  (PRN):  acetaminophen     Tablet .. 650 milliGRAM(s) Oral every 6 hours PRN Temp greater or equal to 38C (100.4F), Mild Pain (1 - 3)  aluminum hydroxide/magnesium hydroxide/simethicone Suspension 30 milliLiter(s) Oral every 4 hours PRN Dyspepsia  HYDROmorphone  Injectable 1 milliGRAM(s) IV Push every 4 hours PRN Severe Pain (7 - 10)  melatonin 3 milliGRAM(s) Oral at bedtime PRN Insomnia  ondansetron Injectable 4 milliGRAM(s) IV Push every 8 hours PRN Nausea and/or Vomiting        CAPILLARY BLOOD GLUCOSE        I&O's Summary    21 Aug 2022 07:01  -  22 Aug 2022 07:00  --------------------------------------------------------  IN: 4620 mL / OUT: 850 mL / NET: 3770 mL        Vital Signs Last 24 Hrs  T(C): 36.9 (22 Aug 2022 09:37), Max: 37.2 (21 Aug 2022 17:42)  T(F): 98.4 (22 Aug 2022 09:37), Max: 99 (21 Aug 2022 17:42)  HR: 81 (22 Aug 2022 09:37) (81 - 102)  BP: 127/84 (22 Aug 2022 09:37) (127/84 - 170/86)  BP(mean): --  RR: 18 (22 Aug 2022 09:37) (16 - 19)  SpO2: 97% (22 Aug 2022 09:37) (95% - 98%)    PHYSICAL EXAM:  GENERAL: in no apparent distress  EYES: EOMI, PERRLA,  NECK: Supple, No JVD  CHEST/LUNG: clear   HEART: S1 S2; no murmurs   ABDOMEN: Soft, nontender  EXTREMITIES:  no edema  NEUROLOGY: AO x 3 non-focal  SKIN: No rashes or lesions    LABS:                        14.1   12.98 )-----------( 144      ( 21 Aug 2022 07:42 )             41.9     08-21    135  |  102  |  9   ----------------------------<  130<H>  4.0   |  22  |  0.85    Ca    9.2      21 Aug 2022 07:41    TPro  5.8<L>  /  Alb  2.7<L>  /  TBili  7.6<H>  /  DBili  x   /  AST  64<H>  /  ALT  145<H>  /  AlkPhos  177<H>  08-21                All consultant(s) notes reviewed and care discussed with other providers        Contact Number, Dr Araujo [phone]

## 2022-08-23 ENCOUNTER — NON-APPOINTMENT (OUTPATIENT)
Age: 72
End: 2022-08-23

## 2022-08-23 LAB
ALBUMIN SERPL ELPH-MCNC: 2.7 G/DL — LOW (ref 3.3–5)
ALP SERPL-CCNC: 152 U/L — HIGH (ref 40–120)
ALT FLD-CCNC: 61 U/L — HIGH (ref 10–45)
ANION GAP SERPL CALC-SCNC: 11 MMOL/L — SIGNIFICANT CHANGE UP (ref 5–17)
AST SERPL-CCNC: 22 U/L — SIGNIFICANT CHANGE UP (ref 10–40)
BILIRUB SERPL-MCNC: 6.3 MG/DL — HIGH (ref 0.2–1.2)
BUN SERPL-MCNC: 9 MG/DL — SIGNIFICANT CHANGE UP (ref 7–23)
CALCIUM SERPL-MCNC: 9.2 MG/DL — SIGNIFICANT CHANGE UP (ref 8.4–10.5)
CHLORIDE SERPL-SCNC: 99 MMOL/L — SIGNIFICANT CHANGE UP (ref 96–108)
CO2 SERPL-SCNC: 23 MMOL/L — SIGNIFICANT CHANGE UP (ref 22–31)
CREAT SERPL-MCNC: 0.78 MG/DL — SIGNIFICANT CHANGE UP (ref 0.5–1.3)
EGFR: 94 ML/MIN/1.73M2 — SIGNIFICANT CHANGE UP
GLUCOSE SERPL-MCNC: 121 MG/DL — HIGH (ref 70–99)
HCT VFR BLD CALC: 39.9 % — SIGNIFICANT CHANGE UP (ref 39–50)
HGB BLD-MCNC: 13.6 G/DL — SIGNIFICANT CHANGE UP (ref 13–17)
LIDOCAIN IGE QN: 51 U/L — SIGNIFICANT CHANGE UP (ref 7–60)
MCHC RBC-ENTMCNC: 29.1 PG — SIGNIFICANT CHANGE UP (ref 27–34)
MCHC RBC-ENTMCNC: 34.1 GM/DL — SIGNIFICANT CHANGE UP (ref 32–36)
MCV RBC AUTO: 85.3 FL — SIGNIFICANT CHANGE UP (ref 80–100)
NRBC # BLD: 0 /100 WBCS — SIGNIFICANT CHANGE UP (ref 0–0)
PLATELET # BLD AUTO: 167 K/UL — SIGNIFICANT CHANGE UP (ref 150–400)
POTASSIUM SERPL-MCNC: 3.9 MMOL/L — SIGNIFICANT CHANGE UP (ref 3.5–5.3)
POTASSIUM SERPL-SCNC: 3.9 MMOL/L — SIGNIFICANT CHANGE UP (ref 3.5–5.3)
PROT SERPL-MCNC: 6.1 G/DL — SIGNIFICANT CHANGE UP (ref 6–8.3)
RBC # BLD: 4.68 M/UL — SIGNIFICANT CHANGE UP (ref 4.2–5.8)
RBC # FLD: 14 % — SIGNIFICANT CHANGE UP (ref 10.3–14.5)
SODIUM SERPL-SCNC: 133 MMOL/L — LOW (ref 135–145)
WBC # BLD: 11.08 K/UL — HIGH (ref 3.8–10.5)
WBC # FLD AUTO: 11.08 K/UL — HIGH (ref 3.8–10.5)

## 2022-08-23 PROCEDURE — 99232 SBSQ HOSP IP/OBS MODERATE 35: CPT

## 2022-08-23 RX ORDER — WARFARIN SODIUM 2.5 MG/1
15 TABLET ORAL ONCE
Refills: 0 | Status: COMPLETED | OUTPATIENT
Start: 2022-08-23 | End: 2022-08-23

## 2022-08-23 RX ADMIN — Medication 100 MICROGRAM(S): at 05:04

## 2022-08-23 RX ADMIN — Medication 50 MILLIGRAM(S): at 05:04

## 2022-08-23 RX ADMIN — PIPERACILLIN AND TAZOBACTAM 25 GRAM(S): 4; .5 INJECTION, POWDER, LYOPHILIZED, FOR SOLUTION INTRAVENOUS at 05:05

## 2022-08-23 RX ADMIN — Medication 300 MILLIGRAM(S): at 21:28

## 2022-08-23 RX ADMIN — HEPARIN SODIUM 5000 UNIT(S): 5000 INJECTION INTRAVENOUS; SUBCUTANEOUS at 14:52

## 2022-08-23 RX ADMIN — HEPARIN SODIUM 5000 UNIT(S): 5000 INJECTION INTRAVENOUS; SUBCUTANEOUS at 05:04

## 2022-08-23 RX ADMIN — CHLORHEXIDINE GLUCONATE 1 APPLICATION(S): 213 SOLUTION TOPICAL at 12:48

## 2022-08-23 RX ADMIN — TAMSULOSIN HYDROCHLORIDE 0.4 MILLIGRAM(S): 0.4 CAPSULE ORAL at 21:28

## 2022-08-23 RX ADMIN — HYDROMORPHONE HYDROCHLORIDE 1 MILLIGRAM(S): 2 INJECTION INTRAMUSCULAR; INTRAVENOUS; SUBCUTANEOUS at 21:28

## 2022-08-23 RX ADMIN — WARFARIN SODIUM 15 MILLIGRAM(S): 2.5 TABLET ORAL at 17:20

## 2022-08-23 RX ADMIN — Medication 50 MILLIGRAM(S): at 17:20

## 2022-08-23 RX ADMIN — HEPARIN SODIUM 5000 UNIT(S): 5000 INJECTION INTRAVENOUS; SUBCUTANEOUS at 21:29

## 2022-08-23 RX ADMIN — Medication 300 MILLIGRAM(S): at 12:47

## 2022-08-23 RX ADMIN — Medication 1 TABLET(S): at 17:21

## 2022-08-23 RX ADMIN — HYDROMORPHONE HYDROCHLORIDE 1 MILLIGRAM(S): 2 INJECTION INTRAMUSCULAR; INTRAVENOUS; SUBCUTANEOUS at 21:58

## 2022-08-23 NOTE — PROGRESS NOTE ADULT - SUBJECTIVE AND OBJECTIVE BOX
Patient is a 72y old  Male who presents with a chief complaint of abdominal pain x 5 days (23 Aug 2022 10:21)      DATE OF SERVICE: 08-23-22 @ 16:39    SUBJECTIVE / OVERNIGHT EVENTS: overnight events noted  "I feel much better"     ROS:  Resp: No cough no sputum production  CVS: No chest pain no palpitations no orthopnea  GI: no N/V/D  : no dysuria, no hematuria        MEDICATIONS  (STANDING):  allopurinol 300 milliGRAM(s) Oral daily  amoxicillin  875 milliGRAM(s)/clavulanate 1 Tablet(s) Oral two times a day  chlorhexidine 2% Cloths 1 Application(s) Topical daily  heparin   Injectable 5000 Unit(s) SubCutaneous every 8 hours  levothyroxine 100 MICROGram(s) Oral daily  metoprolol tartrate 50 milliGRAM(s) Oral two times a day  tamsulosin 0.4 milliGRAM(s) Oral at bedtime  traZODone 300 milliGRAM(s) Oral at bedtime    MEDICATIONS  (PRN):  acetaminophen     Tablet .. 650 milliGRAM(s) Oral every 6 hours PRN Temp greater or equal to 38C (100.4F), Mild Pain (1 - 3)  aluminum hydroxide/magnesium hydroxide/simethicone Suspension 30 milliLiter(s) Oral every 4 hours PRN Dyspepsia  HYDROmorphone  Injectable 1 milliGRAM(s) IV Push every 4 hours PRN Severe Pain (7 - 10)  melatonin 3 milliGRAM(s) Oral at bedtime PRN Insomnia  ondansetron Injectable 4 milliGRAM(s) IV Push every 8 hours PRN Nausea and/or Vomiting        CAPILLARY BLOOD GLUCOSE        I&O's Summary    22 Aug 2022 07:01  -  23 Aug 2022 07:00  --------------------------------------------------------  IN: 2020 mL / OUT: 350 mL / NET: 1670 mL    23 Aug 2022 07:01  -  23 Aug 2022 16:39  --------------------------------------------------------  IN: 180 mL / OUT: 350 mL / NET: -170 mL        Vital Signs Last 24 Hrs  T(C): 36.8 (23 Aug 2022 12:47), Max: 37.4 (22 Aug 2022 17:53)  T(F): 98.3 (23 Aug 2022 12:47), Max: 99.4 (22 Aug 2022 17:53)  HR: 94 (23 Aug 2022 12:47) (63 - 94)  BP: 141/95 (23 Aug 2022 12:47) (118/91 - 149/95)  BP(mean): --  RR: 18 (23 Aug 2022 12:47) (18 - 18)  SpO2: 96% (23 Aug 2022 12:47) (95% - 98%)    PHYSICAL EXAM:  EYES: EOMI, PERRLA,  NECK: Supple, No JVD  CHEST/LUNG: clear   HEART: S1 S2; no murmurs   ABDOMEN: Soft, nontender  EXTREMITIES:  no edema  NEUROLOGY: AO x 3 non-focal  SKIN: No rashes or lesions    LABS:                        13.6   11.08 )-----------( 167      ( 23 Aug 2022 09:28 )             39.9     08-23    133<L>  |  99  |  9   ----------------------------<  121<H>  3.9   |  23  |  0.78    Ca    9.2      23 Aug 2022 09:29    TPro  6.1  /  Alb  2.7<L>  /  TBili  6.3<H>  /  DBili  x   /  AST  22  /  ALT  61<H>  /  AlkPhos  152<H>  08-23                All consultant(s) notes reviewed and care discussed with other providers        Contact Number, Dr Araujo [phone]

## 2022-08-23 NOTE — PROGRESS NOTE ADULT - SUBJECTIVE AND OBJECTIVE BOX
SUBJECTIVE: Asymptomatic in bed.  	  MEDICATIONS:  metoprolol tartrate 50 milliGRAM(s) Oral two times a day  tamsulosin 0.4 milliGRAM(s) Oral at bedtime  amoxicillin  875 milliGRAM(s)/clavulanate 1 Tablet(s) Oral two times a day  acetaminophen     Tablet .. 650 milliGRAM(s) Oral every 6 hours PRN  HYDROmorphone  Injectable 1 milliGRAM(s) IV Push every 4 hours PRN  melatonin 3 milliGRAM(s) Oral at bedtime PRN  ondansetron Injectable 4 milliGRAM(s) IV Push every 8 hours PRN  traZODone 300 milliGRAM(s) Oral at bedtime  aluminum hydroxide/magnesium hydroxide/simethicone Suspension 30 milliLiter(s) Oral every 4 hours PRN  allopurinol 300 milliGRAM(s) Oral daily  levothyroxine 100 MICROGram(s) Oral daily  chlorhexidine 2% Cloths 1 Application(s) Topical daily  heparin   Injectable 5000 Unit(s) SubCutaneous every 8 hours      REVIEW OF SYSTEMS:    CONSTITUTIONAL: No fever, weight loss, or fatigue  EYES: No eye pain, visual disturbances, or discharge  NECK: No pain or stiffness  RESPIRATORY: No cough, wheezing, chills or hemoptysis; No Shortness of Breath  CARDIOVASCULAR: No chest pain, palpitations, dizziness, or leg swelling  GASTROINTESTINAL: No abdominal or epigastric pain. No nausea, vomiting, or hematemesis; No diarrhea or constipation. No melena or hematochezia.  GENITOURINARY: No dysuria, frequency, hematuria, or incontinence  NEUROLOGICAL: No headaches, memory loss, loss of strength, numbness, or tremors  SKIN: No itching, burning, rashes, or lesions   LYMPH Nodes: No enlarged glands  MUSCULOSKELETAL: No joint pain or swelling; No muscle, back, or extremity pain  All other review of systems are negative.  	    PHYSICAL EXAM:  T(C): 36.8 (08-23-22 @ 08:39), Max: 37.4 (08-22-22 @ 17:53)  HR: 63 (08-23-22 @ 08:39) (63 - 91)  BP: 126/89 (08-23-22 @ 08:39) (118/91 - 149/95)  RR: 18 (08-23-22 @ 08:39) (18 - 18)  SpO2: 97% (08-23-22 @ 08:39) (95% - 98%)  Wt(kg): --  I&O's Summary    22 Aug 2022 07:01  -  23 Aug 2022 07:00  --------------------------------------------------------  IN: 2020 mL / OUT: 350 mL / NET: 1670 mL    23 Aug 2022 07:01  -  23 Aug 2022 11:48  --------------------------------------------------------  IN: 0 mL / OUT: 350 mL / NET: -350 mL          PHYSICAL EXAM    Appearance: Normal	  HEENT:   Normal oral mucosa, PERRL, EOMI	  NECK: Soft and supple, No LAD, No JVD  Cardiovascular: Regular Rate and Rhythm, Normal S1 S2, No murmurs, No clicks, gallops or rubs  Respiratory: Lungs clear to auscultation	  Extremities: No clubbing, cyanosis or edema      LABS:	 	               13.6   11.08 )-----------( 167      ( 23 Aug 2022 09:28 )             39.9     08-23    133<L>  |  99  |  9   ----------------------------<  121<H>  3.9   |  23  |  0.78    Ca    9.2      23 Aug 2022 09:29    TPro  6.1  /  Alb  2.7<L>  /  TBili  6.3<H>  /  DBili  x   /  AST  22  /  ALT  61<H>  /  AlkPhos  152<H>  08-23

## 2022-08-23 NOTE — PROGRESS NOTE ADULT - SUBJECTIVE AND OBJECTIVE BOX
JOSSUE KEVIN 72y [id number]    Patient is a 72y old  Male who presents with a chief complaint of abdominal pain x 5 days (22 Aug 2022 10:48)      Follow Up/CC:  ID following for    Interval History/ROS:    Allergies    metformin (Unknown)    Intolerances        ANTIMICROBIALS:  amoxicillin  875 milliGRAM(s)/clavulanate 1 two times a day      MEDICATIONS  (STANDING):  allopurinol 300 milliGRAM(s) Oral daily  amoxicillin  875 milliGRAM(s)/clavulanate 1 Tablet(s) Oral two times a day  chlorhexidine 2% Cloths 1 Application(s) Topical daily  heparin   Injectable 5000 Unit(s) SubCutaneous every 8 hours  levothyroxine 100 MICROGram(s) Oral daily  metoprolol tartrate 50 milliGRAM(s) Oral two times a day  tamsulosin 0.4 milliGRAM(s) Oral at bedtime  traZODone 300 milliGRAM(s) Oral at bedtime    MEDICATIONS  (PRN):  acetaminophen     Tablet .. 650 milliGRAM(s) Oral every 6 hours PRN Temp greater or equal to 38C (100.4F), Mild Pain (1 - 3)  aluminum hydroxide/magnesium hydroxide/simethicone Suspension 30 milliLiter(s) Oral every 4 hours PRN Dyspepsia  HYDROmorphone  Injectable 1 milliGRAM(s) IV Push every 4 hours PRN Severe Pain (7 - 10)  melatonin 3 milliGRAM(s) Oral at bedtime PRN Insomnia  ondansetron Injectable 4 milliGRAM(s) IV Push every 8 hours PRN Nausea and/or Vomiting        Vital Signs Last 24 Hrs  T(C): 36.8 (23 Aug 2022 08:39), Max: 37.4 (22 Aug 2022 17:53)  T(F): 98.3 (23 Aug 2022 08:39), Max: 99.4 (22 Aug 2022 17:53)  HR: 63 (23 Aug 2022 08:39) (63 - 91)  BP: 126/89 (23 Aug 2022 08:39) (118/91 - 149/95)  BP(mean): --  RR: 18 (23 Aug 2022 08:39) (18 - 18)  SpO2: 97% (23 Aug 2022 08:39) (95% - 98%)    Parameters below as of 23 Aug 2022 08:39  Patient On (Oxygen Delivery Method): room air        CBC Full  -  ( 23 Aug 2022 09:28 )  WBC Count : 11.08 K/uL  RBC Count : 4.68 M/uL  Hemoglobin : 13.6 g/dL  Hematocrit : 39.9 %  Platelet Count - Automated : 167 K/uL  Mean Cell Volume : 85.3 fl  Mean Cell Hemoglobin : 29.1 pg  Mean Cell Hemoglobin Concentration : 34.1 gm/dL  Auto Neutrophil # : x  Auto Lymphocyte # : x  Auto Monocyte # : x  Auto Eosinophil # : x  Auto Basophil # : x  Auto Neutrophil % : x  Auto Lymphocyte % : x  Auto Monocyte % : x  Auto Eosinophil % : x  Auto Basophil % : x    08-23    133<L>  |  99  |  9   ----------------------------<  121<H>  3.9   |  23  |  0.78    Ca    9.2      23 Aug 2022 09:29    TPro  6.1  /  Alb  2.7<L>  /  TBili  6.3<H>  /  DBili  x   /  AST  22  /  ALT  61<H>  /  AlkPhos  152<H>  08-23    LIVER FUNCTIONS - ( 23 Aug 2022 09:29 )  Alb: 2.7 g/dL / Pro: 6.1 g/dL / ALK PHOS: 152 U/L / ALT: 61 U/L / AST: 22 U/L / GGT: x               MICROBIOLOGY:  Clean Catch Clean Catch (Midstream)  08-18-22   <10,000 CFU/mL Normal Urogenital Ira  --  --      .Blood Blood-Peripheral  08-18-22   No growth to date.  --  --      .Blood Blood-Peripheral  08-18-22   Growth in anaerobic bottle: Escherichia coli  ***Blood Panel PCR results on this specimen are available  approximately 3 hours after the Gram stain result.***  Gram stain, PCR, and/or culture results may not always  correspond due to difference in methodologies.  ************************************************************  This PCR assay was performed by multiplex PCR. This  Assay tests for 66 bacterial and resistance gene targets.  Please refer to the [facility] test directory  at [email or web address] for details.  --  Blood Culture PCR  Escherichia coli              v    Rapid RVP Result: Juanis (08-18 @ 19:40)          RADIOLOGY     JOSSUE KEVIN 72y [id number]    Patient is a 72y old  Male who presents with a chief complaint of abdominal pain x 5 days (22 Aug 2022 10:48)      Follow Up/CC:  ID following for bacteremia    Interval History/ROS: no fever, feels better    Allergies    metformin (Unknown)    Intolerances        ANTIMICROBIALS:  amoxicillin  875 milliGRAM(s)/clavulanate 1 two times a day      MEDICATIONS  (STANDING):  allopurinol 300 milliGRAM(s) Oral daily  amoxicillin  875 milliGRAM(s)/clavulanate 1 Tablet(s) Oral two times a day  chlorhexidine 2% Cloths 1 Application(s) Topical daily  heparin   Injectable 5000 Unit(s) SubCutaneous every 8 hours  levothyroxine 100 MICROGram(s) Oral daily  metoprolol tartrate 50 milliGRAM(s) Oral two times a day  tamsulosin 0.4 milliGRAM(s) Oral at bedtime  traZODone 300 milliGRAM(s) Oral at bedtime    MEDICATIONS  (PRN):  acetaminophen     Tablet .. 650 milliGRAM(s) Oral every 6 hours PRN Temp greater or equal to 38C (100.4F), Mild Pain (1 - 3)  aluminum hydroxide/magnesium hydroxide/simethicone Suspension 30 milliLiter(s) Oral every 4 hours PRN Dyspepsia  HYDROmorphone  Injectable 1 milliGRAM(s) IV Push every 4 hours PRN Severe Pain (7 - 10)  melatonin 3 milliGRAM(s) Oral at bedtime PRN Insomnia  ondansetron Injectable 4 milliGRAM(s) IV Push every 8 hours PRN Nausea and/or Vomiting        Vital Signs Last 24 Hrs  T(C): 36.8 (23 Aug 2022 08:39), Max: 37.4 (22 Aug 2022 17:53)  T(F): 98.3 (23 Aug 2022 08:39), Max: 99.4 (22 Aug 2022 17:53)  HR: 63 (23 Aug 2022 08:39) (63 - 91)  BP: 126/89 (23 Aug 2022 08:39) (118/91 - 149/95)  BP(mean): --  RR: 18 (23 Aug 2022 08:39) (18 - 18)  SpO2: 97% (23 Aug 2022 08:39) (95% - 98%)    Parameters below as of 23 Aug 2022 08:39  Patient On (Oxygen Delivery Method): room air        CBC Full  -  ( 23 Aug 2022 09:28 )  WBC Count : 11.08 K/uL  RBC Count : 4.68 M/uL  Hemoglobin : 13.6 g/dL  Hematocrit : 39.9 %  Platelet Count - Automated : 167 K/uL  Mean Cell Volume : 85.3 fl  Mean Cell Hemoglobin : 29.1 pg  Mean Cell Hemoglobin Concentration : 34.1 gm/dL  Auto Neutrophil # : x  Auto Lymphocyte # : x  Auto Monocyte # : x  Auto Eosinophil # : x  Auto Basophil # : x  Auto Neutrophil % : x  Auto Lymphocyte % : x  Auto Monocyte % : x  Auto Eosinophil % : x  Auto Basophil % : x    08-23    133<L>  |  99  |  9   ----------------------------<  121<H>  3.9   |  23  |  0.78    Ca    9.2      23 Aug 2022 09:29    TPro  6.1  /  Alb  2.7<L>  /  TBili  6.3<H>  /  DBili  x   /  AST  22  /  ALT  61<H>  /  AlkPhos  152<H>  08-23    LIVER FUNCTIONS - ( 23 Aug 2022 09:29 )  Alb: 2.7 g/dL / Pro: 6.1 g/dL / ALK PHOS: 152 U/L / ALT: 61 U/L / AST: 22 U/L / GGT: x               MICROBIOLOGY:  Clean Catch Clean Catch (Midstream)  08-18-22   <10,000 CFU/mL Normal Urogenital Ira  --  --      .Blood Blood-Peripheral  08-18-22   No growth to date.  --  --      .Blood Blood-Peripheral  08-18-22   Growth in anaerobic bottle: Escherichia coli  ***Blood Panel PCR results on this specimen are available  approximately 3 hours after the Gram stain result.***  Gram stain, PCR, and/or culture results may not always  correspond due to difference in methodologies.  ************************************************************  This PCR assay was performed by multiplex PCR. This  Assay tests for 66 bacterial and resistance gene targets.  Please refer to the [facility] test directory  at [email or web address] for details.  --  Blood Culture PCR  Escherichia coli              v    Rapid RVP Result: Juanis (08-18 @ 19:40)          RADIOLOGY

## 2022-08-23 NOTE — PROGRESS NOTE ADULT - NUTRITIONAL ASSESSMENT
morbid obesity, afib, htn, hypothyroidism, BPH,  presenting with fever and abdominal pain for the past  five days with cholangitis, elevated LFTs, fever, leukocytosis, sepsis planned for ERCP today

## 2022-08-24 ENCOUNTER — TRANSCRIPTION ENCOUNTER (OUTPATIENT)
Age: 72
End: 2022-08-24

## 2022-08-24 VITALS — SYSTOLIC BLOOD PRESSURE: 135 MMHG | HEART RATE: 77 BPM | DIASTOLIC BLOOD PRESSURE: 84 MMHG

## 2022-08-24 LAB
ALBUMIN SERPL ELPH-MCNC: 2.5 G/DL — LOW (ref 3.3–5)
ALP SERPL-CCNC: 132 U/L — HIGH (ref 40–120)
ALT FLD-CCNC: 44 U/L — SIGNIFICANT CHANGE UP (ref 10–45)
ANION GAP SERPL CALC-SCNC: 11 MMOL/L — SIGNIFICANT CHANGE UP (ref 5–17)
AST SERPL-CCNC: 21 U/L — SIGNIFICANT CHANGE UP (ref 10–40)
BILIRUB SERPL-MCNC: 4.1 MG/DL — HIGH (ref 0.2–1.2)
BUN SERPL-MCNC: 9 MG/DL — SIGNIFICANT CHANGE UP (ref 7–23)
CALCIUM SERPL-MCNC: 9 MG/DL — SIGNIFICANT CHANGE UP (ref 8.4–10.5)
CHLORIDE SERPL-SCNC: 102 MMOL/L — SIGNIFICANT CHANGE UP (ref 96–108)
CO2 SERPL-SCNC: 23 MMOL/L — SIGNIFICANT CHANGE UP (ref 22–31)
CREAT SERPL-MCNC: 0.66 MG/DL — SIGNIFICANT CHANGE UP (ref 0.5–1.3)
CULTURE RESULTS: SIGNIFICANT CHANGE UP
EGFR: 99 ML/MIN/1.73M2 — SIGNIFICANT CHANGE UP
GLUCOSE SERPL-MCNC: 107 MG/DL — HIGH (ref 70–99)
INR BLD: 1.21 RATIO — HIGH (ref 0.88–1.16)
POTASSIUM SERPL-MCNC: 3.7 MMOL/L — SIGNIFICANT CHANGE UP (ref 3.5–5.3)
POTASSIUM SERPL-SCNC: 3.7 MMOL/L — SIGNIFICANT CHANGE UP (ref 3.5–5.3)
PROT SERPL-MCNC: 5.9 G/DL — LOW (ref 6–8.3)
PROTHROM AB SERPL-ACNC: 13.9 SEC — HIGH (ref 10.5–13.4)
SODIUM SERPL-SCNC: 136 MMOL/L — SIGNIFICANT CHANGE UP (ref 135–145)
SPECIMEN SOURCE: SIGNIFICANT CHANGE UP

## 2022-08-24 PROCEDURE — 85025 COMPLETE CBC W/AUTO DIFF WBC: CPT

## 2022-08-24 PROCEDURE — 87086 URINE CULTURE/COLONY COUNT: CPT

## 2022-08-24 PROCEDURE — 99232 SBSQ HOSP IP/OBS MODERATE 35: CPT

## 2022-08-24 PROCEDURE — 81001 URINALYSIS AUTO W/SCOPE: CPT

## 2022-08-24 PROCEDURE — 97116 GAIT TRAINING THERAPY: CPT

## 2022-08-24 PROCEDURE — 80053 COMPREHEN METABOLIC PANEL: CPT

## 2022-08-24 PROCEDURE — 0225U NFCT DS DNA&RNA 21 SARSCOV2: CPT

## 2022-08-24 PROCEDURE — 85018 HEMOGLOBIN: CPT

## 2022-08-24 PROCEDURE — 87641 MR-STAPH DNA AMP PROBE: CPT

## 2022-08-24 PROCEDURE — 96374 THER/PROPH/DIAG INJ IV PUSH: CPT

## 2022-08-24 PROCEDURE — 85014 HEMATOCRIT: CPT

## 2022-08-24 PROCEDURE — 87077 CULTURE AEROBIC IDENTIFY: CPT

## 2022-08-24 PROCEDURE — 87186 SC STD MICRODIL/AGAR DIL: CPT

## 2022-08-24 PROCEDURE — 84295 ASSAY OF SERUM SODIUM: CPT

## 2022-08-24 PROCEDURE — 99285 EMERGENCY DEPT VISIT HI MDM: CPT

## 2022-08-24 PROCEDURE — 86022 PLATELET ANTIBODIES: CPT

## 2022-08-24 PROCEDURE — 85610 PROTHROMBIN TIME: CPT

## 2022-08-24 PROCEDURE — 85027 COMPLETE CBC AUTOMATED: CPT

## 2022-08-24 PROCEDURE — 93970 EXTREMITY STUDY: CPT

## 2022-08-24 PROCEDURE — 97530 THERAPEUTIC ACTIVITIES: CPT

## 2022-08-24 PROCEDURE — 97162 PT EVAL MOD COMPLEX 30 MIN: CPT

## 2022-08-24 PROCEDURE — 87640 STAPH A DNA AMP PROBE: CPT

## 2022-08-24 PROCEDURE — 82150 ASSAY OF AMYLASE: CPT

## 2022-08-24 PROCEDURE — 83690 ASSAY OF LIPASE: CPT

## 2022-08-24 PROCEDURE — 85730 THROMBOPLASTIN TIME PARTIAL: CPT

## 2022-08-24 PROCEDURE — 74330 X-RAY BILE/PANC ENDOSCOPY: CPT

## 2022-08-24 PROCEDURE — 82435 ASSAY OF BLOOD CHLORIDE: CPT

## 2022-08-24 PROCEDURE — C9399: CPT

## 2022-08-24 PROCEDURE — C2625: CPT

## 2022-08-24 PROCEDURE — 82803 BLOOD GASES ANY COMBINATION: CPT

## 2022-08-24 PROCEDURE — C1769: CPT

## 2022-08-24 PROCEDURE — 87040 BLOOD CULTURE FOR BACTERIA: CPT

## 2022-08-24 PROCEDURE — 74177 CT ABD & PELVIS W/CONTRAST: CPT | Mod: MA

## 2022-08-24 PROCEDURE — 83605 ASSAY OF LACTIC ACID: CPT

## 2022-08-24 PROCEDURE — 87150 DNA/RNA AMPLIFIED PROBE: CPT

## 2022-08-24 PROCEDURE — 71045 X-RAY EXAM CHEST 1 VIEW: CPT

## 2022-08-24 PROCEDURE — 36415 COLL VENOUS BLD VENIPUNCTURE: CPT

## 2022-08-24 PROCEDURE — 82550 ASSAY OF CK (CPK): CPT

## 2022-08-24 PROCEDURE — 86803 HEPATITIS C AB TEST: CPT

## 2022-08-24 PROCEDURE — 97110 THERAPEUTIC EXERCISES: CPT

## 2022-08-24 PROCEDURE — 82947 ASSAY GLUCOSE BLOOD QUANT: CPT

## 2022-08-24 PROCEDURE — 80074 ACUTE HEPATITIS PANEL: CPT

## 2022-08-24 PROCEDURE — 82330 ASSAY OF CALCIUM: CPT

## 2022-08-24 PROCEDURE — 84132 ASSAY OF SERUM POTASSIUM: CPT

## 2022-08-24 PROCEDURE — 76705 ECHO EXAM OF ABDOMEN: CPT

## 2022-08-24 RX ORDER — WARFARIN SODIUM 2.5 MG/1
2 TABLET ORAL
Qty: 2 | Refills: 0
Start: 2022-08-24 | End: 2022-08-24

## 2022-08-24 RX ORDER — WARFARIN SODIUM 2.5 MG/1
15 TABLET ORAL ONCE
Refills: 0 | Status: DISCONTINUED | OUTPATIENT
Start: 2022-08-24 | End: 2022-08-24

## 2022-08-24 RX ORDER — SEMAGLUTIDE 0.68 MG/ML
0 INJECTION, SOLUTION SUBCUTANEOUS
Qty: 0 | Refills: 0 | DISCHARGE

## 2022-08-24 RX ORDER — ACETAMINOPHEN 500 MG
2 TABLET ORAL
Qty: 0 | Refills: 0 | DISCHARGE
Start: 2022-08-24

## 2022-08-24 RX ORDER — METOPROLOL TARTRATE 50 MG
1 TABLET ORAL
Qty: 60 | Refills: 0
Start: 2022-08-24 | End: 2022-09-22

## 2022-08-24 RX ORDER — ATORVASTATIN CALCIUM 80 MG/1
1 TABLET, FILM COATED ORAL
Qty: 0 | Refills: 0 | DISCHARGE

## 2022-08-24 RX ADMIN — HEPARIN SODIUM 5000 UNIT(S): 5000 INJECTION INTRAVENOUS; SUBCUTANEOUS at 05:09

## 2022-08-24 RX ADMIN — Medication 50 MILLIGRAM(S): at 05:11

## 2022-08-24 RX ADMIN — CHLORHEXIDINE GLUCONATE 1 APPLICATION(S): 213 SOLUTION TOPICAL at 11:12

## 2022-08-24 RX ADMIN — Medication 300 MILLIGRAM(S): at 11:16

## 2022-08-24 RX ADMIN — Medication 1 TABLET(S): at 05:10

## 2022-08-24 RX ADMIN — Medication 100 MICROGRAM(S): at 05:10

## 2022-08-24 NOTE — PROGRESS NOTE ADULT - PSYCHIATRIC
normal affect/alert and oriented x3

## 2022-08-24 NOTE — PROGRESS NOTE ADULT - PROBLEM SELECTOR PLAN 1
with associated sepsis and bacteremia on admission  continue piperacillin/tazobactam  ID help appreciated  s/p ERCP with stent with good drainage  GI follow up appreciated  lipase > 3000 likely secondary to post ERCP pancreatitis   continue CLD  IV pain meds   ml/hr  leukocytosis resolved
with associated sepsis and bacteremia on admission  blood cultures positive for GNR  continue piperacillin/tazobactam  one episode of diarrhea today  post ERCP pancreatitis resolving  advance to low fat diet as he is clinically improving   trend lipase   discontinue statin
-from cholangitis/bacteremia  -cont abx  -monitor temps/wbc
with associated sepsis and bacteremia on admission  blood cultures positive for GNR  switched to po Augmentin by ID  no diarrhea today
with associated sepsis and bacteremia on admission now resolved  switched to po Augmentin by ID  no diarrhea today  formed BM  LFTs resolving well   T Bili down to 4
with associated sepsis and bacteremia on admission  blood cultures positive for GNR  continue piperacillin/tazobactam  continue to follow recommendations   ID follow up appreciated  post ERCP pancreatitis resolving  likely diet advance to low fat diet tomorrow if continues to improve  trend lipase   discontinue statin
-from cholangitis/bacteremia  -cont abx  -monitor temps/wbc  -better  -Augmentin 875 mg po bid x 4 more days   -potential side effects of abx explained including GI, Cdiff, allergy issues, development of resistance, etc.
-from cholangitis/bacteremia  -cont abx  -monitor temps/wbc  -better  -dc zosyn  -Augmentin 875 mg po bid x 5 days   -potential side effects of abx explained including GI, Cdiff, allergy issues, development of resistance, etc.

## 2022-08-24 NOTE — PROGRESS NOTE ADULT - PROVIDER SPECIALTY LIST ADULT
Cardiology
Surgery
Cardiology
Infectious Disease
Internal Medicine
Gastroenterology
Cardiology
Cardiology
Infectious Disease
Internal Medicine
Infectious Disease

## 2022-08-24 NOTE — DISCHARGE NOTE PROVIDER - CARE PROVIDER_API CALL
Eneida Sharma)  Internal Medicine  [address] [place] [address]  Phone: [phone]  Fax: [phone]  Follow Up Time:     Shashi Ceron  CARDIOVASCULAR DISEASE  [address], [address]  [place] [address]  Phone: [phone]  Fax: [phone]  Follow Up Time:    Eneida Sharma)  Internal Medicine  [address] [place] [address]  Phone: [phone]  Fax: [phone]  Follow Up Time:     Shashi Ceron  CARDIOVASCULAR DISEASE  [address] [facility], [address]  [place] [address]  Phone: [phone]  Fax: [phone]  Follow Up Time:     KEISHA GONZALEZ  Internal Medicine  [address] [place] [address]  Phone: [phone]  Fax: [phone]  Follow Up Time: 1 week

## 2022-08-24 NOTE — PROGRESS NOTE ADULT - PROBLEM SELECTOR PLAN 3
hold all BP meds for now  will resume once stable
resume metoprolol at 50 BID  cardiology help appreciated  continue to monitor
resume metoprolol at 50 BID  cardiology help appreciated  continue to monitor
resume metoprolol at 50 BID  cardiology help appreciated  continue to monitor  OK to continue anticoagulation as outpatient for A fib per cardiology   resume home dose warfarin on discharge
resume metoprolol at 50 BID  cardiology help appreciated  continue to monitor  resume warfarin 15 mg x 1 today
-s/p ERCP  -trend LFTs  -cont abx

## 2022-08-24 NOTE — PROGRESS NOTE ADULT - SUBJECTIVE AND OBJECTIVE BOX
Patient is a 72y old  Male who presents with a chief complaint of abdominal pain x 5 days (24 Aug 2022 12:07)      DATE OF SERVICE: 08-24-22 @ 16:34    SUBJECTIVE / OVERNIGHT EVENTS: overnight events noted    ROS:  Resp: No cough no sputum production  CVS: No chest pain no palpitations no orthopnea  GI: no N/V/D  : no dysuria, no hematuria        MEDICATIONS  (STANDING):  allopurinol 300 milliGRAM(s) Oral daily  amoxicillin  875 milliGRAM(s)/clavulanate 1 Tablet(s) Oral two times a day  chlorhexidine 2% Cloths 1 Application(s) Topical daily  heparin   Injectable 5000 Unit(s) SubCutaneous every 8 hours  levothyroxine 100 MICROGram(s) Oral daily  metoprolol tartrate 50 milliGRAM(s) Oral two times a day  tamsulosin 0.4 milliGRAM(s) Oral at bedtime  traZODone 300 milliGRAM(s) Oral at bedtime  warfarin 15 milliGRAM(s) Oral once    MEDICATIONS  (PRN):  acetaminophen     Tablet .. 650 milliGRAM(s) Oral every 6 hours PRN Temp greater or equal to 38C (100.4F), Mild Pain (1 - 3)  aluminum hydroxide/magnesium hydroxide/simethicone Suspension 30 milliLiter(s) Oral every 4 hours PRN Dyspepsia  HYDROmorphone  Injectable 1 milliGRAM(s) IV Push every 4 hours PRN Severe Pain (7 - 10)  melatonin 3 milliGRAM(s) Oral at bedtime PRN Insomnia  ondansetron Injectable 4 milliGRAM(s) IV Push every 8 hours PRN Nausea and/or Vomiting        CAPILLARY BLOOD GLUCOSE        I&O's Summary    23 Aug 2022 07:01  -  24 Aug 2022 07:00  --------------------------------------------------------  IN: 540 mL / OUT: 1300 mL / NET: -760 mL    24 Aug 2022 07:01  -  24 Aug 2022 16:34  --------------------------------------------------------  IN: 350 mL / OUT: 520 mL / NET: -170 mL        Vital Signs Last 24 Hrs  T(C): 37.2 (24 Aug 2022 08:42), Max: 37.2 (24 Aug 2022 08:42)  T(F): 98.9 (24 Aug 2022 08:42), Max: 98.9 (24 Aug 2022 08:42)  HR: 77 (24 Aug 2022 10:30) (72 - 90)  BP: 135/84 (24 Aug 2022 10:30) (131/82 - 148/84)  BP(mean): --  RR: 18 (24 Aug 2022 08:42) (18 - 18)  SpO2: 97% (24 Aug 2022 08:42) (96% - 99%)    PHYSICAL EXAM:  EYES: EOMI, PERRLA,  NECK: Supple, No JVD  CHEST/LUNG: clear   HEART: S1 S2; no murmurs   ABDOMEN: Soft, nontender  EXTREMITIES:  no edema  NEUROLOGY: AO x 3 non-focal  SKIN: No rashes or lesions    LABS:                        13.6   11.08 )-----------( 167      ( 23 Aug 2022 09:28 )             39.9     08-24    136  |  102  |  9   ----------------------------<  107<H>  3.7   |  23  |  0.66    Ca    9.0      24 Aug 2022 07:07    TPro  5.9<L>  /  Alb  2.5<L>  /  TBili  4.1<H>  /  DBili  x   /  AST  21  /  ALT  44  /  AlkPhos  132<H>  08-24    PT/INR - ( 24 Aug 2022 07:05 )   PT: 13.9 sec;   INR: 1.21 ratio                     All consultant(s) notes reviewed and care discussed with other providers        Contact Number, Dr Araujo [phone]

## 2022-08-24 NOTE — PROGRESS NOTE ADULT - PROBLEM SELECTOR PROBLEM 3
Chronic atrial fibrillation
Elevated LFTs

## 2022-08-24 NOTE — DISCHARGE NOTE PROVIDER - PROVIDER TOKENS
PROVIDER:[TOKEN:[[id number]]],PROVIDER:[TOKEN:[[id number]]] PROVIDER:[TOKEN:[[id number]]],PROVIDER:[TOKEN:[[id number]]],PROVIDER:[TOKEN:[[id number]],FOLLOWUP:[1 week]]

## 2022-08-24 NOTE — DISCHARGE NOTE NURSING/CASE MANAGEMENT/SOCIAL WORK - NSDCPETBCESMAN_GEN_ALL_CORE
Pt presents with c/o chest pain and SOB that began at 0200 today. Pt states it is a dull ache in chest that was unrelieved when he took prevacid at home. Pt denies use of drugs or alcohol, denies other medical history. Pt states he has been dry heaving since the pain started. EKG done, labs drawn, Dr Bueno at bedside.    If you are a smoker, it is important for your health to stop smoking. Please be aware that second hand smoke is also harmful.

## 2022-08-24 NOTE — DISCHARGE NOTE PROVIDER - NSDCCPCAREPLAN_GEN_ALL_CORE_FT
PRINCIPAL DISCHARGE DIAGNOSIS  Diagnosis: Cholangitis  Assessment and Plan of Treatment: #Cholangitis: you had a fever 101.8, hypotension (80/60s --> 100/60s with IVF), RUQ abd pain, WBC 23, Tbili 6.4-> 6.7. CT (+) gallstones, normal bile ducts. [place] Grade III cholangitis.  #post-ERCP pancreatitis  You had an ERCP done 8/19 with large stone in CBD, plastic stent placed  You finished a course of Zosyn, switched to po Augmentin by ID. Continue entire course of Augmentin as prescribed.  Consider NM [facility] outpatient to evaluate for possible gastroparesis  Repeat ERCP in 2 months for stent removal with Dr Sharma      SECONDARY DISCHARGE DIAGNOSES  Diagnosis: Bacteremia  Assessment and Plan of Treatment: You have completed a course of IV antibiotics, Zosyn.  You were switched to PO Augmentin, and should finish entire course of antibiotics.    Diagnosis: Elevated LFTs  Assessment and Plan of Treatment: Related to cholangitis as above    Diagnosis: Sepsis  Assessment and Plan of Treatment: Resolved    Diagnosis: Chronic atrial fibrillation  Assessment and Plan of Treatment: Continue with Metoprolol tartrate and Coumadin as prescribed.  Follow up with your cardiologist and primary care provider within 1 week.  Atrial fibrillation is the most common heart rhythm problem.  The condition puts you at risk for has stroke and heart attack  It helps if you control your blood pressure, not drink more than 1-2 alcohol drinks per day, cut down on caffeine, getting treatment for over active thyroid gland, and get regular exercise  Call your doctor if you feel your heart racing or beating unusually, chest tightness or pain, lightheaded, faint, shortness of breath especially with exercise.    Diagnosis: HTN (hypertension)  Assessment and Plan of Treatment: Take medications for your blood pressure as recommended. Continue with Metoprolol tartrate as prescribed.  Eat a heart healthy diet that is low in saturated fats and salt, and includes whole grains, fruits, vegetables and lean protein   Exercise regularly (consult with your physician or cardiologist first); maintain a heart healthy weight.   If you smoke - quit (A resource to help you stop smoking is the [facility] – phone number [phone].). Continue to follow with your primary physician or cardiologist.   Seek medical help for dizziness, Lightheadedness, Blurry vision, Headache, Chest pain, Shortness of breath  Follow up with your medical doctor to establish long term blood pressure treatment goals.    Diagnosis: BPH (benign prostatic hyperplasia)  Assessment and Plan of Treatment: Continue with Tamsulosin as prescribed.  You have an enlarged prostate gland which gets bigger as men get older - it is a very common problem and has nothing to do with prostate cancer  Call your doctor if you are urinating more frequently, have trouble starting to urinate, have weak stream, urine leaking or dribbling, and feeling as though bladder is not empty after urination  Your doctor will monitor your prostate with a rectal exam as well as urine or blood testing  You can help yourself by reducing the amount of fluid you drink before going to bed, limiting the amount of alcohol & caffeine you drink   Avoid cold & allergy medication that contain decongestants or antihistamines which make BPH symptoms worse  You can also "double void" by waiting a moment after urinating & trying again  Take your medication as prescribed - one medication helps to relax the muscle around the urethra and the other medication you may take prevents the prostate from growing more or even shrinking the prostate    Diagnosis: Cholangitis  Assessment and Plan of Treatment: #Cholangitis: you had a fever 101.8, hypotension (80/60s --> 100/60s with IVF), RUQ abd pain, WBC 23, Tbili 6.4-> 6.7. CT (+) gallstones, normal bile ducts. [place] Grade III cholangitis.  #post-ERCP pancreatitis  You had an ERCP done 8/19 with large stone in CBD, plastic stent placed  You finished a course of Zosyn, switched to po Augmentin by ID. Continue entire course of Augmentin as prescribed.  Consider NM GES outpatient to evaluate for possible gastroparesis  Repeat ERCP in 2 months for stent removal with Dr Sharma    Diagnosis: Hypothyroidism  Assessment and Plan of Treatment: Continue with Levothyroxine.    Diagnosis: Fever  Assessment and Plan of Treatment: Resolved     PRINCIPAL DISCHARGE DIAGNOSIS  Diagnosis: Cholangitis  Assessment and Plan of Treatment: #Cholangitis: you had a fever 101.8, hypotension (80/60s --> 100/60s with IVF), RUQ abd pain, WBC 23, Tbili 6.4-> 6.7. CT (+) gallstones, normal bile ducts. [place] Grade III cholangitis.  #post-ERCP pancreatitis  You had an ERCP done 8/19 with large stone in CBD, plastic stent placed  You finished a course of Zosyn, switched to po Augmentin by ID. Continue entire course of Augmentin as prescribed.  Consider NM GES outpatient to evaluate for possible gastroparesis  Repeat ERCP in 2 months for stent removal with Dr Sharma      SECONDARY DISCHARGE DIAGNOSES  Diagnosis: Bacteremia  Assessment and Plan of Treatment: You have completed a course of IV antibiotics, Zosyn.  You were switched to PO Augmentin, and should finish entire course of antibiotics.    Diagnosis: Elevated LFTs  Assessment and Plan of Treatment: Related to cholangitis as above    Diagnosis: Sepsis  Assessment and Plan of Treatment: Resolved    Diagnosis: Chronic atrial fibrillation  Assessment and Plan of Treatment: Continue with Metoprolol tartrate and Coumadin as prescribed.  Follow up with your cardiologist and primary care provider within 1 week.  Please take Coumadin 15 mg PO tonight, THEN to continue Coumadin at your home dose starting 8/25, per Dr. Olvera cardiology. Please have PT/INR check on Monday 8/29.  Atrial fibrillation is the most common heart rhythm problem.  The condition puts you at risk for has stroke and heart attack  It helps if you control your blood pressure, not drink more than 1-2 alcohol drinks per day, cut down on caffeine, getting treatment for over active thyroid gland, and get regular exercise  Call your doctor if you feel your heart racing or beating unusually, chest tightness or pain, lightheaded, faint, shortness of breath especially with exercise.    Diagnosis: HTN (hypertension)  Assessment and Plan of Treatment: Take medications for your blood pressure as recommended. Continue with Metoprolol tartrate as prescribed.  Eat a heart healthy diet that is low in saturated fats and salt, and includes whole grains, fruits, vegetables and lean protein   Exercise regularly (consult with your physician or cardiologist first); maintain a heart healthy weight.   If you smoke - quit (A resource to help you stop smoking is the [facility] – phone number [phone].). Continue to follow with your primary physician or cardiologist.   Seek medical help for dizziness, Lightheadedness, Blurry vision, Headache, Chest pain, Shortness of breath  Follow up with your medical doctor to establish long term blood pressure treatment goals.    Diagnosis: BPH (benign prostatic hyperplasia)  Assessment and Plan of Treatment: Continue with Tamsulosin as prescribed.  You have an enlarged prostate gland which gets bigger as men get older - it is a very common problem and has nothing to do with prostate cancer  Call your doctor if you are urinating more frequently, have trouble starting to urinate, have weak stream, urine leaking or dribbling, and feeling as though bladder is not empty after urination  Your doctor will monitor your prostate with a rectal exam as well as urine or blood testing  You can help yourself by reducing the amount of fluid you drink before going to bed, limiting the amount of alcohol & caffeine you drink   Avoid cold & allergy medication that contain decongestants or antihistamines which make BPH symptoms worse  You can also "double void" by waiting a moment after urinating & trying again  Take your medication as prescribed - one medication helps to relax the muscle around the urethra and the other medication you may take prevents the prostate from growing more or even shrinking the prostate    Diagnosis: Cholangitis  Assessment and Plan of Treatment: #Cholangitis: you had a fever 101.8, hypotension (80/60s --> 100/60s with IVF), RUQ abd pain, WBC 23, Tbili 6.4-> 6.7. CT (+) gallstones, normal bile ducts. [place] Grade III cholangitis.  #post-ERCP pancreatitis  You had an ERCP done 8/19 with large stone in CBD, plastic stent placed  You finished a course of Zosyn, switched to po Augmentin by ID. Continue entire course of Augmentin as prescribed.  Consider NM [facility] outpatient to evaluate for possible gastroparesis  Repeat ERCP in 2 months for stent removal with Dr Sharma    Diagnosis: Hypothyroidism  Assessment and Plan of Treatment: Continue with Levothyroxine.    Diagnosis: Fever  Assessment and Plan of Treatment: Resolved

## 2022-08-24 NOTE — DISCHARGE NOTE NURSING/CASE MANAGEMENT/SOCIAL WORK - PATIENT PORTAL LINK FT
You can access the FollowMyHealth Patient Portal offered by [facility] by registering at the following website: [email or web address]. By joining thePlatform’s FollowMyHealth portal, you will also be able to view your health information using other applications (apps) compatible with our system.

## 2022-08-24 NOTE — PROGRESS NOTE ADULT - RESPIRATORY
clear to auscultation bilaterally/no wheezes/no respiratory distress

## 2022-08-24 NOTE — PROGRESS NOTE ADULT - SUBJECTIVE AND OBJECTIVE BOX
JOSSUE KEVIN 72y [id number]    Patient is a 72y old  Male who presents with a chief complaint of abdominal pain x 5 days (24 Aug 2022 08:00)      Follow Up/CC:  ID following for bacteremia    Interval History/ROS: no fever, feels well    Allergies    metformin (Unknown)    Intolerances        ANTIMICROBIALS:  amoxicillin  875 milliGRAM(s)/clavulanate 1 two times a day      MEDICATIONS  (STANDING):  allopurinol 300 milliGRAM(s) Oral daily  amoxicillin  875 milliGRAM(s)/clavulanate 1 Tablet(s) Oral two times a day  chlorhexidine 2% Cloths 1 Application(s) Topical daily  heparin   Injectable 5000 Unit(s) SubCutaneous every 8 hours  levothyroxine 100 MICROGram(s) Oral daily  metoprolol tartrate 50 milliGRAM(s) Oral two times a day  tamsulosin 0.4 milliGRAM(s) Oral at bedtime  traZODone 300 milliGRAM(s) Oral at bedtime    MEDICATIONS  (PRN):  acetaminophen     Tablet .. 650 milliGRAM(s) Oral every 6 hours PRN Temp greater or equal to 38C (100.4F), Mild Pain (1 - 3)  aluminum hydroxide/magnesium hydroxide/simethicone Suspension 30 milliLiter(s) Oral every 4 hours PRN Dyspepsia  HYDROmorphone  Injectable 1 milliGRAM(s) IV Push every 4 hours PRN Severe Pain (7 - 10)  melatonin 3 milliGRAM(s) Oral at bedtime PRN Insomnia  ondansetron Injectable 4 milliGRAM(s) IV Push every 8 hours PRN Nausea and/or Vomiting        Vital Signs Last 24 Hrs  T(C): 37.2 (24 Aug 2022 08:42), Max: 37.2 (24 Aug 2022 08:42)  T(F): 98.9 (24 Aug 2022 08:42), Max: 98.9 (24 Aug 2022 08:42)  HR: 72 (24 Aug 2022 08:42) (72 - 94)  BP: 148/84 (24 Aug 2022 08:42) (131/82 - 148/84)  BP(mean): --  RR: 18 (24 Aug 2022 08:42) (18 - 18)  SpO2: 97% (24 Aug 2022 08:42) (96% - 99%)    Parameters below as of 24 Aug 2022 08:42  Patient On (Oxygen Delivery Method): room air        CBC Full  -  ( 23 Aug 2022 09:28 )  WBC Count : 11.08 K/uL  RBC Count : 4.68 M/uL  Hemoglobin : 13.6 g/dL  Hematocrit : 39.9 %  Platelet Count - Automated : 167 K/uL  Mean Cell Volume : 85.3 fl  Mean Cell Hemoglobin : 29.1 pg  Mean Cell Hemoglobin Concentration : 34.1 gm/dL  Auto Neutrophil # : x  Auto Lymphocyte # : x  Auto Monocyte # : x  Auto Eosinophil # : x  Auto Basophil # : x  Auto Neutrophil % : x  Auto Lymphocyte % : x  Auto Monocyte % : x  Auto Eosinophil % : x  Auto Basophil % : x    08-24    136  |  102  |  9   ----------------------------<  107<H>  3.7   |  23  |  0.66    Ca    9.0      24 Aug 2022 07:07    TPro  5.9<L>  /  Alb  2.5<L>  /  TBili  4.1<H>  /  DBili  x   /  AST  21  /  ALT  44  /  AlkPhos  132<H>  08-24    LIVER FUNCTIONS - ( 24 Aug 2022 07:07 )  Alb: 2.5 g/dL / Pro: 5.9 g/dL / ALK PHOS: 132 U/L / ALT: 44 U/L / AST: 21 U/L / GGT: x               MICROBIOLOGY:  Clean Catch Clean Catch (Midstream)  08-18-22   <10,000 CFU/mL Normal Urogenital Ira  --  --      .Blood Blood-Peripheral  08-18-22   No Growth Final  --  --      .Blood Blood-Peripheral  08-18-22   Growth in anaerobic bottle: Escherichia coli  ***Blood Panel PCR results on this specimen are available  approximately 3 hours after the Gram stain result.***  Gram stain, PCR, and/or culture results may not always  correspond due to difference in methodologies.  ************************************************************  This PCR assay was performed by multiplex PCR. This  Assay tests for 66 bacterial and resistance gene targets.  Please refer to the [facility] test directory  at [email or web address] for details.  --  Blood Culture PCR  Escherichia coli              v    Rapid RVP Result: Juanis (08-18 @ 19:40)          RADIOLOGY

## 2022-08-24 NOTE — DISCHARGE NOTE PROVIDER - HOSPITAL COURSE
72M w/pmh morbid obesity, afib, htn, hypothyroidism, BPH,  presenting with fever and abdominal pain for the past  five days. Elevated liver tests     Cholangitis.   with associated sepsis and bacteremia on admission  blood cultures positive for GNR  #Post ERCP pancreatitis   #Cholangitis: pw fever Tmax 101.8, hypotension (80/60s --> 100/60s with IVF), RUQ abd pain, WBC 23, Tbili 6.4-> 6.7. CT (+) gallstones, normal bile ducts. [place] Grade III cholangitis.  #Coagulopathy: INR 1.8 in setting of coumadin use  Now s/p ERCP 8/19 with large stone in CBD, plastic stent [id number] x7 cm straight; food noted in stomach/duodenum ?gastroparesis  s/p Zosyn, switched to po Augmentin by ID  - Consider NM [facility] outpatient to evaluate for possible gastroparesis  - Repeat ERCP in 2 months for stent removal with Dr Zachary Mcdermott.   hemodynamically stable at  this time.    Chronic atrial fibrillation.   resume metoprolol at 50 BID  cardiology consulted  resumed Coumadin    Thrombocytopenia.   likely secondary to sepsis and maybe antibiotics  resolved.    HTN (hypertension).   acceptable   continue metoprolol  50 BID    BPH (benign prostatic hyperplasia).   continue tamsulosin.    Hypothyroidism.   - levothyroxine     GOUT   - allopurinol    Medically cleared for discharge home with PT. Discussed with Dr. Araujo. 72M w/pmh morbid obesity, afib, htn, hypothyroidism, BPH,  presenting with fever and abdominal pain for the past  five days. Elevated liver tests     Cholangitis.   with associated sepsis and bacteremia on admission  blood cultures positive for GNR  #Post ERCP pancreatitis   #Cholangitis: pw fever Tmax 101.8, hypotension (80/60s --> 100/60s with IVF), RUQ abd pain, WBC 23, Tbili 6.4-> 6.7. CT (+) gallstones, normal bile ducts. [place] Grade III cholangitis.  #Coagulopathy: INR 1.8 in setting of coumadin use  Now s/p ERCP 8/19 with large stone in CBD, plastic stent [id number] x7 cm straight; food noted in stomach/duodenum ?gastroparesis  s/p Zosyn, switched to po Augmentin by ID  - Consider NM [facility] outpatient to evaluate for possible gastroparesis  - Repeat ERCP in 2 months for stent removal with Dr Zachary Mcdermott.   hemodynamically stable at  this time.    Chronic atrial fibrillation.   resume metoprolol at 50 BID  cardiology consulted  resumed Coumadin  Coumadin 15 mg PO night of 8/24, THEN to continue home dose starting 8/25 with PT/INR check on 8/29    Thrombocytopenia.   likely secondary to sepsis and maybe antibiotics  resolved.    HTN (hypertension).   acceptable   continue metoprolol  50 BID    BPH (benign prostatic hyperplasia).   continue tamsulosin.    Hypothyroidism.   - levothyroxine     GOUT   - allopurinol    Medically cleared for discharge home with PT. Discussed with Dr. Araujo.

## 2022-08-24 NOTE — DISCHARGE NOTE NURSING/CASE MANAGEMENT/SOCIAL WORK - NSDCFUADDAPPT_GEN_ALL_CORE_FT
APPTS ARE READY TO BE MADE: [X] YES    Best Family or Patient Contact (if needed):    Additional Information about above appointments (if needed):    1: GI  2: PCP  3: Cardiology    Other comments or requests:   Patient was provided with follow up request details and was advised to call to schedule follow up within specified time frame.

## 2022-08-24 NOTE — PROGRESS NOTE ADULT - REASON FOR ADMISSION
abdominal pain x 5 days

## 2022-08-24 NOTE — PROGRESS NOTE ADULT - PROBLEM SELECTOR PLAN 2
resolved
continue to monitor  hemodynamically stable at  this time
-cont abx  -s/p ERCP  -trend LFTs  -GI following
continue to monitor  hemodynamically stable at  this time
-cont zosyn  -s/p ERCP  -trend LFTs  -GI following
continue to monitor  hemodynamically stable at  this time
continue to monitor  hemodynamically stable at  this time
-cont zosyn  -s/p ERCP  -trend LFTs  -GI following

## 2022-08-24 NOTE — DISCHARGE NOTE PROVIDER - NSDCFUADDAPPT_GEN_ALL_CORE_FT
APPTS ARE READY TO BE MADE: [X] YES    Best Family or Patient Contact (if needed):    Additional Information about above appointments (if needed):    1: GI  2: PCP  3: Cardiology    Other comments or requests:    APPTS ARE READY TO BE MADE: [X] YES    Best Family or Patient Contact (if needed):    Additional Information about above appointments (if needed):    1: GI  2: PCP  3: Cardiology    Other comments or requests:   Patient was provided with follow up request details and was advised to call to schedule follow up within specified time frame.

## 2022-08-24 NOTE — PROGRESS NOTE ADULT - PROBLEM SELECTOR PLAN 7
- levothyroxine     GOUT   - allopurinol

## 2022-08-24 NOTE — PROGRESS NOTE ADULT - ASSESSMENT
#Gallstone pancreatitis S/P ERCP and stent placement with pancreatitis.  #AF, currently on sq heparin, would restart coumadin when approved by GI, discussed with Dr Araujo.  #htn  #hypothyroidism  #BPH
72M Hx morbidly obese (BMI 50), atrial fibrillation (on coumadin), HTN, BPH, hypothyroidism for fever and RUQ abdominal pain meets criteria for cholangitis.     Impression:   #Post ERCP pancreatitis   #Cholangitis: pw fever Tmax 101.8, hypotension (80/60s --> 100/60s with IVF), RUQ abd pain, WBC 23, Tbili 6.4-> 6.7. CT (+) gallstones, normal bile ducts. [place] Grade III cholangitis.  #Coagulopathy: INR 1.8 in setting of coumadin use  Now s/p ERCP 8/19 with large stone in CBD, plastic stent [id number] x7 cm straight; food noted in stomach/duodenum ?gastroparesis      Recommendations:  - Okay trial advanced diet from clears (patient requesting mashed potatoes)  - IVF  cc/hr if able to tolerate  - 1 of the 8/18 cx with E coli, 2nd negative  - Repeat BCx  - Planning for potential bariatric surgery/ CCY OP?  - Consider NM GES outpatient to evaluate for possible gastroparesis  - Rest of care per primary      Preliminary note until signed by Attending.    Thank you for involving us in this patient's care.      Lucinda Lopez MD  Gastroenterology/Hepatology Fellow, PGY-VI    NON-URGENT CONSULTS:  Please email [email or web address]  AT NIGHT AND ON WEEKENDS:  Contact on-call GI fellow via answering service ([phone]) from 5pm-8am and on weekends/holidays  MONDAY-FRIDAY 8AM-5PM:  Pager# [phone] ([facility])  GI Phone# [phone] ([facility])    
72M Hx morbidly obese (BMI 50), atrial fibrillation (on coumadin), HTN, BPH, hypothyroidism for fever and RUQ abdominal pain meets criteria for cholangitis.     Impression:   #Post ERCP pancreatitis   #Cholangitis: pw fever Tmax 101.8, hypotension (80/60s --> 100/60s with IVF), RUQ abd pain, WBC 23, Tbili 6.4-> 6.7. CT (+) gallstones, normal bile ducts. [place] Grade III cholangitis.  #Coagulopathy: INR 1.8 in setting of coumadin use  Now s/p ERCP 8/19 with large stone in CBD, plastic stent [id number] x7 cm straight; food noted in stomach/duodenum ?gastroparesis      Recommendations:  - CLD for now, advance as tolerated  - IVF  cc/hr if able to tolerate  - Continue Abx, BCx 8/18 negative  - Planning for potential bariatric surgery/ CCY OP?  - Consider NM GES outpatient to evaluate for possible gastroparesis  - Rest of care per primary      Preliminary note until signed by Attending.    Thank you for involving us in this patient's care.      Lucinda Lopze MD  Gastroenterology/Hepatology Fellow, PGY-VI    NON-URGENT CONSULTS:  Please email [email or web address]  [email or web address]  AT NIGHT AND ON WEEKENDS:  Contact on-call GI fellow via answering service ([phone]) from 5pm-8am and on weekends/holidays  MONDAY-FRIDAY 8AM-5PM:  Pager# [phone] ([facility])  GI Phone# [phone] ([facility])    
72M w/pmh morbid obesity, afib, htn, hypothyroidism, BPH,  presenting with fever and abdominal pain for the past  five days. Elevated liver tests   
Gallstone pancreatitis S/P ERCP and stent placement with pancreatitis.  AF well controlled  Continue current cardiac medications  Lipase coming down but bilirubin is up.  Going down for abdominal ultrasound this morning  The patient is stable from a cardiovascular perspective.  
72 year old male w/pmh morbid obesity, afib, htn, hypothyroidism, BPH,  presenting with fever and abdominal pain for the past  five days with cholangitis, elevated LFTs, fever, leukocytosis, sepsis s/p ERCP -One plastic stent (10F x 7cm straight ) placed in setting of cholangitis,        # Sepsis.   ·  Recommendation: -from cholangitis  -cont zosyn 3.375 gm iv q8  -monitor temps/wbc  -trend LFTs  -f/u blood cx  -GI following.    #: Cholangitis.     ERCP- as above  s/p stent   -cont abx  -monitor LFTs., and amylase/lipase    #: Elevated LFTs.   ·  Recommendation: -due to above  -trend LFTs.    #: Fever.   ·  Recommendation: -from sepsis/cholangitis   -monitor temps  -cont abx  -antipyretics PRN.  follow cultures    # Leukocytosis.   ·  Recommendation: -due to above  -better  -trend LFTs  -cont abx.    Cora Galeana M.D. ,   please reach via teams   If no answer, or after 5PM/ weekends,  then please call  [phone]      Assessment and plan discussed with the primary team .    ID service will be covering over the weekend. Please call for acute issues or questions. [phone]      
72 year old male w/pmh morbid obesity, afib, htn, hypothyroidism, BPH,  presenting with fever and abdominal pain for the past  five days with cholangitis, elevated LFTs, fever, leukocytosis, sepsis s/p ERCP -One plastic stent (10F x 7cm straight ) placed in setting of cholangitis,        # Sepsis.   ·  Recommendation: -from cholangitis  -cont zosyn 3.375 gm iv q8  -monitor temps/wbc  -trend LFTs  -f/u blood cx  -GI following.    #Bacteremia  await final ID and sensitivity of organism  PCR shows that its an E Coli    #: Cholangitis.     ERCP- as above  s/p stent   -cont abx  -monitor LFTs., and amylase/lipase    #: Elevated LFTs.   ·  Recommendation: -due to above  -trend LFTs.    #: Fever.   ·  Recommendation: -from sepsis/cholangitis   -monitor temps  -cont abx  -antipyretics PRN.  follow cultures    # Leukocytosis.   ·  Recommendation: -due to above  -better  -trend LFTs  -cont abx.    #THROMBOCYTOPENIA  check HIT abs  check DIC screen   trend        Cora Galeana M.D. ,   please reach via teams   If no answer, or after 5PM/ weekends,  then please call  [phone]      Assessment and plan discussed with the primary team .    ID service will be covering over the weekend. Please call for acute issues or questions. [phone]      
73yo M h/o obesity (BMI 50.4), Afib on coumadin, BPH, hypothyroid, gout, presenting with 1 week of intermittent fevers and jaundice, found to be hypotensive with elevated bilirubin, with findings concerning for cholangitis.    Plan:  - GI consulted for ERCP  - Hemodynamics improved s/p fluids  - C/w IVF, Abx  - Pt counseled regarding bariatric center, would like to think about it before considering bariatric surgery eval  - outpatient f/u with bariatric surgery as an outpatient to consider laparoscopic cholecystectomy and surgical weight loss    ACS/Trauma Surgery  [id number] 
72M Hx morbidly obese (BMI 50), atrial fibrillation (on coumadin), HTN, BPH, hypothyroidism for fever and RUQ abdominal pain meets criteria for cholangitis.     Impression:   #Post ERCP pancreatitis   #Cholangitis: pw fever Tmax 101.8, hypotension (80/60s --> 100/60s with IVF), RUQ abd pain, WBC 23, Tbili 6.4-> 6.7. CT (+) gallstones, normal bile ducts. [place] Grade III cholangitis.  #Coagulopathy: INR 1.8 in setting of coumadin use  Now s/p ERCP 8/19 with large stone in CBD, plastic stent [id number] x7 cm straight; food noted in stomach/duodenum ?gastroparesis      Recommendations:  - advance diet  - 1 of the 8/18 cx with E coli, 2nd negative  - Repeat BCx  - CCY per surgery  - Consider [facility] outpatient to evaluate for possible gastroparesis  - Repeat ERCP in 2 months for stent removal with Dr Sharma  - Rest of care per primary    Preliminary note until signed by Attending.    Thank you for involving us in this patient's care.      Lucinda Lopez MD  Gastroenterology/Hepatology Fellow, PGY-VI    NON-URGENT CONSULTS:  Please email [email or web address]  [email or web address]  AT NIGHT AND ON WEEKENDS:  Contact on-call GI fellow via answering service ([phone]) from 5pm-8am and on weekends/holidays  MONDAY-FRIDAY 8AM-5PM:  Pager# [phone] ([facility])  GI Phone# [phone] ([facility])    
72M w/pmh morbid obesity, afib, htn, hypothyroidism, BPH,  presenting with fever and abdominal pain for the past  five days. Elevated liver tests   
72M w/pmh morbid obesity, afib, htn, hypothyroidism, BPH,  presenting with fever and abdominal pain for the past  five days. Elevated liver tests   
72 year old male w/pmh morbid obesity, afib, htn, hypothyroidism, BPH,  presenting with fever and abdominal pain for the past  five days with cholangitis, elevated LFTs, fever, leukocytosis, sepsis planned for ERCP today    Braulio Cervantes  Attending Physician   Division of Infectious Disease  Office #[phone]  Available on [employer] Teams also  After 5pm/weekend or no response, call #[phone]
72M w/pmh morbid obesity, afib, htn, hypothyroidism, BPH,  presenting with fever and abdominal pain for the past  five days. Elevated liver tests   
72M w/pmh morbid obesity, afib, htn, hypothyroidism, BPH,  presenting with fever and abdominal pain for the past  five days. Elevated liver tests   
72 year old male w/pmh morbid obesity, afib, htn, hypothyroidism, BPH,  presenting with fever and abdominal pain for the past  five days with cholangitis, elevated LFTs, fever, leukocytosis, sepsis planned for ERCP today    Braulio Cervantes  Attending Physician   Division of Infectious Disease  Office #[phone]  Available on [employer] Teams also  After 5pm/weekend or no response, call #[phone]
72 year old male w/pmh morbid obesity, afib, htn, hypothyroidism, BPH,  presenting with fever and abdominal pain for the past  five days with cholangitis, elevated LFTs, fever, leukocytosis, sepsis planned for ERCP today    Braulio Cervantes  Attending Physician   Division of Infectious Disease  Office #[phone]  Available on [employer] Teams also  After 5pm/weekend or no response, call #[phone]

## 2022-08-24 NOTE — PROGRESS NOTE ADULT - NSPROGADDITIONALINFOA_GEN_ALL_CORE
discharge home with home PT  discussed with patient's son at the bedside in detail
discussed with patient in detail, expresses understanding of treatment plans.  compliance with PT reinforced at length
discussed with patient
discussed with patient in detail, expresses understanding of treatment plans.  discussed with patient's family at bedside in detail
discussed with patient in detail, expresses understanding of treatment plans.
D/w Dr. Araujo

## 2022-08-24 NOTE — DISCHARGE NOTE NURSING/CASE MANAGEMENT/SOCIAL WORK - NSDCPEFALRISK_GEN_ALL_CORE
For information on Fall & Injury Prevention, visit: [email or web address] OR  [email or web address] OR  https://www.cdc.gov/steadi/patient.html

## 2022-08-26 NOTE — CHART NOTE - NSCHARTNOTEFT_GEN_A_CORE
Brief Update Note    Liver chemistries downtrending  No additional intervention from GI indicated at this time  Patient should follow up outpatient    Please provide patient with Gastroenterology Clinic or outpatient follow up; [phone] ([facility] at [address] [place] [address])    See prior note for additional recs    Thank you for involving us in this patient's care. Please reach out to [facility] if any further questions or concerns. Signing off.    Lucinda Lopez MD  Gastroenterology/Hepatology Fellow, PGY-VI    NON-URGENT CONSULTS:  Please email [email or web address]  [email or web address]  AT NIGHT AND ON WEEKENDS:  Contact on-call GI fellow via answering service ([phone]) from 5pm-8am and on weekends/holidays  MONDAY-FRIDAY 8AM-5PM:  Pager# [phone] ([facility])  GI Phone# [phone] ([facility])
Left a message for patient in regards to follow up care with callback information.
MRCP cancelled per MRI tech as patient(374lb) exceeds the weight limit.
Patient requested call back on (8/27).
Informed by RN of "positive blood culture"  Reported: Blood Culture 1 set (of 2 sets) sent AM 8/18/22 - POSITIVE for "Gram negative rods"  Patient currently on IV Zosyn - being closely followed by ID, Vital signs are stable and he is Afebrile.  Dr Araujo informed of result - no further intervention at this time requested

## 2022-08-26 NOTE — CHART NOTE - NSCHARTNOTESELECT_GEN_ALL_CORE
positive blood culture/Event Note
[facility]/Event Note
D/C appt/Event Note
Event Note
d/c appt/Event Note

## 2022-09-29 ENCOUNTER — APPOINTMENT (OUTPATIENT)
Dept: GASTROENTEROLOGY | Facility: CLINIC | Age: 72
End: 2022-09-29

## 2022-09-29 VITALS
SYSTOLIC BLOOD PRESSURE: 148 MMHG | SYSTOLIC BLOOD PRESSURE: 120 MMHG | RESPIRATION RATE: 14 BRPM | HEIGHT: 74 IN | TEMPERATURE: 98 F | OXYGEN SATURATION: 98 % | DIASTOLIC BLOOD PRESSURE: 75 MMHG | WEIGHT: 315 LBS | HEART RATE: 77 BPM | DIASTOLIC BLOOD PRESSURE: 72 MMHG | BODY MASS INDEX: 40.43 KG/M2

## 2022-09-29 DIAGNOSIS — I48.91 UNSPECIFIED ATRIAL FIBRILLATION: ICD-10-CM

## 2022-09-29 DIAGNOSIS — K80.50 CALCULUS OF BILE DUCT W/OUT CHOLANGITIS OR CHOLECYSTITIS W/OUT OBSTRUCTION: ICD-10-CM

## 2022-09-29 DIAGNOSIS — K83.09 OTHER CHOLANGITIS: ICD-10-CM

## 2022-09-29 PROCEDURE — 99214 OFFICE O/P EST MOD 30 MIN: CPT

## 2022-09-29 PROCEDURE — 99204 OFFICE O/P NEW MOD 45 MIN: CPT

## 2022-10-06 NOTE — ASSESSMENT
[FreeTextEntry1] : Impression:\par # Cholangitis s/p ERCP with stent placement\par # Choledocholithiasis\par # Atrial Fibrillation (On Coumadin)\par \par Recommendation:\par - Plan for ERCP\par - Alternatives for procedure and risks of pancreatitis, infection, perforation, bleeding, abdominal pain & adverse reaction to anesthesia discussed.\par - Preparations for the procedure discussed\par - Coumadin will need to be held for procedure\par - COVID PCR ordered

## 2022-10-06 NOTE — HISTORY OF PRESENT ILLNESS
[FreeTextEntry1] : 72 year old man with hx of obesity, DM, HTN, HLD, Afib (on Coumadin) and recent admission for cholangitis 2/2 to choledocholithiasis s/p ERCP with stent placement presents for initial evaluation.\par \par Patient presented to [facility] (8/19-24) with RUQ pain and fevers 2/2 to cholangitis 2/2 to choledocholithiasis. ERCP performed with placement of a CBD stent (No sphincterotomy performed due to elevated INR). Course c/b Post-ERCP pancreatitis. Seen by surgery inpatient, so recommended outpatient bariatrics follow up for consideration for cholecystectomy. \par \yesenia Overall feels well without complaints. Reports he has some intermittent abdominal discomfort, but was recently diagnosed with a UTI for which he is undergoing treatment. \par \par Patient denies fevers, chills, chest pain, SOB, nausea, vomiting, diarrhea, melena, hematochezia, hematemesis, dysphagia, odynophagia, headache, dizziness and recent travel.  [de-identified] : 8/19/22\par - A moderate amount of food in stomach + duodenum concerning for possible gastroparesis\par - PD injected. Suspicion for pancreatic divisium. All contrast drained from the PD\par - Choledocholithiasis\par - One plastic stent (10F x 7cm straight ) placed in setting of cholangitis, good bile flow from stent

## 2022-10-06 NOTE — REVIEW OF SYSTEMS
[As Noted in HPI] : as noted in HPI [Fever] : no fever [Chills] : no chills [Feeling Poorly] : not feeling poorly [Eye Pain] : no eye pain [Scleral Icterus (Yellow Eyes)] : no scleral icterus [Sore Throat] : no sore throat [Hoarseness] : no hoarseness [Chest Pain] : no chest pain [Palpitations] : no palpitations [Lower Ext Edema (lower leg swelling)] : no lower extremity edema [Shortness Of Breath] : no shortness of breath [Cough] : no cough [SOB on Exertion] : no shortness of breath during exertion [Joint Swelling] : no joint swelling [Limb Swelling] : no limb swelling [Skin Lesions] : no skin lesions [Jaundice (yellowing of skin)] : no jaundice [Dizziness] : no dizziness [Fainting] : no fainting [Anxiety] : no anxiety [Depression] : no depression [Muscle Weakness] : no muscle weakness [Deepening of voice] : no deepening of voice [Easy Bleeding] : no tendency for easy bleeding [Easy Bruising] : no tendency for easy bruising

## 2022-10-06 NOTE — PHYSICAL EXAM
[Alert] : alert [Normal Voice/Communication] : normal voice/communication [Healthy Appearing] : healthy appearing [No Acute Distress] : no acute distress [Sclera] : the sclera and conjunctiva were normal [Hearing Threshold Finger Rub Not [place]] : hearing was normal [Normal Lips/Gums] : the lips and gums were normal [Oropharynx] : the oropharynx was normal [Normal Appearance] : the appearance of the neck was normal [No Neck Mass] : no neck mass was observed [No Respiratory Distress] : no respiratory distress [No Acc Muscle Use] : no accessory muscle use [Respiration, Rhythm And Depth] : normal respiratory rhythm and effort [Heart Rate And Rhythm] : heart rate was normal and rhythm regular [Normal S1, S2] : normal S1 and S2 [Murmurs] : no murmurs [No Edema] : no edema [Bowel Sounds] : normal bowel sounds [Abdomen Tenderness] : non-tender [No Masses] : no abdominal mass palpated [Abdomen Soft] : soft [Cervical Lymph Nodes Enlarged Posterior Bilaterally] : no posterior cervical lymphadenopathy [Cervical Lymph Nodes Enlarged Anterior Bilaterally] : no anterior cervical lymphadenopathy [No CVA Tenderness] : no CVA  tenderness [No Spinal Tenderness] : no spinal tenderness [Involuntary Movements] : no involuntary movements were seen [Normal Color / Pigmentation] : normal skin color and pigmentation [Skin Lesions] : no skin lesions [Oriented To Time, Place, And Person] : oriented to person, place, and time [Normal Affect] : the affect was normal [Normal Mood] : the mood was normal [Wheelchair] : Patient in wheelchair [No Focal Deficits] : no focal deficits [de-identified] : Addendum: Wheelchair

## 2022-10-19 LAB — SARS-COV-2 N GENE NPH QL NAA+PROBE: NOT DETECTED

## 2022-10-21 ENCOUNTER — OUTPATIENT (OUTPATIENT)
Dept: OUTPATIENT SERVICES | Facility: HOSPITAL | Age: 72
LOS: 1 days | End: 2022-10-21

## 2022-10-21 ENCOUNTER — NON-APPOINTMENT (OUTPATIENT)
Age: 72
End: 2022-10-21

## 2022-10-21 VITALS
DIASTOLIC BLOOD PRESSURE: 78 MMHG | TEMPERATURE: 97 F | RESPIRATION RATE: 16 BRPM | HEART RATE: 76 BPM | SYSTOLIC BLOOD PRESSURE: 117 MMHG | OXYGEN SATURATION: 96 %

## 2022-10-21 DIAGNOSIS — K80.50 CALCULUS OF BILE DUCT WITHOUT CHOLANGITIS OR CHOLECYSTITIS WITHOUT OBSTRUCTION: ICD-10-CM

## 2022-10-21 DIAGNOSIS — I10 ESSENTIAL (PRIMARY) HYPERTENSION: ICD-10-CM

## 2022-10-21 DIAGNOSIS — G47.33 OBSTRUCTIVE SLEEP APNEA (ADULT) (PEDIATRIC): ICD-10-CM

## 2022-10-21 DIAGNOSIS — Z98.890 OTHER SPECIFIED POSTPROCEDURAL STATES: Chronic | ICD-10-CM

## 2022-10-21 DIAGNOSIS — K83.09 OTHER CHOLANGITIS: ICD-10-CM

## 2022-10-21 DIAGNOSIS — E03.9 HYPOTHYROIDISM, UNSPECIFIED: ICD-10-CM

## 2022-10-21 DIAGNOSIS — I48.91 UNSPECIFIED ATRIAL FIBRILLATION: ICD-10-CM

## 2022-10-21 LAB
A1C WITH ESTIMATED AVERAGE GLUCOSE RESULT: 5.3 % — SIGNIFICANT CHANGE UP (ref 4–5.6)
ALBUMIN SERPL ELPH-MCNC: 4.2 G/DL — SIGNIFICANT CHANGE UP (ref 3.3–5)
ALP SERPL-CCNC: 262 U/L — HIGH (ref 40–120)
ALT FLD-CCNC: 262 U/L — HIGH (ref 4–41)
ANION GAP SERPL CALC-SCNC: 14 MMOL/L — SIGNIFICANT CHANGE UP (ref 7–14)
APTT BLD: 52.1 SEC — HIGH (ref 27–36.3)
AST SERPL-CCNC: 280 U/L — HIGH (ref 4–40)
BILIRUB SERPL-MCNC: 4.6 MG/DL — HIGH (ref 0.2–1.2)
BUN SERPL-MCNC: 12 MG/DL — SIGNIFICANT CHANGE UP (ref 7–23)
CALCIUM SERPL-MCNC: 10.1 MG/DL — SIGNIFICANT CHANGE UP (ref 8.4–10.5)
CHLORIDE SERPL-SCNC: 98 MMOL/L — SIGNIFICANT CHANGE UP (ref 98–107)
CO2 SERPL-SCNC: 23 MMOL/L — SIGNIFICANT CHANGE UP (ref 22–31)
CREAT SERPL-MCNC: 0.94 MG/DL — SIGNIFICANT CHANGE UP (ref 0.5–1.3)
EGFR: 86 ML/MIN/1.73M2 — SIGNIFICANT CHANGE UP
ESTIMATED AVERAGE GLUCOSE: 105 — SIGNIFICANT CHANGE UP
GLUCOSE SERPL-MCNC: 132 MG/DL — HIGH (ref 70–99)
HCT VFR BLD CALC: 48.2 % — SIGNIFICANT CHANGE UP (ref 39–50)
HGB BLD-MCNC: 16.9 G/DL — SIGNIFICANT CHANGE UP (ref 13–17)
INR BLD: 1.95 RATIO — HIGH (ref 0.88–1.16)
MCHC RBC-ENTMCNC: 28.7 PG — SIGNIFICANT CHANGE UP (ref 27–34)
MCHC RBC-ENTMCNC: 35.1 GM/DL — SIGNIFICANT CHANGE UP (ref 32–36)
MCV RBC AUTO: 82 FL — SIGNIFICANT CHANGE UP (ref 80–100)
NRBC # BLD: 0 /100 WBCS — SIGNIFICANT CHANGE UP (ref 0–0)
NRBC # FLD: 0 K/UL — SIGNIFICANT CHANGE UP (ref 0–0)
PLATELET # BLD AUTO: 192 K/UL — SIGNIFICANT CHANGE UP (ref 150–400)
POTASSIUM SERPL-MCNC: 4 MMOL/L — SIGNIFICANT CHANGE UP (ref 3.5–5.3)
POTASSIUM SERPL-SCNC: 4 MMOL/L — SIGNIFICANT CHANGE UP (ref 3.5–5.3)
PROT SERPL-MCNC: 7.9 G/DL — SIGNIFICANT CHANGE UP (ref 6–8.3)
PROTHROM AB SERPL-ACNC: 22.8 SEC — HIGH (ref 10.5–13.4)
RBC # BLD: 5.88 M/UL — HIGH (ref 4.2–5.8)
RBC # FLD: 14.6 % — HIGH (ref 10.3–14.5)
SODIUM SERPL-SCNC: 135 MMOL/L — SIGNIFICANT CHANGE UP (ref 135–145)
WBC # BLD: 5.69 K/UL — SIGNIFICANT CHANGE UP (ref 3.8–10.5)
WBC # FLD AUTO: 5.69 K/UL — SIGNIFICANT CHANGE UP (ref 3.8–10.5)

## 2022-10-21 PROCEDURE — 93010 ELECTROCARDIOGRAM REPORT: CPT

## 2022-10-21 NOTE — H&P PST ADULT - HISTORY OF PRESENT ILLNESS
72 year old male with hx cholangitis s/p ERCP with stent placement 8/2022. Pt presents today for presurgical evaluation for Endoscopic Retrograde Cholangiopancreatography.

## 2022-10-21 NOTE — H&P PST ADULT - PROBLEM SELECTOR PLAN 2
Pt is going for cardiac clearance per surgeon's request - requested by PST as well (pt with low METS/ need Coumadin plan).

## 2022-10-21 NOTE — H&P PST ADULT - GENERAL COMMENTS
reports about 25 lbs weight loss since hospitalization in 8/2022 - has cut down on fats/working with nutritionist

## 2022-10-21 NOTE — H&P PST ADULT - PROBLEM SELECTOR PLAN 1
Pt scheduled for surgery on 10/28/22.  Pre-op instructions provided. Pt verbalized understanding.   Pepcid provided for GI prophylaxis.  Order placed for preop COVID PCR testing.  Please weigh on admission - pt unable to stand for height/weight at PST - pt states he is about 6'2" and 353 lbs (which gives a BMI of 45.3)- OR booking notified.

## 2022-10-21 NOTE — H&P PST ADULT - SOURCE OF INFORMATION, PROFILE
Pt updated on plan of care. Pt states she has had a few sips of water that have gone down without any complications while here in the ER   patient

## 2022-10-21 NOTE — H&P PST ADULT - NSICDXPASTMEDICALHX_GEN_ALL_CORE_FT
PAST MEDICAL HISTORY:  Afib on coumadin    Anxiety     BPH (benign prostatic hyperplasia)     Chronic Gout     DM (diabetes mellitus)     Dyslipidemia     H/O cholangitis     History of Obesity     History of Transient Ischemic Attack (TIA) approx 1994    Hypertension     Hypothyroid     Multiple falls now wheelchair bound    Obstructive Sleep Apnea CPAP day

## 2022-10-26 LAB — SARS-COV-2 N GENE NPH QL NAA+PROBE: NOT DETECTED

## 2022-10-28 ENCOUNTER — OUTPATIENT (OUTPATIENT)
Dept: OUTPATIENT SERVICES | Facility: HOSPITAL | Age: 72
LOS: 1 days | Discharge: HOME | End: 2022-10-28

## 2022-10-28 ENCOUNTER — APPOINTMENT (OUTPATIENT)
Dept: GASTROENTEROLOGY | Facility: HOSPITAL | Age: 72
End: 2022-10-28

## 2022-10-28 VITALS
SYSTOLIC BLOOD PRESSURE: 124 MMHG | RESPIRATION RATE: 20 BRPM | TEMPERATURE: 97 F | OXYGEN SATURATION: 98 % | HEART RATE: 69 BPM | HEIGHT: 73 IN | DIASTOLIC BLOOD PRESSURE: 80 MMHG | WEIGHT: 315 LBS

## 2022-10-28 VITALS
HEART RATE: 79 BPM | OXYGEN SATURATION: 95 % | DIASTOLIC BLOOD PRESSURE: 78 MMHG | RESPIRATION RATE: 16 BRPM | SYSTOLIC BLOOD PRESSURE: 115 MMHG

## 2022-10-28 DIAGNOSIS — Z98.890 OTHER SPECIFIED POSTPROCEDURAL STATES: Chronic | ICD-10-CM

## 2022-10-28 DIAGNOSIS — K80.50 CALCULUS OF BILE DUCT WITHOUT CHOLANGITIS OR CHOLECYSTITIS WITHOUT OBSTRUCTION: ICD-10-CM

## 2022-10-28 LAB
GLUCOSE BLDC GLUCOMTR-MCNC: 137 MG/DL — HIGH (ref 70–99)
GLUCOSE BLDC GLUCOMTR-MCNC: 147 MG/DL — HIGH (ref 70–99)

## 2022-10-28 PROCEDURE — 43260 ERCP W/SPECIMEN COLLECTION: CPT | Mod: 52,GC

## 2022-10-28 DEVICE — GWIRE JAGTOME REVOLUTION RX 260CM/0.025IN: Type: IMPLANTABLE DEVICE | Status: FUNCTIONAL

## 2022-10-28 NOTE — ASU PATIENT PROFILE, ADULT - FALL HARM RISK - RISK INTERVENTIONS

## 2022-10-28 NOTE — ASU PATIENT PROFILE, ADULT - CENTRAL VENOUS CATHETER
Consent: The patient's consent was obtained including but not limited to risks of crusting, scabbing, blistering, scarring, darker or lighter pigmentary change, recurrence, incomplete removal and infection. no

## 2022-11-01 ENCOUNTER — NON-APPOINTMENT (OUTPATIENT)
Age: 72
End: 2022-11-01

## 2022-11-01 PROBLEM — E11.9 TYPE 2 DIABETES MELLITUS WITHOUT COMPLICATIONS: Chronic | Status: ACTIVE | Noted: 2022-10-21

## 2022-11-01 PROBLEM — Z87.19 PERSONAL HISTORY OF OTHER DISEASES OF THE DIGESTIVE SYSTEM: Chronic | Status: ACTIVE | Noted: 2022-10-21

## 2022-11-01 PROBLEM — R29.6 REPEATED FALLS: Chronic | Status: ACTIVE | Noted: 2022-10-21

## 2022-11-16 ENCOUNTER — OUTPATIENT (OUTPATIENT)
Dept: OUTPATIENT SERVICES | Facility: HOSPITAL | Age: 72
LOS: 1 days | Discharge: HOME | End: 2022-11-16

## 2022-11-16 ENCOUNTER — APPOINTMENT (OUTPATIENT)
Dept: GASTROENTEROLOGY | Facility: HOSPITAL | Age: 72
End: 2022-11-16

## 2022-11-16 VITALS
HEART RATE: 87 BPM | RESPIRATION RATE: 16 BRPM | SYSTOLIC BLOOD PRESSURE: 117 MMHG | DIASTOLIC BLOOD PRESSURE: 89 MMHG | OXYGEN SATURATION: 95 %

## 2022-11-16 VITALS
RESPIRATION RATE: 20 BRPM | TEMPERATURE: 97 F | DIASTOLIC BLOOD PRESSURE: 66 MMHG | HEART RATE: 83 BPM | OXYGEN SATURATION: 95 % | HEIGHT: 73 IN | SYSTOLIC BLOOD PRESSURE: 113 MMHG | WEIGHT: 315 LBS

## 2022-11-16 DIAGNOSIS — Z98.890 OTHER SPECIFIED POSTPROCEDURAL STATES: Chronic | ICD-10-CM

## 2022-11-16 DIAGNOSIS — K80.50 CALCULUS OF BILE DUCT WITHOUT CHOLANGITIS OR CHOLECYSTITIS WITHOUT OBSTRUCTION: ICD-10-CM

## 2022-11-16 LAB
GLUCOSE BLDC GLUCOMTR-MCNC: 123 MG/DL — HIGH (ref 70–99)
GLUCOSE BLDC GLUCOMTR-MCNC: 148 MG/DL — HIGH (ref 70–99)

## 2022-11-16 PROCEDURE — 43235 EGD DIAGNOSTIC BRUSH WASH: CPT

## 2022-11-22 ENCOUNTER — OUTPATIENT (OUTPATIENT)
Dept: OUTPATIENT SERVICES | Facility: HOSPITAL | Age: 72
LOS: 1 days | End: 2022-11-22
Payer: COMMERCIAL

## 2022-11-22 ENCOUNTER — NON-APPOINTMENT (OUTPATIENT)
Age: 72
End: 2022-11-22

## 2022-11-22 ENCOUNTER — APPOINTMENT (OUTPATIENT)
Dept: RADIOLOGY | Facility: CLINIC | Age: 72
End: 2022-11-22

## 2022-11-22 DIAGNOSIS — K80.50 CALCULUS OF BILE DUCT WITHOUT CHOLANGITIS OR CHOLECYSTITIS WITHOUT OBSTRUCTION: ICD-10-CM

## 2022-11-22 DIAGNOSIS — Z98.890 OTHER SPECIFIED POSTPROCEDURAL STATES: Chronic | ICD-10-CM

## 2022-11-22 PROCEDURE — 74019 RADEX ABDOMEN 2 VIEWS: CPT | Mod: 26

## 2022-11-22 PROCEDURE — 74019 RADEX ABDOMEN 2 VIEWS: CPT

## 2022-11-28 ENCOUNTER — NON-APPOINTMENT (OUTPATIENT)
Age: 72
End: 2022-11-28

## 2022-11-29 ENCOUNTER — OFFICE (OUTPATIENT)
Dept: URBAN - METROPOLITAN AREA CLINIC 6 | Facility: CLINIC | Age: 72
Setting detail: OPHTHALMOLOGY
End: 2022-11-29
Payer: COMMERCIAL

## 2022-11-29 DIAGNOSIS — H02.403: ICD-10-CM

## 2022-11-29 DIAGNOSIS — H01.005: ICD-10-CM

## 2022-11-29 DIAGNOSIS — H52.4: ICD-10-CM

## 2022-11-29 DIAGNOSIS — E11.9: ICD-10-CM

## 2022-11-29 DIAGNOSIS — H01.004: ICD-10-CM

## 2022-11-29 DIAGNOSIS — H01.002: ICD-10-CM

## 2022-11-29 DIAGNOSIS — H02.831: ICD-10-CM

## 2022-11-29 DIAGNOSIS — D31.32: ICD-10-CM

## 2022-11-29 DIAGNOSIS — H25.13: ICD-10-CM

## 2022-11-29 DIAGNOSIS — H02.834: ICD-10-CM

## 2022-11-29 DIAGNOSIS — H01.001: ICD-10-CM

## 2022-11-29 PROCEDURE — 92015 DETERMINE REFRACTIVE STATE: CPT | Performed by: OPHTHALMOLOGY

## 2022-11-29 PROCEDURE — 92250 FUNDUS PHOTOGRAPHY W/I&R: CPT | Performed by: OPHTHALMOLOGY

## 2022-11-29 PROCEDURE — 92014 COMPRE OPH EXAM EST PT 1/>: CPT | Performed by: OPHTHALMOLOGY

## 2022-11-29 ASSESSMENT — REFRACTION_CURRENTRX
OS_VPRISM_DIRECTION: PROGS
OS_ADD: +2.00
OS_OVR_VA: 20/
OD_AXIS: 17
OS_SPHERE: +0.50
OD_SPHERE: -2.50
OS_SPHERE: -3.75
OD_ADD: +2.00
OD_VPRISM_DIRECTION: PROGS
OD_AXIS: 105
OD_VPRISM_DIRECTION: PROGS
OS_VPRISM_DIRECTION: PROGS
OS_CYLINDER: -4.00
OS_ADD: +2.00
OS_CYLINDER: +3.25
OD_CYLINDER: -1.75
OS_AXIS: 80
OS_AXIS: 168
OD_CYLINDER: +2.00
OD_SPHERE: PLANO
OS_OVR_VA: 20/
OD_OVR_VA: 20/
OD_OVR_VA: 20/
OD_ADD: +2.00

## 2022-11-29 ASSESSMENT — AXIALLENGTH_DERIVED
OS_AL: 25.3244
OD_AL: 36.67
OD_AL: 37.76
OS_AL: 24.6154
OD_AL: 36.55
OS_AL: 24.6154

## 2022-11-29 ASSESSMENT — REFRACTION_AUTOREFRACTION
OS_AXIS: 180
OD_AXIS: 20
OD_SPHERE: +0.25
OD_CYLINDER: -1.50
OS_CYLINDER: -3.00
OS_SPHERE: +0.75

## 2022-11-29 ASSESSMENT — REFRACTION_MANIFEST
OS_VA1: 20/25-
OS_AXIS: 80
OD_ADD: +2.50
OD_VA1: 20/20
OS_CYLINDER: +3.75
OS_ADD: +2.50
OD_CYLINDER: -1.25
OS_AXIS: 180
OD_SPHERE: -3.00
OS_VA1: 20/20-
OD_VA1: 20/20-3
OD_AXIS: 95
OS_ADD: +2.50
OD_CYLINDER: +2.75
OS_SPHERE: -4.25
OD_ADD: +2.50
OS_CYLINDER: -3.00
OD_AXIS: 20
OD_SPHERE: +0.25
OS_SPHERE: +0.75

## 2022-11-29 ASSESSMENT — VISUAL ACUITY
OD_BCVA: 20/25
OS_BCVA: 20/25-

## 2022-11-29 ASSESSMENT — TONOMETRY
OD_IOP_MMHG: 17
OS_IOP_MMHG: 17

## 2022-11-29 ASSESSMENT — LID POSITION - DERMATOCHALASIS
OD_DERMATOCHALASIS: 1+
OS_DERMATOCHALASIS: 1+

## 2022-11-29 ASSESSMENT — CONFRONTATIONAL VISUAL FIELD TEST (CVF)
OS_FINDINGS: FULL
OD_FINDINGS: FULL

## 2022-11-29 ASSESSMENT — LID POSITION - PTOSIS
OD_PTOSIS: 1+
OS_PTOSIS: 1+

## 2022-11-29 ASSESSMENT — KERATOMETRY
OS_K2POWER_DIOPTERS: 42.75
OS_K1POWER_DIOPTERS: 40.50
OD_K2POWER_DIOPTERS: 1.50
OD_AXISANGLE_DEGREES: 105
OS_AXISANGLE_DEGREES: 86
OD_K1POWER_DIOPTERS: 40.75
METHOD_AUTO_MANUAL: AUTO

## 2022-11-29 ASSESSMENT — LID EXAM ASSESSMENTS
OD_BLEPHARITIS: 1+ 2+
OS_BLEPHARITIS: 1+ 2+

## 2022-11-29 ASSESSMENT — SPHEQUIV_DERIVED
OS_SPHEQUIV: -0.75
OS_SPHEQUIV: -0.75
OD_SPHEQUIV: -0.5
OD_SPHEQUIV: -1.625
OD_SPHEQUIV: -0.375
OS_SPHEQUIV: -2.375

## 2022-12-08 ENCOUNTER — NON-APPOINTMENT (OUTPATIENT)
Age: 72
End: 2022-12-08

## 2022-12-08 RX ORDER — LEVOFLOXACIN 500 MG/1
500 TABLET, FILM COATED ORAL DAILY
Qty: 12 | Refills: 0 | Status: DISCONTINUED | COMMUNITY
Start: 2022-10-21 | End: 2022-12-08

## 2022-12-09 ENCOUNTER — INPATIENT (INPATIENT)
Facility: HOSPITAL | Age: 72
LOS: 8 days | Discharge: HOME HEALTH | End: 2022-12-18
Attending: HOSPITALIST | Admitting: HOSPITALIST

## 2022-12-09 VITALS
HEART RATE: 70 BPM | OXYGEN SATURATION: 100 % | TEMPERATURE: 98 F | SYSTOLIC BLOOD PRESSURE: 133 MMHG | RESPIRATION RATE: 16 BRPM | DIASTOLIC BLOOD PRESSURE: 82 MMHG | HEIGHT: 73 IN

## 2022-12-09 DIAGNOSIS — E03.9 HYPOTHYROIDISM, UNSPECIFIED: ICD-10-CM

## 2022-12-09 DIAGNOSIS — Z98.890 OTHER SPECIFIED POSTPROCEDURAL STATES: Chronic | ICD-10-CM

## 2022-12-09 DIAGNOSIS — I48.91 UNSPECIFIED ATRIAL FIBRILLATION: ICD-10-CM

## 2022-12-09 DIAGNOSIS — R73.03 PREDIABETES: ICD-10-CM

## 2022-12-09 DIAGNOSIS — K80.51 CALCULUS OF BILE DUCT WITHOUT CHOLANGITIS OR CHOLECYSTITIS WITH OBSTRUCTION: ICD-10-CM

## 2022-12-09 DIAGNOSIS — Z29.9 ENCOUNTER FOR PROPHYLACTIC MEASURES, UNSPECIFIED: ICD-10-CM

## 2022-12-09 DIAGNOSIS — K80.20 CALCULUS OF GALLBLADDER WITHOUT CHOLECYSTITIS WITHOUT OBSTRUCTION: ICD-10-CM

## 2022-12-09 DIAGNOSIS — I10 ESSENTIAL (PRIMARY) HYPERTENSION: ICD-10-CM

## 2022-12-09 LAB
ALBUMIN SERPL ELPH-MCNC: 2.9 G/DL — LOW (ref 3.3–5)
ALP SERPL-CCNC: 261 U/L — HIGH (ref 40–120)
ALT FLD-CCNC: 89 U/L — HIGH (ref 4–41)
ANION GAP SERPL CALC-SCNC: 16 MMOL/L — HIGH (ref 7–14)
APTT BLD: 57.4 SEC — HIGH (ref 27–36.3)
AST SERPL-CCNC: 57 U/L — HIGH (ref 4–40)
BASE EXCESS BLDV CALC-SCNC: -7 MMOL/L — LOW (ref -2–3)
BASOPHILS # BLD AUTO: 0 K/UL — SIGNIFICANT CHANGE UP (ref 0–0.2)
BASOPHILS NFR BLD AUTO: 0 % — SIGNIFICANT CHANGE UP (ref 0–2)
BILIRUB DIRECT SERPL-MCNC: 6.3 MG/DL — HIGH (ref 0–0.3)
BILIRUB INDIRECT FLD-MCNC: 1.6 MG/DL — HIGH (ref 0–1)
BILIRUB SERPL-MCNC: 7.9 MG/DL — HIGH (ref 0.2–1.2)
BILIRUB SERPL-MCNC: 7.9 MG/DL — HIGH (ref 0.2–1.2)
BLD GP AB SCN SERPL QL: NEGATIVE — SIGNIFICANT CHANGE UP
BLOOD GAS VENOUS COMPREHENSIVE RESULT: SIGNIFICANT CHANGE UP
BUN SERPL-MCNC: 17 MG/DL — SIGNIFICANT CHANGE UP (ref 7–23)
CALCIUM SERPL-MCNC: 9.6 MG/DL — SIGNIFICANT CHANGE UP (ref 8.4–10.5)
CHLORIDE BLDV-SCNC: 101 MMOL/L — SIGNIFICANT CHANGE UP (ref 96–108)
CHLORIDE SERPL-SCNC: 96 MMOL/L — LOW (ref 98–107)
CO2 BLDV-SCNC: 19.7 MMOL/L — LOW (ref 22–26)
CO2 SERPL-SCNC: 17 MMOL/L — LOW (ref 22–31)
CREAT SERPL-MCNC: 1 MG/DL — SIGNIFICANT CHANGE UP (ref 0.5–1.3)
EGFR: 79 ML/MIN/1.73M2 — SIGNIFICANT CHANGE UP
EOSINOPHIL # BLD AUTO: 0 K/UL — SIGNIFICANT CHANGE UP (ref 0–0.5)
EOSINOPHIL NFR BLD AUTO: 0 % — SIGNIFICANT CHANGE UP (ref 0–6)
FLUAV AG NPH QL: SIGNIFICANT CHANGE UP
FLUBV AG NPH QL: SIGNIFICANT CHANGE UP
GAS PNL BLDV: 128 MMOL/L — LOW (ref 136–145)
GIANT PLATELETS BLD QL SMEAR: PRESENT — SIGNIFICANT CHANGE UP
GLUCOSE BLDC GLUCOMTR-MCNC: 120 MG/DL — HIGH (ref 70–99)
GLUCOSE BLDC GLUCOMTR-MCNC: 145 MG/DL — HIGH (ref 70–99)
GLUCOSE BLDC GLUCOMTR-MCNC: 157 MG/DL — HIGH (ref 70–99)
GLUCOSE BLDV-MCNC: 144 MG/DL — HIGH (ref 70–99)
GLUCOSE SERPL-MCNC: 152 MG/DL — HIGH (ref 70–99)
HCO3 BLDV-SCNC: 19 MMOL/L — LOW (ref 22–29)
HCT VFR BLD CALC: 45.8 % — SIGNIFICANT CHANGE UP (ref 39–50)
HCT VFR BLDA CALC: 47 % — SIGNIFICANT CHANGE UP (ref 39–51)
HGB BLD CALC-MCNC: 15.8 G/DL — SIGNIFICANT CHANGE UP (ref 13–17)
HGB BLD-MCNC: 15.9 G/DL — SIGNIFICANT CHANGE UP (ref 13–17)
IANC: 10 K/UL — HIGH (ref 1.8–7.4)
INR BLD: 1.66 RATIO — HIGH (ref 0.88–1.16)
INR BLD: 3.99 RATIO — HIGH (ref 0.88–1.16)
LACTATE BLDV-MCNC: 5 MMOL/L — CRITICAL HIGH (ref 0.5–2)
LACTATE SERPL-SCNC: 4 MMOL/L — CRITICAL HIGH (ref 0.5–2)
LIDOCAIN IGE QN: >3000 U/L — SIGNIFICANT CHANGE UP (ref 7–60)
LYMPHOCYTES # BLD AUTO: 0.39 K/UL — LOW (ref 1–3.3)
LYMPHOCYTES # BLD AUTO: 3.5 % — LOW (ref 13–44)
MAGNESIUM SERPL-MCNC: 1.4 MG/DL — LOW (ref 1.6–2.6)
MANUAL SMEAR VERIFICATION: SIGNIFICANT CHANGE UP
MCHC RBC-ENTMCNC: 28.8 PG — SIGNIFICANT CHANGE UP (ref 27–34)
MCHC RBC-ENTMCNC: 34.7 GM/DL — SIGNIFICANT CHANGE UP (ref 32–36)
MCV RBC AUTO: 82.8 FL — SIGNIFICANT CHANGE UP (ref 80–100)
MONOCYTES # BLD AUTO: 0.19 K/UL — SIGNIFICANT CHANGE UP (ref 0–0.9)
MONOCYTES NFR BLD AUTO: 1.7 % — LOW (ref 2–14)
NEUTROPHILS # BLD AUTO: 10.34 K/UL — HIGH (ref 1.8–7.4)
NEUTROPHILS NFR BLD AUTO: 93 % — HIGH (ref 43–77)
PCO2 BLDV: 37 MMHG — LOW (ref 42–55)
PH BLDV: 7.31 — LOW (ref 7.32–7.43)
PHOSPHATE SERPL-MCNC: 2.5 MG/DL — SIGNIFICANT CHANGE UP (ref 2.5–4.5)
PLAT MORPH BLD: NORMAL — SIGNIFICANT CHANGE UP
PLATELET # BLD AUTO: 105 K/UL — LOW (ref 150–400)
PLATELET COUNT - ESTIMATE: ABNORMAL
PO2 BLDV: 51 MMHG — SIGNIFICANT CHANGE UP
POTASSIUM BLDV-SCNC: 3.5 MMOL/L — SIGNIFICANT CHANGE UP (ref 3.5–5.1)
POTASSIUM SERPL-MCNC: 3.7 MMOL/L — SIGNIFICANT CHANGE UP (ref 3.5–5.3)
POTASSIUM SERPL-SCNC: 3.7 MMOL/L — SIGNIFICANT CHANGE UP (ref 3.5–5.3)
PROT SERPL-MCNC: 6.9 G/DL — SIGNIFICANT CHANGE UP (ref 6–8.3)
PROTHROM AB SERPL-ACNC: 19.3 SEC — HIGH (ref 10.5–13.4)
PROTHROM AB SERPL-ACNC: 46.9 SEC — HIGH (ref 10.5–13.4)
RBC # BLD: 5.53 M/UL — SIGNIFICANT CHANGE UP (ref 4.2–5.8)
RBC # FLD: 15.2 % — HIGH (ref 10.3–14.5)
RBC BLD AUTO: NORMAL — SIGNIFICANT CHANGE UP
RH IG SCN BLD-IMP: NEGATIVE — SIGNIFICANT CHANGE UP
RH IG SCN BLD-IMP: NEGATIVE — SIGNIFICANT CHANGE UP
RSV RNA NPH QL NAA+NON-PROBE: SIGNIFICANT CHANGE UP
SAO2 % BLDV: 78.2 % — SIGNIFICANT CHANGE UP
SARS-COV-2 RNA SPEC QL NAA+PROBE: SIGNIFICANT CHANGE UP
SODIUM SERPL-SCNC: 129 MMOL/L — LOW (ref 135–145)
VARIANT LYMPHS # BLD: 1.8 % — SIGNIFICANT CHANGE UP (ref 0–6)
WBC # BLD: 11.12 K/UL — HIGH (ref 3.8–10.5)
WBC # FLD AUTO: 11.12 K/UL — HIGH (ref 3.8–10.5)

## 2022-12-09 PROCEDURE — 76705 ECHO EXAM OF ABDOMEN: CPT | Mod: 26

## 2022-12-09 PROCEDURE — 74328 X-RAY BILE DUCT ENDOSCOPY: CPT | Mod: 26

## 2022-12-09 PROCEDURE — 99285 EMERGENCY DEPT VISIT HI MDM: CPT

## 2022-12-09 PROCEDURE — 99223 1ST HOSP IP/OBS HIGH 75: CPT

## 2022-12-09 PROCEDURE — 99233 SBSQ HOSP IP/OBS HIGH 50: CPT | Mod: 25

## 2022-12-09 PROCEDURE — 43274 ERCP DUCT STENT PLACEMENT: CPT | Mod: GC

## 2022-12-09 PROCEDURE — 71045 X-RAY EXAM CHEST 1 VIEW: CPT | Mod: 26

## 2022-12-09 PROCEDURE — 74177 CT ABD & PELVIS W/CONTRAST: CPT | Mod: 26,MA

## 2022-12-09 DEVICE — STENT BIL ADVANIX 10FRX7CM PRELOADED: Type: IMPLANTABLE DEVICE | Status: FUNCTIONAL

## 2022-12-09 DEVICE — HYDRATOME 44: Type: IMPLANTABLE DEVICE | Status: FUNCTIONAL

## 2022-12-09 DEVICE — JAGWIRE HIGH PERFORMANCE GUIDEWIRE .035 LENGTH 260 STRAIGHT: Type: IMPLANTABLE DEVICE | Status: FUNCTIONAL

## 2022-12-09 RX ORDER — MORPHINE SULFATE 50 MG/1
4 CAPSULE, EXTENDED RELEASE ORAL ONCE
Refills: 0 | Status: DISCONTINUED | OUTPATIENT
Start: 2022-12-09 | End: 2022-12-09

## 2022-12-09 RX ORDER — POTASSIUM CHLORIDE 20 MEQ
1 PACKET (EA) ORAL
Qty: 0 | Refills: 0 | DISCHARGE

## 2022-12-09 RX ORDER — TAMSULOSIN HYDROCHLORIDE 0.4 MG/1
0.4 CAPSULE ORAL AT BEDTIME
Refills: 0 | Status: DISCONTINUED | OUTPATIENT
Start: 2022-12-09 | End: 2022-12-18

## 2022-12-09 RX ORDER — FUROSEMIDE 40 MG
1 TABLET ORAL
Qty: 0 | Refills: 0 | DISCHARGE

## 2022-12-09 RX ORDER — SODIUM CHLORIDE 9 MG/ML
1000 INJECTION, SOLUTION INTRAVENOUS ONCE
Refills: 0 | Status: DISCONTINUED | OUTPATIENT
Start: 2022-12-09 | End: 2022-12-11

## 2022-12-09 RX ORDER — INSULIN LISPRO 100/ML
VIAL (ML) SUBCUTANEOUS
Refills: 0 | Status: DISCONTINUED | OUTPATIENT
Start: 2022-12-09 | End: 2022-12-12

## 2022-12-09 RX ORDER — ALLOPURINOL 300 MG
2 TABLET ORAL
Qty: 0 | Refills: 0 | DISCHARGE

## 2022-12-09 RX ORDER — TRAZODONE HCL 50 MG
1 TABLET ORAL
Qty: 0 | Refills: 0 | DISCHARGE

## 2022-12-09 RX ORDER — TRAZODONE HCL 50 MG
450 TABLET ORAL AT BEDTIME
Refills: 0 | Status: DISCONTINUED | OUTPATIENT
Start: 2022-12-09 | End: 2022-12-18

## 2022-12-09 RX ORDER — ACETAMINOPHEN 500 MG
1000 TABLET ORAL ONCE
Refills: 0 | Status: DISCONTINUED | OUTPATIENT
Start: 2022-12-09 | End: 2022-12-09

## 2022-12-09 RX ORDER — SODIUM CHLORIDE 9 MG/ML
1000 INJECTION, SOLUTION INTRAVENOUS
Refills: 0 | Status: DISCONTINUED | OUTPATIENT
Start: 2022-12-09 | End: 2022-12-12

## 2022-12-09 RX ORDER — CEFTRIAXONE 500 MG/1
2000 INJECTION, POWDER, FOR SOLUTION INTRAMUSCULAR; INTRAVENOUS ONCE
Refills: 0 | Status: COMPLETED | OUTPATIENT
Start: 2022-12-09 | End: 2022-12-09

## 2022-12-09 RX ORDER — DEXTROSE 50 % IN WATER 50 %
15 SYRINGE (ML) INTRAVENOUS ONCE
Refills: 0 | Status: DISCONTINUED | OUTPATIENT
Start: 2022-12-09 | End: 2022-12-12

## 2022-12-09 RX ORDER — TRAZODONE HCL 50 MG
3 TABLET ORAL
Qty: 0 | Refills: 0 | DISCHARGE

## 2022-12-09 RX ORDER — SODIUM CHLORIDE 9 MG/ML
1000 INJECTION INTRAMUSCULAR; INTRAVENOUS; SUBCUTANEOUS ONCE
Refills: 0 | Status: COMPLETED | OUTPATIENT
Start: 2022-12-09 | End: 2022-12-09

## 2022-12-09 RX ORDER — DEXTROSE 50 % IN WATER 50 %
25 SYRINGE (ML) INTRAVENOUS ONCE
Refills: 0 | Status: DISCONTINUED | OUTPATIENT
Start: 2022-12-09 | End: 2022-12-12

## 2022-12-09 RX ORDER — METOPROLOL TARTRATE 50 MG
2 TABLET ORAL
Qty: 0 | Refills: 0 | DISCHARGE

## 2022-12-09 RX ORDER — ATORVASTATIN CALCIUM 80 MG/1
20 TABLET, FILM COATED ORAL AT BEDTIME
Refills: 0 | Status: DISCONTINUED | OUTPATIENT
Start: 2022-12-09 | End: 2022-12-18

## 2022-12-09 RX ORDER — METRONIDAZOLE 500 MG
500 TABLET ORAL ONCE
Refills: 0 | Status: COMPLETED | OUTPATIENT
Start: 2022-12-09 | End: 2022-12-09

## 2022-12-09 RX ORDER — GLUCAGON INJECTION, SOLUTION 0.5 MG/.1ML
1 INJECTION, SOLUTION SUBCUTANEOUS ONCE
Refills: 0 | Status: DISCONTINUED | OUTPATIENT
Start: 2022-12-09 | End: 2022-12-18

## 2022-12-09 RX ORDER — ATORVASTATIN CALCIUM 80 MG/1
1 TABLET, FILM COATED ORAL
Qty: 0 | Refills: 0 | DISCHARGE

## 2022-12-09 RX ORDER — LANOLIN ALCOHOL/MO/W.PET/CERES
3 CREAM (GRAM) TOPICAL AT BEDTIME
Refills: 0 | Status: DISCONTINUED | OUTPATIENT
Start: 2022-12-09 | End: 2022-12-18

## 2022-12-09 RX ORDER — SODIUM CHLORIDE 9 MG/ML
500 INJECTION INTRAMUSCULAR; INTRAVENOUS; SUBCUTANEOUS ONCE
Refills: 0 | Status: COMPLETED | OUTPATIENT
Start: 2022-12-09 | End: 2022-12-09

## 2022-12-09 RX ORDER — MAGNESIUM SULFATE 500 MG/ML
1 VIAL (ML) INJECTION ONCE
Refills: 0 | Status: COMPLETED | OUTPATIENT
Start: 2022-12-09 | End: 2022-12-09

## 2022-12-09 RX ORDER — INFLUENZA VIRUS VACCINE 15; 15; 15; 15 UG/.5ML; UG/.5ML; UG/.5ML; UG/.5ML
0.7 SUSPENSION INTRAMUSCULAR ONCE
Refills: 0 | Status: DISCONTINUED | OUTPATIENT
Start: 2022-12-09 | End: 2022-12-18

## 2022-12-09 RX ORDER — ACETAMINOPHEN 500 MG
650 TABLET ORAL EVERY 6 HOURS
Refills: 0 | Status: DISCONTINUED | OUTPATIENT
Start: 2022-12-09 | End: 2022-12-18

## 2022-12-09 RX ORDER — ALLOPURINOL 300 MG
200 TABLET ORAL DAILY
Refills: 0 | Status: DISCONTINUED | OUTPATIENT
Start: 2022-12-09 | End: 2022-12-18

## 2022-12-09 RX ORDER — LEVOTHYROXINE SODIUM 125 MCG
112 TABLET ORAL DAILY
Refills: 0 | Status: DISCONTINUED | OUTPATIENT
Start: 2022-12-09 | End: 2022-12-18

## 2022-12-09 RX ORDER — SEMAGLUTIDE 0.68 MG/ML
0 INJECTION, SOLUTION SUBCUTANEOUS
Qty: 0 | Refills: 0 | DISCHARGE

## 2022-12-09 RX ORDER — DEXTROSE 50 % IN WATER 50 %
12.5 SYRINGE (ML) INTRAVENOUS ONCE
Refills: 0 | Status: DISCONTINUED | OUTPATIENT
Start: 2022-12-09 | End: 2022-12-12

## 2022-12-09 RX ORDER — METOPROLOL TARTRATE 50 MG
100 TABLET ORAL
Refills: 0 | Status: DISCONTINUED | OUTPATIENT
Start: 2022-12-09 | End: 2022-12-10

## 2022-12-09 RX ORDER — TAMSULOSIN HYDROCHLORIDE 0.4 MG/1
1 CAPSULE ORAL
Qty: 0 | Refills: 0 | DISCHARGE

## 2022-12-09 RX ORDER — PHYTONADIONE (VIT K1) 5 MG
10 TABLET ORAL ONCE
Refills: 0 | Status: COMPLETED | OUTPATIENT
Start: 2022-12-09 | End: 2022-12-09

## 2022-12-09 RX ORDER — NIFEDIPINE 30 MG
1 TABLET, EXTENDED RELEASE 24 HR ORAL
Qty: 0 | Refills: 0 | DISCHARGE

## 2022-12-09 RX ORDER — PROTHROMBIN COMPLEX CONCENTRATE (HUMAN) 25.5; 16.5; 24; 22; 22; 26 [IU]/ML; [IU]/ML; [IU]/ML; [IU]/ML; [IU]/ML; [IU]/ML
1500 POWDER, FOR SOLUTION INTRAVENOUS ONCE
Refills: 0 | Status: COMPLETED | OUTPATIENT
Start: 2022-12-09 | End: 2022-12-09

## 2022-12-09 RX ORDER — METOCLOPRAMIDE HCL 10 MG
10 TABLET ORAL ONCE
Refills: 0 | Status: COMPLETED | OUTPATIENT
Start: 2022-12-09 | End: 2022-12-09

## 2022-12-09 RX ORDER — METOPROLOL TARTRATE 50 MG
100 TABLET ORAL
Refills: 0 | Status: DISCONTINUED | OUTPATIENT
Start: 2022-12-09 | End: 2022-12-09

## 2022-12-09 RX ORDER — CHOLECALCIFEROL (VITAMIN D3) 125 MCG
1 CAPSULE ORAL
Qty: 0 | Refills: 0 | DISCHARGE

## 2022-12-09 RX ORDER — INSULIN LISPRO 100/ML
VIAL (ML) SUBCUTANEOUS AT BEDTIME
Refills: 0 | Status: DISCONTINUED | OUTPATIENT
Start: 2022-12-09 | End: 2022-12-12

## 2022-12-09 RX ORDER — MORPHINE SULFATE 50 MG/1
4 CAPSULE, EXTENDED RELEASE ORAL EVERY 4 HOURS
Refills: 0 | Status: DISCONTINUED | OUTPATIENT
Start: 2022-12-09 | End: 2022-12-14

## 2022-12-09 RX ADMIN — SODIUM CHLORIDE 1000 MILLILITER(S): 9 INJECTION INTRAMUSCULAR; INTRAVENOUS; SUBCUTANEOUS at 10:22

## 2022-12-09 RX ADMIN — PROTHROMBIN COMPLEX CONCENTRATE (HUMAN) 400 INTERNATIONAL UNIT(S): 25.5; 16.5; 24; 22; 22; 26 POWDER, FOR SOLUTION INTRAVENOUS at 15:32

## 2022-12-09 RX ADMIN — Medication 100 MILLIGRAM(S): at 21:58

## 2022-12-09 RX ADMIN — TAMSULOSIN HYDROCHLORIDE 0.4 MILLIGRAM(S): 0.4 CAPSULE ORAL at 21:58

## 2022-12-09 RX ADMIN — Medication 200 MILLIGRAM(S): at 21:59

## 2022-12-09 RX ADMIN — Medication 100 GRAM(S): at 12:27

## 2022-12-09 RX ADMIN — ATORVASTATIN CALCIUM 20 MILLIGRAM(S): 80 TABLET, FILM COATED ORAL at 21:58

## 2022-12-09 RX ADMIN — SODIUM CHLORIDE 500 MILLILITER(S): 9 INJECTION INTRAMUSCULAR; INTRAVENOUS; SUBCUTANEOUS at 11:58

## 2022-12-09 RX ADMIN — Medication 10 MILLIGRAM(S): at 14:11

## 2022-12-09 RX ADMIN — Medication 500 MILLIGRAM(S): at 12:29

## 2022-12-09 RX ADMIN — SODIUM CHLORIDE 1000 MILLILITER(S): 9 INJECTION INTRAMUSCULAR; INTRAVENOUS; SUBCUTANEOUS at 09:22

## 2022-12-09 RX ADMIN — CEFTRIAXONE 100 MILLIGRAM(S): 500 INJECTION, POWDER, FOR SOLUTION INTRAMUSCULAR; INTRAVENOUS at 10:58

## 2022-12-09 RX ADMIN — Medication 102 MILLIGRAM(S): at 16:16

## 2022-12-09 RX ADMIN — MORPHINE SULFATE 4 MILLIGRAM(S): 50 CAPSULE, EXTENDED RELEASE ORAL at 09:23

## 2022-12-09 RX ADMIN — Medication 100 MILLIGRAM(S): at 11:29

## 2022-12-09 RX ADMIN — SODIUM CHLORIDE 500 MILLILITER(S): 9 INJECTION INTRAMUSCULAR; INTRAVENOUS; SUBCUTANEOUS at 10:58

## 2022-12-09 RX ADMIN — MORPHINE SULFATE 4 MILLIGRAM(S): 50 CAPSULE, EXTENDED RELEASE ORAL at 09:53

## 2022-12-09 RX ADMIN — Medication 1 GRAM(S): at 13:27

## 2022-12-09 RX ADMIN — CEFTRIAXONE 2000 MILLIGRAM(S): 500 INJECTION, POWDER, FOR SOLUTION INTRAMUSCULAR; INTRAVENOUS at 11:28

## 2022-12-09 NOTE — H&P ADULT - NSHPLABSRESULTS_GEN_ALL_CORE
.  LABS:                         15.9   11.12 )-----------( 105      ( 09 Dec 2022 10:01 )             45.8     12-09    129<L>  |  96<L>  |  17  ----------------------------<  152<H>  3.7   |  17<L>  |  1.00    Ca    9.6      09 Dec 2022 10:01  Phos  2.5     12-09  Mg     1.40     12-09    TPro  6.9  /  Alb  2.9<L>  /  TBili  7.9<H>  /  DBili  6.3<H>  /  AST  57<H>  /  ALT  89<H>  /  AlkPhos  261<H>  12-09    PT/INR - ( 09 Dec 2022 16:15 )   PT: 19.3 sec;   INR: 1.66 ratio    PTT - ( 09 Dec 2022 09:10 )  PTT:57.4 sec      Lactate, Blood: 4.0 mmol/L (12-09 @ 12:35)    ACC: [id number] EXAM:  US ABDOMEN RT UPR QUADRANT                        FINDINGS:  Liver: Within normal limits.  Bile ducts: Common bile duct is dilated up to 1.6 cm in caliber. Mild   intrahepatic biliary ductal dilatation. No CBD stone identified, though   the distal CBD is not visualized.  Gallbladder: Distended. Gallbladder sludge. Gallstones were seen on prior   CT of 8/18/2022.  Pancreas: Poorly visualized.  Right kidney: 13.3 cm. No hydronephrosis. A right renal cyst is noted.  Ascites: None.  IVC: Visualized portions are within normal limits.    IMPRESSION:  1.  Biliary ductal dilatation; no CBD stone identified sonographically,   though the distal CBD is not visualized.  2.  Distended gallbladder with sludge.         CT ABDOMEN AND PELVIS IC                          PROCEDURE DATE:  12/09/2022          INTERPRETATION:  CLINICAL INFORMATION: Jaundice, abdominal pain. History   of biliary stent. Concern for biliary stent migration.    COMPARISON: CT abdomen and pelvis 8/18/2022    CONTRAST/COMPLICATIONS:  IV Contrast: Omnipaque 350  90 cc administered   10 cc discarded  Oral Contrast: NONE  Complications: None reported at time of study completion    PROCEDURE:  CT of the Abdomen and Pelvis was performed.  Noncontrast, Arterial and Delayed phases were acquired.  Sagittal and coronal reformats were performed.    FINDINGS:  LOWER CHEST: Coronary artery calcifications. Left basilar segmental   atelectasis. Right lower lobe linear atelectasis. 2 mm right upper lobe   nodule, unchanged since 8/18/2022.    LIVER: Within normal limits.  BILE DUCTS: Dilatation of the CBD, which measures up to 1.8 cm, with   stone in the distal CBD at the level of the ampulla measuring 8 mm (2:76)    GALLBLADDER: Distended. Cholelithiasis.    SPLEEN: Within normal limits.  PANCREAS: Within normal limits.  ADRENALS: Within normal limits.  KIDNEYS/URETERS: No hydronephrosis. Bilateral nonobstructing renal   calculi, largest measuring 4 mm. Bilateral renal cysts.    BLADDER: Within normal limits.  REPRODUCTIVE ORGANS: Prostate within normal limits.    BOWEL: No bowel obstruction. Appendix is normal.  PERITONEUM: No ascites.  VESSELS: Atherosclerotic changes.  RETROPERITONEUM/LYMPH NODES: No lymphadenopathy.  ABDOMINAL WALL: Within normal limits.  BONES: Degenerative changes. Healed left lateral eighth rib fracture.    IMPRESSION:  *  Cholelithiasis and choledocholithiasis.  *  CBD measures up to 18 mm, demonstrating an 8 mm stone at the level of   the ampulla.

## 2022-12-09 NOTE — H&P ADULT - PROBLEM SELECTOR PLAN 1
- 8mm stone in the ampulla on imaging and on presentation had elevation in bili to 7.9 (4.6 on 10/21), lipase >3000, and mild elevation in LFTs AST/ALT 57/89 (280/262 on 10/21), all consistent with choledocholithiasis with pancreatitis  - Pt with choledocholithiasis in 8/2022 s/p ERCP with stent placement, course complicated by post-ERCP pancreatitis and E. Coli bacteremia  - Pt was uncertain if the stent is still in place as his 2 follow up ERCPs were aborted and pt had xrays outpt to look for it (?) but they never were told the results, no stent seen on CT  - GI to take pt for urgent ERCP, as soon as INR decreased with Kcentra  - S/p IV fluids 1.5L NS and 1L LR in the ED  - Pain PRNs Tylenol 650mg and morphine 4mg IV - 8mm stone in the ampulla on imaging and on presentation had elevation in bili to 7.9 (4.6 on 10/21), lipase >3000, and mild elevation in LFTs AST/ALT 57/89 (280/262 on 10/21), all consistent with choledocholithiasis with pancreatitis  - Pt with choledocholithiasis in 8/2022 s/p ERCP with stent placement, course complicated by post-ERCP pancreatitis and E. Coli bacteremia  - Pt was uncertain if the stent is still in place as his 2 follow up ERCPs were aborted and pt had xrays outpt to look for it (?) but they never were told the results, no stent seen on CT  - GI to take pt for urgent ERCP, as soon as INR decreased with Kcentra (3.99 ->1.6)  - S/p IV fluids 1.5L NS and 1L LR in the ED  - Pain PRNs Tylenol 650mg and morphine 4mg IV  - Pt will likely need outpt cholecystectomy, given 2 episodes choledocholithiasis

## 2022-12-09 NOTE — H&P ADULT - NSHPREVIEWOFSYSTEMS_GEN_ALL_CORE
CONSTITUTIONAL - (+) fever, No weight change, No lightheadedness  SKIN - No rash  HEMATOLOGIC - No abnormal bleeding or bruising  EYES - No eye pain, No blurred vision  ENT - No change in hearing, No sore throat, No neck pain, No rhinorrhea, No ear pain  RESPIRATORY - No shortness of breath, No cough  CARDIAC -No chest pain, No palpitations  GI - (+) abdominal pain, (+) nausea, No vomiting, No diarrhea, No constipation  - No dysuria, no frequency, no hematuria.   MUSCULOSKELETAL - No joint pain, No swelling, No back pain  NEUROLOGIC - No numbness, No focal weakness, No headache, No dizziness

## 2022-12-09 NOTE — ED PROVIDER NOTE - CLINICAL SUMMARY MEDICAL DECISION MAKING FREE TEXT BOX
Lupe: GLENIS reina (coumadin), biliary duct stent, p/w jaundice, abd pain, tremors. Fever at home. No N/V. Concern for stent failure. Check labs. Admit for GI EGD stent replacement.

## 2022-12-09 NOTE — ED PROVIDER NOTE - NS ED ROS FT
Constitutional: no fevers, chills  HEENT: no HA, vision changes, rhinorrhea, sore throat  Cardiac: no chest pain, palpitations  Respiratory: +SOB, no cough or hemoptysis  GI: no n/v/d/c, +abd pain, no bloody or dark stools  : no dysuria, frequency, or hematuria  MSK: no joint pain, neck pain or back pain  Skin: no rashes, jaundice, pruritis  Neuro: no numbness/tingling, +weakness of lower extremitites - wheelchair bound at baseline with unchanged unsteady gait  ROS otherwise neg except per hpi

## 2022-12-09 NOTE — CONSULT NOTE ADULT - SUBJECTIVE AND OBJECTIVE BOX
HPI: 72M Hx morbid obesity (BMI 50), atrial fibrillation (on coumadin), HTN, BPH, hypothyroidism, cholangitis 2/2 choledocholithiasis s/p ERCP (8/2022) c/b post-ERCP pancreatitis, c/f possible gastroparesis 2/2 food retention on endoscopies, now presenting for fever, RUQ abdominal pain with elevated liver enzymes, lipase, leukocytosis c/f gallstone pancreatitis + cholangitis.     Pt reporting having a fever to 101 (measured) 2 days ago, accompanied by right sided abdominal pain (report 5-8/10 in severity) and worsening jaundice/scleral icterus. Denies n/v/d.     In ED, VSS (afebrile HD stable)  WBC 11.12, Hb 15.9,   INR 3.99   Na 129   Tbili 7.9 /  / AST 57 / ALT 89     AXR (11/25/22): biliary stent not visualized     EGD (11/16/22): A large amount of food (residue) in the stomach. Retained food in the duodenum. ERCP aborted due to impaired visualization.  ERCP (10/28/22): ERCP was aborted due to presence of a large amount of retained food in stomach.  ERCP (8/19/22): moderate amount of food in stomach + duodenum concerning for possible gastroparesis. PD injected. Suspicion for pancreatic divisium. All contrast drained from   the PD. Choledocholithiasis. One plastic stent (10F x 7cm straight ) placed in setting of cholangitis, good bile flow from stent      Allergies:  metformin (Unknown)      Home Medications:    Hospital Medications:  metroNIDAZOLE  IVPB 500 milliGRAM(s) IV Intermittent Once      PMHX/PSHX:  Dyslipidemia    Hypertension    Afib    Chronic Gout    Anxiety    History of Transient Ischemic Attack (TIA)    History of Obesity    Obstructive Sleep Apnea    Gout    Hypothyroid    BPH (benign prostatic hyperplasia)    H/O cholangitis    Multiple falls    Borderline diabetes    DM (diabetes mellitus)    History of Tonsillectomy    S/P ERCP        Family history:  No pertinent family history in first degree relatives    FH: myocardial infarction (Father)        Denies family history of colon cancer/polyps, stomach cancer/polyps, pancreatic cancer/masses, liver cancer/disease, ovarian cancer and endometrial cancer.    Social History:     Tob: Denies  EtOH: Denies  Illicit Drugs: Denies    ROS:     General:  No wt loss, fevers, chills, night sweats, fatigue  Eyes:  Good vision, no reported pain  ENT:  No sore throat, pain, runny nose, dysphagia  CV:  No pain, palpitations, hypo/hypertension  Pulm:  No dyspnea, cough, tachypnea, wheezing  GI:  see above  :  No pain, bleeding, incontinence, nocturia  Muscle:  No pain, weakness  Neuro:  No weakness, tingling, memory problems  Psych:  No fatigue, insomnia, mood problems, depression  Endocrine:  No polyuria, polydipsia, cold/heat intolerance  Heme:  No petechiae, ecchymosis, easy bruisability  Skin:  No rash, tattoos, scars, edema    PHYSICAL EXAM:     GENERAL:  No acute distress  HEENT:  Normocephalic/atraumatic, +scleral icterus  CHEST:  no accessory muscle use  HEART:  Regular rate and rhythm, no murmurs/rubs/gallops  ABDOMEN:  Soft, non-tender, non-distended  EXTREMITIES: No cyanosis, clubbing, or edema  SKIN:  No rash/warm/dry, +jaundice  NEURO:  Alert and oriented x 3    Vital Signs:  Vital Signs Last 24 Hrs  T(C): 37.3 (09 Dec 2022 11:16), Max: 37.3 (09 Dec 2022 11:16)  T(F): 99.1 (09 Dec 2022 11:16), Max: 99.1 (09 Dec 2022 11:16)  HR: 93 (09 Dec 2022 11:16) (70 - 93)  BP: 122/70 (09 Dec 2022 11:16) (122/70 - 133/82)  BP(mean): --  RR: 18 (09 Dec 2022 11:16) (16 - 18)  SpO2: 100% (09 Dec 2022 11:16) (100% - 100%)    Parameters below as of 09 Dec 2022 07:28  Patient On (Oxygen Delivery Method): room air      Daily Height in cm: 185.42 (09 Dec 2022 07:28)    Daily     LABS:                        15.9   11.12 )-----------( 105      ( 09 Dec 2022 10:01 )             45.8     Mean Cell Volume: 82.8 fL (12-09-22 @ 10:01)    12-09    129<L>  |  96<L>  |  17  ----------------------------<  152<H>  3.7   |  17<L>  |  1.00    Ca    9.6      09 Dec 2022 10:01  Phos  2.5     12-09  Mg     1.40     12-09    TPro  6.9  /  Alb  2.9<L>  /  TBili  7.9<H>  /  DBili  6.3<H>  /  AST  57<H>  /  ALT  89<H>  /  AlkPhos  261<H>  12-09    LIVER FUNCTIONS - ( 09 Dec 2022 10:01 )  Alb: 2.9 g/dL / Pro: 6.9 g/dL / ALK PHOS: 261 U/L / ALT: 89 U/L / AST: 57 U/L / GGT: x           PT/INR - ( 09 Dec 2022 09:10 )   PT: 46.9 sec;   INR: 3.99 ratio         PTT - ( 09 Dec 2022 09:10 )  PTT:57.4 sec    Amylase Serum--      Lipase serum>3000       Ammonia--                          15.9   11.12 )-----------( 105      ( 09 Dec 2022 10:01 )             45.8       Imaging:

## 2022-12-09 NOTE — H&P ADULT - PROBLEM SELECTOR PLAN 6
DVT Prophylaxis: Holding for procedure, will start Lovenox in AM  Diet: NPO for ERCP  Lines/Tubes: peripheral IVs only  PT: Consult in, pending Eval  Social Work:  Code Status: Full Code    Home Situation: At baseline he lives with his son and has a home assistant/friend Terrell. The pt is wheelchair bound at baseline for the last 3 years after a fall where he injured his leg and became severely deconditioned, at the time of the fall the pt reports he was 540lbs so had difficulty with PT. He is able to stand for brief periods. He manages all his medications himself and is A&Ox4, able to feed and dress himself but has help with bathing and cooking.    Discharge Plan: Pending PT eval DVT Prophylaxis: Holding for procedure, will consider retarting AC after procedure for a-fib  Diet: NPO for ERCP  Lines/Tubes: peripheral IVs only  PT: Consult in, pending Eval  Social Work:  Code Status: Full Code    Home Situation: At baseline he lives with his son and has a home assistant/friend Terrell. The pt is wheelchair bound at baseline for the last 3 years after a fall where he injured his leg and became severely deconditioned, at the time of the fall the pt reports he was 540lbs so had difficulty with PT. He is able to stand for brief periods. He manages all his medications himself and is A&Ox4, able to feed and dress himself but has help with bathing and cooking.    Discharge Plan: Pending PT daljit

## 2022-12-09 NOTE — H&P ADULT - PROBLEM SELECTOR PLAN 5
- Continue home regimen of Levothyroxine 110mcg/day    Also will continue the following home medications:  - Allopurinol 200mg qd for Gout prophylaxis (No joint pain currently)  - Tamsulosin 0.4mg qd for BPH (No trouble urinating at this time)  - Trazodone 450mg qhs for sleep  - Atorvastatin 20mg for Hyperlipidemia

## 2022-12-09 NOTE — H&P ADULT - HISTORY OF PRESENT ILLNESS
72yM with PMHx a-fib on warfarin, HTN, hypothyroidism, recent hospitalization from 8/19-8/24 when he presented with fever /and abd pain found to have transaminitis and choledocholithiasis s/p ERCP(8/19) with stent placement with a course complicated by sepsis with bacteremia with pan-sensitive E. Coli and post ERCP pancreatitis, 2 other ERCPs attempting to remove stent 10/28 and 11/16 aborted due to food residue in the stomach, presents with 2 days of fever and abdominal pain. 72yM with PMHx a-fib on warfarin, HTN, hypothyroidism, recent hospitalization from 8/19-8/24 when he presented with fever and abd pain found to have transaminitis and choledocholithiasis s/p ERCP(8/19) with stent placement with a course complicated by sepsis with bacteremia with pan-sensitive E. Coli and post ERCP pancreatitis, 2 other ERCPs attempting to remove stent 10/28 and 11/16 aborted due to food residue in the stomach, presents with 2 days of fever and abdominal pain. The abdominal pain is in the RUQ, LUQ, and suprapubic area. He has had some nausea but denies vomiting, diarrhea, chest pain, shortness of breath, back pain. He has had dark urine and paler than usual stools for the last couple of days.     In the ED the patient was found to have normal vitals, but Bilirubin of 7.9 increased from 4.6 on 10/21, primarily direct bilirubin, mild elevation in AST/ALT and lactate of 5. GI saw the pt and are planning for emergent ERCP tonight after reversal of INR (3.99).    At baseline he lives with his son and has a home assistant/friend Terrell. The pt is wheelchair bound at baseline for the last 3 years after a fall where he injured his leg and became severely deconditioned, at the time of the fall the pt reports he was 540lbs so had difficulty with PT. He manages all his medications and is A&Ox4, able to feed and dress himself but has help with bathing and cooking.

## 2022-12-09 NOTE — ED PROVIDER NOTE - ATTENDING CONTRIBUTION TO CARE
I performed a face-to-face evaluation of the patient and performed a history and physical examination. I agree with the history and physical examination. If this was a PA visit, I personally saw the patient with the PA and performed a substantive portion of the visit including all aspects of the medical decision making.    A. fib (coumadin), biliary duct stent, p/w jaundice, abd pain, tremors. Fever at home. No N/V. Concern for stent failure. Check labs. Admit for GI EGD stent replacement.

## 2022-12-09 NOTE — ED ADULT NURSE NOTE - OBJECTIVE STATEMENT
Received patient in room 25 c/o abdominal pain, weakness x 1 week, jaundice x 2 days. Patient denies fever, SOB, chest pain. Patient is A&OX4, airway patent, breathing unlabored and even, radial pulses palpable, abdomen soft, tender, jaundice on face and body. Patient is on cardiac monitor AFIB w/ MD made aware, O2 sat 100% on a room air. Labs obtained, 20G IV place, medications given as ordered, IV fluid bolus infusing, awaiting US/CT scan. Side rails up and safety maintained. Fall precaution in place. Call bells within reach. Family at bedside. Received patient in room 25 c/o abdominal pain, weakness x 1 week, jaundice x 2 days. Patient denies fever, SOB, chest pain. Patient is A&OX4, airway patent, breathing unlabored and even, radial pulses palpable, abdomen soft, tender, jaundice on face and body. Patient is on cardiac monitor AFIB, O2 sat 100% on a room air. Labs obtained, 20G IV place, medications given as ordered, IV fluid bolus infusing, awaiting US/CT scan. Side rails up and safety maintained. Fall precaution in place. Call bells within reach. Family at bedside. Received patient in room 25 c/o abdominal pain, weakness x 1 week, jaundice x 2 days. Patient denies fever, SOB, chest pain. Patient is A&OX4, airway patent, breathing unlabored and even, radial pulses palpable, abdomen soft, tender, jaundice on face and body. Patient is on cardiac monitor AFIB, O2 sat 100% on a room air. Labs obtained, 20G IV placed on left hand, medications given as ordered, IV fluid bolus infusing, awaiting US/CT scan. Side rails up and safety maintained. Fall precaution in place. Call bells within reach. Family at bedside.

## 2022-12-09 NOTE — H&P ADULT - ASSESSMENT
72yM with PMHx a-fib on warfarin, HTN, hypothyroidism, recent hospitalization from 8/19-8/24 when he presented with fever and abd pain found to have transaminitis and choledocholithiasis s/p ERCP(8/19) with stent placement with a course complicated by sepsis with bacteremia with pan-sensitive E. Coli and post ERCP pancreatitis, 2 other ERCPs attempting to remove stent 10/28 and 11/16 aborted due to food residue in the stomach, presents with 2 days of fever and abdominal pain. In the ED found to have elevated bilirubin with RUQ US and CT abd/pel showing dilated CBD with 8mm stone at the ampulla with distended gallbladder. GI saw pt and taking for same day ERCP on 12/9.

## 2022-12-09 NOTE — ED PROVIDER NOTE - CARE PLAN
1 Principal Discharge DX:	Biliary stent migration   Principal Discharge DX:	Cholelithiasis with cholangitis  Secondary Diagnosis:	Gallstone pancreatitis

## 2022-12-09 NOTE — H&P ADULT - ATTENDING COMMENTS
Briefly this is a 77 y/o M with pmhx of nonvalvular a-fib on warfarin( on coumadin for over 12 years), HTN, hypothyroidism who presented for abdominal pain and subjective fevers. Patient states that he hs been having increased abdominal pain with associated nausea. States that he took a forehead temp and the Tmax 100 at home. He endorses come chills, but denies any night sweats, CP, SOB.   Of note patient with previous ERCPs and previous hospitalization for E-coli bacteremia. Has had 2 attempts of ERCP but unsuccessful due to residual food noted in stomach.     In ED Bilirubin elevated to 7.9 (direct 6.3), , AST/ALT 57/89. INR elevated at 3.99 received K-centra and Vitamin K with improvement to 1.66.     EKG personally reviewed with Afib with RVR, . QTc 513. No ST- T wave changes appreciated. CT A/P showing 8mm stone noted in CBD with choledocholithiasis with pancreatitis; also with cholelithiasis. Patient Seen By GI in ED and planned for surgery ERCP for stone removal; +/- stent placement.    On exam, patient awake and alert. Scleral icterus noted. Irregularly irregular rhythm. Abd tender to palpation. Able to move all extremities, no focal deficits appreciated.       #Choledocholithiasis  - CT A/P with 8 mm stone in CBD with pancreatitis   -  Appreciate GI recs, plan for ERCP tonight, f/u GI recs post procedure   - Warfarin on Hold for now given procedure, will resume AC in AM post procedure   - No sings of cholangitis at this time, will monitor off abx  - if patient with fever spikes or signs of hemodynamic instability, low threshold to start on norbert spectrum Abx zosyn   - given cholelithiasis noted on CT imaging, patient likely will need cholecystectomy vs perc abdi, surgery consult in AM   - VS q4hrs     #Afib  - EKG with Afib with RVR to 130s, on 200mg metop BID  - will start on 100mg BID and increase in AM   - patient on coumadin  over 12 years, Echo from 2018 with normal EF with normal valvular function, no dings of EF or known mechanical valve  - patient can likely be transitioned to DOAC as coumadin not indicated for nonvalvular Afib\  - monitor on telemetry     Rest of plan as stated above. Discussed with Dr. Padilla

## 2022-12-09 NOTE — ED PROVIDER NOTE - PHYSICAL EXAMINATION
General: non-toxic, NAD  HEENT: NCAT, PERRL  Cardiac: RRR, no murmurs, 2+ peripheral pulses  Resp: CTAB  Abdomen: obese, +ttp epigastric LUQ, questionable splenomegaly v epigastric mass v hernia. no rebound or guarding.   Extremities: no peripheral edema, calf tenderness, or leg size discrepancies  Skin: jaundice with scleral icterus. no lesions petechiae or purpura noted.   Neuro: AAOx4, 5+motor, sensation diminished to R LE which is long-standing for him. Able to stand with assistance. CN 2-12 intact.   Psych: mood and affect appropriate

## 2022-12-09 NOTE — H&P ADULT - PROBLEM SELECTOR PLAN 3
- Home regimen of Metoprolol tartrate 200mg BID, Nifedipine 60mg qd, Furosemide 40mg qd  - No hx heart failure per pt  - For now hold these medications until after procedure, BP stable - Home regimen of Metoprolol tartrate 200mg BID, Nifedipine 60mg qd, Furosemide 40mg qd  - No hx heart failure per pt  - For now will continue metoprolol at lower dose for now and otherwise hold all other BP meds until after procedure, BP stable

## 2022-12-09 NOTE — ED PROVIDER NOTE - OBJECTIVE STATEMENT
72-year-old male with a history of prediabetes A. fib on Coumadin hypothyroidism gout presents with 2 days of abdominal pain and jaundice as well as tremulousness.  Patient had biliary duct stent for choledocholithiasis back in October of this year.  Unable to have stent removed endoscopically 8x2 in the setting of gastric contents.  Was originally scheduled for gastric emptying study however unable to perform that study at this time.  Endorses fever 2 days ago T-max 101.  No chest pain +shortness of breath no nausea/vomiting.  Last BM was yesterday, described as pale.  No melena or hematochezia.  Minimal food intake over the last couple of weeks for loss of appetite

## 2022-12-09 NOTE — PATIENT PROFILE ADULT - FALL HARM RISK - HARM RISK INTERVENTIONS
Assistance with ambulation/Assistance OOB with selected safe patient handling equipment/Communicate Risk of Fall with Harm to all staff/Discuss with provider need for PT consult/Monitor gait and stability/Reinforce activity limits and safety measures with patient and family/Tailored Fall Risk Interventions/Visual Cue: Yellow wristband and red socks/Bed in lowest position, wheels locked, appropriate side rails in place/Call bell, personal items and telephone in reach/Instruct patient to call for assistance before getting out of bed or chair/Non-slip footwear when patient is out of bed/[place] to call system/Physically safe environment - no spills, clutter or unnecessary equipment/Purposeful Proactive Rounding/Room/bathroom lighting operational, light cord in reach

## 2022-12-09 NOTE — ED PROVIDER NOTE - PROGRESS NOTE DETAILS
MARLIN Coleman PGY2 GI fellow at bedside. Pt sent for US. She will eval in US suite for consideration of ERCP. ceftriaxone ordered upon pt return to ER. MARLIN Coleman PGY2 lactate 5. additional fluids ordered. pt still up in US suite after 40 minutes. I called US tech who checked on pt. still mentating well without palpitations. GI fellow was able to evaluate pt. MARLIN Coleman PGY2 received call from Gi Fellow Jacque requesting FFP x2 and magnesium repletion. scheduled for ERCP later today. consent form printed. pt in CT scanner. will consent upon pt's return. MARLIN Coleman PGY2 pt comfortable. admitted to Dr. MARLIN Medrano. metaclompramide ordered for hx of gastric contents impeding procedure in the recent past.

## 2022-12-09 NOTE — H&P ADULT - NSHPPHYSICALEXAM_GEN_ALL_CORE
GENERAL: Sitting comfortably in bed in no acute distress, Diffuse Jaundice  NEURO: Alert and Oriented to person, place, date and situation. Pupils symmetric, No ptosis. No facial asymmetry or dysarthria, no tremor noted.  HEENT: No conjunctival injection, (+) scleral icterus  CARD: Normal rate and regular rhythm, no murmurs and no gallops appreciated.  RESP: Clear to auscultation bilaterally, No wheezes, rales or rhonchi. Good respiratory effort.  ABD: Obese abdomen, Nondistended, Soft and moderately tender to palpation in RUQ, LUQ and suprapubic area, no guarding, no rigidity. No masses appreciated.  EXT: 1+ nonpitting pedal edema. 2+DP pulses bilaterally.  SKIN: No rashes, bruising or acute skin injuries on face, limbs, abdomen, chest, back

## 2022-12-09 NOTE — ED ADULT NURSE NOTE - NSFALLRSKINDICTYPE_ED_ALL_ED
< from: CT Tibia/Fibia No Cont, Left (03.22.20 @ 15:17) >    IMPRESSION:    Diffuse subcutaneous edema of the left leg without evidence of osteomyelitis, necrotizing fasciitis, or abscess.    No acutely displaced fracture    < end of copied text >  < from: [facility] Duplex Lower Ext Vein Scan, Left (03.22.20 @ 08:43) >    Impression:    No evidence of deep venous thrombosis or superficial thrombophlebitis in left lower extremity.    < end of copied text > Impaired Gait/Need for Mobility Assisted Device

## 2022-12-09 NOTE — PATIENT PROFILE ADULT - STATED REASON FOR ADMISSION
I came in because I was jaundice and had a fever of 100F, the biliary duct was clogged by a large stone and it was causing bile to backflow into the liver and they placed a stent

## 2022-12-09 NOTE — H&P ADULT - NSHPSOCIALHISTORY_GEN_ALL_CORE
At baseline he lives with his son and has a home assistant/friend Terrell. The pt is wheelchair bound at baseline for the last 3 years after a fall where he injured his leg and became severely deconditioned, at the time of the fall the pt reports he was 540lbs so had difficulty with PT. He manages all his medications and is A&Ox4, able to feed and dress himself but has help with bathing and cooking.

## 2022-12-09 NOTE — H&P ADULT - PROBLEM SELECTOR PLAN 4
- Pt reports he has prediabetes, on Ozempic at home but cannot remember the dose  - Hold ozempic while inpatient, will put on LDSS - Pt reports he has prediabetes, on Ozempic at home but cannot remember the dose  - Hold Ozempic while inpatient, will put on LDSS - Pt reports he has prediabetes, on Ozempic at home but cannot remember the dose, this medication at this time is mainly for weight loss per pt  - Hold Ozempic while inpatient, will put on LDSS

## 2022-12-09 NOTE — H&P ADULT - PROBLEM SELECTOR PLAN 2
- Home regimen Metoprolol tartrate 200mg BID and coumadin 12-14mg/day, he measures INR at home and gets medication adjustments weekly  - INR was 3.99 on arrival, received Kcentra 1500mg + Vit K in ER for reversal so he could go urgently to ERCP  - Holding AC and DVT prophylaxis until after procedure - Home regimen Metoprolol tartrate 200mg BID and coumadin 12-14mg/day, he measures INR at home and gets medication adjustments weekly  - INR was 3.99 on arrival, received Kcentra 1500mg + Vit K in ER for reversal so he could go urgently to ERCP  - Holding AC and DVT prophylaxis until after procedure  - Will consider transition to DOAC due to high risk of falls and supratherapeutic INR on presentation, a-fib is non-valvular

## 2022-12-09 NOTE — ED ADULT TRIAGE NOTE - CHIEF COMPLAINT QUOTE
Pt c/o abdominal pain, weakness x 1 week and jaundice x 2 days. Pt states he had gallstone in the bilary duct back in august where they stented it. Pt states it feels similar to that last time he was here. No complaints of chest pain, headache, nausea, dizziness, vomiting  SOB, fever, chills verbalized..

## 2022-12-10 DIAGNOSIS — K80.20 CALCULUS OF GALLBLADDER WITHOUT CHOLECYSTITIS WITHOUT OBSTRUCTION: ICD-10-CM

## 2022-12-10 LAB
ALBUMIN SERPL ELPH-MCNC: 2.6 G/DL — LOW (ref 3.3–5)
ALP SERPL-CCNC: 217 U/L — HIGH (ref 40–120)
ALT FLD-CCNC: 62 U/L — HIGH (ref 4–41)
ANION GAP SERPL CALC-SCNC: 11 MMOL/L — SIGNIFICANT CHANGE UP (ref 7–14)
AST SERPL-CCNC: 39 U/L — SIGNIFICANT CHANGE UP (ref 4–40)
BILIRUB SERPL-MCNC: 9.5 MG/DL — HIGH (ref 0.2–1.2)
BUN SERPL-MCNC: 18 MG/DL — SIGNIFICANT CHANGE UP (ref 7–23)
CALCIUM SERPL-MCNC: 9.1 MG/DL — SIGNIFICANT CHANGE UP (ref 8.4–10.5)
CHLORIDE SERPL-SCNC: 102 MMOL/L — SIGNIFICANT CHANGE UP (ref 98–107)
CO2 SERPL-SCNC: 23 MMOL/L — SIGNIFICANT CHANGE UP (ref 22–31)
CREAT SERPL-MCNC: 0.83 MG/DL — SIGNIFICANT CHANGE UP (ref 0.5–1.3)
EGFR: 92 ML/MIN/1.73M2 — SIGNIFICANT CHANGE UP
GLUCOSE BLDC GLUCOMTR-MCNC: 109 MG/DL — HIGH (ref 70–99)
GLUCOSE BLDC GLUCOMTR-MCNC: 118 MG/DL — HIGH (ref 70–99)
GLUCOSE BLDC GLUCOMTR-MCNC: 126 MG/DL — HIGH (ref 70–99)
GLUCOSE BLDC GLUCOMTR-MCNC: 128 MG/DL — HIGH (ref 70–99)
GLUCOSE SERPL-MCNC: 123 MG/DL — HIGH (ref 70–99)
HCT VFR BLD CALC: 41.1 % — SIGNIFICANT CHANGE UP (ref 39–50)
HGB BLD-MCNC: 14.8 G/DL — SIGNIFICANT CHANGE UP (ref 13–17)
INR BLD: 1.63 RATIO — HIGH (ref 0.88–1.16)
LACTATE SERPL-SCNC: 1.5 MMOL/L — SIGNIFICANT CHANGE UP (ref 0.5–2)
MAGNESIUM SERPL-MCNC: 1.5 MG/DL — LOW (ref 1.6–2.6)
MCHC RBC-ENTMCNC: 29.6 PG — SIGNIFICANT CHANGE UP (ref 27–34)
MCHC RBC-ENTMCNC: 36 GM/DL — SIGNIFICANT CHANGE UP (ref 32–36)
MCV RBC AUTO: 82.2 FL — SIGNIFICANT CHANGE UP (ref 80–100)
NRBC # BLD: 0 /100 WBCS — SIGNIFICANT CHANGE UP (ref 0–0)
NRBC # FLD: 0 K/UL — SIGNIFICANT CHANGE UP (ref 0–0)
PHOSPHATE SERPL-MCNC: 2.1 MG/DL — LOW (ref 2.5–4.5)
PLATELET # BLD AUTO: 116 K/UL — LOW (ref 150–400)
POTASSIUM SERPL-MCNC: 3.3 MMOL/L — LOW (ref 3.5–5.3)
POTASSIUM SERPL-SCNC: 3.3 MMOL/L — LOW (ref 3.5–5.3)
PROT SERPL-MCNC: 6.1 G/DL — SIGNIFICANT CHANGE UP (ref 6–8.3)
PROTHROM AB SERPL-ACNC: 19 SEC — HIGH (ref 10.5–13.4)
RBC # BLD: 5 M/UL — SIGNIFICANT CHANGE UP (ref 4.2–5.8)
RBC # FLD: 15.3 % — HIGH (ref 10.3–14.5)
SODIUM SERPL-SCNC: 136 MMOL/L — SIGNIFICANT CHANGE UP (ref 135–145)
WBC # BLD: 11.6 K/UL — HIGH (ref 3.8–10.5)
WBC # FLD AUTO: 11.6 K/UL — HIGH (ref 3.8–10.5)

## 2022-12-10 PROCEDURE — 99221 1ST HOSP IP/OBS SF/LOW 40: CPT

## 2022-12-10 PROCEDURE — 99232 SBSQ HOSP IP/OBS MODERATE 35: CPT | Mod: GC

## 2022-12-10 PROCEDURE — 99233 SBSQ HOSP IP/OBS HIGH 50: CPT

## 2022-12-10 RX ORDER — NIFEDIPINE 30 MG
60 TABLET, EXTENDED RELEASE 24 HR ORAL DAILY
Refills: 0 | Status: DISCONTINUED | OUTPATIENT
Start: 2022-12-10 | End: 2022-12-18

## 2022-12-10 RX ORDER — PIPERACILLIN AND TAZOBACTAM 4; .5 G/20ML; G/20ML
3.38 INJECTION, POWDER, LYOPHILIZED, FOR SOLUTION INTRAVENOUS ONCE
Refills: 0 | Status: COMPLETED | OUTPATIENT
Start: 2022-12-10 | End: 2022-12-10

## 2022-12-10 RX ORDER — MAGNESIUM SULFATE 500 MG/ML
1 VIAL (ML) INJECTION ONCE
Refills: 0 | Status: COMPLETED | OUTPATIENT
Start: 2022-12-10 | End: 2022-12-10

## 2022-12-10 RX ORDER — METOPROLOL TARTRATE 50 MG
200 TABLET ORAL
Refills: 0 | Status: DISCONTINUED | OUTPATIENT
Start: 2022-12-10 | End: 2022-12-18

## 2022-12-10 RX ORDER — POTASSIUM PHOSPHATE, MONOBASIC POTASSIUM PHOSPHATE, DIBASIC 236; 224 MG/ML; MG/ML
15 INJECTION, SOLUTION INTRAVENOUS ONCE
Refills: 0 | Status: COMPLETED | OUTPATIENT
Start: 2022-12-10 | End: 2022-12-10

## 2022-12-10 RX ORDER — CHLORHEXIDINE GLUCONATE 213 G/1000ML
15 SOLUTION TOPICAL
Refills: 0 | Status: DISCONTINUED | OUTPATIENT
Start: 2022-12-10 | End: 2022-12-18

## 2022-12-10 RX ORDER — NIFEDIPINE 30 MG
60 TABLET, EXTENDED RELEASE 24 HR ORAL DAILY
Refills: 0 | Status: DISCONTINUED | OUTPATIENT
Start: 2022-12-10 | End: 2022-12-10

## 2022-12-10 RX ORDER — PIPERACILLIN AND TAZOBACTAM 4; .5 G/20ML; G/20ML
3.38 INJECTION, POWDER, LYOPHILIZED, FOR SOLUTION INTRAVENOUS EVERY 8 HOURS
Refills: 0 | Status: DISCONTINUED | OUTPATIENT
Start: 2022-12-10 | End: 2022-12-14

## 2022-12-10 RX ORDER — ENOXAPARIN SODIUM 100 MG/ML
40 INJECTION SUBCUTANEOUS EVERY 12 HOURS
Refills: 0 | Status: DISCONTINUED | OUTPATIENT
Start: 2022-12-10 | End: 2022-12-11

## 2022-12-10 RX ADMIN — POTASSIUM PHOSPHATE, MONOBASIC POTASSIUM PHOSPHATE, DIBASIC 62.5 MILLIMOLE(S): 236; 224 INJECTION, SOLUTION INTRAVENOUS at 11:44

## 2022-12-10 RX ADMIN — PIPERACILLIN AND TAZOBACTAM 25 GRAM(S): 4; .5 INJECTION, POWDER, LYOPHILIZED, FOR SOLUTION INTRAVENOUS at 11:44

## 2022-12-10 RX ADMIN — PIPERACILLIN AND TAZOBACTAM 200 GRAM(S): 4; .5 INJECTION, POWDER, LYOPHILIZED, FOR SOLUTION INTRAVENOUS at 08:16

## 2022-12-10 RX ADMIN — PIPERACILLIN AND TAZOBACTAM 25 GRAM(S): 4; .5 INJECTION, POWDER, LYOPHILIZED, FOR SOLUTION INTRAVENOUS at 21:56

## 2022-12-10 RX ADMIN — ENOXAPARIN SODIUM 40 MILLIGRAM(S): 100 INJECTION SUBCUTANEOUS at 17:39

## 2022-12-10 RX ADMIN — Medication 3 MILLIGRAM(S): at 21:54

## 2022-12-10 RX ADMIN — PIPERACILLIN AND TAZOBACTAM 25 GRAM(S): 4; .5 INJECTION, POWDER, LYOPHILIZED, FOR SOLUTION INTRAVENOUS at 17:39

## 2022-12-10 RX ADMIN — TAMSULOSIN HYDROCHLORIDE 0.4 MILLIGRAM(S): 0.4 CAPSULE ORAL at 21:54

## 2022-12-10 RX ADMIN — Medication 200 MILLIGRAM(S): at 17:35

## 2022-12-10 RX ADMIN — Medication 100 MILLIGRAM(S): at 11:44

## 2022-12-10 RX ADMIN — Medication 112 MICROGRAM(S): at 05:33

## 2022-12-10 RX ADMIN — CHLORHEXIDINE GLUCONATE 15 MILLILITER(S): 213 SOLUTION TOPICAL at 21:54

## 2022-12-10 RX ADMIN — Medication 450 MILLIGRAM(S): at 21:55

## 2022-12-10 RX ADMIN — Medication 200 MILLIGRAM(S): at 11:43

## 2022-12-10 RX ADMIN — Medication 100 GRAM(S): at 11:44

## 2022-12-10 RX ADMIN — ATORVASTATIN CALCIUM 20 MILLIGRAM(S): 80 TABLET, FILM COATED ORAL at 21:55

## 2022-12-10 NOTE — PROGRESS NOTE ADULT - ATTENDING COMMENTS
GI following for cholangitis, s/p ERCP on 12/9 with placement of biliary stent.   Currently feels well. Reports resolution of abdominal pain.     - continue IV abx   - trend LFTs, INR   - repeat ERCP in 8-12 weeks for stent removal   - ccy per surgery team   - diet as tolerated     GI to follow, please call with questions

## 2022-12-10 NOTE — PROGRESS NOTE ADULT - ASSESSMENT
72M Hx morbid obesity (BMI 50), atrial fibrillation (on coumadin), HTN, BPH, hypothyroidism, cholangitis 2/2 choledocholithiasis s/p ERCP (8/2022) c/b post-ERCP pancreatitis, c/f possible gastroparesis 2/2 food retention on endoscopies, now presenting for fever, RUQ abdominal pain with elevated liver enzymes, lipase, leukocytosis c/f gallstone pancreatitis + cholangitis s/p ERCP 12/9 with plastic biliary stent was placed into  the common bile duct.    #Gallstone pancreatitis c/b cholangitis  #Hx choledocholithiasis   #Hx post-ERCP pancreatitis (8/2022)  #Coagulopathy: INR 3.9 in setting of coumadin use  P/w fever 101 at home, right sided abdominal pain with leukocytosis (11), elevated liver enzymes (Tbili 7.9), elevated lipase (>3000s) + CBD dilation on RUQ US with GB sludge. Previously with known Hx of cholangitis 2/2 choledocholithiasis s/p CBD stent placement (8/2022) with subsequent ERCPs for stent/stone removal cancelled 2/2 poor visualization due to retention of food contents. Current clinical picture c/f gallstone pancreatitis/choledocholithiasis c/b cholangitis (likely grade I, INR elevated in setting of coumadin use). s/p ERCP on 12/9 with purulent drainage from CBG with plastic biliary stent was placed into  the common bile duct in addition to evidence suggestive of clot protruding from the ampullary orifice likely in the setting of elevated INR. Feeling well post procedure with no fevers, chills, or any ongoing abdominal pain,    #Suspected gastroparesis: multiple EGD with retention of food contents, patient without gastric emptying study on file       Recommendations:   -Ok to advance diet as tolerated   -Agree with empiric broad spectrums antibiotic coverage x 10-14 days for a course of cholangitis   -Daily LFTs/INR   -Would discuss anti-coagulation management with Cardiology in the setting of above   -Return for stent removal ERCP in 8 - 12 weeks.    All recommendations are tentative until note is attested by attending.     Santosh Ross, PGY-4  Gastroenterology/Hepatology Fellow  Available on [employer] Teams   [phone] (Long Range Pager)  [phone] (Short Range Pager [facility])    After 5pm, please contact the on-call GI fellow. [phone]           72M Hx morbid obesity (BMI 50), atrial fibrillation (on coumadin), HTN, BPH, hypothyroidism, cholangitis 2/2 choledocholithiasis s/p ERCP (8/2022) c/b post-ERCP pancreatitis, c/f possible gastroparesis 2/2 food retention on endoscopies, now presenting for fever, RUQ abdominal pain with elevated liver enzymes, lipase, leukocytosis c/f gallstone pancreatitis + cholangitis s/p ERCP 12/9 with plastic biliary stent was placed into  the common bile duct.    #Gallstone pancreatitis c/b cholangitis  #Hx choledocholithiasis   #Hx post-ERCP pancreatitis (8/2022)  #Coagulopathy: INR 3.9 in setting of coumadin use  P/w fever 101 at home, right sided abdominal pain with leukocytosis (11), elevated liver enzymes (Tbili 7.9), elevated lipase (>3000s) + CBD dilation on RUQ US with GB sludge. Previously with known Hx of cholangitis 2/2 choledocholithiasis s/p CBD stent placement (8/2022) with subsequent ERCPs for stent/stone removal cancelled 2/2 poor visualization due to retention of food contents. Current clinical picture c/f gallstone pancreatitis/choledocholithiasis c/b cholangitis (likely grade I, INR elevated in setting of coumadin use). s/p ERCP on 12/9 with purulent drainage from CBG with plastic biliary stent was placed into  the common bile duct in addition to evidence suggestive of clot protruding from the ampullary orifice likely in the setting of elevated INR. Feeling well post procedure with no fevers, chills, or any ongoing abdominal pain,    #Suspected gastroparesis: multiple EGD with retention of food contents, patient without gastric emptying study on file       Recommendations:   -Ok to advance diet as tolerated   -Agree with empiric broad spectrums antibiotic coverage x 10-14 days for a course of cholangitis   -Daily LFTs/INR   -Would discuss anti-coagulation management with Cardiology in the setting of above   -Return for stent removal ERCP in 8 - 12 weeks.  -Discuss CCY with surgery team given recurrent episodes of chloangitis     All recommendations are tentative until note is attested by attending.     Santosh Ross, PGY-4  Gastroenterology/Hepatology Fellow  Available on [employer] Teams   [phone] (Long Range Pager)  [phone] (Short Range Pager [facility])    After 5pm, please contact the on-call GI fellow. [phone]           72M Hx morbid obesity (BMI 50), atrial fibrillation (on coumadin), HTN, BPH, hypothyroidism, cholangitis 2/2 choledocholithiasis s/p ERCP (8/2022) c/b post-ERCP pancreatitis, c/f possible gastroparesis 2/2 food retention on endoscopies, now presenting for fever, RUQ abdominal pain with elevated liver enzymes, lipase, leukocytosis c/f gallstone pancreatitis + cholangitis s/p ERCP 12/9 with plastic biliary stent was placed into  the common bile duct.    #Gallstone pancreatitis c/b cholangitis  #Hx choledocholithiasis   #Hx post-ERCP pancreatitis (8/2022)  #Coagulopathy: INR 3.9 in setting of coumadin use  P/w fever 101 at home, right sided abdominal pain with leukocytosis (11), elevated liver enzymes (Tbili 7.9), elevated lipase (>3000s) + CBD dilation on RUQ US with GB sludge. Previously with known Hx of cholangitis 2/2 choledocholithiasis s/p CBD stent placement (8/2022) with subsequent ERCPs for stent/stone removal cancelled 2/2 poor visualization due to retention of food contents. Current clinical picture c/f gallstone pancreatitis/choledocholithiasis c/b cholangitis (likely grade I, INR elevated in setting of coumadin use). s/p ERCP on 12/9 with purulent drainage from CBG with plastic biliary stent was placed into  the common bile duct in addition to evidence suggestive of clot protruding from the ampullary orifice likely in the setting of elevated INR. Feeling well post procedure with no fevers, chills, or any ongoing abdominal pain. Olaf elevated although ALP and ALT/AST improving. Monitor for now.     #Suspected gastroparesis: multiple EGD with retention of food contents, patient without gastric emptying study on file       Recommendations:   -Ok to advance diet as tolerated   -Agree with empiric broad spectrums antibiotic coverage x 10-14 days for a course of cholangitis   -Please fractionate bilirubin level   -Daily LFTs/INR   -Would discuss anti-coagulation management with Cardiology in the setting of above   -Return for stent removal ERCP in 8 - 12 weeks.  -Discuss CCY with surgery team given recurrent episodes of chloangitis     All recommendations are tentative until note is attested by attending.     Santosh Ross, PGY-4  Gastroenterology/Hepatology Fellow  Available on [employer] Teams   [phone] (Long Range Pager)  [phone] (Short Range Pager [facility])    After 5pm, please contact the on-call GI fellow. [phone]

## 2022-12-10 NOTE — PROGRESS NOTE ADULT - PROBLEM SELECTOR PLAN 1
- 8mm stone in the ampulla on imaging and on presentation had elevation in bili to 7.9 (4.6 on 10/21), lipase >3000, and mild elevation in LFTs AST/ALT 57/89 (280/262 on 10/21), all consistent with choledocholithiasis with pancreatitis  - Pt with choledocholithiasis in 8/2022 s/p ERCP with stent placement, course complicated by post-ERCP pancreatitis and E. Coli bacteremia  - Pt was uncertain if the stent is still in place as his 2 follow up ERCPs were aborted and pt had xrays outpt to look for it (?) but they never were told the results, no stent seen on CT  - GI to take pt for urgent ERCP, as soon as INR decreased with Kcentra (3.99 ->1.6)  - S/p IV fluids 1.5L NS and 1L LR in the ED  - Pain PRNs Tylenol 650mg and morphine 4mg IV  - Pt will likely need outpt cholecystectomy, given 2 episodes choledocholithiasis - 8mm stone in the ampulla on imaging and on presentation had elevation in bili to 7.9 (4.6 on 10/21), lipase >3000, and mild elevation in LFTs AST/ALT 57/89 (280/262 on 10/21), all consistent with choledocholithiasis with pancreatitis  - Pt with choledocholithiasis in 8/2022 s/p ERCP with stent placement, course complicated by post-ERCP pancreatitis and E. Coli bacteremia  - Prior stent now gone, passed on its own  - S/p IV fluids 1.5L NS and 1L LR in the ED  - Pain PRNs Tylenol 650mg and morphine 4mg IV, did not require any overnight  - Pt will likely need outpt cholecystectomy, given 2 episodes choledocholithiasis, Surg consulted to speak with patient about this  - GI took pt for urgent ERCP on 12/9 for stent placement

## 2022-12-10 NOTE — PROGRESS NOTE ADULT - PROBLEM SELECTOR PLAN 4
- Pt reports he has prediabetes, on Ozempic at home but cannot remember the dose, this medication at this time is mainly for weight loss per pt  - Hold Ozempic while inpatient, will put on LDSS - Home regimen of Metoprolol tartrate 200mg BID, Nifedipine 60mg qd, Furosemide 40mg qd  - No hx heart failure per pt  - Continue metoprolol and nifedipine, holding furosemide for now while pt recovers

## 2022-12-10 NOTE — CONSULT NOTE ADULT - ATTENDING COMMENTS
Agree with above  # Gallstone pancreatitis complicated by cholangitis (given fever to 101 and rising bili). Plan for urgent ERCP today.
I have reviewed the history, pertinent labs and imaging, and discussed the care with the consult resident.  More than 50% of this 50 minute encounter  included review of vitals, labs, imaging, discussion with consultants.    Chief complaint:  recurrent cholangitis    The active issues are:  1. cholangitis 2/2 choledocholithiasis now s/p ERCP and exchange of biliary stent  2. afib on t-A-C  3. morbid obesity    Surgical extirpation of gallbladder for prevention of future episodes of cholangitis carries great risk in this patient due to his underlying medical co-morbidities as well as his morbid obesity.  Review of cross sectional imaging reveals a distended gallbladder that extends to the left and enveloped by an enlarged liver.  Also, he is anticipated to have significant inflammation at Calot's triangle due to choledocholithiasis, stent FB and infection.  These considerations increased the likelihood of needed open conversion of a laparoscopic approach.  Given the anticipated operative complexity, it is essential to treat the underlying infection, optimize cardiac/resp status and have the patient and his family fully understand the potential for surgical and medical complications post-op.  Will discuss further with B team colleagues about our consensus recommendation to proceed with cholecystectomy or not and timing.    [facility] (B Team) Practice:    urgent issues - spectra [id number]  nonurgent issues - [phone]  patient appointments or afterhours - [phone]

## 2022-12-10 NOTE — PROGRESS NOTE ADULT - SUBJECTIVE AND OBJECTIVE BOX
Huong Padilla MD  EM/IM PGY-2  Pager # [phone] or Teams  --------------------------------  ==============UNFINISHED NOTE==================    INTERVAL HPI/OVERNIGHT EVENTS:    SUBJECTIVE: Patient seen and examined at bedside.     VITAL SIGNS:  T(C): 36.9 (12-10-22 @ 08:13), Max: 37.3 (12-09-22 @ 11:16)  HR: 130 (12-10-22 @ 09:20) (60 - 130)  BP: 119/68 (12-10-22 @ 08:13) (100/59 - 141/83)  RR: 18 (12-10-22 @ 09:20) (17 - 27)  SpO2: 97% (12-10-22 @ 09:20) (95% - 100%)      PHYSICAL EXAM:        MEDICATIONS:  MEDICATIONS  (STANDING):  allopurinol 200 milliGRAM(s) Oral daily  atorvastatin 20 milliGRAM(s) Oral at bedtime  dextrose 5%. 1000 milliLiter(s) (50 mL/Hr) IV Continuous <Continuous>  dextrose 5%. 1000 milliLiter(s) (100 mL/Hr) IV Continuous <Continuous>  dextrose 50% Injectable 25 Gram(s) IV Push once  dextrose 50% Injectable 12.5 Gram(s) IV Push once  dextrose 50% Injectable 25 Gram(s) IV Push once  glucagon  Injectable 1 milliGRAM(s) IntraMuscular once  influenza  Vaccine (HIGH DOSE) 0.7 milliLiter(s) IntraMuscular once  insulin lispro (ADMELOG) corrective regimen sliding scale   SubCutaneous three times a day before meals  insulin lispro (ADMELOG) corrective regimen sliding scale   SubCutaneous at bedtime  lactated ringers Bolus 1000 milliLiter(s) IV Bolus once  levothyroxine 112 MICROGram(s) Oral daily  metoprolol tartrate 100 milliGRAM(s) Oral two times a day  piperacillin/tazobactam IVPB.- 3.375 Gram(s) IV Intermittent once  piperacillin/tazobactam IVPB.- 3.375 Gram(s) IV Intermittent once  piperacillin/tazobactam IVPB.. 3.375 Gram(s) IV Intermittent every 8 hours  tamsulosin 0.4 milliGRAM(s) Oral at bedtime  traZODone 450 milliGRAM(s) Oral at bedtime    MEDICATIONS  (PRN):  acetaminophen     Tablet .. 650 milliGRAM(s) Oral every 6 hours PRN Temp greater or equal to 38C (100.4F), Mild Pain (1 - 3)  dextrose Oral Gel 15 Gram(s) Oral once PRN Blood Glucose LESS THAN 70 milliGRAM(s)/deciliter  melatonin 3 milliGRAM(s) Oral at bedtime PRN Insomnia  morphine  - Injectable 4 milliGRAM(s) IV Push every 4 hours PRN Severe Pain (7 - 10)      LABS:                        14.8   11.60 )-----------( 116      ( 10 Dec 2022 07:08 )             41.1     12-10    136  |  102  |  18  ----------------------------<  123<H>  3.3<L>   |  23  |  0.83    Ca    9.1      10 Dec 2022 07:08  Phos  2.1     12-10  Mg     1.50     12-10    TPro  6.1  /  Alb  2.6<L>  /  TBili  9.5<H>  /  DBili  x   /  AST  39  /  ALT  62<H>  /  AlkPhos  217<H>  12-10    PT/INR - ( 10 Dec 2022 07:08 )   PT: 19.0 sec;   INR: 1.63 ratio         PTT - ( 09 Dec 2022 09:10 )  PTT:57.4 sec       Huong Padilla MD  EM/IM PGY-2  Pager # [phone] or Teams  --------------------------------    INTERVAL HPI/OVERNIGHT EVENTS: Went for ERCP last night and had stent placed, start on clear diet. Vitals remained normal overnight and no other acute events, received all scheduled medications, no PRNs required.    SUBJECTIVE: Patient seen and examined at bedside. Feels well, has been drinking without symptoms. Abdominal pain absent, mild when he pushes on the stomach. No vomiting or diarrhea.     VITAL SIGNS:  T(C): 36.9 (12-10-22 @ 08:13), Max: 37.3 (12-09-22 @ 11:16)  HR: 130 (12-10-22 @ 09:20) (60 - 130)  BP: 119/68 (12-10-22 @ 08:13) (100/59 - 141/83)  RR: 18 (12-10-22 @ 09:20) (17 - 27)  SpO2: 97% (12-10-22 @ 09:20) (95% - 100%)      PHYSICAL EXAM:  GENERAL: Sitting comfortably in bed in no acute distress, diffusely jaundiced  NEURO: Alert and Oriented to person, place, date and situation. Pupils symmetric, No ptosis. No facial asymmetry or dysarthria, no tremor noted.  HEENT: No conjunctival injection, +scleral icterus  CARD: Normal rate and regular rhythm, no murmurs and no gallops appreciated.  RESP: Clear to auscultation bilaterally in anterior lung fields, No wheezes, rales or rhonchi. Good respiratory effort.  ABD: Nondistended, Soft and nontender to palpation in all quadrants, no guarding, no rigidity. No masses appreciated.  EXT: No pedal edema.       MEDICATIONS:  MEDICATIONS  (STANDING):  allopurinol 200 milliGRAM(s) Oral daily  atorvastatin 20 milliGRAM(s) Oral at bedtime  dextrose 5%. 1000 milliLiter(s) (50 mL/Hr) IV Continuous <Continuous>  dextrose 5%. 1000 milliLiter(s) (100 mL/Hr) IV Continuous <Continuous>  dextrose 50% Injectable 25 Gram(s) IV Push once  dextrose 50% Injectable 12.5 Gram(s) IV Push once  dextrose 50% Injectable 25 Gram(s) IV Push once  glucagon  Injectable 1 milliGRAM(s) IntraMuscular once  influenza  Vaccine (HIGH DOSE) 0.7 milliLiter(s) IntraMuscular once  insulin lispro (ADMELOG) corrective regimen sliding scale   SubCutaneous three times a day before meals  insulin lispro (ADMELOG) corrective regimen sliding scale   SubCutaneous at bedtime  lactated ringers Bolus 1000 milliLiter(s) IV Bolus once  levothyroxine 112 MICROGram(s) Oral daily  metoprolol tartrate 100 milliGRAM(s) Oral two times a day  piperacillin/tazobactam IVPB.- 3.375 Gram(s) IV Intermittent once  piperacillin/tazobactam IVPB.- 3.375 Gram(s) IV Intermittent once  piperacillin/tazobactam IVPB.. 3.375 Gram(s) IV Intermittent every 8 hours  tamsulosin 0.4 milliGRAM(s) Oral at bedtime  traZODone 450 milliGRAM(s) Oral at bedtime    MEDICATIONS  (PRN):  acetaminophen     Tablet .. 650 milliGRAM(s) Oral every 6 hours PRN Temp greater or equal to 38C (100.4F), Mild Pain (1 - 3)  dextrose Oral Gel 15 Gram(s) Oral once PRN Blood Glucose LESS THAN 70 milliGRAM(s)/deciliter  melatonin 3 milliGRAM(s) Oral at bedtime PRN Insomnia  morphine  - Injectable 4 milliGRAM(s) IV Push every 4 hours PRN Severe Pain (7 - 10)      LABS:                        14.8   11.60 )-----------( 116      ( 10 Dec 2022 07:08 )             41.1     12-10    136  |  102  |  18  ----------------------------<  123<H>  3.3<L>   |  23  |  0.83    Ca    9.1      10 Dec 2022 07:08  Phos  2.1     12-10  Mg     1.50     12-10    TPro  6.1  /  Alb  2.6<L>  /  TBili  9.5<H>  /  DBili  x   /  AST  39  /  ALT  62<H>  /  AlkPhos  217<H>  12-10    PT/INR - ( 10 Dec 2022 07:08 )   PT: 19.0 sec;   INR: 1.63 ratio         PTT - ( 09 Dec 2022 09:10 )  PTT:57.4 sec

## 2022-12-10 NOTE — PROGRESS NOTE ADULT - PROBLEM SELECTOR PLAN 6
DVT Prophylaxis: Holding for procedure, will consider retarting AC after procedure for a-fib  Diet: NPO for ERCP  Lines/Tubes: peripheral IVs only  PT: Consult in, pending Eval  Social Work:  Code Status: Full Code    Home Situation: At baseline he lives with his son and has a home assistant/friend Terrell. The pt is wheelchair bound at baseline for the last 3 years after a fall where he injured his leg and became severely deconditioned, at the time of the fall the pt reports he was 540lbs so had difficulty with PT. He is able to stand for brief periods. He manages all his medications himself and is A&Ox4, able to feed and dress himself but has help with bathing and cooking.    Discharge Plan: Pending PT daljit - Continue home regimen of Levothyroxine 110mcg/day    Also will continue the following home medications:  - Allopurinol 200mg qd for Gout prophylaxis (No joint pain currently)  - Tamsulosin 0.4mg qd for BPH (No trouble urinating at this time)  - Trazodone 450mg qhs for sleep  - Atorvastatin 20mg for Hyperlipidemia

## 2022-12-10 NOTE — PHYSICAL THERAPY INITIAL EVALUATION ADULT - NSPTDISCHREC_GEN_A_CORE
to optimize safety in the home environment and promote improvement of strength and functional deficits/Home PT

## 2022-12-10 NOTE — PROGRESS NOTE ADULT - PROBLEM SELECTOR PLAN 2
- Home regimen Metoprolol tartrate 200mg BID and coumadin 12-14mg/day, he measures INR at home and gets medication adjustments weekly  - INR was 3.99 on arrival, received Kcentra 1500mg + Vit K in ER for reversal so he could go urgently to ERCP  - Holding AC and DVT prophylaxis until after procedure  - Will consider transition to DOAC due to high risk of falls and supratherapeutic INR on presentation, a-fib is non-valvular - Pus coming from behind obstruction seen on ERCP, consistent with likely infection indicating cholangitis  - Pt did have a fever at home, but otherwise WBC only mildly elevated on arrival and vitals have been stable, HR in the 90s  - Continue Zosyn 3.375 q8hrs (12/10 -

## 2022-12-10 NOTE — PROGRESS NOTE ADULT - ATTENDING COMMENTS
76 y.o. M w/ a hx of afib on Warfarin, HTN, hypothyroidism, hx of recent choledocholithiasis hospitalized for cholangitis.  S/p ERCP with evidence of suppurative cholangitis.   Patient feels well today, denies any abdominal pain, tolerated CLD. Denies any F/C, CP, SOB, N/V. PE: Obese male, no RUQ TTP, very mild epigastric TTP. Labs notable for rising T. bili, improving AST/ALT.    #Gallstone pancreatitis c/b Cholangitis: Cont Zosyn, advance diet as tolerated. Plan for ERCP in 2-3 months per GI reccs to retrieve stent. Surgery c/s for cholecystectomy as this is patient's second hospitalization this year.   # Afib: Cont Metoprolol. Was on Warfarin at home although patient may qualify for DOAC. If GI clears patient for AC, will check insurance coverage for DOAC.   [ ] PT c/s.

## 2022-12-10 NOTE — PROGRESS NOTE ADULT - PROBLEM SELECTOR PLAN 3
- Home regimen of Metoprolol tartrate 200mg BID, Nifedipine 60mg qd, Furosemide 40mg qd  - No hx heart failure per pt  - For now will continue metoprolol at lower dose for now and otherwise hold all other BP meds until after procedure, BP stable - Home regimen Metoprolol tartrate 200mg BID and coumadin 12-14mg/day, he measures INR at home and gets medication adjustments weekly  - INR was 3.99 on arrival, received Kcentra 1500mg + Vit K in ER for reversal so he could go urgently to ERCP  - Will consider transition to DOAC due to high risk of falls and supratherapeutic INR on presentation, a-fib is non-valvular  - For now DVT Proph only

## 2022-12-10 NOTE — CONSULT NOTE ADULT - SUBJECTIVE AND OBJECTIVE BOX
HPI: 71 y/o M w/ PMH HTN, Afib on Warfarin, BPH, DANIELLE, morbid obesity who presents to [facility] ED 12/9 with concerns of fever to 102, jaundice, RUQ pain. He was taken for ERCP emergently with GI- reported findings of pus and sludge through a pre existing plastic biliary stent concerning for cholangitis- new stent was placed and antibiotics IV Zosyn was started. Labs in the ED were significant for mild leukocytosis and elevated direct bili to 8.     Of note, the patient had a previous episode of cholangitis and E.Coli sepsis August 2022 and was hospitalized at [facility]. Labs were significant for elevation of lipase as well as bilirubin with severe leukocytosis to 21. GI evaluated the patient there as well as the Surgery service under attending Dr. Arvizu. ERCP was done with a plastic 10f biliary stent. Of note no stones were swept using a balloon and sphincterotomy not performed likely 2/2 warfarin induced coagulopathy. CT scan A/P w/ Iv contrast showing aberrant anatomy of gallbladder in left segment of liver as well as significant distension and stones.     ACS is consulted for purposes of evaluation for possible cholecystectomy, i/s/o choledocholithiasis. Extensive conversation with son and patient at bedside regarding need for cholecystectomy to help prevent recurrent CBD stones, as most stones are secondary CBD stones. Patient endorses he has lost nearly 200lb since his first hospital stay, which I explained is in and of itself a risk factor for gallstone disease. The patient's functional status is also poor as he is wheelchair bound and immobilized. However the patient is enthusiastic about surgery and is wondering whether it can be done at this hospital stay.  HPI: 71 y/o M w/ PMH HTN, Afib on Warfarin, BPH, DANIELLE, morbid obesity who presents to [facility] ED 12/9 with concerns of fever to 102, jaundice, RUQ pain. He was taken for ERCP emergently with GI- reported findings of pus and sludge through a pre existing plastic biliary stent concerning for cholangitis- new stent was placed and antibiotics IV Zosyn was started. Labs in the ED were significant for mild leukocytosis and elevated direct bili to 8.     Of note, the patient had a previous episode of cholangitis and E.Coli sepsis August 2022 and was hospitalized at [facility]. Labs were significant for elevation of lipase as well as bilirubin with severe leukocytosis to 21. GI evaluated the patient there as well as the Surgery service under attending Dr. Arvizu. ERCP was done with a plastic 10f biliary stent. Of note no stones were swept using a balloon and sphincterotomy not performed likely 2/2 warfarin induced coagulopathy. CT scan A/P w/ Iv contrast showing aberrant anatomy of gallbladder in left segment of liver as well as significant distension and stones. Warfarin currently being held for ERCP as INR on admission was 4.    ACS is consulted for purposes of evaluation for possible cholecystectomy, i/s/o choledocholithiasis. Extensive conversation with son and patient at bedside regarding need for cholecystectomy to help prevent recurrent CBD stones, as most stones are secondary CBD stones. Patient endorses he has lost nearly 200lb since his first hospital stay, which I explained is in and of itself a risk factor for gallstone disease. The patient's functional status is also poor as he is wheelchair bound and immobilized. However, the patient is enthusiastic about surgery and is wondering whether it can be done at this hospital stay. Per discussion with Internal Medicine resident, patient likely will stay in the hospital for several days more of antibiotics. The patient's diet was advanced today to Regular diet and he is without abdominal pain, with low suspicion for pancreatitis. No sequelae of pancreatitis noted on imaging. Family is hesitant to have patient return to hospital as semi elective given that holidays are coming up and the patient may have biliary colic or problems and need to be admitted again. Patient is not endorsing any plans for bariatric surgery at this time.     PMH: As above    PSH: As above    Medications: Ozempic    Allergies: Metformin    Physical exam:    /78 HR 99 SpO2 95 T 98.3 RR 18    General- NAD. Pleasant older man lying in bed. No evidence of scleral icterus  Abdomen- Obese. No surgical scars noted. Das's sign negative. Non tender to palpation  Lungs- Comfortable on room air    Imaging:    ACC: [id number] EXAM: CT ABDOMEN AND PELVIS IC    PROCEDURE DATE: 12/09/2022        INTERPRETATION: CLINICAL INFORMATION: Jaundice, abdominal pain. History of biliary stent. Concern for biliary stent migration.    CT abdomen and pelvis 12/9/2022    CONTRAST/COMPLICATIONS:  IV Contrast: Omnipaque 350 90 cc administered 10 cc discarded  Oral Contrast: NONE  Complications: None reported at time of study completion    PROCEDURE:  CT of the Abdomen and Pelvis was performed.  Noncontrast, Arterial and Delayed phases were acquired.  Sagittal and coronal reformats were performed.    FINDINGS:  LOWER CHEST: Coronary artery calcifications. Left basilar segmental atelectasis. Right lower lobe linear atelectasis. 2 mm right upper lobe nodule, unchanged since 8/18/2022.    LIVER: Within normal limits.  BILE DUCTS: Dilatation of the CBD, which measures up to 1.8 cm, with stone in the distal CBD at the level of the ampulla measuring 8 mm (2:76)    GALLBLADDER: Distended. Cholelithiasis.    SPLEEN: Within normal limits.  PANCREAS: Within normal limits.  ADRENALS: Within normal limits.  KIDNEYS/URETERS: No hydronephrosis. Bilateral nonobstructing renal calculi, largest measuring 4 mm. Bilateral renal cysts.    BLADDER: Within normal limits.  REPRODUCTIVE ORGANS: Prostate within normal limits.    BOWEL: No bowel obstruction. Appendix is normal.  PERITONEUM: No ascites.  VESSELS: Atherosclerotic changes.  RETROPERITONEUM/LYMPH NODES: No lymphadenopathy.  ABDOMINAL WALL: Within normal limits.  BONES: Degenerative changes. Healed left lateral eighth rib fracture.    IMPRESSION:  * Cholelithiasis and choledocholithiasis.  * CBD measures up to 18 mm, demonstrating an 8 mm stone at the level of the ampulla.    --- End of Report ---

## 2022-12-10 NOTE — PROGRESS NOTE ADULT - PROBLEM SELECTOR PLAN 5
- Continue home regimen of Levothyroxine 110mcg/day    Also will continue the following home medications:  - Allopurinol 200mg qd for Gout prophylaxis (No joint pain currently)  - Tamsulosin 0.4mg qd for BPH (No trouble urinating at this time)  - Trazodone 450mg qhs for sleep  - Atorvastatin 20mg for Hyperlipidemia - Pt reports he has prediabetes, on Ozempic at home but cannot remember the dose, this medication at this time is mainly for weight loss per pt  - Hold Ozempic while inpatient, will put on LDSS, sugars at goal

## 2022-12-10 NOTE — PROGRESS NOTE ADULT - SUBJECTIVE AND OBJECTIVE BOX
Gastroenterology Progress Note    Interval Events:   Feeling well post procedure with no acute complaints. Abdominal pain much improved with no n/v. Tolerating a liquid diet. Denies any overt bleeding with no episodes of melena or hematochezia      Allergies:  metformin (Unknown)      Hospital Medications:  acetaminophen     Tablet .. 650 milliGRAM(s) Oral every 6 hours PRN  allopurinol 200 milliGRAM(s) Oral daily  atorvastatin 20 milliGRAM(s) Oral at bedtime  dextrose 5%. 1000 milliLiter(s) IV Continuous <Continuous>  dextrose 5%. 1000 milliLiter(s) IV Continuous <Continuous>  dextrose 50% Injectable 25 Gram(s) IV Push once  dextrose 50% Injectable 12.5 Gram(s) IV Push once  dextrose 50% Injectable 25 Gram(s) IV Push once  dextrose Oral Gel 15 Gram(s) Oral once PRN  glucagon  Injectable 1 milliGRAM(s) IntraMuscular once  influenza  Vaccine (HIGH DOSE) 0.7 milliLiter(s) IntraMuscular once  insulin lispro (ADMELOG) corrective regimen sliding scale   SubCutaneous three times a day before meals  insulin lispro (ADMELOG) corrective regimen sliding scale   SubCutaneous at bedtime  lactated ringers Bolus 1000 milliLiter(s) IV Bolus once  levothyroxine 112 MICROGram(s) Oral daily  melatonin 3 milliGRAM(s) Oral at bedtime PRN  metoprolol tartrate 100 milliGRAM(s) Oral two times a day  morphine  - Injectable 4 milliGRAM(s) IV Push every 4 hours PRN  piperacillin/tazobactam IVPB. 3.375 Gram(s) IV Intermittent once  piperacillin/tazobactam IVPB.- 3.375 Gram(s) IV Intermittent once  piperacillin/tazobactam IVPB.- 3.375 Gram(s) IV Intermittent once  piperacillin/tazobactam IVPB.. 3.375 Gram(s) IV Intermittent every 8 hours  tamsulosin 0.4 milliGRAM(s) Oral at bedtime  traZODone 450 milliGRAM(s) Oral at bedtime      ROS: 14 point ROS negative unless otherwise state in subjective    PHYSICAL EXAM:   Vital Signs:  Vital Signs Last 24 Hrs  T(C): 36.8 (10 Dec 2022 05:33), Max: 37.3 (09 Dec 2022 11:16)  T(F): 98.3 (10 Dec 2022 05:33), Max: 99.1 (09 Dec 2022 11:16)  HR: 60 (10 Dec 2022 05:33) (60 - 111)  BP: 106/74 (10 Dec 2022 05:33) (100/59 - 141/83)  BP(mean): --  RR: 17 (10 Dec 2022 05:33) (16 - 27)  SpO2: 99% (10 Dec 2022 05:33) (95% - 100%)    Parameters below as of 10 Dec 2022 05:33  Patient On (Oxygen Delivery Method): nasal cannula  O2 Flow (L/min): 2    Daily Height in cm: 185.42 (09 Dec 2022 07:28)    Daily     GENERAL:  No acute distress  HEENT:  _scleral icterus  CHEST: no resp distress  HEART:  RRR  ABDOMEN:  Soft, non-tender, distended, normoactive bowel sounds  EXTREMITIES:  No edema  NEURO:  Alert and oriented x 3    LABS:                        15.9   11.12 )-----------( 105      ( 09 Dec 2022 10:01 )             45.8     Mean Cell Volume: 82.8 fL (12-09-22 @ 10:01)    12-09    129<L>  |  96<L>  |  17  ----------------------------<  152<H>  3.7   |  17<L>  |  1.00    Ca    9.6      09 Dec 2022 10:01  Phos  2.5     12-09  Mg     1.40     12-09    TPro  6.9  /  Alb  2.9<L>  /  TBili  7.9<H>  /  DBili  6.3<H>  /  AST  57<H>  /  ALT  89<H>  /  AlkPhos  261<H>  12-09    LIVER FUNCTIONS - ( 09 Dec 2022 10:01 )  Alb: 2.9 g/dL / Pro: 6.9 g/dL / ALK PHOS: 261 U/L / ALT: 89 U/L / AST: 57 U/L / GGT: x           PT/INR - ( 09 Dec 2022 16:15 )   PT: 19.3 sec;   INR: 1.66 ratio      PTT - ( 09 Dec 2022 09:10 )  PTT:57.4 sec  Lipase serum>3000           Imaging:  ERCP 12/9/2022                                                        Impression:          EGD:                       - Z-line regular, 40 cm from the incisors.                       - Normal stomach.                       ERCP:                       - The major papilla was bulging, congested, edematous                        and erythematous.                       - There was also evidence suggestive of clot protruding                        from the ampullary orifice.                       - Suspect findings likely in the setting of elevated INR                        and chronic cholangitis / choledocholithiasis.                       - One 10 fr x 7 cm plastic biliary stent was placed into                        the common bile duct.   - Purulent drainage noted likely consistent with                        suspected cholangitis.                       - No specimens obtained.  Recommendation:      - Advance diet as tolerated.                       - Agree with empiric broad specturm antibiotic coverage.                       - Continue supportive care for pancreatitis.                       - Would discuss anti-coagulation management with                        Cardiology.                       - Return for stent removal ERCP in 8 - 12 weeks.                       - Will need to discuss anticoagulation management prior                        to repeat ERCP.                       - Return patient to hospital [facility] for ongoing care.

## 2022-12-10 NOTE — PHYSICAL THERAPY INITIAL EVALUATION ADULT - PERTINENT HX OF CURRENT PROBLEM, REHAB EVAL
72 year old male presents with two days of fever and abdominal pain. In the ED found to have elevated bilirubin with right upper quarter pain, US and CT abdomen/pelvis showing dilated CBD with 8mm stone at the ampulla with distended gallbladder.

## 2022-12-10 NOTE — PROGRESS NOTE ADULT - ASSESSMENT
72yM with PMHx a-fib on warfarin, HTN, hypothyroidism, recent hospitalization from 8/19-8/24 when he presented with fever and abd pain found to have transaminitis and choledocholithiasis s/p ERCP(8/19) with stent placement with a course complicated by sepsis with bacteremia with pan-sensitive E. Coli and post ERCP pancreatitis, 2 other ERCPs attempting to remove stent 10/28 and 11/16 aborted due to food residue in the stomach, presents with 2 days of fever and abdominal pain. In the ED found to have elevated bilirubin with RUQ US and CT abd/pel showing dilated CBD with 8mm stone at the ampulla with distended gallbladder. GI saw pt and taking for same day ERCP on 12/9. 72yM with PMHx a-fib on warfarin, HTN, hypothyroidism, recent hospitalization from 8/19-8/24 when he presented with fever and abd pain found to have transaminitis and choledocholithiasis s/p ERCP(8/19) with stent placement with a course complicated by sepsis with bacteremia with pan-sensitive E. Coli and post ERCP pancreatitis, 2 other ERCPs attempting to remove stent 10/28 and 11/16 aborted due to food residue in the stomach, presents with 2 days of fever and abdominal pain. In the ED found to have elevated bilirubin with RUQ US and CT abd/pel showing dilated CBD with 8mm stone at the ampulla with distended gallbladder. S/p ERCP with GI on 12/9, had stent placed and there was pus draining from behind obstruction so we started Zosyn on 12/10. Of note, pt had an INR of 3.99 on presentation and required a dose of Kcentra and Vit K prior to procedure. Pending clinical improvement for DC, surgery to see pt to discuss planning for outpt cholecystectomy.

## 2022-12-10 NOTE — PHYSICAL THERAPY INITIAL EVALUATION ADULT - GROSSLY INTACT, SENSORY
pt reports numbness in bilateral feet, sensation intact to distal LE with impaired sensation to bottom of feet./Left UE/Right UE/Grossly Intact

## 2022-12-10 NOTE — PHYSICAL THERAPY INITIAL EVALUATION ADULT - ADDITIONAL COMMENTS
Pt states he lives in a house with his family with no steps to negotiate, resides on the main floor. Prior to admission pt was primarily wheelchair bound, states he was able to stand pivot transfer with assistance from family. Pt states he was previously ambulating but has been wheelchair bound for the last several years due to falls. Pt reports being mostly independent in ADLs.    Following evaluation, pt was left semireclined in bed in no distress, all lines in tact, call hodgson in reach. DAYANNA collins.

## 2022-12-10 NOTE — PROGRESS NOTE ADULT - PROBLEM SELECTOR PLAN 7
DVT Prophylaxis: Holding for procedure, will consider retarting AC after procedure for a-fib  Diet: NPO for ERCP  Lines/Tubes: peripheral IVs only  PT: Consult in, pending Eval  Social Work:  Code Status: Full Code    Home Situation: At baseline he lives with his son and has a home assistant/friend Terrell. The pt is wheelchair bound at baseline for the last 3 years after a fall where he injured his leg and became severely deconditioned, at the time of the fall the pt reports he was 540lbs so had difficulty with PT. He is able to stand for brief periods. He manages all his medications himself and is A&Ox4, able to feed and dress himself but has help with bathing and cooking.    Discharge Plan: Pending PT eval, will likely qualify for subacute rehab DVT Prophylaxis: Lovenox 40mg BID for BMI>40  Diet: advancing as tolerated to regular diet  Lines/Tubes: peripheral IVs only  PT: Maximilian 12/10 - Home with home PT  Social Work:  Code Status: Full Code    Home Situation: At baseline he lives with his son and has a home assistant/friend Terrell. The pt is wheelchair bound at baseline for the last 3 years after a fall where he injured his leg and became severely deconditioned, at the time of the fall the pt reports he was 540lbs so had difficulty with PT. He is able to stand for brief periods. He manages all his medications himself and is A&Ox4, able to feed and dress himself but has help with bathing and cooking.    Discharge Plan: Home with home PT

## 2022-12-11 LAB
ALBUMIN SERPL ELPH-MCNC: 2.4 G/DL — LOW (ref 3.3–5)
ALP SERPL-CCNC: 202 U/L — HIGH (ref 40–120)
ALT FLD-CCNC: 46 U/L — HIGH (ref 4–41)
ANION GAP SERPL CALC-SCNC: 10 MMOL/L — SIGNIFICANT CHANGE UP (ref 7–14)
APTT BLD: 36.5 SEC — HIGH (ref 27–36.3)
AST SERPL-CCNC: 28 U/L — SIGNIFICANT CHANGE UP (ref 4–40)
BASOPHILS # BLD AUTO: 0.03 K/UL — SIGNIFICANT CHANGE UP (ref 0–0.2)
BASOPHILS NFR BLD AUTO: 0.3 % — SIGNIFICANT CHANGE UP (ref 0–2)
BILIRUB DIRECT SERPL-MCNC: 4.9 MG/DL — HIGH (ref 0–0.3)
BILIRUB INDIRECT FLD-MCNC: 1.4 MG/DL — HIGH (ref 0–1)
BILIRUB SERPL-MCNC: 6.3 MG/DL — HIGH (ref 0.2–1.2)
BILIRUB SERPL-MCNC: 6.3 MG/DL — HIGH (ref 0.2–1.2)
BUN SERPL-MCNC: 13 MG/DL — SIGNIFICANT CHANGE UP (ref 7–23)
CALCIUM SERPL-MCNC: 9 MG/DL — SIGNIFICANT CHANGE UP (ref 8.4–10.5)
CHLORIDE SERPL-SCNC: 100 MMOL/L — SIGNIFICANT CHANGE UP (ref 98–107)
CO2 SERPL-SCNC: 24 MMOL/L — SIGNIFICANT CHANGE UP (ref 22–31)
CREAT SERPL-MCNC: 0.69 MG/DL — SIGNIFICANT CHANGE UP (ref 0.5–1.3)
EGFR: 98 ML/MIN/1.73M2 — SIGNIFICANT CHANGE UP
EOSINOPHIL # BLD AUTO: 0.02 K/UL — SIGNIFICANT CHANGE UP (ref 0–0.5)
EOSINOPHIL NFR BLD AUTO: 0.2 % — SIGNIFICANT CHANGE UP (ref 0–6)
GLUCOSE BLDC GLUCOMTR-MCNC: 110 MG/DL — HIGH (ref 70–99)
GLUCOSE BLDC GLUCOMTR-MCNC: 112 MG/DL — HIGH (ref 70–99)
GLUCOSE BLDC GLUCOMTR-MCNC: 141 MG/DL — HIGH (ref 70–99)
GLUCOSE BLDC GLUCOMTR-MCNC: 188 MG/DL — HIGH (ref 70–99)
GLUCOSE SERPL-MCNC: 116 MG/DL — HIGH (ref 70–99)
HCT VFR BLD CALC: 40.2 % — SIGNIFICANT CHANGE UP (ref 39–50)
HGB BLD-MCNC: 14 G/DL — SIGNIFICANT CHANGE UP (ref 13–17)
IANC: 6.12 K/UL — SIGNIFICANT CHANGE UP (ref 1.8–7.4)
IMM GRANULOCYTES NFR BLD AUTO: 1.9 % — HIGH (ref 0–0.9)
INR BLD: 1.36 RATIO — HIGH (ref 0.88–1.16)
LIDOCAIN IGE QN: 60 U/L — SIGNIFICANT CHANGE UP (ref 7–60)
LYMPHOCYTES # BLD AUTO: 1.64 K/UL — SIGNIFICANT CHANGE UP (ref 1–3.3)
LYMPHOCYTES # BLD AUTO: 18.7 % — SIGNIFICANT CHANGE UP (ref 13–44)
MAGNESIUM SERPL-MCNC: 1.8 MG/DL — SIGNIFICANT CHANGE UP (ref 1.6–2.6)
MCHC RBC-ENTMCNC: 28.9 PG — SIGNIFICANT CHANGE UP (ref 27–34)
MCHC RBC-ENTMCNC: 34.8 GM/DL — SIGNIFICANT CHANGE UP (ref 32–36)
MCV RBC AUTO: 82.9 FL — SIGNIFICANT CHANGE UP (ref 80–100)
MONOCYTES # BLD AUTO: 0.8 K/UL — SIGNIFICANT CHANGE UP (ref 0–0.9)
MONOCYTES NFR BLD AUTO: 9.1 % — SIGNIFICANT CHANGE UP (ref 2–14)
NEUTROPHILS # BLD AUTO: 6.12 K/UL — SIGNIFICANT CHANGE UP (ref 1.8–7.4)
NEUTROPHILS NFR BLD AUTO: 69.8 % — SIGNIFICANT CHANGE UP (ref 43–77)
NRBC # BLD: 0 /100 WBCS — SIGNIFICANT CHANGE UP (ref 0–0)
NRBC # FLD: 0 K/UL — SIGNIFICANT CHANGE UP (ref 0–0)
PHOSPHATE SERPL-MCNC: 1.8 MG/DL — LOW (ref 2.5–4.5)
PLATELET # BLD AUTO: 113 K/UL — LOW (ref 150–400)
POTASSIUM SERPL-MCNC: 3.1 MMOL/L — LOW (ref 3.5–5.3)
POTASSIUM SERPL-SCNC: 3.1 MMOL/L — LOW (ref 3.5–5.3)
PROT SERPL-MCNC: 6 G/DL — SIGNIFICANT CHANGE UP (ref 6–8.3)
PROTHROM AB SERPL-ACNC: 15.8 SEC — HIGH (ref 10.5–13.4)
RBC # BLD: 4.85 M/UL — SIGNIFICANT CHANGE UP (ref 4.2–5.8)
RBC # FLD: 15.5 % — HIGH (ref 10.3–14.5)
SODIUM SERPL-SCNC: 134 MMOL/L — LOW (ref 135–145)
WBC # BLD: 8.78 K/UL — SIGNIFICANT CHANGE UP (ref 3.8–10.5)
WBC # FLD AUTO: 8.78 K/UL — SIGNIFICANT CHANGE UP (ref 3.8–10.5)

## 2022-12-11 PROCEDURE — 99232 SBSQ HOSP IP/OBS MODERATE 35: CPT | Mod: GC

## 2022-12-11 PROCEDURE — 99233 SBSQ HOSP IP/OBS HIGH 50: CPT | Mod: GC

## 2022-12-11 RX ORDER — SENNA PLUS 8.6 MG/1
2 TABLET ORAL AT BEDTIME
Refills: 0 | Status: DISCONTINUED | OUTPATIENT
Start: 2022-12-11 | End: 2022-12-18

## 2022-12-11 RX ORDER — POTASSIUM CHLORIDE 20 MEQ
40 PACKET (EA) ORAL ONCE
Refills: 0 | Status: COMPLETED | OUTPATIENT
Start: 2022-12-11 | End: 2022-12-11

## 2022-12-11 RX ORDER — POTASSIUM PHOSPHATE, MONOBASIC POTASSIUM PHOSPHATE, DIBASIC 236; 224 MG/ML; MG/ML
30 INJECTION, SOLUTION INTRAVENOUS ONCE
Refills: 0 | Status: COMPLETED | OUTPATIENT
Start: 2022-12-11 | End: 2022-12-11

## 2022-12-11 RX ORDER — ENOXAPARIN SODIUM 100 MG/ML
150 INJECTION SUBCUTANEOUS EVERY 12 HOURS
Refills: 0 | Status: DISCONTINUED | OUTPATIENT
Start: 2022-12-11 | End: 2022-12-12

## 2022-12-11 RX ORDER — POLYETHYLENE GLYCOL 3350 17 G/17G
17 POWDER, FOR SOLUTION ORAL DAILY
Refills: 0 | Status: DISCONTINUED | OUTPATIENT
Start: 2022-12-11 | End: 2022-12-14

## 2022-12-11 RX ADMIN — Medication 200 MILLIGRAM(S): at 18:36

## 2022-12-11 RX ADMIN — POTASSIUM PHOSPHATE, MONOBASIC POTASSIUM PHOSPHATE, DIBASIC 83.33 MILLIMOLE(S): 236; 224 INJECTION, SOLUTION INTRAVENOUS at 12:22

## 2022-12-11 RX ADMIN — POLYETHYLENE GLYCOL 3350 17 GRAM(S): 17 POWDER, FOR SOLUTION ORAL at 12:30

## 2022-12-11 RX ADMIN — Medication 112 MICROGRAM(S): at 05:47

## 2022-12-11 RX ADMIN — PIPERACILLIN AND TAZOBACTAM 25 GRAM(S): 4; .5 INJECTION, POWDER, LYOPHILIZED, FOR SOLUTION INTRAVENOUS at 13:44

## 2022-12-11 RX ADMIN — ENOXAPARIN SODIUM 150 MILLIGRAM(S): 100 INJECTION SUBCUTANEOUS at 19:02

## 2022-12-11 RX ADMIN — PIPERACILLIN AND TAZOBACTAM 25 GRAM(S): 4; .5 INJECTION, POWDER, LYOPHILIZED, FOR SOLUTION INTRAVENOUS at 05:46

## 2022-12-11 RX ADMIN — PIPERACILLIN AND TAZOBACTAM 25 GRAM(S): 4; .5 INJECTION, POWDER, LYOPHILIZED, FOR SOLUTION INTRAVENOUS at 22:18

## 2022-12-11 RX ADMIN — Medication 450 MILLIGRAM(S): at 22:19

## 2022-12-11 RX ADMIN — ENOXAPARIN SODIUM 40 MILLIGRAM(S): 100 INJECTION SUBCUTANEOUS at 05:54

## 2022-12-11 RX ADMIN — Medication 3 MILLIGRAM(S): at 22:21

## 2022-12-11 RX ADMIN — SENNA PLUS 2 TABLET(S): 8.6 TABLET ORAL at 22:20

## 2022-12-11 RX ADMIN — TAMSULOSIN HYDROCHLORIDE 0.4 MILLIGRAM(S): 0.4 CAPSULE ORAL at 22:19

## 2022-12-11 RX ADMIN — Medication 200 MILLIGRAM(S): at 12:25

## 2022-12-11 RX ADMIN — Medication 1: at 12:42

## 2022-12-11 RX ADMIN — Medication 40 MILLIEQUIVALENT(S): at 12:25

## 2022-12-11 RX ADMIN — CHLORHEXIDINE GLUCONATE 15 MILLILITER(S): 213 SOLUTION TOPICAL at 18:35

## 2022-12-11 RX ADMIN — ATORVASTATIN CALCIUM 20 MILLIGRAM(S): 80 TABLET, FILM COATED ORAL at 22:18

## 2022-12-11 RX ADMIN — CHLORHEXIDINE GLUCONATE 15 MILLILITER(S): 213 SOLUTION TOPICAL at 05:53

## 2022-12-11 NOTE — PROGRESS NOTE ADULT - PROBLEM SELECTOR PLAN 4
- Home regimen of Metoprolol tartrate 200mg BID, Nifedipine 60mg qd, Furosemide 40mg qd  - No hx heart failure per pt  - Continue metoprolol and nifedipine, holding furosemide for now while pt recovers

## 2022-12-11 NOTE — PROGRESS NOTE ADULT - PROBLEM SELECTOR PLAN 3
- Home regimen Metoprolol tartrate 200mg BID and coumadin 12-14mg/day, he measures INR at home and gets medication adjustments weekly  - INR was 3.99 on arrival, received Kcentra 1500mg + Vit K in ER for reversal so he could go urgently to ERCP  - Will consider transition to DOAC due to high risk of falls and supratherapeutic INR on presentation, a-fib is non-valvular  - For now DVT Proph only - Home regimen Metoprolol tartrate 200mg BID and coumadin 12-14mg/day, he measures INR at home and gets medication adjustments weekly  - INR was 3.99 on arrival, received Kcentra 1500mg + Vit K in ER for reversal so he could go urgently to ERCP  - Will consider transition to DOAC due to high risk of falls and supratherapeutic INR on presentation, a-fib is non-valvular  - Can start AC as per GI. Will need to clarify if patient would be candidate for DOAC moving forward. Start therapeutic dose lovenox for now. Heparin gtt if surgery planning for OR

## 2022-12-11 NOTE — PROGRESS NOTE ADULT - ASSESSMENT
72yM with PMHx a-fib on warfarin, HTN, hypothyroidism, recent hospitalization from 8/19-8/24 when he presented with fever and abd pain found to have transaminitis and choledocholithiasis s/p ERCP(8/19) with stent placement with a course complicated by sepsis with bacteremia with pan-sensitive E. Coli and post ERCP pancreatitis, 2 other ERCPs attempting to remove stent 10/28 and 11/16 aborted due to food residue in the stomach, presents with 2 days of fever and abdominal pain. In the ED found to have elevated bilirubin with RUQ US and CT abd/pel showing dilated CBD with 8mm stone at the ampulla with distended gallbladder. S/p ERCP with GI on 12/9, had stent placed and there was pus draining from behind obstruction so we started Zosyn on 12/10. Of note, pt had an INR of 3.99 on presentation and required a dose of Kcentra and Vit K prior to procedure. Pending clinical improvement for DC, surgery to see pt to discuss planning for outpt cholecystectomy.

## 2022-12-11 NOTE — PROGRESS NOTE ADULT - PROBLEM SELECTOR PLAN 7
DVT Prophylaxis: Lovenox 40mg BID for BMI>40  Diet: advancing as tolerated to regular diet  Lines/Tubes: peripheral IVs only  PT: Maximilian 12/10 - Home with home PT  Social Work:  Code Status: Full Code    Home Situation: At baseline he lives with his son and has a home assistant/friend Terrell. The pt is wheelchair bound at baseline for the last 3 years after a fall where he injured his leg and became severely deconditioned, at the time of the fall the pt reports he was 540lbs so had difficulty with PT. He is able to stand for brief periods. He manages all his medications himself and is A&Ox4, able to feed and dress himself but has help with bathing and cooking.    Discharge Plan: Home with home PT DVT Prophylaxis: AC as above   Diet: advancing as tolerated to regular diet. BM regiment senna miralax daily   Lines/Tubes: peripheral IVs only  PT: Maximilian 12/10 - Home with home PT  Social Work:  Code Status: Full Code    Home Situation: At baseline he lives with his son and has a home assistant/friend Terrell. The pt is wheelchair bound at baseline for the last 3 years after a fall where he injured his leg and became severely deconditioned, at the time of the fall the pt reports he was 540lbs so had difficulty with PT. He is able to stand for brief periods. He manages all his medications himself and is A&Ox4, able to feed and dress himself but has help with bathing and cooking.    Discharge Plan: Home with home PT

## 2022-12-11 NOTE — PROGRESS NOTE ADULT - PROBLEM SELECTOR PLAN 2
- Pus coming from behind obstruction seen on ERCP, consistent with likely infection indicating cholangitis  - Pt did have a fever at home, but otherwise WBC only mildly elevated on arrival and vitals have been stable, HR in the 90s  - Continue Zosyn 3.375 q8hrs (12/10 -

## 2022-12-11 NOTE — PROGRESS NOTE ADULT - PROBLEM SELECTOR PLAN 5
- Pt reports he has prediabetes, on Ozempic at home but cannot remember the dose, this medication at this time is mainly for weight loss per pt  - Hold Ozempic while inpatient, will put on LDSS, sugars at goal

## 2022-12-11 NOTE — PROGRESS NOTE ADULT - SUBJECTIVE AND OBJECTIVE BOX
Gastroenterology  Progress Note    Interval Events:   Feeling well today with no acute complaints. Denies any ongoing n/v/adbominal pain. Tolerating a diet with no issues. Denies any episodes of overt bleeding with no melena or hematochezia.     Allergies:  metformin (Unknown)    Hospital Medications:  acetaminophen     Tablet .. 650 milliGRAM(s) Oral every 6 hours PRN  allopurinol 200 milliGRAM(s) Oral daily  atorvastatin 20 milliGRAM(s) Oral at bedtime  chlorhexidine 0.12% Liquid 15 milliLiter(s) Swish and Spit two times a day  dextrose 5%. 1000 milliLiter(s) IV Continuous <Continuous>  dextrose 5%. 1000 milliLiter(s) IV Continuous <Continuous>  dextrose 50% Injectable 25 Gram(s) IV Push once  dextrose 50% Injectable 12.5 Gram(s) IV Push once  dextrose 50% Injectable 25 Gram(s) IV Push once  dextrose Oral Gel 15 Gram(s) Oral once PRN  enoxaparin Injectable 40 milliGRAM(s) SubCutaneous every 12 hours  glucagon  Injectable 1 milliGRAM(s) IntraMuscular once  influenza  Vaccine (HIGH DOSE) 0.7 milliLiter(s) IntraMuscular once  insulin lispro (ADMELOG) corrective regimen sliding scale   SubCutaneous three times a day before meals  insulin lispro (ADMELOG) corrective regimen sliding scale   SubCutaneous at bedtime  lactated ringers Bolus 1000 milliLiter(s) IV Bolus once  levothyroxine 112 MICROGram(s) Oral daily  melatonin 3 milliGRAM(s) Oral at bedtime PRN  metoprolol tartrate 200 milliGRAM(s) Oral two times a day  morphine  - Injectable 4 milliGRAM(s) IV Push every 4 hours PRN  NIFEdipine XL 60 milliGRAM(s) Oral daily  piperacillin/tazobactam IVPB.. 3.375 Gram(s) IV Intermittent every 8 hours  tamsulosin 0.4 milliGRAM(s) Oral at bedtime  traZODone 450 milliGRAM(s) Oral at bedtime      ROS: 14 point ROS negative unless otherwise state in subjective    PHYSICAL EXAM:   Vital Signs:  Vital Signs Last 24 Hrs  T(C): 37.1 (10 Dec 2022 21:25), Max: 37.1 (10 Dec 2022 21:25)  T(F): 98.8 (10 Dec 2022 21:25), Max: 98.8 (10 Dec 2022 21:25)  HR: 80 (10 Dec 2022 21:25) (80 - 130)  BP: 135/80 (10 Dec 2022 21:25) (119/68 - 140/78)  BP(mean): --  RR: 18 (10 Dec 2022 21:25) (18 - 18)  SpO2: 98% (10 Dec 2022 21:25) (95% - 98%)    Parameters below as of 10 Dec 2022 21:25  Patient On (Oxygen Delivery Method): room air    GENERAL:  No acute distress  HEENT:  +scleral icterus  CHEST: no resp distress  HEART:  RRR  ABDOMEN:  Soft, non-tender, non-distended, normoactive bowel sounds  EXTREMITIES:  Noedema  NEURO:  Alert and oriented x 3    LABS:                        14.8   11.60 )-----------( 116      ( 10 Dec 2022 07:08 )             41.1     Mean Cell Volume: 82.2 fL (12-10-22 @ 07:08)    12-10    136  |  102  |  18  ----------------------------<  123<H>  3.3<L>   |  23  |  0.83    Ca    9.1      10 Dec 2022 07:08  Phos  2.1     12-10  Mg     1.50     12-10    TPro  6.1  /  Alb  2.6<L>  /  TBili  9.5<H>  /  DBili  x   /  AST  39  /  ALT  62<H>  /  AlkPhos  217<H>  12-10    LIVER FUNCTIONS - ( 10 Dec 2022 07:08 )  Alb: 2.6 g/dL / Pro: 6.1 g/dL / ALK PHOS: 217 U/L / ALT: 62 U/L / AST: 39 U/L / GGT: x           PT/INR - ( 10 Dec 2022 07:08 )   PT: 19.0 sec;   INR: 1.63 ratio    PTT - ( 09 Dec 2022 09:10 )  PTT:57.4 sec    Imaging:    Imaging:  ERCP 12/9/2022                                                        Impression:          EGD:                       - Z-line regular, 40 cm from the incisors.                       - Normal stomach.                       ERCP:                       - The major papilla was bulging, congested, edematous                        and erythematous.                       - There was also evidence suggestive of clot protruding                        from the ampullary orifice.                       - Suspect findings likely in the setting of elevated INR                        and chronic cholangitis / choledocholithiasis.                       - One 10 fr x 7 cm plastic biliary stent was placed into                        the common bile duct.   - Purulent drainage noted likely consistent with                        suspected cholangitis.                       - No specimens obtained.  Recommendation:      - Advance diet as tolerated.                       - Agree with empiric broad specturm antibiotic coverage.                       - Continue supportive care for pancreatitis.                       - Would discuss anti-coagulation management with                        Cardiology.                       - Return for stent removal ERCP in 8 - 12 weeks.                       - Will need to discuss anticoagulation management prior                        to repeat ERCP.                       - Return patient to [facility] for ongoing care.

## 2022-12-11 NOTE — PROGRESS NOTE ADULT - ATTENDING COMMENTS
GI following for cholangitis, s/p ERCP on 12/9 with placement of biliary stent.   Currently feels well. Reports resolution of abdominal pain. Tolerating diet.     - continue IV abx   - trend LFTs, INR   - repeat ERCP in 8-12 weeks for stent removal   - ccy per surgery team   - diet as tolerated     GI to follow, please call with questions .

## 2022-12-11 NOTE — PROGRESS NOTE ADULT - PROBLEM SELECTOR PLAN 1
- 8mm stone in the ampulla on imaging and on presentation had elevation in bili to 7.9 (4.6 on 10/21), lipase >3000, and mild elevation in LFTs AST/ALT 57/89 (280/262 on 10/21), all consistent with choledocholithiasis with pancreatitis  - Pt with choledocholithiasis in 8/2022 s/p ERCP with stent placement, course complicated by post-ERCP pancreatitis and E. Coli bacteremia  - Prior stent now gone, passed on its own  - S/p IV fluids 1.5L NS and 1L LR in the ED  - Pain PRNs Tylenol 650mg and morphine 4mg IV, did not require any overnight  - Pt will likely need outpt cholecystectomy, given 2 episodes choledocholithiasis, Surg consulted to speak with patient about this  - GI took pt for urgent ERCP on 12/9 for stent placement - 8mm stone in the ampulla on imaging and on presentation had elevation in bili to 7.9 (4.6 on 10/21), lipase >3000, and mild elevation in LFTs AST/ALT 57/89 (280/262 on 10/21), all consistent with choledocholithiasis with pancreatitis  - Pt with choledocholithiasis in 8/2022 s/p ERCP with stent placement, course complicated by post-ERCP pancreatitis and E. Coli bacteremia  - Prior stent now gone, passed on its own  - S/p IV fluids 1.5L NS and 1L LR in the ED  - Pain PRNs Tylenol 650mg and morphine 4mg IV, did not require any overnight  - Pt will likely need outpt cholecystectomy, given 2 episodes choledocholithiasis, Surg consulted to speak with patient about this  - GI took pt for urgent ERCP on 12/9 for stent placement  - Pending surgery final recs

## 2022-12-11 NOTE — PROGRESS NOTE ADULT - ATTENDING COMMENTS
76 y.o. M w/ a hx of afib on Warfarin, HTN, hypothyroidism, hx of recent choledocholithiasis hospitalized for cholangitis.  S/p ERCP with evidence of suppurative cholangitis.     Patient feels well today, denies any abdominal pain, has been tolerating a regular diet. PE unchanged. Labs notable for improving LFT's.     #Gallstone pancreatitis c/b Cholangitis: Cont Zosyn. Plan for ERCP in 2-3 months per GI reccs to retrieve stent. Surgery consulted for cholecystectomy, want to monitor for additional day.   # Afib: Cont Metoprolol. Was on Warfarin at home although patient may qualify for DOAC. Can cont therapeutic Lovenox pending Surgery reccs and DOAC coverage.   # Constipation: Bowel regimen.   Patient interested in [facility], will re-consult PT.

## 2022-12-11 NOTE — PROGRESS NOTE ADULT - SUBJECTIVE AND OBJECTIVE BOX
PROGRESS NOTE:   Authored by Dr. Emerita Perez MD (PGY-3). Available on TEAMS.    Patient is a 72y old  Male who presents with a chief complaint of Choledocholithiasis (10 Dec 2022 17:26)      SUBJECTIVE / OVERNIGHT EVENTS:  No acute events overnight. Patient seen and examined at bedside. Patient denies new symptoms or concerns.     ADDITIONAL REVIEW OF SYSTEMS:  Patient denies fevers, chills, chest pain, shortness of breath, nausea, abdominal pain, diarrhea, constipation, dysuria, leg swelling, headache, light headedness.    MEDICATIONS  (STANDING):  allopurinol 200 milliGRAM(s) Oral daily  atorvastatin 20 milliGRAM(s) Oral at bedtime  chlorhexidine 0.12% Liquid 15 milliLiter(s) Swish and Spit two times a day  dextrose 5%. 1000 milliLiter(s) (100 mL/Hr) IV Continuous <Continuous>  dextrose 5%. 1000 milliLiter(s) (50 mL/Hr) IV Continuous <Continuous>  dextrose 50% Injectable 25 Gram(s) IV Push once  dextrose 50% Injectable 12.5 Gram(s) IV Push once  dextrose 50% Injectable 25 Gram(s) IV Push once  enoxaparin Injectable 40 milliGRAM(s) SubCutaneous every 12 hours  glucagon  Injectable 1 milliGRAM(s) IntraMuscular once  influenza  Vaccine (HIGH DOSE) 0.7 milliLiter(s) IntraMuscular once  insulin lispro (ADMELOG) corrective regimen sliding scale   SubCutaneous at bedtime  insulin lispro (ADMELOG) corrective regimen sliding scale   SubCutaneous three times a day before meals  lactated ringers Bolus 1000 milliLiter(s) IV Bolus once  levothyroxine 112 MICROGram(s) Oral daily  metoprolol tartrate 200 milliGRAM(s) Oral two times a day  NIFEdipine XL 60 milliGRAM(s) Oral daily  piperacillin/tazobactam IVPB.. 3.375 Gram(s) IV Intermittent every 8 hours  tamsulosin 0.4 milliGRAM(s) Oral at bedtime  traZODone 450 milliGRAM(s) Oral at bedtime    MEDICATIONS  (PRN):  acetaminophen     Tablet .. 650 milliGRAM(s) Oral every 6 hours PRN Temp greater or equal to 38C (100.4F), Mild Pain (1 - 3)  dextrose Oral Gel 15 Gram(s) Oral once PRN Blood Glucose LESS THAN 70 milliGRAM(s)/deciliter  melatonin 3 milliGRAM(s) Oral at bedtime PRN Insomnia  morphine  - Injectable 4 milliGRAM(s) IV Push every 4 hours PRN Severe Pain (7 - 10)      CAPILLARY BLOOD GLUCOSE      POCT Blood Glucose.: 128 mg/dL (10 Dec 2022 20:41)  POCT Blood Glucose.: 118 mg/dL (10 Dec 2022 16:47)  POCT Blood Glucose.: 126 mg/dL (10 Dec 2022 11:51)  POCT Blood Glucose.: 109 mg/dL (10 Dec 2022 07:40)    I&O's Summary      PHYSICAL EXAM:  Vital Signs Last 24 Hrs  T(C): 37.1 (10 Dec 2022 21:25), Max: 37.1 (10 Dec 2022 21:25)  T(F): 98.8 (10 Dec 2022 21:25), Max: 98.8 (10 Dec 2022 21:25)  HR: 80 (10 Dec 2022 21:25) (80 - 130)  BP: 135/80 (10 Dec 2022 21:25) (119/68 - 140/78)  BP(mean): --  RR: 18 (10 Dec 2022 21:25) (18 - 18)  SpO2: 98% (10 Dec 2022 21:25) (95% - 98%)    Parameters below as of 10 Dec 2022 21:25  Patient On (Oxygen Delivery Method): room air        CONSTITUTIONAL: NAD, well-developed  RESPIRATORY: Normal respiratory effort; lungs are clear to auscultation bilaterally  CARDIOVASCULAR: Regular rate and rhythm, normal S1 and S2, no murmur/rub/gallop; No lower extremity edema; Peripheral pulses are 2+ bilaterally  ABDOMEN: Nontender to palpation, normoactive bowel sounds, no rebound/guarding; No hepatosplenomegaly  MUSCLOSKELETAL: no clubbing or cyanosis of digits; no joint swelling or tenderness to palpation  PSYCH: A+O to person, place, and time; affect appropriate    LABS:                        14.8   11.60 )-----------( 116      ( 10 Dec 2022 07:08 )             41.1     12-10    136  |  102  |  18  ----------------------------<  123<H>  3.3<L>   |  23  |  0.83    Ca    9.1      10 Dec 2022 07:08  Phos  2.1     12-10  Mg     1.50     12-10    TPro  6.1  /  Alb  2.6<L>  /  TBili  9.5<H>  /  DBili  x   /  AST  39  /  ALT  62<H>  /  AlkPhos  217<H>  12-10    PT/INR - ( 10 Dec 2022 07:08 )   PT: 19.0 sec;   INR: 1.63 ratio         PTT - ( 09 Dec 2022 09:10 )  PTT:57.4 sec          Culture - Blood (collected 09 Dec 2022 09:15)  Source: .Blood Blood-Peripheral  Preliminary Report (10 Dec 2022 12:02):    No growth to date.    Culture - Blood (collected 09 Dec 2022 09:10)  Source: .Blood Blood-Peripheral  Preliminary Report (10 Dec 2022 12:02):    No growth to date.        Tele Reviewed:    RADIOLOGY & ADDITIONAL TESTS:  Results Reviewed:   Imaging Personally Reviewed:  Electrocardiogram Personally Reviewed:     PROGRESS NOTE:   Authored by Dr. Emerita Perez MD (PGY-3). Available on TEAMS.    Patient is a 72y old  Male who presents with a chief complaint of Choledocholithiasis (10 Dec 2022 17:26)      SUBJECTIVE / OVERNIGHT EVENTS:  Pt refused BiPAP overnight. Patient seen and examined at bedside. Patient denies new symptoms or concerns. Abdominal pain is improved. He reports LBM 5 days ago.     ADDITIONAL REVIEW OF SYSTEMS:  Patient denies fevers, chills, chest pain, shortness of breath, nausea, abdominal pain, diarrhea, constipation, dysuria, leg swelling, headache, light headedness.    MEDICATIONS  (STANDING):  allopurinol 200 milliGRAM(s) Oral daily  atorvastatin 20 milliGRAM(s) Oral at bedtime  chlorhexidine 0.12% Liquid 15 milliLiter(s) Swish and Spit two times a day  dextrose 5%. 1000 milliLiter(s) (100 mL/Hr) IV Continuous <Continuous>  dextrose 5%. 1000 milliLiter(s) (50 mL/Hr) IV Continuous <Continuous>  dextrose 50% Injectable 25 Gram(s) IV Push once  dextrose 50% Injectable 12.5 Gram(s) IV Push once  dextrose 50% Injectable 25 Gram(s) IV Push once  enoxaparin Injectable 40 milliGRAM(s) SubCutaneous every 12 hours  glucagon  Injectable 1 milliGRAM(s) IntraMuscular once  influenza  Vaccine (HIGH DOSE) 0.7 milliLiter(s) IntraMuscular once  insulin lispro (ADMELOG) corrective regimen sliding scale   SubCutaneous at bedtime  insulin lispro (ADMELOG) corrective regimen sliding scale   SubCutaneous three times a day before meals  lactated ringers Bolus 1000 milliLiter(s) IV Bolus once  levothyroxine 112 MICROGram(s) Oral daily  metoprolol tartrate 200 milliGRAM(s) Oral two times a day  NIFEdipine XL 60 milliGRAM(s) Oral daily  piperacillin/tazobactam IVPB.. 3.375 Gram(s) IV Intermittent every 8 hours  tamsulosin 0.4 milliGRAM(s) Oral at bedtime  traZODone 450 milliGRAM(s) Oral at bedtime    MEDICATIONS  (PRN):  acetaminophen     Tablet .. 650 milliGRAM(s) Oral every 6 hours PRN Temp greater or equal to 38C (100.4F), Mild Pain (1 - 3)  dextrose Oral Gel 15 Gram(s) Oral once PRN Blood Glucose LESS THAN 70 milliGRAM(s)/deciliter  melatonin 3 milliGRAM(s) Oral at bedtime PRN Insomnia  morphine  - Injectable 4 milliGRAM(s) IV Push every 4 hours PRN Severe Pain (7 - 10)      CAPILLARY BLOOD GLUCOSE      POCT Blood Glucose.: 128 mg/dL (10 Dec 2022 20:41)  POCT Blood Glucose.: 118 mg/dL (10 Dec 2022 16:47)  POCT Blood Glucose.: 126 mg/dL (10 Dec 2022 11:51)  POCT Blood Glucose.: 109 mg/dL (10 Dec 2022 07:40)    I&O's Summary      PHYSICAL EXAM:  Vital Signs Last 24 Hrs  T(C): 37.1 (10 Dec 2022 21:25), Max: 37.1 (10 Dec 2022 21:25)  T(F): 98.8 (10 Dec 2022 21:25), Max: 98.8 (10 Dec 2022 21:25)  HR: 80 (10 Dec 2022 21:25) (80 - 130)  BP: 135/80 (10 Dec 2022 21:25) (119/68 - 140/78)  BP(mean): --  RR: 18 (10 Dec 2022 21:25) (18 - 18)  SpO2: 98% (10 Dec 2022 21:25) (95% - 98%)    Parameters below as of 10 Dec 2022 21:25  Patient On (Oxygen Delivery Method): room air        CONSTITUTIONAL: NAD, well-developed, morbidly obese   RESPIRATORY: Normal respiratory effort; lungs are clear to auscultation bilaterally  CARDIOVASCULAR: Regular rate and rhythm, normal S1 and S2, no murmur/rub/gallop; No lower extremity edema; Peripheral pulses are 2+ bilaterally  ABDOMEN: Nontender to palpation, normoactive bowel sounds, no rebound/guarding  MUSCLOSKELETAL: no clubbing or cyanosis of digits; no joint swelling or tenderness to palpation  PSYCH: A+O to person, place, and time; affect appropriate    LABS:                        14.8   11.60 )-----------( 116      ( 10 Dec 2022 07:08 )             41.1     12-10    136  |  102  |  18  ----------------------------<  123<H>  3.3<L>   |  23  |  0.83    Ca    9.1      10 Dec 2022 07:08  Phos  2.1     12-10  Mg     1.50     12-10    TPro  6.1  /  Alb  2.6<L>  /  TBili  9.5<H>  /  DBili  x   /  AST  39  /  ALT  62<H>  /  AlkPhos  217<H>  12-10    PT/INR - ( 10 Dec 2022 07:08 )   PT: 19.0 sec;   INR: 1.63 ratio         PTT - ( 09 Dec 2022 09:10 )  PTT:57.4 sec          Culture - Blood (collected 09 Dec 2022 09:15)  Source: .Blood Blood-Peripheral  Preliminary Report (10 Dec 2022 12:02):    No growth to date.    Culture - Blood (collected 09 Dec 2022 09:10)  Source: .Blood Blood-Peripheral  Preliminary Report (10 Dec 2022 12:02):    No growth to date.        Tele Reviewed:    RADIOLOGY & ADDITIONAL TESTS:  Results Reviewed:   Imaging Personally Reviewed:  Electrocardiogram Personally Reviewed:

## 2022-12-11 NOTE — PROGRESS NOTE ADULT - ASSESSMENT
72M Hx morbid obesity (BMI 50), atrial fibrillation (on coumadin), HTN, BPH, hypothyroidism, cholangitis 2/2 choledocholithiasis s/p ERCP (8/2022) c/b post-ERCP pancreatitis, c/f possible gastroparesis 2/2 food retention on endoscopies, now presenting for fever, RUQ abdominal pain with elevated liver enzymes, lipase, leukocytosis c/f gallstone pancreatitis + cholangitis s/p ERCP 12/9 with plastic biliary stent was placed into the common bile duct.    #Gallstone pancreatitis c/b cholangitis  #Hx choledocholithiasis   #Hx post-ERCP pancreatitis (8/2022)  #Coagulopathy: INR 3.9 in setting of coumadin use  P/w fever 101 at home, right sided abdominal pain with leukocytosis (11), elevated liver enzymes (Tbili 7.9), elevated lipase (>3000s) + CBD dilation on RUQ US with GB sludge. Previously with known Hx of cholangitis 2/2 choledocholithiasis s/p CBD stent placement (8/2022) with subsequent ERCPs for stent/stone removal cancelled 2/2 poor visualization due to retention of food contents. Current clinical picture c/f gallstone pancreatitis/choledocholithiasis c/b cholangitis (likely grade I, INR elevated in setting of coumadin use). s/p ERCP on 12/9 with purulent drainage from CBG with plastic biliary stent was placed into  the common bile duct in addition to evidence suggestive of clot protruding from the ampullary orifice likely in the setting of elevated INR. Feeling well post procedure with no fevers, chills, or any ongoing abdominal pain. Suspect that liver enzymes will continue to slowly improve over the next couple of dayes    #Suspected gastroparesis: multiple EGD with retention of food contents, patient without gastric emptying study on file       Recommendations:   -Diet as tolerated   -Agree with empiric broad spectrums antibiotic coverage x 10-14 days for a course of cholangitis   -Daily LFTs/INR   -Would discuss anti-coagulation management with Cardiology in the setting of above   -Return for stent removal ERCP in 8 - 12 weeks.  -Discuss CCY with surgery team given recurrent episodes of cholangitis     All recommendations are tentative until note is attested by attending.     Santosh Ross, PGY-4  Gastroenterology/Hepatology Fellow  Available on [employer] Teams   [phone] (Long Range Pager)  [phone] (Short Range Pager [facility])    After 5pm, please contact the on-call GI fellow. [phone]

## 2022-12-12 LAB
ALBUMIN SERPL ELPH-MCNC: 2.3 G/DL — LOW (ref 3.3–5)
ALP SERPL-CCNC: 189 U/L — HIGH (ref 40–120)
ALT FLD-CCNC: 41 U/L — SIGNIFICANT CHANGE UP (ref 4–41)
ANION GAP SERPL CALC-SCNC: 10 MMOL/L — SIGNIFICANT CHANGE UP (ref 7–14)
APTT BLD: 43.3 SEC — HIGH (ref 27–36.3)
AST SERPL-CCNC: 40 U/L — SIGNIFICANT CHANGE UP (ref 4–40)
BASOPHILS # BLD AUTO: 0.03 K/UL — SIGNIFICANT CHANGE UP (ref 0–0.2)
BASOPHILS NFR BLD AUTO: 0.4 % — SIGNIFICANT CHANGE UP (ref 0–2)
BILIRUB DIRECT SERPL-MCNC: 3 MG/DL — HIGH (ref 0–0.3)
BILIRUB INDIRECT FLD-MCNC: 1.4 MG/DL — HIGH (ref 0–1)
BILIRUB SERPL-MCNC: 4.4 MG/DL — HIGH (ref 0.2–1.2)
BILIRUB SERPL-MCNC: 4.4 MG/DL — HIGH (ref 0.2–1.2)
BUN SERPL-MCNC: 10 MG/DL — SIGNIFICANT CHANGE UP (ref 7–23)
CALCIUM SERPL-MCNC: 9 MG/DL — SIGNIFICANT CHANGE UP (ref 8.4–10.5)
CHLORIDE SERPL-SCNC: 102 MMOL/L — SIGNIFICANT CHANGE UP (ref 98–107)
CO2 SERPL-SCNC: 24 MMOL/L — SIGNIFICANT CHANGE UP (ref 22–31)
CREAT SERPL-MCNC: 0.69 MG/DL — SIGNIFICANT CHANGE UP (ref 0.5–1.3)
EGFR: 98 ML/MIN/1.73M2 — SIGNIFICANT CHANGE UP
EOSINOPHIL # BLD AUTO: 0.02 K/UL — SIGNIFICANT CHANGE UP (ref 0–0.5)
EOSINOPHIL NFR BLD AUTO: 0.3 % — SIGNIFICANT CHANGE UP (ref 0–6)
GLUCOSE BLDC GLUCOMTR-MCNC: 102 MG/DL — HIGH (ref 70–99)
GLUCOSE SERPL-MCNC: 117 MG/DL — HIGH (ref 70–99)
HCT VFR BLD CALC: 38.8 % — LOW (ref 39–50)
HGB BLD-MCNC: 13.5 G/DL — SIGNIFICANT CHANGE UP (ref 13–17)
IANC: 4.47 K/UL — SIGNIFICANT CHANGE UP (ref 1.8–7.4)
IMM GRANULOCYTES NFR BLD AUTO: 2.5 % — HIGH (ref 0–0.9)
INR BLD: 1.37 RATIO — HIGH (ref 0.88–1.16)
LIDOCAIN IGE QN: 65 U/L — HIGH (ref 7–60)
LYMPHOCYTES # BLD AUTO: 1.51 K/UL — SIGNIFICANT CHANGE UP (ref 1–3.3)
LYMPHOCYTES # BLD AUTO: 22 % — SIGNIFICANT CHANGE UP (ref 13–44)
MAGNESIUM SERPL-MCNC: 2 MG/DL — SIGNIFICANT CHANGE UP (ref 1.6–2.6)
MCHC RBC-ENTMCNC: 28.8 PG — SIGNIFICANT CHANGE UP (ref 27–34)
MCHC RBC-ENTMCNC: 34.8 GM/DL — SIGNIFICANT CHANGE UP (ref 32–36)
MCV RBC AUTO: 82.7 FL — SIGNIFICANT CHANGE UP (ref 80–100)
MONOCYTES # BLD AUTO: 0.66 K/UL — SIGNIFICANT CHANGE UP (ref 0–0.9)
MONOCYTES NFR BLD AUTO: 9.6 % — SIGNIFICANT CHANGE UP (ref 2–14)
NEUTROPHILS # BLD AUTO: 4.47 K/UL — SIGNIFICANT CHANGE UP (ref 1.8–7.4)
NEUTROPHILS NFR BLD AUTO: 65.2 % — SIGNIFICANT CHANGE UP (ref 43–77)
NRBC # BLD: 0 /100 WBCS — SIGNIFICANT CHANGE UP (ref 0–0)
NRBC # FLD: 0 K/UL — SIGNIFICANT CHANGE UP (ref 0–0)
PHOSPHATE SERPL-MCNC: 2.3 MG/DL — LOW (ref 2.5–4.5)
PLATELET # BLD AUTO: 114 K/UL — LOW (ref 150–400)
POTASSIUM SERPL-MCNC: 3.7 MMOL/L — SIGNIFICANT CHANGE UP (ref 3.5–5.3)
POTASSIUM SERPL-SCNC: 3.7 MMOL/L — SIGNIFICANT CHANGE UP (ref 3.5–5.3)
PROT SERPL-MCNC: 5.9 G/DL — LOW (ref 6–8.3)
PROTHROM AB SERPL-ACNC: 16 SEC — HIGH (ref 10.5–13.4)
RBC # BLD: 4.69 M/UL — SIGNIFICANT CHANGE UP (ref 4.2–5.8)
RBC # FLD: 15.8 % — HIGH (ref 10.3–14.5)
SODIUM SERPL-SCNC: 136 MMOL/L — SIGNIFICANT CHANGE UP (ref 135–145)
WBC # BLD: 6.86 K/UL — SIGNIFICANT CHANGE UP (ref 3.8–10.5)
WBC # FLD AUTO: 6.86 K/UL — SIGNIFICANT CHANGE UP (ref 3.8–10.5)

## 2022-12-12 PROCEDURE — 99233 SBSQ HOSP IP/OBS HIGH 50: CPT | Mod: GC

## 2022-12-12 RX ORDER — APIXABAN 2.5 MG/1
5 TABLET, FILM COATED ORAL EVERY 12 HOURS
Refills: 0 | Status: DISCONTINUED | OUTPATIENT
Start: 2022-12-12 | End: 2022-12-18

## 2022-12-12 RX ORDER — APIXABAN 2.5 MG/1
5 TABLET, FILM COATED ORAL EVERY 12 HOURS
Refills: 0 | Status: DISCONTINUED | OUTPATIENT
Start: 2022-12-12 | End: 2022-12-12

## 2022-12-12 RX ORDER — APIXABAN 2.5 MG/1
10 TABLET, FILM COATED ORAL EVERY 12 HOURS
Refills: 0 | Status: DISCONTINUED | OUTPATIENT
Start: 2022-12-12 | End: 2022-12-12

## 2022-12-12 RX ADMIN — Medication 450 MILLIGRAM(S): at 22:26

## 2022-12-12 RX ADMIN — TAMSULOSIN HYDROCHLORIDE 0.4 MILLIGRAM(S): 0.4 CAPSULE ORAL at 22:26

## 2022-12-12 RX ADMIN — PIPERACILLIN AND TAZOBACTAM 25 GRAM(S): 4; .5 INJECTION, POWDER, LYOPHILIZED, FOR SOLUTION INTRAVENOUS at 06:22

## 2022-12-12 RX ADMIN — APIXABAN 5 MILLIGRAM(S): 2.5 TABLET, FILM COATED ORAL at 18:20

## 2022-12-12 RX ADMIN — POLYETHYLENE GLYCOL 3350 17 GRAM(S): 17 POWDER, FOR SOLUTION ORAL at 12:37

## 2022-12-12 RX ADMIN — ENOXAPARIN SODIUM 150 MILLIGRAM(S): 100 INJECTION SUBCUTANEOUS at 06:24

## 2022-12-12 RX ADMIN — ATORVASTATIN CALCIUM 20 MILLIGRAM(S): 80 TABLET, FILM COATED ORAL at 22:26

## 2022-12-12 RX ADMIN — Medication 112 MICROGRAM(S): at 06:22

## 2022-12-12 RX ADMIN — Medication 200 MILLIGRAM(S): at 12:37

## 2022-12-12 RX ADMIN — Medication 60 MILLIGRAM(S): at 12:37

## 2022-12-12 RX ADMIN — SENNA PLUS 2 TABLET(S): 8.6 TABLET ORAL at 22:25

## 2022-12-12 RX ADMIN — PIPERACILLIN AND TAZOBACTAM 25 GRAM(S): 4; .5 INJECTION, POWDER, LYOPHILIZED, FOR SOLUTION INTRAVENOUS at 13:44

## 2022-12-12 RX ADMIN — PIPERACILLIN AND TAZOBACTAM 25 GRAM(S): 4; .5 INJECTION, POWDER, LYOPHILIZED, FOR SOLUTION INTRAVENOUS at 22:24

## 2022-12-12 RX ADMIN — Medication 200 MILLIGRAM(S): at 12:36

## 2022-12-12 NOTE — CONSULT NOTE ADULT - ASSESSMENT
72M Hx morbid obesity (BMI 50), atrial fibrillation (on coumadin), HTN, BPH, hypothyroidism, cholangitis 2/2 choledocholithiasis s/p ERCP (8/2022) c/b post-ERCP pancreatitis, c/f possible gastroparesis 2/2 food retention on endoscopies, now presenting for fever, RUQ abdominal pain with elevated liver enzymes, lipase, leukocytosis c/f gallstone pancreatitis + cholangitis.       Impression:   #Gallstone pancreatitis c/b likely cholangitis  #Hx choledocholithiasis   pw fever 101 at home, right sided abdominal pain with leukocytosis (11), elevated liver enzymes (Tbili 7.9), elevated lipase (>3000s) + CBD dilation on RUQ US with GB sludge. Previously with known Hx of cholangitis 2/2 choledocholithiasis s/p CBD stent placement (8/2022) with subsequent ERCPs for stent/stone removal cancelled 2/2 poor visualization due to retention of food contents. Current clinical picture c/f gallstone pancreatitis/choledocholithiasis c/b cholangitis (likely grade I, INR elevated in setting of coumadin use).     #Coagulopathy: INR 3.9 in setting of coumadin use    #Hx post-ERCP pancreatitis (8/2022)    #Suspected gastroparesis: multiple EGD with retention of food contents, patient without gastric emptying study on file     #Hyponatremia  #Hypomagnesemia       Recommendations:   - NPO   - IV abx   - BCx x2  - Give 2U FFP for coagulopathy   - Hold coumadin   - Please check EKG --> if QTC wnl --> give reglan x 1 dose   - Plan for urgent ERCP in setting of suspected cholangitis today  - Trend liver enzymes, CBC  - If worsening hemodynamics --> ICU consult         Thank you for involving us in the care of this patient, please reach out if any further questions.     Corey Sterling MD  Gastroenterology/Hepatology Fellow, PGY6    Available on [employer] Teams  [phone] ([facility])  [phone] ([facility])  Please contact on call fellow weekdays after 5pm-7am and weekends: [phone]    
72yM with PMHx a-fib on warfarin, HTN, hypothyroidism, recent hospitalization from 8/19-8/24 when he presented with fever and abd pain found to have transaminitis and choledocholithiasis.  Patient had ERCP.    Asked to address anticoagulation.    Patient was not a candidate for a NOAC in the past because of a BMI of >50.  His BMI is now 43.5.  (wt. 338)  Can change coumadin to eliquis 5 mg bid.  Discussed with Patient and PA.  AF well controlled.  Continue current medications.  The patient is stable from a cardiovascular perspective.  
71 y/o M w/ recurrent cholangitis.     - Optimally patient can stay in hospital and stabilize long enough for definitive management of gallbladder (laparoscopic cholecystectomy)  - Recommend trend labs until bilirubin and leukocytosis have down trended  - Recommend cardiology consult and medical optimization prior to surgery   - Agree with IV Zosyn  - If anticoagulation indicated per primary team, recommend hep gtt in ina op period for fast on/off  - General Surgery is available    D/w Dr. Rodríguez, ACS Surgeon on call    Rakesh Noriega MD  PGY-2  B Team Surgery  #[id number]

## 2022-12-12 NOTE — PROGRESS NOTE ADULT - ASSESSMENT
72M Hx morbid obesity (BMI 50), atrial fibrillation (on coumadin), HTN, BPH, hypothyroidism, cholangitis 2/2 choledocholithiasis s/p ERCP (8/2022) c/b post-ERCP pancreatitis, c/f possible gastroparesis 2/2 food retention on endoscopies, now presenting for fever, RUQ abdominal pain with elevated liver enzymes, lipase, leukocytosis c/f gallstone pancreatitis + cholangitis s/p ERCP 12/9 with plastic biliary stent was placed into the common bile duct.    #Gallstone pancreatitis c/b cholangitis  #Hx choledocholithiasis   #Hx post-ERCP pancreatitis (8/2022)  #Coagulopathy: INR 3.9 in setting of coumadin use  P/w fever 101 at home, right sided abdominal pain with leukocytosis (11), elevated liver enzymes (Tbili 7.9), elevated lipase (>3000s) + CBD dilation on RUQ US with GB sludge. Previously with known Hx of cholangitis 2/2 choledocholithiasis s/p CBD stent placement (8/2022) with subsequent ERCPs for stent/stone removal cancelled 2/2 poor visualization due to retention of food contents. Current clinical picture c/f gallstone pancreatitis/choledocholithiasis c/b cholangitis (likely grade I, INR elevated in setting of coumadin use). s/p ERCP on 12/9 with purulent drainage from CBG with plastic biliary stent was placed into  the common bile duct in addition to evidence suggestive of clot protruding from the ampullary orifice likely in the setting of elevated INR. Feeling well post procedure with no fevers, chills, or any ongoing abdominal pain. Suspect that liver enzymes will continue to slowly improve over the next couple of dayes    #Suspected gastroparesis: multiple EGD with retention of food contents, patient without gastric emptying study on file     Recommendations:   -Diet as tolerated   -Agree with empiric broad spectrums antibiotic coverage x 10-14 days for a course of cholangitis   -Daily LFTs/INR   -Return for stent removal ERCP in 8 - 12 weeks.  -Appreciate surgery recs re CCY    All recommendations are tentative until note is attested by attending.     Khadijah Stephens, PGY5  Gastroenterology/Hepatology Fellow  Available on Microsoft Teams  [phone] ([facility] Short Range Pager)  [phone] (Long Range Pager)    After 5pm, please contact the on-call GI fellow. [phone]

## 2022-12-12 NOTE — CONSULT NOTE ADULT - SUBJECTIVE AND OBJECTIVE BOX
CHIEF COMPLAINT: Chest Pain    HPI:  72yM with PMHx a-fib on warfarin, HTN, hypothyroidism, recent hospitalization from 8/19-8/24 when he presented with fever and abd pain found to have transaminitis and choledocholithiasis s/p ERCP(8/19) with stent placement with a course complicated by sepsis with bacteremia with pan-sensitive E. Coli and post ERCP pancreatitis, 2 other ERCPs attempting to remove stent 10/28 and 11/16 aborted due to food residue in the stomach, presents with 2 days of fever and abdominal pain. The abdominal pain is in the RUQ, LUQ, and suprapubic area. He has had some nausea but denies vomiting, diarrhea, chest pain, shortness of breath, back pain. He has had dark urine and paler than usual stools for the last couple of days.     In the ED the patient was found to have normal vitals, but Bilirubin of 7.9 increased from 4.6 on 10/21, primarily direct bilirubin, mild elevation in AST/ALT and lactate of 5. GI saw the pt and are planning for emergent ERCP tonight after reversal of INR (3.99).    At baseline he lives with his son and has a home assistant/friend Terrell. The pt is wheelchair bound at baseline for the last 3 years after a fall where he injured his leg and became severely deconditioned, at the time of the fall the pt reports he was 540lbs so had difficulty with PT. He manages all his medications and is A&Ox4, able to feed and dress himself but has help with bathing and cooking.  (09 Dec 2022 15:42)    Called to help manage AF and anticoagulation.      PAST MEDICAL & SURGICAL HISTORY:  Dyslipidemia      Hypertension      Afib  on coumadin      Chronic Gout      Anxiety      History of Transient Ischemic Attack (TIA)  approx 1994      History of Obesity      Obstructive Sleep Apnea  CPAP currenlty      Hypothyroid      BPH (benign prostatic hyperplasia)      H/O cholangitis      Multiple falls  now wheelchair bound      DM (diabetes mellitus)      History of Tonsillectomy      S/P ERCP          Allergies    metformin (Unknown)    Intolerances        SOCIAL HISTORY    Smoking Hx: None  ETOH Hx: None  Occupational Hx:     FAMILY HISTORY:  FH: myocardial infarction (Father)        MEDICATIONS:  acetaminophen     Tablet .. 650 milliGRAM(s) Oral every 6 hours PRN  allopurinol 200 milliGRAM(s) Oral daily  atorvastatin 20 milliGRAM(s) Oral at bedtime  chlorhexidine 0.12% Liquid 15 milliLiter(s) Swish and Spit two times a day  enoxaparin Injectable 150 milliGRAM(s) SubCutaneous every 12 hours  glucagon  Injectable 1 milliGRAM(s) IntraMuscular once  influenza  Vaccine (HIGH DOSE) 0.7 milliLiter(s) IntraMuscular once  levothyroxine 112 MICROGram(s) Oral daily  melatonin 3 milliGRAM(s) Oral at bedtime PRN  metoprolol tartrate 200 milliGRAM(s) Oral two times a day  morphine  - Injectable 4 milliGRAM(s) IV Push every 4 hours PRN  NIFEdipine XL 60 milliGRAM(s) Oral daily  piperacillin/tazobactam IVPB.. 3.375 Gram(s) IV Intermittent every 8 hours  polyethylene glycol 3350 17 Gram(s) Oral daily  senna 2 Tablet(s) Oral at bedtime  tamsulosin 0.4 milliGRAM(s) Oral at bedtime  traZODone 450 milliGRAM(s) Oral at bedtime      REVIEW OF SYSTEMS:    CONSTITUTIONAL: No weakness, fevers or chills  EYES/ENT: No visual changes;  No vertigo or throat pain   NECK: No pain or stiffness  RESPIRATORY: No cough, wheezing, hemoptysis; No shortness of breath  CARDIOVASCULAR: No chest pain or palpitations  GASTROINTESTINAL: No abdominal or epigastric pain. No nausea, vomiting, or hematemesis; No diarrhea or constipation. No melena or hematochezia.  GENITOURINARY: No dysuria, frequency or hematuria  NEUROLOGICAL: No numbness or weakness  SKIN: No itching, burning, rashes, or lesions   All other review of systems is negative unless indicated above    Vital Signs Last 24 Hrs  T(C): 36.6 (12 Dec 2022 06:00), Max: 37.1 (11 Dec 2022 12:24)  T(F): 97.9 (12 Dec 2022 06:00), Max: 98.8 (11 Dec 2022 12:24)  HR: 68 (12 Dec 2022 07:36) (68 - 87)  BP: 109/60 (12 Dec 2022 06:00) (106/62 - 110/69)  BP(mean): --  RR: 18 (12 Dec 2022 06:00) (18 - 18)  SpO2: 99% (12 Dec 2022 07:36) (99% - 100%)    Parameters below as of 12 Dec 2022 06:00  Patient On (Oxygen Delivery Method): room air        I&O's Summary      PHYSICAL EXAM:    Constitutional: NAD, awake and alert, well-developed  HEENT: PERR, EOMI  Neck: soft and supple, No LAD, No JVD  Respiratory: Breath sounds are clear bilaterally, No wheezing, rales or rhonchi  Cardiovascular: Irregular rate and rhythm, normal S1 and S2,  no murmurs, gallops or rubs  Gastrointestinal: Bowel Sounds present, soft, nontender.   Extremities: No peripheral edema. No clubbing or cyanosis.  Vascular: 2+ peripheral pulses  Neurological: A/O x 3, no focal deficits  Musculoskeletal: no calf tenderness.  Skin: No rashes.      LABS: All Labs Reviewed:                        13.5   6.86  )-----------( 114      ( 12 Dec 2022 06:08 )             38.8                         14.0   8.78  )-----------( 113      ( 11 Dec 2022 06:35 )             40.2                         14.8   11.60 )-----------( 116      ( 10 Dec 2022 07:08 )             41.1     12 Dec 2022 06:08    136    |  102    |  10     ----------------------------<  117    3.7     |  24     |  0.69   11 Dec 2022 06:35    134    |  100    |  13     ----------------------------<  116    3.1     |  24     |  0.69   10 Dec 2022 07:08    136    |  102    |  18     ----------------------------<  123    3.3     |  23     |  0.83     Ca    9.0        12 Dec 2022 06:08  Ca    9.0        11 Dec 2022 06:35  Ca    9.1        10 Dec 2022 07:08  Phos  2.3       12 Dec 2022 06:08  Phos  1.8       11 Dec 2022 06:35  Phos  2.1       10 Dec 2022 07:08  Mg     2.00      12 Dec 2022 06:08  Mg     1.80      11 Dec 2022 06:35  Mg     1.50      10 Dec 2022 07:08    TPro  5.9    /  Alb  2.3    /  TBili  4.4    /  DBili  3.0    /  AST  40     /  ALT  41     /  AlkPhos  189    12 Dec 2022 06:08  TPro  6.0    /  Alb  2.4    /  TBili  6.3    /  DBili  4.9    /  AST  28     /  ALT  46     /  AlkPhos  202    11 Dec 2022 06:35  TPro  6.1    /  Alb  2.6    /  TBili  9.5    /  DBili  x      /  AST  39     /  ALT  62     /  AlkPhos  217    10 Dec 2022 07:08    PT/INR - ( 12 Dec 2022 06:08 )   PT: 16.0 sec;   INR: 1.37 ratio         PTT - ( 12 Dec 2022 06:08 )  PTT:43.3 sec  Blood Culture: Organism --  Gram Stain Blood -- Gram Stain --  Specimen Source .Blood Blood-Peripheral  Culture-Blood --    Organism --  Gram Stain Blood -- Gram Stain --  Specimen Source .Blood Blood-Peripheral  Culture-Blood --            RADIOLOGY/EKG: AF with moderate ventricular response

## 2022-12-12 NOTE — PROGRESS NOTE ADULT - ATTENDING COMMENTS
pain/decreased ROM/decreased strength 72M PMH Afib on Coumadin, HTN, hypothyroidism, hx of recent choledocholithiasis p/w fever, abdominal pain, s/p ERCP on 12/9 w/ evidence of suppurative cholangitis.    #Gallstone pancreatitis c/b Cholangitis: Cont Zosyn. Plan for ERCP in 2-3 months per GI recs to retrieve stent. Surgery consulted for cholecystectomy, f/u plans for timing.   # Afib: Cont Metoprolol. Was on Coumadin at home, team discussed w/ Cardiology, will switch to Eliquis.   # Constipation: Bowel regimen.   Patient interested in [facility], will re-consult PT.

## 2022-12-12 NOTE — PROGRESS NOTE ADULT - ASSESSMENT
71 y/o M w/ recurrent cholangitis. Wheelchair bound at baseline.    Recommendations:  - Cardiology evaluation for pre-operative risk stratification  - Nuclear stress test  - Continue IV abx    There is ~30% chance of recurrence of cholangitis without cholecystectomy. Per ACS/NSQUIP risk calculator, patient has a 31.3% chance of any complication and 21.6% chance of a serious complication for cholecystectomy with his given comorbities.    Team will discuss if/when ideal timing of operative intervention would be.      B Team Surgery  [id number]

## 2022-12-12 NOTE — PROGRESS NOTE ADULT - SUBJECTIVE AND OBJECTIVE BOX
Surgery Progress Note     Subjective/24hour Events:   Patient seen and examined. No acute overnight events. Pain controlled. No nausea/vomiting. Able to tolerate liquids.      Vital Signs:  Vital Signs Last 24 Hrs  T(C): 36.6 (12 Dec 2022 06:00), Max: 37.1 (11 Dec 2022 12:24)  T(F): 97.9 (12 Dec 2022 06:00), Max: 98.8 (11 Dec 2022 12:24)  HR: 68 (12 Dec 2022 07:36) (68 - 87)  BP: 109/60 (12 Dec 2022 06:00) (106/62 - 110/69)  BP(mean): --  RR: 18 (12 Dec 2022 06:00) (18 - 18)  SpO2: 99% (12 Dec 2022 07:36) (99% - 100%)    Parameters below as of 12 Dec 2022 06:00  Patient On (Oxygen Delivery Method): room air        CAPILLARY BLOOD GLUCOSE      POCT Blood Glucose.: 102 mg/dL (12 Dec 2022 07:34)  POCT Blood Glucose.: 141 mg/dL (11 Dec 2022 22:40)  POCT Blood Glucose.: 112 mg/dL (11 Dec 2022 16:54)  POCT Blood Glucose.: 188 mg/dL (11 Dec 2022 12:38)      I&O's Detail        Physical Exam:  Gen: NAD  CV: Regular rate  Resp: Nonlabored breathing on room air  Abd: Obese, soft, nontender to palpation  Ext: Warm      Labs:    12-12    136  |  102  |  10  ----------------------------<  117<H>  3.7   |  24  |  0.69    Ca    9.0      12 Dec 2022 06:08  Phos  2.3     12-12  Mg     2.00     12-12    TPro  5.9<L>  /  Alb  2.3<L>  /  TBili  4.4<H>  /  DBili  3.0<H>  /  AST  40  /  ALT  41  /  AlkPhos  189<H>  12-12    LIVER FUNCTIONS - ( 12 Dec 2022 06:08 )  Alb: 2.3 g/dL / Pro: 5.9 g/dL / ALK PHOS: 189 U/L / ALT: 41 U/L / AST: 40 U/L / GGT: x                                 13.5   6.86  )-----------( 114      ( 12 Dec 2022 06:08 )             38.8     PT/INR - ( 12 Dec 2022 06:08 )   PT: 16.0 sec;   INR: 1.37 ratio         PTT - ( 12 Dec 2022 06:08 )  PTT:43.3 sec

## 2022-12-12 NOTE — PROGRESS NOTE ADULT - PROBLEM SELECTOR PLAN 7
DVT Prophylaxis: AC as above   Diet: advancing as tolerated to regular diet. BM regiment senna miralax daily   Lines/Tubes: peripheral IVs only  PT: Maximilian 12/10 - Home with home PT  Social Work:  Code Status: Full Code    Home Situation: At baseline he lives with his son and has a home assistant/friend Terrell. The pt is wheelchair bound at baseline for the last 3 years after a fall where he injured his leg and became severely deconditioned, at the time of the fall the pt reports he was 540lbs so had difficulty with PT. He is able to stand for brief periods. He manages all his medications himself and is A&Ox4, able to feed and dress himself but has help with bathing and cooking.    Discharge Plan: Home with home PT

## 2022-12-12 NOTE — PROGRESS NOTE ADULT - SUBJECTIVE AND OBJECTIVE BOX
Huong Padilla MD  EM/IM PGY-2  Pager # [phone] or Teams  --------------------------------  ==============UNFINISHED NOTE==================    INTERVAL HPI/OVERNIGHT EVENTS: No acute events overnight, vitals remained stable. All scheduled medications given, No PRN medications required.     SUBJECTIVE: Patient seen and examined at bedside.     VITAL SIGNS:  T(C): 36.6 (12-12-22 @ 06:00), Max: 37.1 (12-11-22 @ 12:24)  HR: 68 (12-12-22 @ 07:36) (68 - 87)  BP: 109/60 (12-12-22 @ 06:00) (106/62 - 110/69)  RR: 18 (12-12-22 @ 06:00) (18 - 18)  SpO2: 99% (12-12-22 @ 07:36) (94% - 100%)      PHYSICAL EXAM:        MEDICATIONS:  MEDICATIONS  (STANDING):  allopurinol 200 milliGRAM(s) Oral daily  atorvastatin 20 milliGRAM(s) Oral at bedtime  chlorhexidine 0.12% Liquid 15 milliLiter(s) Swish and Spit two times a day  enoxaparin Injectable 150 milliGRAM(s) SubCutaneous every 12 hours  glucagon  Injectable 1 milliGRAM(s) IntraMuscular once  influenza  Vaccine (HIGH DOSE) 0.7 milliLiter(s) IntraMuscular once  insulin lispro (ADMELOG) corrective regimen sliding scale   SubCutaneous three times a day before meals  insulin lispro (ADMELOG) corrective regimen sliding scale   SubCutaneous at bedtime  levothyroxine 112 MICROGram(s) Oral daily  metoprolol tartrate 200 milliGRAM(s) Oral two times a day  NIFEdipine XL 60 milliGRAM(s) Oral daily  piperacillin/tazobactam IVPB.. 3.375 Gram(s) IV Intermittent every 8 hours  polyethylene glycol 3350 17 Gram(s) Oral daily  senna 2 Tablet(s) Oral at bedtime  tamsulosin 0.4 milliGRAM(s) Oral at bedtime  traZODone 450 milliGRAM(s) Oral at bedtime    MEDICATIONS  (PRN):  acetaminophen     Tablet .. 650 milliGRAM(s) Oral every 6 hours PRN Temp greater or equal to 38C (100.4F), Mild Pain (1 - 3)  dextrose Oral Gel 15 Gram(s) Oral once PRN Blood Glucose LESS THAN 70 milliGRAM(s)/deciliter  melatonin 3 milliGRAM(s) Oral at bedtime PRN Insomnia  morphine  - Injectable 4 milliGRAM(s) IV Push every 4 hours PRN Severe Pain (7 - 10)      LABS:                        13.5   6.86  )-----------( 114      ( 12 Dec 2022 06:08 )             38.8     12-12    136  |  102  |  10  ----------------------------<  117<H>  3.7   |  24  |  0.69    Ca    9.0      12 Dec 2022 06:08  Phos  2.3     12-12  Mg     2.00     12-12    TPro  5.9<L>  /  Alb  2.3<L>  /  TBili  4.4<H>  /  DBili  3.0<H>  /  AST  40  /  ALT  41  /  AlkPhos  189<H>  12-12    PT/INR - ( 12 Dec 2022 06:08 )   PT: 16.0 sec;   INR: 1.37 ratio         PTT - ( 12 Dec 2022 06:08 )  PTT:43.3 sec       Huong Padilla MD  EM/IM PGY-2  Pager # [phone] or Teams  --------------------------------    INTERVAL HPI/OVERNIGHT EVENTS: No acute events overnight, vitals remained stable. All scheduled medications given, No PRN medications required.     SUBJECTIVE: Patient seen and examined at bedside. No pain, eating well. Has not had fevers or chills or any other new or concerning symptoms.    VITAL SIGNS:  T(C): 36.6 (12-12-22 @ 06:00), Max: 37.1 (12-11-22 @ 12:24)  HR: 68 (12-12-22 @ 07:36) (68 - 87)  BP: 109/60 (12-12-22 @ 06:00) (106/62 - 110/69)  RR: 18 (12-12-22 @ 06:00) (18 - 18)  SpO2: 99% (12-12-22 @ 07:36) (94% - 100%)      PHYSICAL EXAM:  GENERAL: Sitting comfortably in bed in no acute distress, diffusely jaundiced but decreased significantly from admission  NEURO: Alert and Oriented to person, place, date and situation. Pupils symmetric, No ptosis. No facial asymmetry or dysarthria, no tremor noted.  HEENT: No conjunctival injection or scleral icterus.   CARD: Normal rate and regular rhythm, no murmurs and no gallops appreciated.  RESP: Clear to auscultation bilaterally, No wheezes, rales or rhonchi.   ABD: Nondistended, Soft and nontender to palpation in all quadrants, no guarding, no rigidity. No masses appreciated.  EXT: No pedal edema.       MEDICATIONS:  MEDICATIONS  (STANDING):  allopurinol 200 milliGRAM(s) Oral daily  atorvastatin 20 milliGRAM(s) Oral at bedtime  chlorhexidine 0.12% Liquid 15 milliLiter(s) Swish and Spit two times a day  enoxaparin Injectable 150 milliGRAM(s) SubCutaneous every 12 hours  glucagon  Injectable 1 milliGRAM(s) IntraMuscular once  influenza  Vaccine (HIGH DOSE) 0.7 milliLiter(s) IntraMuscular once  insulin lispro (ADMELOG) corrective regimen sliding scale   SubCutaneous three times a day before meals  insulin lispro (ADMELOG) corrective regimen sliding scale   SubCutaneous at bedtime  levothyroxine 112 MICROGram(s) Oral daily  metoprolol tartrate 200 milliGRAM(s) Oral two times a day  NIFEdipine XL 60 milliGRAM(s) Oral daily  piperacillin/tazobactam IVPB.. 3.375 Gram(s) IV Intermittent every 8 hours  polyethylene glycol 3350 17 Gram(s) Oral daily  senna 2 Tablet(s) Oral at bedtime  tamsulosin 0.4 milliGRAM(s) Oral at bedtime  traZODone 450 milliGRAM(s) Oral at bedtime    MEDICATIONS  (PRN):  acetaminophen     Tablet .. 650 milliGRAM(s) Oral every 6 hours PRN Temp greater or equal to 38C (100.4F), Mild Pain (1 - 3)  dextrose Oral Gel 15 Gram(s) Oral once PRN Blood Glucose LESS THAN 70 milliGRAM(s)/deciliter  melatonin 3 milliGRAM(s) Oral at bedtime PRN Insomnia  morphine  - Injectable 4 milliGRAM(s) IV Push every 4 hours PRN Severe Pain (7 - 10)      LABS:                        13.5   6.86  )-----------( 114      ( 12 Dec 2022 06:08 )             38.8     12-12    136  |  102  |  10  ----------------------------<  117<H>  3.7   |  24  |  0.69    Ca    9.0      12 Dec 2022 06:08  Phos  2.3     12-12  Mg     2.00     12-12    TPro  5.9<L>  /  Alb  2.3<L>  /  TBili  4.4<H>  /  DBili  3.0<H>  /  AST  40  /  ALT  41  /  AlkPhos  189<H>  12-12    PT/INR - ( 12 Dec 2022 06:08 )   PT: 16.0 sec;   INR: 1.37 ratio         PTT - ( 12 Dec 2022 06:08 )  PTT:43.3 sec

## 2022-12-12 NOTE — PROGRESS NOTE ADULT - SUBJECTIVE AND OBJECTIVE BOX
Gastroenterology/Hepatology Progress Note    Interval Events: Tbili, transaminases improving. Patient denies abdominal pain, nausea, vomiting. Tolerating po.    Allergies:  metformin (Unknown)    Hospital Medications:  acetaminophen     Tablet .. 650 milliGRAM(s) Oral every 6 hours PRN  allopurinol 200 milliGRAM(s) Oral daily  atorvastatin 20 milliGRAM(s) Oral at bedtime  chlorhexidine 0.12% Liquid 15 milliLiter(s) Swish and Spit two times a day  enoxaparin Injectable 150 milliGRAM(s) SubCutaneous every 12 hours  glucagon  Injectable 1 milliGRAM(s) IntraMuscular once  influenza  Vaccine (HIGH DOSE) 0.7 milliLiter(s) IntraMuscular once  levothyroxine 112 MICROGram(s) Oral daily  melatonin 3 milliGRAM(s) Oral at bedtime PRN  metoprolol tartrate 200 milliGRAM(s) Oral two times a day  morphine  - Injectable 4 milliGRAM(s) IV Push every 4 hours PRN  NIFEdipine XL 60 milliGRAM(s) Oral daily  piperacillin/tazobactam IVPB.. 3.375 Gram(s) IV Intermittent every 8 hours  polyethylene glycol 3350 17 Gram(s) Oral daily  senna 2 Tablet(s) Oral at bedtime  tamsulosin 0.4 milliGRAM(s) Oral at bedtime  traZODone 450 milliGRAM(s) Oral at bedtime    ROS: 14 point ROS negative unless otherwise state in subjective    PHYSICAL EXAM:   Vital Signs:  Vital Signs Last 24 Hrs  T(C): 36.6 (12 Dec 2022 06:00), Max: 37.1 (11 Dec 2022 12:24)  T(F): 97.9 (12 Dec 2022 06:00), Max: 98.8 (11 Dec 2022 12:24)  HR: 68 (12 Dec 2022 07:36) (68 - 87)  BP: 109/60 (12 Dec 2022 06:00) (106/62 - 110/69)  BP(mean): --  RR: 18 (12 Dec 2022 06:00) (18 - 18)  SpO2: 99% (12 Dec 2022 07:36) (99% - 100%)    Parameters below as of 12 Dec 2022 06:00  Patient On (Oxygen Delivery Method): room air      Daily     Daily     GENERAL:  No acute distress  HEENT:  NCAT, + scleral icterus  CHEST: no resp distress  HEART:  RRR  ABDOMEN:  Soft, non-tender, non-distended   EXTREMITIES:  No cyanosis, clubbing, or edema  SKIN:  No rash/erythema/ecchymoses. +jaundice  NEURO:  Alert and oriented x 3, no asterixis     LABS:                        13.5   6.86  )-----------( 114      ( 12 Dec 2022 06:08 )             38.8     Mean Cell Volume: 82.7 fL (12-12-22 @ 06:08)    12-12    136  |  102  |  10  ----------------------------<  117<H>  3.7   |  24  |  0.69    Ca    9.0      12 Dec 2022 06:08  Phos  2.3     12-12  Mg     2.00     12-12    TPro  5.9<L>  /  Alb  2.3<L>  /  TBili  4.4<H>  /  DBili  3.0<H>  /  AST  40  /  ALT  41  /  AlkPhos  189<H>  12-12    LIVER FUNCTIONS - ( 12 Dec 2022 06:08 )  Alb: 2.3 g/dL / Pro: 5.9 g/dL / ALK PHOS: 189 U/L / ALT: 41 U/L / AST: 40 U/L / GGT: x           PT/INR - ( 12 Dec 2022 06:08 )   PT: 16.0 sec;   INR: 1.37 ratio         PTT - ( 12 Dec 2022 06:08 )  PTT:43.3 sec    Amylase Serum--      Lipase serum65       Ammonia--        Imaging:  reviewed

## 2022-12-12 NOTE — PROGRESS NOTE ADULT - PROBLEM SELECTOR PLAN 1
- 8mm stone in the ampulla on imaging and on presentation had elevation in bili to 7.9 (4.6 on 10/21), lipase >3000, and mild elevation in LFTs AST/ALT 57/89 (280/262 on 10/21), all consistent with choledocholithiasis with pancreatitis  - Pt with choledocholithiasis in 8/2022 s/p ERCP with stent placement, course complicated by post-ERCP pancreatitis and E. Coli bacteremia  - Prior stent now gone, passed on its own  - S/p IV fluids 1.5L NS and 1L LR in the ED  - Pain PRNs Tylenol 650mg and morphine 4mg IV, did not require any overnight  - Pt will likely need outpt cholecystectomy, given 2 episodes choledocholithiasis, Surg consulted to speak with patient about this  - GI took pt for urgent ERCP on 12/9 for stent placement  - Pending surgery final recs - 8mm stone in the ampulla on imaging and on presentation had elevation in bili to 7.9 (4.6 on 10/21), lipase >3000, and mild elevation in LFTs AST/ALT 57/89 (280/262 on 10/21), all consistent with choledocholithiasis with pancreatitis  - GI took pt for urgent ERCP on 12/9 for stent placement  - Pt had 1st episode of choledocholithiasis in 8/2022 s/p ERCP with stent placement, course complicated by post-ERCP pancreatitis and E. Coli bacteremia  - Prior stent now gone, passed on its own  - S/p IV fluids 1.5L NS and 1L LR in the ED  - Pain PRNs Tylenol 650mg and morphine 4mg IV, has not required any since the procedure  - Pt will likely need outpt cholecystectomy, given 2 episodes choledocholithiasis with cholangitis, Surg spoke with patient about surgical planning, he is high risk for cholecystectomy due to his weight, but also high risk to keep having complications from his gallstones, pending official recs

## 2022-12-13 LAB
ALBUMIN SERPL ELPH-MCNC: 2.3 G/DL — LOW (ref 3.3–5)
ALP SERPL-CCNC: 190 U/L — HIGH (ref 40–120)
ALT FLD-CCNC: 46 U/L — HIGH (ref 4–41)
ANION GAP SERPL CALC-SCNC: 9 MMOL/L — SIGNIFICANT CHANGE UP (ref 7–14)
ANISOCYTOSIS BLD QL: SLIGHT — SIGNIFICANT CHANGE UP
APTT BLD: 42.6 SEC — HIGH (ref 27–36.3)
AST SERPL-CCNC: 56 U/L — HIGH (ref 4–40)
BASOPHILS # BLD AUTO: 0 K/UL — SIGNIFICANT CHANGE UP (ref 0–0.2)
BASOPHILS NFR BLD AUTO: 0 % — SIGNIFICANT CHANGE UP (ref 0–2)
BILIRUB DIRECT SERPL-MCNC: 3 MG/DL — HIGH (ref 0–0.3)
BILIRUB SERPL-MCNC: 4.3 MG/DL — HIGH (ref 0.2–1.2)
BUN SERPL-MCNC: 8 MG/DL — SIGNIFICANT CHANGE UP (ref 7–23)
CALCIUM SERPL-MCNC: 9.2 MG/DL — SIGNIFICANT CHANGE UP (ref 8.4–10.5)
CHLORIDE SERPL-SCNC: 101 MMOL/L — SIGNIFICANT CHANGE UP (ref 98–107)
CO2 SERPL-SCNC: 25 MMOL/L — SIGNIFICANT CHANGE UP (ref 22–31)
CREAT SERPL-MCNC: 0.68 MG/DL — SIGNIFICANT CHANGE UP (ref 0.5–1.3)
EGFR: 98 ML/MIN/1.73M2 — SIGNIFICANT CHANGE UP
EOSINOPHIL # BLD AUTO: 0 K/UL — SIGNIFICANT CHANGE UP (ref 0–0.5)
EOSINOPHIL NFR BLD AUTO: 0 % — SIGNIFICANT CHANGE UP (ref 0–6)
GLUCOSE SERPL-MCNC: 112 MG/DL — HIGH (ref 70–99)
HCT VFR BLD CALC: 40.8 % — SIGNIFICANT CHANGE UP (ref 39–50)
HGB BLD-MCNC: 14.2 G/DL — SIGNIFICANT CHANGE UP (ref 13–17)
IANC: 3.82 K/UL — SIGNIFICANT CHANGE UP (ref 1.8–7.4)
INR BLD: 1.53 RATIO — HIGH (ref 0.88–1.16)
LYMPHOCYTES # BLD AUTO: 0.81 K/UL — LOW (ref 1–3.3)
LYMPHOCYTES # BLD AUTO: 12.6 % — LOW (ref 13–44)
MAGNESIUM SERPL-MCNC: 1.6 MG/DL — SIGNIFICANT CHANGE UP (ref 1.6–2.6)
MCHC RBC-ENTMCNC: 29 PG — SIGNIFICANT CHANGE UP (ref 27–34)
MCHC RBC-ENTMCNC: 34.8 GM/DL — SIGNIFICANT CHANGE UP (ref 32–36)
MCV RBC AUTO: 83.3 FL — SIGNIFICANT CHANGE UP (ref 80–100)
METAMYELOCYTES # FLD: 1.7 % — HIGH (ref 0–1)
MICROCYTES BLD QL: SLIGHT — SIGNIFICANT CHANGE UP
MONOCYTES # BLD AUTO: 0.43 K/UL — SIGNIFICANT CHANGE UP (ref 0–0.9)
MONOCYTES NFR BLD AUTO: 6.7 % — SIGNIFICANT CHANGE UP (ref 2–14)
NEUTROPHILS # BLD AUTO: 4.72 K/UL — SIGNIFICANT CHANGE UP (ref 1.8–7.4)
NEUTROPHILS NFR BLD AUTO: 73.1 % — SIGNIFICANT CHANGE UP (ref 43–77)
PHOSPHATE SERPL-MCNC: 2.2 MG/DL — LOW (ref 2.5–4.5)
PLAT MORPH BLD: ABNORMAL
PLATELET # BLD AUTO: 120 K/UL — LOW (ref 150–400)
PLATELET COUNT - ESTIMATE: ABNORMAL
POLYCHROMASIA BLD QL SMEAR: SLIGHT — SIGNIFICANT CHANGE UP
POTASSIUM SERPL-MCNC: 3.9 MMOL/L — SIGNIFICANT CHANGE UP (ref 3.5–5.3)
POTASSIUM SERPL-SCNC: 3.9 MMOL/L — SIGNIFICANT CHANGE UP (ref 3.5–5.3)
PROT SERPL-MCNC: 6.1 G/DL — SIGNIFICANT CHANGE UP (ref 6–8.3)
PROTHROM AB SERPL-ACNC: 17.8 SEC — HIGH (ref 10.5–13.4)
RBC # BLD: 4.9 M/UL — SIGNIFICANT CHANGE UP (ref 4.2–5.8)
RBC # FLD: 15.5 % — HIGH (ref 10.3–14.5)
RBC BLD AUTO: ABNORMAL
SODIUM SERPL-SCNC: 135 MMOL/L — SIGNIFICANT CHANGE UP (ref 135–145)
TARGETS BLD QL SMEAR: SLIGHT — SIGNIFICANT CHANGE UP
VARIANT LYMPHS # BLD: 5.9 % — SIGNIFICANT CHANGE UP (ref 0–6)
WBC # BLD: 6.46 K/UL — SIGNIFICANT CHANGE UP (ref 3.8–10.5)
WBC # FLD AUTO: 6.46 K/UL — SIGNIFICANT CHANGE UP (ref 3.8–10.5)

## 2022-12-13 PROCEDURE — 99232 SBSQ HOSP IP/OBS MODERATE 35: CPT | Mod: GC

## 2022-12-13 PROCEDURE — 99233 SBSQ HOSP IP/OBS HIGH 50: CPT | Mod: GC

## 2022-12-13 RX ADMIN — ATORVASTATIN CALCIUM 20 MILLIGRAM(S): 80 TABLET, FILM COATED ORAL at 21:20

## 2022-12-13 RX ADMIN — APIXABAN 5 MILLIGRAM(S): 2.5 TABLET, FILM COATED ORAL at 17:52

## 2022-12-13 RX ADMIN — APIXABAN 5 MILLIGRAM(S): 2.5 TABLET, FILM COATED ORAL at 06:09

## 2022-12-13 RX ADMIN — Medication 200 MILLIGRAM(S): at 12:33

## 2022-12-13 RX ADMIN — SENNA PLUS 2 TABLET(S): 8.6 TABLET ORAL at 21:20

## 2022-12-13 RX ADMIN — PIPERACILLIN AND TAZOBACTAM 25 GRAM(S): 4; .5 INJECTION, POWDER, LYOPHILIZED, FOR SOLUTION INTRAVENOUS at 06:07

## 2022-12-13 RX ADMIN — TAMSULOSIN HYDROCHLORIDE 0.4 MILLIGRAM(S): 0.4 CAPSULE ORAL at 21:21

## 2022-12-13 RX ADMIN — Medication 450 MILLIGRAM(S): at 21:20

## 2022-12-13 RX ADMIN — POLYETHYLENE GLYCOL 3350 17 GRAM(S): 17 POWDER, FOR SOLUTION ORAL at 12:29

## 2022-12-13 RX ADMIN — CHLORHEXIDINE GLUCONATE 15 MILLILITER(S): 213 SOLUTION TOPICAL at 06:09

## 2022-12-13 RX ADMIN — Medication 60 MILLIGRAM(S): at 12:34

## 2022-12-13 RX ADMIN — Medication 200 MILLIGRAM(S): at 21:22

## 2022-12-13 RX ADMIN — PIPERACILLIN AND TAZOBACTAM 25 GRAM(S): 4; .5 INJECTION, POWDER, LYOPHILIZED, FOR SOLUTION INTRAVENOUS at 17:48

## 2022-12-13 RX ADMIN — Medication 200 MILLIGRAM(S): at 12:29

## 2022-12-13 RX ADMIN — Medication 112 MICROGRAM(S): at 06:09

## 2022-12-13 NOTE — PROGRESS NOTE ADULT - PROBLEM SELECTOR PLAN 5
- Pt reports he has prediabetes, on Ozempic at home but cannot remember the dose, this medication at this time is mainly for weight loss per pt  - Hold Ozempic while inpatient, no insulin requirement DC'd insulin sliding scale

## 2022-12-13 NOTE — PROGRESS NOTE ADULT - PROBLEM SELECTOR PLAN 1
- 8mm stone in the ampulla on imaging and on presentation had elevation in bili to 7.9 (4.6 on 10/21), lipase >3000, and mild elevation in LFTs AST/ALT 57/89 (280/262 on 10/21), all consistent with choledocholithiasis with pancreatitis  - GI took pt for urgent ERCP on 12/9 for stent placement  - Pt had 1st episode of choledocholithiasis in 8/2022 s/p ERCP with stent placement, course complicated by post-ERCP pancreatitis and E. Coli bacteremia  - Prior stent now gone, passed on its own  - S/p IV fluids 1.5L NS and 1L LR in the ED  - Pain PRNs Tylenol 650mg and morphine 4mg IV, has not required any since the procedure  - Pt will likely need outpt cholecystectomy, given 2 episodes choledocholithiasis with cholangitis, however he is high risk for cholecystectomy due to his weight, but also high risk to keep having complications from his gallstones, Surg team holding off on abdi for now  - F/u with GI in 8-12 weeks for ERCP/stent removal

## 2022-12-13 NOTE — PROGRESS NOTE ADULT - SUBJECTIVE AND OBJECTIVE BOX
Huong Padilla MD  EM/IM PGY-2  Pager # [phone] or Teams  --------------------------------  ==============UNFINISHED NOTE==================   Huong Padilla MD  EM/IM PGY-2  Pager # [phone] or Teams  --------------------------------    INTERVAL HPI/OVERNIGHT EVENTS: No acute events overnight, vitals remained stable. All scheduled medications given, No PRN medications required.     SUBJECTIVE: Patient seen and examined at bedside. Pt still not in pain, no new symptoms like leg pain, SOB, chest pain.    VITAL SIGNS:  T(C): 36.6 (12-13-22 @ 12:30), Max: 36.6 (12-12-22 @ 20:59)  HR: 97 (12-13-22 @ 16:31) (68 - 97)  BP: 122/79 (12-13-22 @ 12:30) (104/57 - 122/79)  RR: 18 (12-13-22 @ 12:30) (18 - 18)  SpO2: 99% (12-13-22 @ 16:31) (95% - 99%)      PHYSICAL EXAM:  GENERAL: Sitting comfortably in bed in no acute distress, Jaundice decreasing daily  NEURO: Alert and Oriented to person, place, date and situation. Pupils symmetric, No ptosis. No facial asymmetry or dysarthria, no tremor noted.  HEENT: No conjunctival injection, + scleral icterus.   CARD: Normal rate and regular rhythm, no murmurs and no gallops appreciated.  RESP: Clear to auscultation bilaterally, No wheezes, rales or rhonchi. Good respiratory effort.  ABD:  Nondistended, Soft and nontender to palpation in all quadrants, no guarding, no rigidity. No masses appreciated.  EXT: No pedal edema.      MEDICATIONS:  MEDICATIONS  (STANDING):  allopurinol 200 milliGRAM(s) Oral daily  apixaban 5 milliGRAM(s) Oral every 12 hours  atorvastatin 20 milliGRAM(s) Oral at bedtime  chlorhexidine 0.12% Liquid 15 milliLiter(s) Swish and Spit two times a day  glucagon  Injectable 1 milliGRAM(s) IntraMuscular once  influenza  Vaccine (HIGH DOSE) 0.7 milliLiter(s) IntraMuscular once  levothyroxine 112 MICROGram(s) Oral daily  metoprolol tartrate 200 milliGRAM(s) Oral two times a day  NIFEdipine XL 60 milliGRAM(s) Oral daily  piperacillin/tazobactam IVPB.. 3.375 Gram(s) IV Intermittent every 8 hours  polyethylene glycol 3350 17 Gram(s) Oral daily  senna 2 Tablet(s) Oral at bedtime  tamsulosin 0.4 milliGRAM(s) Oral at bedtime  traZODone 450 milliGRAM(s) Oral at bedtime    MEDICATIONS  (PRN):  acetaminophen     Tablet .. 650 milliGRAM(s) Oral every 6 hours PRN Temp greater or equal to 38C (100.4F), Mild Pain (1 - 3)  melatonin 3 milliGRAM(s) Oral at bedtime PRN Insomnia  morphine  - Injectable 4 milliGRAM(s) IV Push every 4 hours PRN Severe Pain (7 - 10)      LABS:                        14.2   6.46  )-----------( 120      ( 13 Dec 2022 06:24 )             40.8     12-13    135  |  101  |  8   ----------------------------<  112<H>  3.9   |  25  |  0.68    Ca    9.2      13 Dec 2022 06:24  Phos  2.2     12-13  Mg     1.60     12-13    TPro  6.1  /  Alb  2.3<L>  /  TBili  4.3<H>  /  DBili  3.0<H>  /  AST  56<H>  /  ALT  46<H>  /  AlkPhos  190<H>  12-13    PT/INR - ( 13 Dec 2022 06:24 )   PT: 17.8 sec;   INR: 1.53 ratio         PTT - ( 13 Dec 2022 06:24 )  PTT:42.6 sec

## 2022-12-13 NOTE — PROGRESS NOTE ADULT - ASSESSMENT
71 y/o M w/ recurrent cholangitis. Wheelchair bound at baseline.    Recommendations:  - Continue abx  - Patient not an operative candidate at this time  - If cholangitis were to reoccur, recommend repeat ERCP      B Team Surgery  [id number]

## 2022-12-13 NOTE — PROGRESS NOTE ADULT - SUBJECTIVE AND OBJECTIVE BOX
Surgery Progress Note     Subjective/24hour Events:   Patient seen and examined. Pain controlled. No nausea/vomiting. Tolerating regular diet.      Vital Signs:  Vital Signs Last 24 Hrs  T(C): 36.6 (13 Dec 2022 06:06), Max: 36.7 (12 Dec 2022 12:26)  T(F): 97.9 (13 Dec 2022 06:06), Max: 98 (12 Dec 2022 12:26)  HR: 80 (13 Dec 2022 06:06) (68 - 92)  BP: 104/57 (13 Dec 2022 06:06) (104/57 - 119/78)  BP(mean): --  RR: 18 (13 Dec 2022 06:06) (18 - 18)  SpO2: 97% (13 Dec 2022 06:06) (95% - 100%)    Parameters below as of 13 Dec 2022 06:06  Patient On (Oxygen Delivery Method): room air        CAPILLARY BLOOD GLUCOSE      POCT Blood Glucose.: 105 mg/dL (13 Dec 2022 08:04)  POCT Blood Glucose.: 105 mg/dL (12 Dec 2022 16:48)  POCT Blood Glucose.: 114 mg/dL (12 Dec 2022 12:16)      I&O's Detail        Physical Exam:  Gen: NAD  CV: Regular rate  Resp: Nonlabored breathing on room air  Abd: Obese, soft, nontender to palpation  Ext: Warm      Labs:    12-13    135  |  101  |  8   ----------------------------<  112<H>  3.9   |  25  |  0.68    Ca    9.2      13 Dec 2022 06:24  Phos  2.2     12-13  Mg     1.60     12-13    TPro  6.1  /  Alb  2.3<L>  /  TBili  4.3<H>  /  DBili  3.0<H>  /  AST  56<H>  /  ALT  46<H>  /  AlkPhos  190<H>  12-13    LIVER FUNCTIONS - ( 13 Dec 2022 06:24 )  Alb: 2.3 g/dL / Pro: 6.1 g/dL / ALK PHOS: 190 U/L / ALT: 46 U/L / AST: 56 U/L / GGT: x                                 14.2   6.46  )-----------( 120      ( 13 Dec 2022 06:24 )             40.8     PT/INR - ( 13 Dec 2022 06:24 )   PT: 17.8 sec;   INR: 1.53 ratio         PTT - ( 13 Dec 2022 06:24 )  PTT:42.6 sec

## 2022-12-13 NOTE — PROGRESS NOTE ADULT - PROBLEM SELECTOR PLAN 3
- Home regimen Metoprolol tartrate 200mg BID and coumadin 12-14mg/day, he measures INR at home and gets medication adjustments weekly  - INR was 3.99 on arrival, received Kcentra 1500mg + Vit K in ER for reversal so he could go urgently to ERCP  - Transitioned to Eliquis on 12/12, pt spoke with his cardiologist Dr. Johnson and now that BMI is <40 he qualifies for DOAC

## 2022-12-13 NOTE — PROGRESS NOTE ADULT - ATTENDING COMMENTS
72M PMH Afib on Coumadin, HTN, hypothyroidism, hx of recent choledocholithiasis p/w fever, abdominal pain, s/p ERCP on 12/9 w/ evidence of suppurative cholangitis.    #Gallstone pancreatitis c/b Cholangitis: Cont Zosyn. Plan for ERCP in 2-3 months per GI recs to retrieve stent. Surgery consulted for cholecystectomy, deemed not candidate at this time, will f/u as OP.   # Afib: Cont Metoprolol. Was on Coumadin at home, team discussed w/ Cardiology, will switch to Eliquis.   # Constipation: Bowel regimen.   PT reconsult now recommends [facility], pending placement 72M PMH Afib on Coumadin, HTN, hypothyroidism, hx of recent choledocholithiasis p/w fever, abdominal pain, s/p ERCP on 12/9 w/ evidence of suppurative cholangitis.    #Gallstone pancreatitis c/b Cholangitis: Cont Zosyn while inpatient, transition to Cipro/Flagyl on DC to complete 10-14 day course. Plan for repeat ERCP in 2-3 months per GI recs. Surgery consulted for cholecystectomy, deemed not candidate at this time, will f/u as OP.   # Afib: Cont Metoprolol. Was on Coumadin at home, team discussed w/ Cardiology, will switch to Eliquis.   # Constipation: Bowel regimen.   PT reconsult now recommends [facility], pending placement.

## 2022-12-13 NOTE — PROGRESS NOTE ADULT - PROBLEM SELECTOR PLAN 7
DVT Prophylaxis: Eliquis 5mg BID full AC for a-fib  Diet: Regular  Lines/Tubes: peripheral IVs only  PT: Eval 12/10 - Home with home PT, Re-eval 12/13 [facility]?  Social Work:  Code Status: Full Code    Home Situation: At baseline he lives with his son and has a home assistant/friend Terrell. The pt is wheelchair bound at baseline for the last 3 years after a fall where he injured his leg and became severely deconditioned, at the time of the fall the pt reports he was 540lbs so had difficulty with PT. He is able to stand for brief periods. He manages all his medications himself and is A&Ox4, able to feed and dress himself but has help with bathing and cooking.    Discharge Plan: To Rehab?

## 2022-12-14 LAB
ALBUMIN SERPL ELPH-MCNC: 2.5 G/DL — LOW (ref 3.3–5)
ALP SERPL-CCNC: 197 U/L — HIGH (ref 40–120)
ALT FLD-CCNC: 64 U/L — HIGH (ref 4–41)
ANION GAP SERPL CALC-SCNC: 9 MMOL/L — SIGNIFICANT CHANGE UP (ref 7–14)
AST SERPL-CCNC: 80 U/L — HIGH (ref 4–40)
BASOPHILS # BLD AUTO: 0.03 K/UL — SIGNIFICANT CHANGE UP (ref 0–0.2)
BASOPHILS NFR BLD AUTO: 0.4 % — SIGNIFICANT CHANGE UP (ref 0–2)
BILIRUB DIRECT SERPL-MCNC: 2.5 MG/DL — HIGH (ref 0–0.3)
BILIRUB INDIRECT FLD-MCNC: 1.4 MG/DL — HIGH (ref 0–1)
BILIRUB SERPL-MCNC: 3.9 MG/DL — HIGH (ref 0.2–1.2)
BILIRUB SERPL-MCNC: 3.9 MG/DL — HIGH (ref 0.2–1.2)
BUN SERPL-MCNC: 8 MG/DL — SIGNIFICANT CHANGE UP (ref 7–23)
CALCIUM SERPL-MCNC: 9.2 MG/DL — SIGNIFICANT CHANGE UP (ref 8.4–10.5)
CHLORIDE SERPL-SCNC: 102 MMOL/L — SIGNIFICANT CHANGE UP (ref 98–107)
CO2 SERPL-SCNC: 25 MMOL/L — SIGNIFICANT CHANGE UP (ref 22–31)
CREAT SERPL-MCNC: 0.65 MG/DL — SIGNIFICANT CHANGE UP (ref 0.5–1.3)
CULTURE RESULTS: SIGNIFICANT CHANGE UP
CULTURE RESULTS: SIGNIFICANT CHANGE UP
EGFR: 99 ML/MIN/1.73M2 — SIGNIFICANT CHANGE UP
EOSINOPHIL # BLD AUTO: 0.05 K/UL — SIGNIFICANT CHANGE UP (ref 0–0.5)
EOSINOPHIL NFR BLD AUTO: 0.6 % — SIGNIFICANT CHANGE UP (ref 0–6)
GLUCOSE SERPL-MCNC: 110 MG/DL — HIGH (ref 70–99)
HCT VFR BLD CALC: 41.4 % — SIGNIFICANT CHANGE UP (ref 39–50)
HGB BLD-MCNC: 14 G/DL — SIGNIFICANT CHANGE UP (ref 13–17)
IANC: 4.83 K/UL — SIGNIFICANT CHANGE UP (ref 1.8–7.4)
IMM GRANULOCYTES NFR BLD AUTO: 5.9 % — HIGH (ref 0–0.9)
LYMPHOCYTES # BLD AUTO: 1.94 K/UL — SIGNIFICANT CHANGE UP (ref 1–3.3)
LYMPHOCYTES # BLD AUTO: 24.7 % — SIGNIFICANT CHANGE UP (ref 13–44)
MAGNESIUM SERPL-MCNC: 1.8 MG/DL — SIGNIFICANT CHANGE UP (ref 1.6–2.6)
MCHC RBC-ENTMCNC: 28.3 PG — SIGNIFICANT CHANGE UP (ref 27–34)
MCHC RBC-ENTMCNC: 33.8 GM/DL — SIGNIFICANT CHANGE UP (ref 32–36)
MCV RBC AUTO: 83.6 FL — SIGNIFICANT CHANGE UP (ref 80–100)
MONOCYTES # BLD AUTO: 0.55 K/UL — SIGNIFICANT CHANGE UP (ref 0–0.9)
MONOCYTES NFR BLD AUTO: 7 % — SIGNIFICANT CHANGE UP (ref 2–14)
NEUTROPHILS # BLD AUTO: 4.83 K/UL — SIGNIFICANT CHANGE UP (ref 1.8–7.4)
NEUTROPHILS NFR BLD AUTO: 61.4 % — SIGNIFICANT CHANGE UP (ref 43–77)
NRBC # BLD: 0 /100 WBCS — SIGNIFICANT CHANGE UP (ref 0–0)
NRBC # FLD: 0 K/UL — SIGNIFICANT CHANGE UP (ref 0–0)
PHOSPHATE SERPL-MCNC: 2.5 MG/DL — SIGNIFICANT CHANGE UP (ref 2.5–4.5)
PLATELET # BLD AUTO: 154 K/UL — SIGNIFICANT CHANGE UP (ref 150–400)
POTASSIUM SERPL-MCNC: 3.9 MMOL/L — SIGNIFICANT CHANGE UP (ref 3.5–5.3)
POTASSIUM SERPL-SCNC: 3.9 MMOL/L — SIGNIFICANT CHANGE UP (ref 3.5–5.3)
PROT SERPL-MCNC: 6.4 G/DL — SIGNIFICANT CHANGE UP (ref 6–8.3)
RBC # BLD: 4.95 M/UL — SIGNIFICANT CHANGE UP (ref 4.2–5.8)
RBC # FLD: 15.4 % — HIGH (ref 10.3–14.5)
SARS-COV-2 RNA SPEC QL NAA+PROBE: SIGNIFICANT CHANGE UP
SODIUM SERPL-SCNC: 136 MMOL/L — SIGNIFICANT CHANGE UP (ref 135–145)
SPECIMEN SOURCE: SIGNIFICANT CHANGE UP
SPECIMEN SOURCE: SIGNIFICANT CHANGE UP
WBC # BLD: 7.86 K/UL — SIGNIFICANT CHANGE UP (ref 3.8–10.5)
WBC # FLD AUTO: 7.86 K/UL — SIGNIFICANT CHANGE UP (ref 3.8–10.5)

## 2022-12-14 PROCEDURE — 99232 SBSQ HOSP IP/OBS MODERATE 35: CPT | Mod: GC

## 2022-12-14 RX ORDER — POLYETHYLENE GLYCOL 3350 17 G/17G
17 POWDER, FOR SOLUTION ORAL
Refills: 0 | Status: DISCONTINUED | OUTPATIENT
Start: 2022-12-14 | End: 2022-12-18

## 2022-12-14 RX ORDER — METRONIDAZOLE 500 MG
500 TABLET ORAL EVERY 8 HOURS
Refills: 0 | Status: COMPLETED | OUTPATIENT
Start: 2022-12-14 | End: 2022-12-15

## 2022-12-14 RX ORDER — LACTULOSE 10 G/15ML
10 SOLUTION ORAL DAILY
Refills: 0 | Status: DISCONTINUED | OUTPATIENT
Start: 2022-12-14 | End: 2022-12-18

## 2022-12-14 RX ORDER — CIPROFLOXACIN LACTATE 400MG/40ML
500 VIAL (ML) INTRAVENOUS EVERY 12 HOURS
Refills: 0 | Status: COMPLETED | OUTPATIENT
Start: 2022-12-14 | End: 2022-12-15

## 2022-12-14 RX ADMIN — Medication 500 MILLIGRAM(S): at 17:58

## 2022-12-14 RX ADMIN — PIPERACILLIN AND TAZOBACTAM 25 GRAM(S): 4; .5 INJECTION, POWDER, LYOPHILIZED, FOR SOLUTION INTRAVENOUS at 00:07

## 2022-12-14 RX ADMIN — MORPHINE SULFATE 4 MILLIGRAM(S): 50 CAPSULE, EXTENDED RELEASE ORAL at 06:22

## 2022-12-14 RX ADMIN — ATORVASTATIN CALCIUM 20 MILLIGRAM(S): 80 TABLET, FILM COATED ORAL at 23:41

## 2022-12-14 RX ADMIN — Medication 60 MILLIGRAM(S): at 06:09

## 2022-12-14 RX ADMIN — PIPERACILLIN AND TAZOBACTAM 25 GRAM(S): 4; .5 INJECTION, POWDER, LYOPHILIZED, FOR SOLUTION INTRAVENOUS at 09:26

## 2022-12-14 RX ADMIN — POLYETHYLENE GLYCOL 3350 17 GRAM(S): 17 POWDER, FOR SOLUTION ORAL at 18:00

## 2022-12-14 RX ADMIN — Medication 450 MILLIGRAM(S): at 23:39

## 2022-12-14 RX ADMIN — POLYETHYLENE GLYCOL 3350 17 GRAM(S): 17 POWDER, FOR SOLUTION ORAL at 11:49

## 2022-12-14 RX ADMIN — Medication 200 MILLIGRAM(S): at 17:59

## 2022-12-14 RX ADMIN — SENNA PLUS 2 TABLET(S): 8.6 TABLET ORAL at 23:41

## 2022-12-14 RX ADMIN — LACTULOSE 10 GRAM(S): 10 SOLUTION ORAL at 17:59

## 2022-12-14 RX ADMIN — TAMSULOSIN HYDROCHLORIDE 0.4 MILLIGRAM(S): 0.4 CAPSULE ORAL at 23:40

## 2022-12-14 RX ADMIN — APIXABAN 5 MILLIGRAM(S): 2.5 TABLET, FILM COATED ORAL at 18:00

## 2022-12-14 RX ADMIN — Medication 200 MILLIGRAM(S): at 11:50

## 2022-12-14 RX ADMIN — Medication 112 MICROGRAM(S): at 06:07

## 2022-12-14 RX ADMIN — Medication 500 MILLIGRAM(S): at 23:39

## 2022-12-14 RX ADMIN — MORPHINE SULFATE 4 MILLIGRAM(S): 50 CAPSULE, EXTENDED RELEASE ORAL at 06:50

## 2022-12-14 RX ADMIN — APIXABAN 5 MILLIGRAM(S): 2.5 TABLET, FILM COATED ORAL at 06:07

## 2022-12-14 RX ADMIN — Medication 200 MILLIGRAM(S): at 06:09

## 2022-12-14 NOTE — PROGRESS NOTE ADULT - SUBJECTIVE AND OBJECTIVE BOX
Surgery Progress Note     Subjective/24hour Events:   Patient seen and examined. Pain controlled. No nausea/vomiting. Tolerating regular diet.      Vital Signs:  Vital Signs Last 24 Hrs  T(C): 36.6 (14 Dec 2022 11:55), Max: 36.8 (14 Dec 2022 06:05)  T(F): 97.8 (14 Dec 2022 11:55), Max: 98.2 (14 Dec 2022 06:05)  HR: 78 (14 Dec 2022 18:00) (66 - 78)  BP: 117/61 (14 Dec 2022 18:00) (117/61 - 123/66)  BP(mean): --  RR: 18 (14 Dec 2022 18:00) (17 - 18)  SpO2: 94% (14 Dec 2022 18:00) (94% - 100%)    Parameters below as of 14 Dec 2022 18:00  Patient On (Oxygen Delivery Method): room air        CAPILLARY BLOOD GLUCOSE      POCT Blood Glucose.: 120 mg/dL (14 Dec 2022 16:43)  POCT Blood Glucose.: 110 mg/dL (14 Dec 2022 11:46)  POCT Blood Glucose.: 123 mg/dL (14 Dec 2022 07:40)      I&O's Detail        Physical Exam:  Gen: NAD  CV: Regular rate  Resp: Nonlabored breathing on room air  Abd: Obese, soft, nontender to palpation  Ext: Warm        Labs:    12-14    136  |  102  |  8   ----------------------------<  110<H>  3.9   |  25  |  0.65    Ca    9.2      14 Dec 2022 05:28  Phos  2.5     12-14  Mg     1.80     12-14    TPro  6.4  /  Alb  2.5<L>  /  TBili  3.9<H>  /  DBili  2.5<H>  /  AST  80<H>  /  ALT  64<H>  /  AlkPhos  197<H>  12-14    LIVER FUNCTIONS - ( 14 Dec 2022 05:28 )  Alb: 2.5 g/dL / Pro: 6.4 g/dL / ALK PHOS: 197 U/L / ALT: 64 U/L / AST: 80 U/L / GGT: x                                 14.0   7.86  )-----------( 154      ( 14 Dec 2022 05:28 )             41.4     PT/INR - ( 13 Dec 2022 06:24 )   PT: 17.8 sec;   INR: 1.53 ratio         PTT - ( 13 Dec 2022 06:24 )  PTT:42.6 sec

## 2022-12-14 NOTE — PROGRESS NOTE ADULT - ASSESSMENT
72yM with PMHx a-fib on warfarin, HTN, hypothyroidism, recent hospitalization from 8/19-8/24 when he presented with fever and abd pain found to have transaminitis and choledocholithiasis s/p ERCP(8/19) with stent placement with a course complicated by sepsis with bacteremia with pan-sensitive E. Coli and post ERCP pancreatitis, 2 other ERCPs attempting to remove stent 10/28 and 11/16 aborted due to food residue in the stomach, presents with 2 days of fever and abdominal pain. In the ED found to have elevated bilirubin with RUQ US and CT abd/pel showing dilated CBD with 8mm stone at the ampulla with distended gallbladder. S/p ERCP with GI on 12/9, had stent placed and there was pus draining from behind obstruction so we started Zosyn on 12/10. Of note, pt had an INR of 3.99 on presentation and required a dose of Kcentra and Vit K prior to procedure. Pending DC to rehab, medically cleared, will continue Abx for total 7-10 day course.

## 2022-12-14 NOTE — PROGRESS NOTE ADULT - PROBLEM SELECTOR PLAN 2
- Pus coming from behind obstruction seen on ERCP, consistent with likely infection indicating cholangitis  - Pt did have a fever at home, but otherwise WBC only mildly elevated on arrival and vitals have been stable, HR in the 90s  - S/p Zosyn 3.375 q8hrs (12/10 - 12/14)  - Switch to PO abx, Start ciprofloxacin 500mg q12hrs and metronidazole 500mg q8hrs

## 2022-12-14 NOTE — PROGRESS NOTE ADULT - ATTENDING COMMENTS
72M PMH Afib on Coumadin, HTN, hypothyroidism, hx of recent choledocholithiasis p/w fever, abdominal pain, s/p ERCP on 12/9 w/ evidence of suppurative cholangitis.    #Gallstone pancreatitis c/b Cholangitis: Cont Zosyn while inpatient, transition to Cipro/Flagyl on DC to complete 10-14 day course. Plan for repeat ERCP in 2-3 months per GI recs. Surgery consulted for cholecystectomy, deemed not candidate at this time, will f/u as OP.   # Afib: Cont Metoprolol. Was on Coumadin at home, team discussed w/ Cardiology, will switch to Eliquis.   # Constipation: Bowel regimen.   PT reconsult now recommends [facility], pending placement.

## 2022-12-14 NOTE — PROGRESS NOTE ADULT - ASSESSMENT
73 y/o M w/ recurrent cholangitis. Wheelchair bound at baseline.    Recommendations:  - Patient may follow up outpatient in the acute care surgery office for interval cholecystectomy discussion  - Please also have patient follow up with cardiology for pre-operative risk stratification  - Please re-consult prn      B Team Surgery  [id number]

## 2022-12-14 NOTE — PROGRESS NOTE ADULT - SUBJECTIVE AND OBJECTIVE BOX
Huong Padilla MD  EM/IM PGY-2  Pager # [phone] or Teams  --------------------------------  ==============UNFINISHED NOTE==================   Huong Padilla MD  EM/IM PGY-2  Pager # [phone] or Teams  --------------------------------    INTERVAL HPI/OVERNIGHT EVENTS: No acute events overnight, vitals remained stable. All scheduled medications given, No PRN medications required.     SUBJECTIVE: Patient seen and examined at bedside. Pt feels well, no pain, eating without difficulty but has not had BM yet since ERCP    VITAL SIGNS:  T(C): 36.6 (12-14-22 @ 11:55), Max: 36.8 (12-14-22 @ 06:05)  HR: 70 (12-14-22 @ 11:55) (66 - 73)  BP: 122/77 (12-14-22 @ 11:55) (120/74 - 123/66)  RR: 18 (12-14-22 @ 11:55) (17 - 18)  SpO2: 95% (12-14-22 @ 11:55) (95% - 100%)      PHYSICAL EXAM:  GENERAL: Sitting comfortably in bed in no acute distress, mildly jaundices to skin and eyes  NEURO: Alert and Oriented to person, place, date and situation. Pupils symmetric, No ptosis. No facial asymmetry or dysarthria, no tremor noted.  HEENT: No conjunctival injection  CARD: Normal rate and regular rhythm, no murmurs and no gallops appreciated.  RESP: Clear to auscultation bilaterally, No wheezes, rales or rhonchi. Good respiratory effort.  ABD: Nondistended, Soft and nontender to palpation in all quadrants, no guarding, no rigidity. No masses appreciated.  EXT: No pedal edema.       MEDICATIONS:  MEDICATIONS  (STANDING):  allopurinol 200 milliGRAM(s) Oral daily  apixaban 5 milliGRAM(s) Oral every 12 hours  atorvastatin 20 milliGRAM(s) Oral at bedtime  chlorhexidine 0.12% Liquid 15 milliLiter(s) Swish and Spit two times a day  ciprofloxacin     Tablet 500 milliGRAM(s) Oral every 12 hours  glucagon  Injectable 1 milliGRAM(s) IntraMuscular once  influenza  Vaccine (HIGH DOSE) 0.7 milliLiter(s) IntraMuscular once  lactulose Syrup 10 Gram(s) Oral daily  levothyroxine 112 MICROGram(s) Oral daily  metoprolol tartrate 200 milliGRAM(s) Oral two times a day  metroNIDAZOLE    Tablet 500 milliGRAM(s) Oral every 8 hours  NIFEdipine XL 60 milliGRAM(s) Oral daily  polyethylene glycol 3350 17 Gram(s) Oral two times a day  senna 2 Tablet(s) Oral at bedtime  tamsulosin 0.4 milliGRAM(s) Oral at bedtime  traZODone 450 milliGRAM(s) Oral at bedtime    MEDICATIONS  (PRN):  acetaminophen     Tablet .. 650 milliGRAM(s) Oral every 6 hours PRN Temp greater or equal to 38C (100.4F), Mild Pain (1 - 3)  melatonin 3 milliGRAM(s) Oral at bedtime PRN Insomnia  morphine  - Injectable 4 milliGRAM(s) IV Push every 4 hours PRN Severe Pain (7 - 10)      LABS:                        14.0   7.86  )-----------( 154      ( 14 Dec 2022 05:28 )             41.4     12-14    136  |  102  |  8   ----------------------------<  110<H>  3.9   |  25  |  0.65    Ca    9.2      14 Dec 2022 05:28  Phos  2.5     12-14  Mg     1.80     12-14    TPro  6.4  /  Alb  2.5<L>  /  TBili  3.9<H>  /  DBili  2.5<H>  /  AST  80<H>  /  ALT  64<H>  /  AlkPhos  197<H>  12-14    PT/INR - ( 13 Dec 2022 06:24 )   PT: 17.8 sec;   INR: 1.53 ratio         PTT - ( 13 Dec 2022 06:24 )  PTT:42.6 sec

## 2022-12-14 NOTE — PROGRESS NOTE ADULT - PROBLEM SELECTOR PLAN 7
DVT Prophylaxis: Eliquis 5mg BID full AC for a-fib  Diet: Regular  Lines/Tubes: peripheral IVs only  PT: Eval 12/10 - Home with home PT, Re-eval 12/13 [facility]  Social Work: Rehab  Code Status: Full Code    Home Situation: At baseline he lives with his son and has a home assistant/friend Terrell. The pt is wheelchair bound at baseline for the last 3 years after a fall where he injured his leg and became severely deconditioned, at the time of the fall the pt reports he was 540lbs so had difficulty with PT. He is able to stand for brief periods. He manages all his medications himself and is A&Ox4, able to feed and dress himself but has help with bathing and cooking.    Discharge Plan: To Rehab

## 2022-12-15 DIAGNOSIS — R74.01 ELEVATION OF LEVELS OF LIVER TRANSAMINASE LEVELS: ICD-10-CM

## 2022-12-15 LAB
ALBUMIN SERPL ELPH-MCNC: 2.6 G/DL — LOW (ref 3.3–5)
ALP SERPL-CCNC: 197 U/L — HIGH (ref 40–120)
ALT FLD-CCNC: 66 U/L — HIGH (ref 4–41)
ANION GAP SERPL CALC-SCNC: 10 MMOL/L — SIGNIFICANT CHANGE UP (ref 7–14)
AST SERPL-CCNC: 80 U/L — HIGH (ref 4–40)
BASOPHILS # BLD AUTO: 0.07 K/UL — SIGNIFICANT CHANGE UP (ref 0–0.2)
BASOPHILS NFR BLD AUTO: 0.9 % — SIGNIFICANT CHANGE UP (ref 0–2)
BILIRUB SERPL-MCNC: 3.1 MG/DL — HIGH (ref 0.2–1.2)
BUN SERPL-MCNC: 8 MG/DL — SIGNIFICANT CHANGE UP (ref 7–23)
CALCIUM SERPL-MCNC: 9.1 MG/DL — SIGNIFICANT CHANGE UP (ref 8.4–10.5)
CHLORIDE SERPL-SCNC: 101 MMOL/L — SIGNIFICANT CHANGE UP (ref 98–107)
CO2 SERPL-SCNC: 23 MMOL/L — SIGNIFICANT CHANGE UP (ref 22–31)
CREAT SERPL-MCNC: 0.62 MG/DL — SIGNIFICANT CHANGE UP (ref 0.5–1.3)
EGFR: 101 ML/MIN/1.73M2 — SIGNIFICANT CHANGE UP
EOSINOPHIL # BLD AUTO: 0.08 K/UL — SIGNIFICANT CHANGE UP (ref 0–0.5)
EOSINOPHIL NFR BLD AUTO: 1 % — SIGNIFICANT CHANGE UP (ref 0–6)
GLUCOSE SERPL-MCNC: 125 MG/DL — HIGH (ref 70–99)
HCT VFR BLD CALC: 41.1 % — SIGNIFICANT CHANGE UP (ref 39–50)
HGB BLD-MCNC: 13.8 G/DL — SIGNIFICANT CHANGE UP (ref 13–17)
IANC: 4.78 K/UL — SIGNIFICANT CHANGE UP (ref 1.8–7.4)
IMM GRANULOCYTES NFR BLD AUTO: 5.5 % — HIGH (ref 0–0.9)
LYMPHOCYTES # BLD AUTO: 2.05 K/UL — SIGNIFICANT CHANGE UP (ref 1–3.3)
LYMPHOCYTES # BLD AUTO: 25.7 % — SIGNIFICANT CHANGE UP (ref 13–44)
MAGNESIUM SERPL-MCNC: 1.6 MG/DL — SIGNIFICANT CHANGE UP (ref 1.6–2.6)
MCHC RBC-ENTMCNC: 28.5 PG — SIGNIFICANT CHANGE UP (ref 27–34)
MCHC RBC-ENTMCNC: 33.6 GM/DL — SIGNIFICANT CHANGE UP (ref 32–36)
MCV RBC AUTO: 84.7 FL — SIGNIFICANT CHANGE UP (ref 80–100)
MONOCYTES # BLD AUTO: 0.56 K/UL — SIGNIFICANT CHANGE UP (ref 0–0.9)
MONOCYTES NFR BLD AUTO: 7 % — SIGNIFICANT CHANGE UP (ref 2–14)
NEUTROPHILS # BLD AUTO: 4.78 K/UL — SIGNIFICANT CHANGE UP (ref 1.8–7.4)
NEUTROPHILS NFR BLD AUTO: 59.9 % — SIGNIFICANT CHANGE UP (ref 43–77)
NRBC # BLD: 0 /100 WBCS — SIGNIFICANT CHANGE UP (ref 0–0)
NRBC # FLD: 0 K/UL — SIGNIFICANT CHANGE UP (ref 0–0)
PHOSPHATE SERPL-MCNC: 2.4 MG/DL — LOW (ref 2.5–4.5)
PLATELET # BLD AUTO: 151 K/UL — SIGNIFICANT CHANGE UP (ref 150–400)
POTASSIUM SERPL-MCNC: 4 MMOL/L — SIGNIFICANT CHANGE UP (ref 3.5–5.3)
POTASSIUM SERPL-SCNC: 4 MMOL/L — SIGNIFICANT CHANGE UP (ref 3.5–5.3)
PROT SERPL-MCNC: 6.4 G/DL — SIGNIFICANT CHANGE UP (ref 6–8.3)
RBC # BLD: 4.85 M/UL — SIGNIFICANT CHANGE UP (ref 4.2–5.8)
RBC # FLD: 15.4 % — HIGH (ref 10.3–14.5)
SODIUM SERPL-SCNC: 134 MMOL/L — LOW (ref 135–145)
WBC # BLD: 7.98 K/UL — SIGNIFICANT CHANGE UP (ref 3.8–10.5)
WBC # FLD AUTO: 7.98 K/UL — SIGNIFICANT CHANGE UP (ref 3.8–10.5)

## 2022-12-15 PROCEDURE — 99232 SBSQ HOSP IP/OBS MODERATE 35: CPT | Mod: GC

## 2022-12-15 RX ADMIN — Medication 500 MILLIGRAM(S): at 22:10

## 2022-12-15 RX ADMIN — TAMSULOSIN HYDROCHLORIDE 0.4 MILLIGRAM(S): 0.4 CAPSULE ORAL at 22:09

## 2022-12-15 RX ADMIN — Medication 200 MILLIGRAM(S): at 12:21

## 2022-12-15 RX ADMIN — APIXABAN 5 MILLIGRAM(S): 2.5 TABLET, FILM COATED ORAL at 06:10

## 2022-12-15 RX ADMIN — SENNA PLUS 2 TABLET(S): 8.6 TABLET ORAL at 22:09

## 2022-12-15 RX ADMIN — Medication 112 MICROGRAM(S): at 06:10

## 2022-12-15 RX ADMIN — Medication 500 MILLIGRAM(S): at 06:10

## 2022-12-15 RX ADMIN — Medication 200 MILLIGRAM(S): at 06:10

## 2022-12-15 RX ADMIN — Medication 500 MILLIGRAM(S): at 17:14

## 2022-12-15 RX ADMIN — Medication 200 MILLIGRAM(S): at 17:14

## 2022-12-15 RX ADMIN — APIXABAN 5 MILLIGRAM(S): 2.5 TABLET, FILM COATED ORAL at 17:14

## 2022-12-15 RX ADMIN — Medication 500 MILLIGRAM(S): at 13:00

## 2022-12-15 RX ADMIN — Medication 500 MILLIGRAM(S): at 06:15

## 2022-12-15 RX ADMIN — Medication 60 MILLIGRAM(S): at 06:10

## 2022-12-15 RX ADMIN — Medication 450 MILLIGRAM(S): at 22:10

## 2022-12-15 RX ADMIN — ATORVASTATIN CALCIUM 20 MILLIGRAM(S): 80 TABLET, FILM COATED ORAL at 22:09

## 2022-12-15 NOTE — PROGRESS NOTE ADULT - ASSESSMENT
72M Hx morbid obesity (BMI 50), atrial fibrillation (on coumadin), HTN, BPH, hypothyroidism, cholangitis 2/2 choledocholithiasis s/p ERCP (8/2022) c/b post-ERCP pancreatitis, c/f possible gastroparesis 2/2 food retention on endoscopies, now presenting for fever, RUQ abdominal pain with elevated liver enzymes, lipase, leukocytosis c/f gallstone pancreatitis + cholangitis s/p ERCP 12/9 with plastic biliary stent was placed into the common bile duct.    #Gallstone pancreatitis c/b cholangitis  #Hx choledocholithiasis   #Hx post-ERCP pancreatitis (8/2022)  #Coagulopathy: INR 3.9 in setting of coumadin use  P/w fever 101 at home, right sided abdominal pain with leukocytosis (11), elevated liver enzymes (Tbili 7.9), elevated lipase (>3000s) + CBD dilation on RUQ US with GB sludge. Previously with known Hx of cholangitis 2/2 choledocholithiasis s/p CBD stent placement (8/2022) with subsequent ERCPs for stent/stone removal cancelled 2/2 poor visualization due to retention of food contents. Current clinical picture c/f gallstone pancreatitis/choledocholithiasis c/b cholangitis (likely grade I, INR elevated in setting of coumadin use). s/p ERCP on 12/9 with purulent drainage from CBG with plastic biliary stent was placed into the common bile duct in addition to evidence suggestive of clot protruding from the ampullary orifice likely in the setting of elevated INR. Feeling well post procedure with no fevers, chills, or any ongoing abdominal pain. Suspect that liver enzymes will continue to slowly improve over the next couple of days    #Suspected gastroparesis: multiple EGD with retention of food contents, patient without gastric emptying study on file   #Mild transaminase elevation: High risk for hepatic steatosis given morbid obesity. Unlikely related to ERCP since Tbili downtrending. DILI less likely, no obvious offending agent.     Recommendations:   -Diet as tolerated   -Antibiotic coverage for 5 days after ERCP  -Daily LFTs/INR   -Return for stent removal ERCP in 8 - 12 weeks.  - trend CMP  - send HEV IgM/ IgG  - send following for autoimmune etiologies: LALIT, anti-smooth muscle antibody, anti-mitochondrial antibody, immunoglobulin panel  - send ceruloplasmin, ferritin, iron panel   - should get fibroscan as outpatient    All recommendations are tentative until note is attested by attending.     Khadijah Stephens, PGY5  Gastroenterology/Hepatology Fellow  Available on [employer] Teams  [phone] ([employer] Short Range Pager)  [phone] (Long Range Pager)    After 5pm, please contact the on-call GI fellow. [phone]

## 2022-12-15 NOTE — PROGRESS NOTE ADULT - ATTENDING COMMENTS
72M PMH Afib on Coumadin, HTN, hypothyroidism, hx of recent choledocholithiasis p/w fever, abdominal pain, s/p ERCP on 12/9 w/ evidence of suppurative cholangitis.    #Gallstone pancreatitis c/b Cholangitis: Zosyn transitioned to Cipro/Flagyl yesterday. Clarified with GI - only needs 5 days of abx post ERCP - abx will complete today.  Plan for repeat ERCP in 2-3 months per GI recs. Surgery consulted for cholecystectomy, deemed not candidate at this time, will f/u as OP.   # Afib: Cont Metoprolol. Was on Coumadin at home, team discussed w/ Cardiology, switched to Eliquis.   # Constipation: Bowel regimen.   PT reconsult now recommends [facility], pending placement.  Pt is medically stable for DC to [facility], awaiting placement.

## 2022-12-15 NOTE — PROGRESS NOTE ADULT - SUBJECTIVE AND OBJECTIVE BOX
Huong Padilla MD  EM/IM PGY-2  Pager # [phone] or Teams  --------------------------------  ==============UNFINISHED NOTE==================    INTERVAL HPI/OVERNIGHT EVENTS: No acute events overnight, vitals remained stable. All scheduled medications given, No PRN medications required.     SUBJECTIVE: Patient seen and examined at bedside.     VITAL SIGNS:  T(C): 36.8 (12-14-22 @ 21:55), Max: 36.8 (12-14-22 @ 21:55)  HR: 66 (12-15-22 @ 03:58) (63 - 78)  BP: 104/65 (12-14-22 @ 21:55) (104/65 - 122/77)  RR: 17 (12-14-22 @ 21:55) (17 - 18)  SpO2: 98% (12-15-22 @ 03:58) (94% - 98%)      PHYSICAL EXAM:        MEDICATIONS:  MEDICATIONS  (STANDING):  allopurinol 200 milliGRAM(s) Oral daily  apixaban 5 milliGRAM(s) Oral every 12 hours  atorvastatin 20 milliGRAM(s) Oral at bedtime  chlorhexidine 0.12% Liquid 15 milliLiter(s) Swish and Spit two times a day  ciprofloxacin     Tablet 500 milliGRAM(s) Oral every 12 hours  glucagon  Injectable 1 milliGRAM(s) IntraMuscular once  influenza  Vaccine (HIGH DOSE) 0.7 milliLiter(s) IntraMuscular once  lactulose Syrup 10 Gram(s) Oral daily  levothyroxine 112 MICROGram(s) Oral daily  metoprolol tartrate 200 milliGRAM(s) Oral two times a day  metroNIDAZOLE    Tablet 500 milliGRAM(s) Oral every 8 hours  NIFEdipine XL 60 milliGRAM(s) Oral daily  polyethylene glycol 3350 17 Gram(s) Oral two times a day  senna 2 Tablet(s) Oral at bedtime  tamsulosin 0.4 milliGRAM(s) Oral at bedtime  traZODone 450 milliGRAM(s) Oral at bedtime    MEDICATIONS  (PRN):  acetaminophen     Tablet .. 650 milliGRAM(s) Oral every 6 hours PRN Temp greater or equal to 38C (100.4F), Mild Pain (1 - 3)  melatonin 3 milliGRAM(s) Oral at bedtime PRN Insomnia  morphine  - Injectable 4 milliGRAM(s) IV Push every 4 hours PRN Severe Pain (7 - 10)      LABS:                        14.0   7.86  )-----------( 154      ( 14 Dec 2022 05:28 )             41.4     12-14    136  |  102  |  8   ----------------------------<  110<H>  3.9   |  25  |  0.65    Ca    9.2      14 Dec 2022 05:28  Phos  2.5     12-14  Mg     1.80     12-14    TPro  6.4  /  Alb  2.5<L>  /  TBili  3.9<H>  /  DBili  2.5<H>  /  AST  80<H>  /  ALT  64<H>  /  AlkPhos  197<H>  12-14    PT/INR - ( 13 Dec 2022 06:24 )   PT: 17.8 sec;   INR: 1.53 ratio         PTT - ( 13 Dec 2022 06:24 )  PTT:42.6 sec       Huong Padilla MD  EM/IM PGY-2  Pager # [phone] or Teams  --------------------------------    INTERVAL HPI/OVERNIGHT EVENTS: No acute events overnight, vitals remained stable. All scheduled medications given, No PRN medications required.     SUBJECTIVE: Patient seen and examined at bedside. Feels well, no increase in pain, no new shortness of breath or leg or chest pain or any other symptoms. Had BM yesterday was not bloody.    VITAL SIGNS:  T(C): 36.8 (12-14-22 @ 21:55), Max: 36.8 (12-14-22 @ 21:55)  HR: 66 (12-15-22 @ 03:58) (63 - 78)  BP: 104/65 (12-14-22 @ 21:55) (104/65 - 122/77)  RR: 17 (12-14-22 @ 21:55) (17 - 18)  SpO2: 98% (12-15-22 @ 03:58) (94% - 98%)      PHYSICAL EXAM:  GENERAL: Sitting comfortably in bed in no acute distress, mild jaundice to skin  NEURO: Alert and Oriented to person, place, date and situation. Pupils symmetric, No ptosis. No facial asymmetry or dysarthria, no tremor noted.  HEENT: No conjunctival injection or scleral icterus.   CARD: Normal rate and regular rhythm, no murmurs and no gallops appreciated.  RESP: Clear to auscultation bilaterally in anterior lung fields, No wheezes, rales or rhonchi. Good respiratory effort.  ABD:  Nondistended, Soft and nontender to palpation in all quadrants, no guarding, no rigidity. No masses appreciated.  EXT: No pedal edema. 2+DP pulses bilaterally.      MEDICATIONS:  MEDICATIONS  (STANDING):  allopurinol 200 milliGRAM(s) Oral daily  apixaban 5 milliGRAM(s) Oral every 12 hours  atorvastatin 20 milliGRAM(s) Oral at bedtime  chlorhexidine 0.12% Liquid 15 milliLiter(s) Swish and Spit two times a day  ciprofloxacin     Tablet 500 milliGRAM(s) Oral every 12 hours  glucagon  Injectable 1 milliGRAM(s) IntraMuscular once  influenza  Vaccine (HIGH DOSE) 0.7 milliLiter(s) IntraMuscular once  lactulose Syrup 10 Gram(s) Oral daily  levothyroxine 112 MICROGram(s) Oral daily  metoprolol tartrate 200 milliGRAM(s) Oral two times a day  metroNIDAZOLE    Tablet 500 milliGRAM(s) Oral every 8 hours  NIFEdipine XL 60 milliGRAM(s) Oral daily  polyethylene glycol 3350 17 Gram(s) Oral two times a day  senna 2 Tablet(s) Oral at bedtime  tamsulosin 0.4 milliGRAM(s) Oral at bedtime  traZODone 450 milliGRAM(s) Oral at bedtime    MEDICATIONS  (PRN):  acetaminophen     Tablet .. 650 milliGRAM(s) Oral every 6 hours PRN Temp greater or equal to 38C (100.4F), Mild Pain (1 - 3)  melatonin 3 milliGRAM(s) Oral at bedtime PRN Insomnia  morphine  - Injectable 4 milliGRAM(s) IV Push every 4 hours PRN Severe Pain (7 - 10)      LABS:                        14.0   7.86  )-----------( 154      ( 14 Dec 2022 05:28 )             41.4     12-14    136  |  102  |  8   ----------------------------<  110<H>  3.9   |  25  |  0.65    Ca    9.2      14 Dec 2022 05:28  Phos  2.5     12-14  Mg     1.80     12-14    TPro  6.4  /  Alb  2.5<L>  /  TBili  3.9<H>  /  DBili  2.5<H>  /  AST  80<H>  /  ALT  64<H>  /  AlkPhos  197<H>  12-14    PT/INR - ( 13 Dec 2022 06:24 )   PT: 17.8 sec;   INR: 1.53 ratio         PTT - ( 13 Dec 2022 06:24 )  PTT:42.6 sec

## 2022-12-15 NOTE — PROGRESS NOTE ADULT - PROBLEM SELECTOR PLAN 2
- Pus coming from behind obstruction seen on ERCP, consistent with likely infection indicating cholangitis  - Pt did have a fever at home, but otherwise WBC only mildly elevated on arrival and vitals have been stable, HR in the 90s  - S/p Zosyn 3.375 q8hrs (12/10 - 12/14)  - Continue ciprofloxacin 500mg q12hrs and metronidazole 500mg q8hrs (12/14- - Pus coming from behind obstruction seen on ERCP, consistent with likely infection indicating cholangitis  - Pt did have a fever at home, but otherwise WBC only mildly elevated on arrival and vitals have been stable, HR in the 90s  - S/p Zosyn 3.375 q8hrs (12/10 - 12/14)  - Continue ciprofloxacin 500mg q12hrs and metronidazole 500mg q8hrs (12/14-12/15) WIll stop today as course is to be only 5 days post ERCP, per GI

## 2022-12-15 NOTE — PROGRESS NOTE ADULT - PROBLEM SELECTOR PLAN 4
- Home regimen of Metoprolol tartrate 200mg BID, Nifedipine 60mg qd, Furosemide 40mg qd  - No hx heart failure per pt  - Continue home metoprolol and nifedipine - Home regimen Metoprolol tartrate 200mg BID and coumadin 12-14mg/day, he measures INR at home and gets medication adjustments weekly  - INR was 3.99 on arrival, received Kcentra 1500mg + Vit K in ER for reversal so he could go urgently to ERCP  - Transitioned to Eliquis on 12/12, pt spoke with his cardiologist Dr. Johnson and now that BMI is <40 he qualifies for DOAC

## 2022-12-15 NOTE — PROGRESS NOTE ADULT - PROBLEM SELECTOR PLAN 6
- Continue home regimen of Levothyroxine 110mcg/day    Also will continue the following home medications:  - Allopurinol 200mg qd for Gout prophylaxis (No joint pain currently)  - Tamsulosin 0.4mg qd for BPH (No trouble urinating at this time)  - Trazodone 450mg qhs for sleep  - Atorvastatin 20mg for Hyperlipidemia - Pt reports he has prediabetes, on Ozempic at home but cannot remember the dose, this medication at this time is mainly for weight loss per pt  - Hold Ozempic while inpatient, no insulin requirement DC'd insulin sliding scale

## 2022-12-15 NOTE — PROGRESS NOTE ADULT - PROBLEM SELECTOR PLAN 7
DVT Prophylaxis: Eliquis 5mg BID full AC for a-fib  Diet: Regular  Lines/Tubes: peripheral IVs only  PT: Eval 12/10 - Home with home PT, Re-eval 12/13 [facility]  Social Work: Rehab  Code Status: Full Code    Home Situation: At baseline he lives with his son and has a home assistant/friend Terrell. The pt is wheelchair bound at baseline for the last 3 years after a fall where he injured his leg and became severely deconditioned, at the time of the fall the pt reports he was 540lbs so had difficulty with PT. He is able to stand for brief periods. He manages all his medications himself and is A&Ox4, able to feed and dress himself but has help with bathing and cooking.    Discharge Plan: To Rehab - Continue home regimen of Levothyroxine 110mcg/day    Also will continue the following home medications:  - Allopurinol 200mg qd for Gout prophylaxis (No joint pain currently)  - Tamsulosin 0.4mg qd for BPH (No trouble urinating at this time)  - Trazodone 450mg qhs for sleep  - Atorvastatin 20mg for Hyperlipidemia

## 2022-12-15 NOTE — PROGRESS NOTE ADULT - PROBLEM SELECTOR PLAN 1
- 8mm stone in the ampulla on imaging and on presentation had elevation in bili to 7.9 (4.6 on 10/21), lipase >3000, and mild elevation in LFTs AST/ALT 57/89 (280/262 on 10/21), all consistent with choledocholithiasis with pancreatitis  - GI took pt for urgent ERCP on 12/9 for stent placement  - Pt had 1st episode of choledocholithiasis in 8/2022 s/p ERCP with stent placement, course complicated by post-ERCP pancreatitis and E. Coli bacteremia  - Prior stent now gone, passed on its own  - S/p IV fluids 1.5L NS and 1L LR in the ED  - Pain PRNs Tylenol 650mg and morphine 4mg IV, pt requiring minimal pain control  - AST/ALT, lipase and bili downtrending   - Pt will likely need outpt cholecystectomy, given 2 episodes choledocholithiasis with cholangitis, however he is high risk for cholecystectomy due to his weight, but also high risk to keep having complications from his gallstones, Surg team holding off on abdi for now, prefer outpt cardic risk eval then outpt appt to discuss surgical planning  - F/u with GI in 8-12 weeks for ERCP/stent removal - 8mm stone in the ampulla on imaging and on presentation had elevation in bili to 7.9 (4.6 on 10/21), lipase >3000, and mild elevation in LFTs AST/ALT 57/89 (280/262 on 10/21), all consistent with choledocholithiasis with pancreatitis  - GI took pt for urgent ERCP on 12/9 for stent placement  - Pt had 1st episode of choledocholithiasis in 8/2022 s/p ERCP with stent placement, course complicated by post-ERCP pancreatitis and E. Coli bacteremia  - Prior stent now gone, passed on its own  - S/p IV fluids 1.5L NS and 1L LR in the ED  - Pain PRNs Tylenol 650mg and morphine 4mg IV, pt requiring minimal pain control  - AST/ALT, lipase and bili downtrended after procedure, AST/ALT slightly re-elevating without worsening pt symptoms, if continues will get RUQ US vs MRCP  - Pt will likely need outpt cholecystectomy, given 2 episodes choledocholithiasis with cholangitis, however he is high risk for cholecystectomy due to his weight, but also high risk to keep having complications from his gallstones, Surg team holding off on abdi for now, prefer outpt cardic risk eval then outpt appt to discuss surgical planning  - F/u with GI in 8-12 weeks for ERCP/stent removal - 8mm stone in the ampulla on imaging and on presentation had elevation in bili to 7.9 (4.6 on 10/21), lipase >3000, and mild elevation in LFTs AST/ALT 57/89 (280/262 on 10/21), all consistent with choledocholithiasis with pancreatitis  - GI took pt for urgent ERCP on 12/9 for stent placement  - Pt had 1st episode of choledocholithiasis in 8/2022 s/p ERCP with stent placement, course complicated by post-ERCP pancreatitis and E. Coli bacteremia  - Prior stent now gone, passed on its own  - S/p IV fluids 1.5L NS and 1L LR in the ED  - Pain PRNs Tylenol 650mg and morphine 4mg IV, pt requiring minimal pain control  - AST/ALT, lipase and bili downtrended after procedure, AST/ALT slightly re-elevating without worsening pt symptoms, if continues will get RUQ US vs MRCP to check status of stent  - Pt will likely need outpt cholecystectomy, given 2 episodes choledocholithiasis with cholangitis, however he is high risk for cholecystectomy due to his weight, but also high risk to keep having complications from his gallstones, Surg team holding off on adbi for now, prefer outpt cardic risk eval then outpt appt to discuss surgical planning  - F/u with GI in 8-12 weeks for ERCP/stent removal

## 2022-12-15 NOTE — PROGRESS NOTE ADULT - ASSESSMENT
72yM with PMHx a-fib on warfarin, HTN, hypothyroidism, recent hospitalization from 8/19-8/24 when he presented with fever and abd pain found to have transaminitis and choledocholithiasis s/p ERCP(8/19) with stent placement with a course complicated by sepsis with bacteremia with pan-sensitive E. Coli and post ERCP pancreatitis, 2 other ERCPs attempting to remove stent 10/28 and 11/16 aborted due to food residue in the stomach, presents with 2 days of fever and abdominal pain. In the ED found to have elevated bilirubin with RUQ US and CT abd/pel showing dilated CBD with 8mm stone at the ampulla with distended gallbladder. S/p ERCP with GI on 12/9, had stent placed and there was pus draining from behind obstruction so we started Zosyn on 12/10. Of note, pt had an INR of 3.99 on presentation and required a dose of Kcentra and Vit K prior to procedure. Pending DC to rehab, medically cleared, will continue Abx for total 7-10 day course. 72yM with PMHx a-fib on warfarin, HTN, hypothyroidism, recent hospitalization from 8/19-8/24 when he presented with fever and abd pain found to have transaminitis and choledocholithiasis s/p ERCP(8/19) with stent placement with a course complicated by sepsis with bacteremia with pan-sensitive E. Coli and post ERCP pancreatitis, 2 other ERCPs attempting to remove stent 10/28 and 11/16 aborted due to food residue in the stomach, presents with 2 days of fever and abdominal pain. In the ED found to have elevated bilirubin with RUQ US and CT abd/pel showing dilated CBD with 8mm stone at the ampulla with distended gallbladder. S/p ERCP with GI on 12/9, had stent placed and there was pus draining from behind obstruction so we started Zosyn on 12/10. Of note, pt had an INR of 3.99 on presentation and required a dose of Kcentra and Vit K prior to procedure. Pending DC to rehab, medically cleared, will continue Abx for 5 days post ERCP.

## 2022-12-15 NOTE — PROGRESS NOTE ADULT - PROBLEM SELECTOR PLAN 5
- Pt reports he has prediabetes, on Ozempic at home but cannot remember the dose, this medication at this time is mainly for weight loss per pt  - Hold Ozempic while inpatient, no insulin requirement DC'd insulin sliding scale - Home regimen of Metoprolol tartrate 200mg BID, Nifedipine 60mg qd, Furosemide 40mg qd  - No hx heart failure per pt  - Continue home metoprolol and nifedipine

## 2022-12-15 NOTE — PROGRESS NOTE ADULT - SUBJECTIVE AND OBJECTIVE BOX
Gastroenterology/Hepatology Progress Note    Interval Events: No events overnight    Allergies:  metformin (Unknown)      Hospital Medications:  acetaminophen     Tablet .. 650 milliGRAM(s) Oral every 6 hours PRN  allopurinol 200 milliGRAM(s) Oral daily  apixaban 5 milliGRAM(s) Oral every 12 hours  atorvastatin 20 milliGRAM(s) Oral at bedtime  chlorhexidine 0.12% Liquid 15 milliLiter(s) Swish and Spit two times a day  ciprofloxacin     Tablet 500 milliGRAM(s) Oral every 12 hours  glucagon  Injectable 1 milliGRAM(s) IntraMuscular once  influenza  Vaccine (HIGH DOSE) 0.7 milliLiter(s) IntraMuscular once  lactulose Syrup 10 Gram(s) Oral daily  levothyroxine 112 MICROGram(s) Oral daily  melatonin 3 milliGRAM(s) Oral at bedtime PRN  metoprolol tartrate 200 milliGRAM(s) Oral two times a day  metroNIDAZOLE    Tablet 500 milliGRAM(s) Oral every 8 hours  morphine  - Injectable 4 milliGRAM(s) IV Push every 4 hours PRN  NIFEdipine XL 60 milliGRAM(s) Oral daily  polyethylene glycol 3350 17 Gram(s) Oral two times a day  senna 2 Tablet(s) Oral at bedtime  tamsulosin 0.4 milliGRAM(s) Oral at bedtime  traZODone 450 milliGRAM(s) Oral at bedtime      ROS: 14 point ROS negative unless otherwise state in subjective    PHYSICAL EXAM:   Vital Signs:  Vital Signs Last 24 Hrs  T(C): 36.7 (15 Dec 2022 11:17), Max: 36.8 (14 Dec 2022 21:55)  T(F): 98 (15 Dec 2022 11:17), Max: 98.2 (14 Dec 2022 21:55)  HR: 65 (15 Dec 2022 11:17) (63 - 79)  BP: 118/70 (15 Dec 2022 11:17) (104/65 - 121/71)  BP(mean): --  RR: 18 (15 Dec 2022 11:17) (17 - 18)  SpO2: 98% (15 Dec 2022 11:17) (94% - 98%)    Parameters below as of 15 Dec 2022 11:17  Patient On (Oxygen Delivery Method): room air      Daily     Daily     GENERAL:  No acute distress  HEENT:  NCAT, no scleral icterus  CHEST: no resp distress  HEART:  RRR  ABDOMEN:  Soft, non-tender, non-distended, normoactive bowel sounds, no masses  EXTREMITIES:  No cyanosis, clubbing, or edema  SKIN:  No rash/erythema/ecchymoses/petechiae/wounds/abscess/warm/dry  NEURO:  Alert and oriented x 3, no asterixis, no tremor    LABS:                        13.8   7.98  )-----------( 151      ( 15 Dec 2022 06:35 )             41.1     Mean Cell Volume: 84.7 fL (12-15-22 @ 06:35)    12-15    134<L>  |  101  |  8   ----------------------------<  125<H>  4.0   |  23  |  0.62    Ca    9.1      15 Dec 2022 06:35  Phos  2.4     12-15  Mg     1.60     12-15    TPro  6.4  /  Alb  2.6<L>  /  TBili  3.1<H>  /  DBili  x   /  AST  80<H>  /  ALT  66<H>  /  AlkPhos  197<H>  12-15    LIVER FUNCTIONS - ( 15 Dec 2022 06:35 )  Alb: 2.6 g/dL / Pro: 6.4 g/dL / ALK PHOS: 197 U/L / ALT: 66 U/L / AST: 80 U/L / GGT: x                     Imaging:

## 2022-12-15 NOTE — PROGRESS NOTE ADULT - PROBLEM SELECTOR PLAN 3
- Home regimen Metoprolol tartrate 200mg BID and coumadin 12-14mg/day, he measures INR at home and gets medication adjustments weekly  - INR was 3.99 on arrival, received Kcentra 1500mg + Vit K in ER for reversal so he could go urgently to ERCP  - Transitioned to Eliquis on 12/12, pt spoke with his cardiologist Dr. Johnson and now that BMI is <40 he qualifies for DOAC - Pt came in with AST/ALT 57/89, assumed to be secondary to acute biliary obstruction  - However, pt appears to have mild waxing and waning transaminitis, today 80/66, stable from yesterday  - Bili continues to downtrend and pt without pain, clinically very unlikely to have malfunctioning stent or new biliary occlusion  - May have chronic fatty liver, Liver "WNL" on both RUQ US and CT abd/pel on admission  - Will wait for GI to comment if there is any indication for repeat imaging

## 2022-12-15 NOTE — PROGRESS NOTE ADULT - ATTENDING COMMENTS
Cholangitis/Pancreatitis secondary to choledocholithiasis s/p ERCP with stent placement. Repeat ERCP as outpatient for stent removal. Bili improving. Liver enzymes elevated. Agree with work up as above.

## 2022-12-16 LAB
ALBUMIN SERPL ELPH-MCNC: 2.6 G/DL — LOW (ref 3.3–5)
ALP SERPL-CCNC: 197 U/L — HIGH (ref 40–120)
ALT FLD-CCNC: 70 U/L — HIGH (ref 4–41)
ANION GAP SERPL CALC-SCNC: 9 MMOL/L — SIGNIFICANT CHANGE UP (ref 7–14)
AST SERPL-CCNC: 82 U/L — HIGH (ref 4–40)
BASOPHILS # BLD AUTO: 0.08 K/UL — SIGNIFICANT CHANGE UP (ref 0–0.2)
BASOPHILS NFR BLD AUTO: 1.1 % — SIGNIFICANT CHANGE UP (ref 0–2)
BILIRUB DIRECT SERPL-MCNC: 1.2 MG/DL — HIGH (ref 0–0.3)
BILIRUB SERPL-MCNC: 2.4 MG/DL — HIGH (ref 0.2–1.2)
BUN SERPL-MCNC: 8 MG/DL — SIGNIFICANT CHANGE UP (ref 7–23)
CALCIUM SERPL-MCNC: 9.2 MG/DL — SIGNIFICANT CHANGE UP (ref 8.4–10.5)
CHLORIDE SERPL-SCNC: 103 MMOL/L — SIGNIFICANT CHANGE UP (ref 98–107)
CO2 SERPL-SCNC: 21 MMOL/L — LOW (ref 22–31)
CREAT SERPL-MCNC: 0.61 MG/DL — SIGNIFICANT CHANGE UP (ref 0.5–1.3)
EGFR: 101 ML/MIN/1.73M2 — SIGNIFICANT CHANGE UP
EOSINOPHIL # BLD AUTO: 0.06 K/UL — SIGNIFICANT CHANGE UP (ref 0–0.5)
EOSINOPHIL NFR BLD AUTO: 0.8 % — SIGNIFICANT CHANGE UP (ref 0–6)
GLUCOSE SERPL-MCNC: 117 MG/DL — HIGH (ref 70–99)
HCT VFR BLD CALC: 40.4 % — SIGNIFICANT CHANGE UP (ref 39–50)
HGB BLD-MCNC: 13.8 G/DL — SIGNIFICANT CHANGE UP (ref 13–17)
IANC: 3.78 K/UL — SIGNIFICANT CHANGE UP (ref 1.8–7.4)
IMM GRANULOCYTES NFR BLD AUTO: 6.8 % — HIGH (ref 0–0.9)
LYMPHOCYTES # BLD AUTO: 2.52 K/UL — SIGNIFICANT CHANGE UP (ref 1–3.3)
LYMPHOCYTES # BLD AUTO: 33.7 % — SIGNIFICANT CHANGE UP (ref 13–44)
MAGNESIUM SERPL-MCNC: 2 MG/DL — SIGNIFICANT CHANGE UP (ref 1.6–2.6)
MCHC RBC-ENTMCNC: 29.1 PG — SIGNIFICANT CHANGE UP (ref 27–34)
MCHC RBC-ENTMCNC: 34.2 GM/DL — SIGNIFICANT CHANGE UP (ref 32–36)
MCV RBC AUTO: 85.1 FL — SIGNIFICANT CHANGE UP (ref 80–100)
MONOCYTES # BLD AUTO: 0.52 K/UL — SIGNIFICANT CHANGE UP (ref 0–0.9)
MONOCYTES NFR BLD AUTO: 7 % — SIGNIFICANT CHANGE UP (ref 2–14)
NEUTROPHILS # BLD AUTO: 3.78 K/UL — SIGNIFICANT CHANGE UP (ref 1.8–7.4)
NEUTROPHILS NFR BLD AUTO: 50.6 % — SIGNIFICANT CHANGE UP (ref 43–77)
NRBC # BLD: 0 /100 WBCS — SIGNIFICANT CHANGE UP (ref 0–0)
NRBC # FLD: 0 K/UL — SIGNIFICANT CHANGE UP (ref 0–0)
PHOSPHATE SERPL-MCNC: 2.6 MG/DL — SIGNIFICANT CHANGE UP (ref 2.5–4.5)
PLATELET # BLD AUTO: 170 K/UL — SIGNIFICANT CHANGE UP (ref 150–400)
POTASSIUM SERPL-MCNC: 4 MMOL/L — SIGNIFICANT CHANGE UP (ref 3.5–5.3)
POTASSIUM SERPL-SCNC: 4 MMOL/L — SIGNIFICANT CHANGE UP (ref 3.5–5.3)
PROT SERPL-MCNC: 6.5 G/DL — SIGNIFICANT CHANGE UP (ref 6–8.3)
RBC # BLD: 4.75 M/UL — SIGNIFICANT CHANGE UP (ref 4.2–5.8)
RBC # FLD: 15.1 % — HIGH (ref 10.3–14.5)
SODIUM SERPL-SCNC: 133 MMOL/L — LOW (ref 135–145)
WBC # BLD: 7.47 K/UL — SIGNIFICANT CHANGE UP (ref 3.8–10.5)
WBC # FLD AUTO: 7.47 K/UL — SIGNIFICANT CHANGE UP (ref 3.8–10.5)

## 2022-12-16 PROCEDURE — 99232 SBSQ HOSP IP/OBS MODERATE 35: CPT | Mod: GC

## 2022-12-16 RX ADMIN — Medication 200 MILLIGRAM(S): at 05:36

## 2022-12-16 RX ADMIN — Medication 450 MILLIGRAM(S): at 21:51

## 2022-12-16 RX ADMIN — APIXABAN 5 MILLIGRAM(S): 2.5 TABLET, FILM COATED ORAL at 05:31

## 2022-12-16 RX ADMIN — TAMSULOSIN HYDROCHLORIDE 0.4 MILLIGRAM(S): 0.4 CAPSULE ORAL at 21:52

## 2022-12-16 RX ADMIN — Medication 112 MICROGRAM(S): at 05:32

## 2022-12-16 RX ADMIN — Medication 200 MILLIGRAM(S): at 17:31

## 2022-12-16 RX ADMIN — ATORVASTATIN CALCIUM 20 MILLIGRAM(S): 80 TABLET, FILM COATED ORAL at 21:52

## 2022-12-16 RX ADMIN — APIXABAN 5 MILLIGRAM(S): 2.5 TABLET, FILM COATED ORAL at 17:31

## 2022-12-16 RX ADMIN — Medication 60 MILLIGRAM(S): at 05:32

## 2022-12-16 RX ADMIN — Medication 200 MILLIGRAM(S): at 11:38

## 2022-12-16 RX ADMIN — Medication 3 MILLIGRAM(S): at 21:57

## 2022-12-16 NOTE — PROGRESS NOTE ADULT - PROBLEM SELECTOR PLAN 3
- Pt came in with AST/ALT 57/89, assumed to be secondary to acute biliary obstruction  - However, pt appears to have mild waxing and waning transaminitis, today 80/66, stable from yesterday  - Bili continues to downtrend and pt without pain, clinically very unlikely to have malfunctioning stent or new biliary occlusion  - May have chronic fatty liver, Liver "WNL" on both RUQ US and CT abd/pel on admission  - Will wait for GI to comment if there is any indication for repeat imaging - Pt came in with AST/ALT 57/89, assumed to be secondary to acute biliary obstruction  - However, pt appears to have mild waxing and waning transaminitis, today 80/66, stable from yesterday  - Bili continues to downtrend and pt without pain, clinically very unlikely to have malfunctioning stent or new biliary occlusion  - May have chronic fatty liver, Liver "WNL" on both RUQ US and CT abd/pel on admission  - will follow up with GI on discharge

## 2022-12-16 NOTE — PROGRESS NOTE ADULT - PROBLEM SELECTOR PLAN 2
- Pus coming from behind obstruction seen on ERCP, consistent with likely infection indicating cholangitis  - Pt did have a fever at home, but otherwise WBC only mildly elevated on arrival and vitals have been stable, HR in the 90s  - S/p Zosyn 3.375 q8hrs (12/10 - 12/14)  - Continue ciprofloxacin 500mg q12hrs and metronidazole 500mg q8hrs (12/14-12/15) WIll stop today as course is to be only 5 days post ERCP, per GI - Pus coming from behind obstruction seen on ERCP, consistent with likely infection indicating cholangitis  - Pt did have a fever at home, but otherwise WBC only mildly elevated on arrival and vitals have been stable, HR in the 90s  - S/p Zosyn 3.375 q8hrs (12/10 - 12/14)  - s/p ciprofloxacin 500mg q12hrs and metronidazole 500mg q8hrs (12/14-12/15)

## 2022-12-16 NOTE — PROGRESS NOTE ADULT - ATTENDING COMMENTS
72M PMH Afib on Coumadin, HTN, hypothyroidism, hx of recent choledocholithiasis p/w fever, abdominal pain, s/p ERCP on 12/9 w/ evidence of suppurative cholangitis.    #Gallstone pancreatitis c/b Cholangitis: Zosyn transitioned to Cipro/Flagyl. Clarified with GI - only needs 5 days of abx post ERCP - abx completed 12/15.  Plan for repeat ERCP in 2-3 months per GI recs. Surgery consulted for cholecystectomy, deemed not candidate at this time, will f/u as OP.   # Afib: Cont Metoprolol. Was on Coumadin at home, team discussed w/ Cardiology, switched to Eliquis.   # Constipation: Bowel regimen.   PT reconsult now recommends [facility].  Pt is medically stable for DC to [facility], awaiting placement.

## 2022-12-16 NOTE — PROGRESS NOTE ADULT - PROBLEM SELECTOR PLAN 1
- 8mm stone in the ampulla on imaging and on presentation had elevation in bili to 7.9 (4.6 on 10/21), lipase >3000, and mild elevation in LFTs AST/ALT 57/89 (280/262 on 10/21), all consistent with choledocholithiasis with pancreatitis  - GI took pt for urgent ERCP on 12/9 for stent placement  - Pt had 1st episode of choledocholithiasis in 8/2022 s/p ERCP with stent placement, course complicated by post-ERCP pancreatitis and E. Coli bacteremia  - Prior stent now gone, passed on its own  - S/p IV fluids 1.5L NS and 1L LR in the ED  - Pain PRNs Tylenol 650mg and morphine 4mg IV, pt requiring minimal pain control  - AST/ALT, lipase and bili downtrended after procedure, AST/ALT slightly re-elevating without worsening pt symptoms, if continues will get RUQ US vs MRCP to check status of stent  - Pt will likely need outpt cholecystectomy, given 2 episodes choledocholithiasis with cholangitis, however he is high risk for cholecystectomy due to his weight, but also high risk to keep having complications from his gallstones, Surg team holding off on abdi for now, prefer outpt cardic risk eval then outpt appt to discuss surgical planning  - F/u with GI in 8-12 weeks for ERCP/stent removal

## 2022-12-16 NOTE — PROGRESS NOTE ADULT - ASSESSMENT
72yM with PMHx a-fib on warfarin, HTN, hypothyroidism, recent hospitalization from 8/19-8/24 when he presented with fever and abd pain found to have transaminitis and choledocholithiasis s/p ERCP(8/19) with stent placement with a course complicated by sepsis with bacteremia with pan-sensitive E. Coli and post ERCP pancreatitis, 2 other ERCPs attempting to remove stent 10/28 and 11/16 aborted due to food residue in the stomach, presents with 2 days of fever and abdominal pain. In the ED found to have elevated bilirubin with RUQ US and CT abd/pel showing dilated CBD with 8mm stone at the ampulla with distended gallbladder. S/p ERCP with GI on 12/9, had stent placed and there was pus draining from behind obstruction so we started Zosyn on 12/10. Of note, pt had an INR of 3.99 on presentation and required a dose of Kcentra and Vit K prior to procedure. Pending DC to rehab, medically cleared, will continue Abx for 5 days post ERCP.

## 2022-12-16 NOTE — PROGRESS NOTE ADULT - PROBLEM SELECTOR PLAN 5
- Home regimen of Metoprolol tartrate 200mg BID, Nifedipine 60mg qd, Furosemide 40mg qd  - No hx heart failure per pt  - Continue home metoprolol and nifedipine

## 2022-12-16 NOTE — DIETITIAN INITIAL EVALUATION ADULT - ADD RECOMMEND
1. Encourage & assist Pt with meals; Monitor PO diet tolerance;   2. Honor food preferences;   3. Add Multivitamins 1 tab daily for micronutrient coverage;   4. Monitor labs, hydration status;   5. Pt to follow up with appropriate RDN upon discharge for the purposes of long-term nutrition evaluation and diet education;

## 2022-12-16 NOTE — DIETITIAN INITIAL EVALUATION ADULT - OTHER INFO
Pt 71 yo male with PMHx a-fib, HTN, hypothyroidism, recent hospitalization from 8/19-8/24; now presented with fever and abdominal pain. Pt S/p ERCP on 12/9 - per chart review.     At time of visit, Pt awake, alert. Pt wheel chair bound reported. Per Pt, his appetite good; no chewing or swallowing difficulty; no nausea, vomiting or diarrhea @ this time. Per Pt his height: ~73" & his weight: ~320#, PTA. Pt's UBW: ~560# "in January 2021". Pt with intentional weight loss of ~240#, reported. Food preferences discussed; better food options discussed as well. RDN answered concerns related to diet. Case discussed with nurse. RDN remains available, Pt made aware.

## 2022-12-16 NOTE — DIETITIAN INITIAL EVALUATION ADULT - PERTINENT LABORATORY DATA
12-16    133<L>  |  103  |  8   ----------------------------<  117<H>  4.0   |  21<L>  |  0.61    Ca    9.2      16 Dec 2022 05:55  Phos  2.6     12-16  Mg     2.00     12-16    TPro  6.5  /  Alb  2.6<L>  /  TBili  2.4<H>  /  DBili  1.2<H>  /  AST  82<H>  /  ALT  70<H>  /  AlkPhos  197<H>  12-16  POCT Blood Glucose.: 117 mg/dL (12-16-22 @ 11:47)  A1C with Estimated Average Glucose Result: 5.3 % (10-21-22 @ 13:25)

## 2022-12-16 NOTE — PROGRESS NOTE ADULT - SUBJECTIVE AND OBJECTIVE BOX
PATIENT: JOSSUE KEVIN, MRN: [id number]    CHIEF COMPLAINT: Patient is a 72y old  Male who presents with a chief complaint of Choledocholithiasis (15 Dec 2022 14:17)      INTERVAL HISTORY/OVERNIGHT EVENTS: No overnight events.         MEDICATIONS:  MEDICATIONS  (STANDING):  allopurinol 200 milliGRAM(s) Oral daily  apixaban 5 milliGRAM(s) Oral every 12 hours  atorvastatin 20 milliGRAM(s) Oral at bedtime  chlorhexidine 0.12% Liquid 15 milliLiter(s) Swish and Spit two times a day  glucagon  Injectable 1 milliGRAM(s) IntraMuscular once  influenza  Vaccine (HIGH DOSE) 0.7 milliLiter(s) IntraMuscular once  lactulose Syrup 10 Gram(s) Oral daily  levothyroxine 112 MICROGram(s) Oral daily  metoprolol tartrate 200 milliGRAM(s) Oral two times a day  NIFEdipine XL 60 milliGRAM(s) Oral daily  polyethylene glycol 3350 17 Gram(s) Oral two times a day  senna 2 Tablet(s) Oral at bedtime  tamsulosin 0.4 milliGRAM(s) Oral at bedtime  traZODone 450 milliGRAM(s) Oral at bedtime    MEDICATIONS  (PRN):  acetaminophen     Tablet .. 650 milliGRAM(s) Oral every 6 hours PRN Temp greater or equal to 38C (100.4F), Mild Pain (1 - 3)  melatonin 3 milliGRAM(s) Oral at bedtime PRN Insomnia  morphine  - Injectable 4 milliGRAM(s) IV Push every 4 hours PRN Severe Pain (7 - 10)      ALLERGIES: Allergies    metformin (Unknown)    Intolerances        OBJECTIVE:  ICU Vital Signs Last 24 Hrs  T(C): 36.7 (16 Dec 2022 05:02), Max: 36.7 (15 Dec 2022 11:17)  T(F): 98.1 (16 Dec 2022 05:02), Max: 98.1 (16 Dec 2022 05:02)  HR: 63 (16 Dec 2022 05:02) (59 - 74)  BP: 123/67 (16 Dec 2022 05:02) (100/62 - 123/67)  BP(mean): --  ABP: --  ABP(mean): --  RR: 18 (16 Dec 2022 05:02) (17 - 18)  SpO2: 99% (16 Dec 2022 05:02) (98% - 99%)    O2 Parameters below as of 16 Dec 2022 05:02  Patient On (Oxygen Delivery Method): room air            Adult Advanced Hemodynamics Last 24 Hrs  CVP(mm Hg): --  CVP(cm H2O): --  CO: --  CI: --  PA: --  PA(mean): --  PCWP: --  SVR: --  SVRI: --  PVR: --  PVRI: --  CAPILLARY BLOOD GLUCOSE      POCT Blood Glucose.: 105 mg/dL (15 Dec 2022 22:06)  POCT Blood Glucose.: 116 mg/dL (15 Dec 2022 16:53)  POCT Blood Glucose.: 123 mg/dL (15 Dec 2022 11:27)  POCT Blood Glucose.: 117 mg/dL (15 Dec 2022 08:02)    CAPILLARY BLOOD GLUCOSE      POCT Blood Glucose.: 105 mg/dL (15 Dec 2022 22:06)    I&O's Summary    Daily     Daily     PHYSICAL EXAMINATION:  GENERAL: Sitting comfortably in bed in no acute distress, mild jaundice to skin  NEURO: Alert and Oriented to person, place, date and situation. Pupils symmetric, No ptosis. No facial asymmetry or dysarthria, no tremor noted.  HEENT: No conjunctival injection or scleral icterus.   CARD: Normal rate and regular rhythm, no murmurs and no gallops appreciated.  RESP: Clear to auscultation bilaterally in anterior lung fields, No wheezes, rales or rhonchi. Good respiratory effort.  ABD:  Nondistended, Soft and nontender to palpation in all quadrants, no guarding, no rigidity. No masses appreciated.  EXT: No pedal edema. 2+DP pulses bilaterally.    LABS:                          13.8   7.47  )-----------( 170      ( 16 Dec 2022 05:55 )             40.4     12-15    134<L>  |  101  |  8   ----------------------------<  125<H>  4.0   |  23  |  0.62    Ca    9.1      15 Dec 2022 06:35  Phos  2.4     12-15  Mg     1.60     12-15    TPro  6.4  /  Alb  2.6<L>  /  TBili  3.1<H>  /  DBili  x   /  AST  80<H>  /  ALT  66<H>  /  AlkPhos  197<H>  12-15    LIVER FUNCTIONS - ( 15 Dec 2022 06:35 )  Alb: 2.6 g/dL / Pro: 6.4 g/dL / ALK PHOS: 197 U/L / ALT: 66 U/L / AST: 80 U/L / GGT: x              PATIENT: JOSSUE KEVIN, MRN: [id number]    CHIEF COMPLAINT: Patient is a 72y old  Male who presents with a chief complaint of Choledocholithiasis (15 Dec 2022 14:17)      INTERVAL HISTORY/OVERNIGHT EVENTS: No overnight events. Patient denies abdominal pain, diarrhea, constipation, N/V. Does report that BM have been slightly lighter in color. Was on 2L O2 in the room however denies shortness of breath so NC was removed.       MEDICATIONS:  MEDICATIONS  (STANDING):  allopurinol 200 milliGRAM(s) Oral daily  apixaban 5 milliGRAM(s) Oral every 12 hours  atorvastatin 20 milliGRAM(s) Oral at bedtime  chlorhexidine 0.12% Liquid 15 milliLiter(s) Swish and Spit two times a day  glucagon  Injectable 1 milliGRAM(s) IntraMuscular once  influenza  Vaccine (HIGH DOSE) 0.7 milliLiter(s) IntraMuscular once  lactulose Syrup 10 Gram(s) Oral daily  levothyroxine 112 MICROGram(s) Oral daily  metoprolol tartrate 200 milliGRAM(s) Oral two times a day  NIFEdipine XL 60 milliGRAM(s) Oral daily  polyethylene glycol 3350 17 Gram(s) Oral two times a day  senna 2 Tablet(s) Oral at bedtime  tamsulosin 0.4 milliGRAM(s) Oral at bedtime  traZODone 450 milliGRAM(s) Oral at bedtime    MEDICATIONS  (PRN):  acetaminophen     Tablet .. 650 milliGRAM(s) Oral every 6 hours PRN Temp greater or equal to 38C (100.4F), Mild Pain (1 - 3)  melatonin 3 milliGRAM(s) Oral at bedtime PRN Insomnia  morphine  - Injectable 4 milliGRAM(s) IV Push every 4 hours PRN Severe Pain (7 - 10)      ALLERGIES: Allergies    metformin (Unknown)    Intolerances        OBJECTIVE:  ICU Vital Signs Last 24 Hrs  T(C): 36.7 (16 Dec 2022 05:02), Max: 36.7 (15 Dec 2022 11:17)  T(F): 98.1 (16 Dec 2022 05:02), Max: 98.1 (16 Dec 2022 05:02)  HR: 63 (16 Dec 2022 05:02) (59 - 74)  BP: 123/67 (16 Dec 2022 05:02) (100/62 - 123/67)  BP(mean): --  ABP: --  ABP(mean): --  RR: 18 (16 Dec 2022 05:02) (17 - 18)  SpO2: 99% (16 Dec 2022 05:02) (98% - 99%)    O2 Parameters below as of 16 Dec 2022 05:02  Patient On (Oxygen Delivery Method): room air            Adult Advanced Hemodynamics Last 24 Hrs  CVP(mm Hg): --  CVP(cm H2O): --  CO: --  CI: --  PA: --  PA(mean): --  PCWP: --  SVR: --  SVRI: --  PVR: --  PVRI: --  CAPILLARY BLOOD GLUCOSE      POCT Blood Glucose.: 105 mg/dL (15 Dec 2022 22:06)  POCT Blood Glucose.: 116 mg/dL (15 Dec 2022 16:53)  POCT Blood Glucose.: 123 mg/dL (15 Dec 2022 11:27)  POCT Blood Glucose.: 117 mg/dL (15 Dec 2022 08:02)    CAPILLARY BLOOD GLUCOSE      POCT Blood Glucose.: 105 mg/dL (15 Dec 2022 22:06)    I&O's Summary    Daily     Daily     PHYSICAL EXAMINATION:  GENERAL: Sitting comfortably in bed in no acute distress, mild jaundice to skin  NEURO: Alert and Oriented to person, place, date and situation. Pupils symmetric, No ptosis. No facial asymmetry or dysarthria, no tremor noted.  HEENT: No conjunctival injection or scleral icterus.   CARD: Normal rate and regular rhythm, no murmurs and no gallops appreciated.  RESP: Clear to auscultation bilaterally in anterior lung fields, No wheezes, rales or rhonchi. Good respiratory effort.  ABD:  Nondistended, Soft and nontender to palpation in all quadrants, no guarding, no rigidity. No masses appreciated.  EXT: No pedal edema. 2+DP pulses bilaterally.    LABS:                          13.8   7.47  )-----------( 170      ( 16 Dec 2022 05:55 )             40.4     12-15    134<L>  |  101  |  8   ----------------------------<  125<H>  4.0   |  23  |  0.62    Ca    9.1      15 Dec 2022 06:35  Phos  2.4     12-15  Mg     1.60     12-15    TPro  6.4  /  Alb  2.6<L>  /  TBili  3.1<H>  /  DBili  x   /  AST  80<H>  /  ALT  66<H>  /  AlkPhos  197<H>  12-15    LIVER FUNCTIONS - ( 15 Dec 2022 06:35 )  Alb: 2.6 g/dL / Pro: 6.4 g/dL / ALK PHOS: 197 U/L / ALT: 66 U/L / AST: 80 U/L / GGT: x

## 2022-12-17 ENCOUNTER — TRANSCRIPTION ENCOUNTER (OUTPATIENT)
Age: 72
End: 2022-12-17

## 2022-12-17 LAB
CERULOPLASMIN SERPL-MCNC: 22 MG/DL — SIGNIFICANT CHANGE UP (ref 15–30)
FERRITIN SERPL-MCNC: 709 NG/ML — HIGH (ref 30–400)
IRON SATN MFR SERPL: 29 % — SIGNIFICANT CHANGE UP (ref 14–50)
IRON SATN MFR SERPL: 56 UG/DL — SIGNIFICANT CHANGE UP (ref 45–165)
TIBC SERPL-MCNC: 194 UG/DL — LOW (ref 220–430)
UIBC SERPL-MCNC: 138 UG/DL — SIGNIFICANT CHANGE UP (ref 110–370)

## 2022-12-17 PROCEDURE — 99232 SBSQ HOSP IP/OBS MODERATE 35: CPT | Mod: GC

## 2022-12-17 RX ORDER — WARFARIN SODIUM 2.5 MG/1
12.75 TABLET ORAL
Qty: 0 | Refills: 0 | DISCHARGE

## 2022-12-17 RX ORDER — APIXABAN 2.5 MG/1
1 TABLET, FILM COATED ORAL
Qty: 0 | Refills: 0 | DISCHARGE
Start: 2022-12-17

## 2022-12-17 RX ADMIN — Medication 112 MICROGRAM(S): at 05:37

## 2022-12-17 RX ADMIN — CHLORHEXIDINE GLUCONATE 15 MILLILITER(S): 213 SOLUTION TOPICAL at 05:43

## 2022-12-17 RX ADMIN — APIXABAN 5 MILLIGRAM(S): 2.5 TABLET, FILM COATED ORAL at 05:38

## 2022-12-17 RX ADMIN — ATORVASTATIN CALCIUM 20 MILLIGRAM(S): 80 TABLET, FILM COATED ORAL at 21:17

## 2022-12-17 RX ADMIN — Medication 3 MILLIGRAM(S): at 21:16

## 2022-12-17 RX ADMIN — Medication 60 MILLIGRAM(S): at 05:37

## 2022-12-17 RX ADMIN — Medication 200 MILLIGRAM(S): at 12:17

## 2022-12-17 RX ADMIN — TAMSULOSIN HYDROCHLORIDE 0.4 MILLIGRAM(S): 0.4 CAPSULE ORAL at 21:16

## 2022-12-17 RX ADMIN — CHLORHEXIDINE GLUCONATE 15 MILLILITER(S): 213 SOLUTION TOPICAL at 17:08

## 2022-12-17 RX ADMIN — Medication 300 MILLIGRAM(S): at 21:17

## 2022-12-17 RX ADMIN — Medication 200 MILLIGRAM(S): at 21:21

## 2022-12-17 RX ADMIN — APIXABAN 5 MILLIGRAM(S): 2.5 TABLET, FILM COATED ORAL at 17:09

## 2022-12-17 NOTE — PROGRESS NOTE ADULT - PROBLEM SELECTOR PLAN 3
- Pt came in with AST/ALT 57/89, assumed to be secondary to acute biliary obstruction  - However, pt appears to have mild waxing and waning transaminitis  - Bili continues to downtrend and pt without pain, clinically very unlikely to have malfunctioning stent or new biliary occlusion  - May have chronic fatty liver, Liver "WNL" on both RUQ US and CT abd/pel on admission  - will follow up with GI on discharge

## 2022-12-17 NOTE — PROVIDER CONTACT NOTE (OTHER) - RECOMMENDATIONS
Hold Metoprolol for HR
RN educated pt on importance of CPAP, pt still continues to refuse, pt verbalizes understanding of education via teach back method

## 2022-12-17 NOTE — PROVIDER CONTACT NOTE (OTHER) - REASON
[id number] Maddie Storey pt refuses CPAP machine at night, pt saturating 96% on room air while sleeping
bradycardia on telemetry

## 2022-12-17 NOTE — PROGRESS NOTE ADULT - PROBLEM SELECTOR PLAN 6
SUBJECTIVE:   Kristin Avalos is a 46 Donna Issa NP  [facility]    " - Pt reports he has prediabetes, on Ozempic at home but cannot remember the dose, this medication at this time is mainly for weight loss per pt  - Hold Ozempic while inpatient, no insulin requirement DC'd insulin sliding scale

## 2022-12-17 NOTE — PROGRESS NOTE ADULT - PROBLEM SELECTOR PLAN 2
- Pus coming from behind obstruction seen on ERCP, consistent with likely infection indicating cholangitis  - Pt did have a fever at home, but otherwise WBC only mildly elevated on arrival and vitals have been stable, HR in the 90s  - S/p Zosyn 3.375 q8hrs (12/10 - 12/14), ciprofloxacin 500mg q12hrs and metronidazole 500mg q8hrs (12/14-12/15)  - Continued course for 5 days post ERCP per GI recs, course now finished

## 2022-12-17 NOTE — DISCHARGE NOTE PROVIDER - DETAILS OF MALNUTRITION DIAGNOSIS/DIAGNOSES
This patient has been assessed with a concern for Malnutrition and was treated during this hospitalization for the following Nutrition diagnosis/diagnoses:     -  12/16/2022: Morbid obesity (BMI > 40)

## 2022-12-17 NOTE — CHART NOTE - NSCHARTNOTEFT_GEN_A_CORE
On telemetry, pt with 2.3 second sinus pause around 10:45PM. EKG ordered showing slow a fib. Pt seen and examined at bedside, found to be comfortable appearing, in deep sleep. Denies chest pain, palpitations, lightheadedness, dizziness. Does endorse some mild SOB that has been there over the last 5 days. Around 4AM, pt usha down to 34, non-sustained, again largely asymptomatic and comfortable appearing.     Upon further review of tele, also found to have another sinus pause earlier in the morning on 12/16  lasting 3.6 seconds around 8AM. Pt is on high dose metoprolol 200 mg bid. Will hold AM dose for now and discuss these findings with day team.

## 2022-12-17 NOTE — PROVIDER CONTACT NOTE (OTHER) - BACKGROUND
Patient admitted for ERCP with stent placement and placed on antibiotics. Patient with Hx of afib, HTN.
73 y/o male admitted with calculus of the bladder, history of afib on warfarin, HTN, hypothyroidism, found to have pus behind obstruction and started on Zosyn

## 2022-12-17 NOTE — PROVIDER CONTACT NOTE (OTHER) - SITUATION
Patient bradycardic on telemetry in low 50s, patient went bradycardic to 40s. Patient due for Metoprolol.
[id number] Maddie Storey pt refuses CPAP machine at night, pt saturating 96% on room air while sleeping

## 2022-12-17 NOTE — PROVIDER CONTACT NOTE (OTHER) - ASSESSMENT
pt is A&O x 4, vital signs stable, pt saturating 96% on room air while sleeping, pt denies shortness of breath, pt is on continuous pulse ox monitoring
Patient A&Ox4, denies chest pain or difficulty breathing. BP stable.

## 2022-12-17 NOTE — PROGRESS NOTE ADULT - ASSESSMENT
72yM with PMHx a-fib on warfarin, HTN, hypothyroidism, recent hospitalization from 8/19-8/24 when he presented with fever and abd pain found to have transaminitis and choledocholithiasis s/p ERCP(8/19) with stent placement with a course complicated by sepsis with bacteremia with pan-sensitive E. Coli and post ERCP pancreatitis, 2 other ERCPs attempting to remove stent 10/28 and 11/16 aborted due to food residue in the stomach, presents with 2 days of fever and abdominal pain. In the ED found to have elevated bilirubin with RUQ US and CT abd/pel showing dilated CBD with 8mm stone at the ampulla with distended gallbladder. S/p ERCP with GI on 12/9, had stent placed and there was pus draining from behind obstruction so we started Zosyn on 12/10. Of note, pt had an INR of 3.99 on presentation and required a dose of Kcentra and Vit K prior to procedure. Pending DC to rehab, medically cleared, continued Abx for 5 days post ERCP now finished course.

## 2022-12-17 NOTE — PROGRESS NOTE ADULT - SUBJECTIVE AND OBJECTIVE BOX
Huong Padilla MD  EM/IM PGY-2  Pager # [phone] or Teams  --------------------------------  ==============UNFINISHED NOTE==================    INTERVAL HPI/OVERNIGHT EVENTS:    SUBJECTIVE: Patient seen and examined at bedside.     VITAL SIGNS:  T(C): 36.6 (12-17-22 @ 05:30), Max: 36.9 (12-16-22 @ 20:28)  HR: 75 (12-17-22 @ 05:30) (34 - 75)  BP: 112/68 (12-17-22 @ 05:30) (108/67 - 121/60)  RR: 17 (12-17-22 @ 05:30) (17 - 18)  SpO2: 100% (12-17-22 @ 05:30) (98% - 100%)      PHYSICAL EXAM:        MEDICATIONS:  MEDICATIONS  (STANDING):  allopurinol 200 milliGRAM(s) Oral daily  apixaban 5 milliGRAM(s) Oral every 12 hours  atorvastatin 20 milliGRAM(s) Oral at bedtime  chlorhexidine 0.12% Liquid 15 milliLiter(s) Swish and Spit two times a day  glucagon  Injectable 1 milliGRAM(s) IntraMuscular once  influenza  Vaccine (HIGH DOSE) 0.7 milliLiter(s) IntraMuscular once  lactulose Syrup 10 Gram(s) Oral daily  levothyroxine 112 MICROGram(s) Oral daily  metoprolol tartrate 200 milliGRAM(s) Oral two times a day  NIFEdipine XL 60 milliGRAM(s) Oral daily  polyethylene glycol 3350 17 Gram(s) Oral two times a day  senna 2 Tablet(s) Oral at bedtime  tamsulosin 0.4 milliGRAM(s) Oral at bedtime  traZODone 450 milliGRAM(s) Oral at bedtime    MEDICATIONS  (PRN):  acetaminophen     Tablet .. 650 milliGRAM(s) Oral every 6 hours PRN Temp greater or equal to 38C (100.4F), Mild Pain (1 - 3)  melatonin 3 milliGRAM(s) Oral at bedtime PRN Insomnia      LABS:                        13.8   7.47  )-----------( 170      ( 16 Dec 2022 05:55 )             40.4     12-16    133<L>  |  103  |  8   ----------------------------<  117<H>  4.0   |  21<L>  |  0.61    Ca    9.2      16 Dec 2022 05:55  Phos  2.6     12-16  Mg     2.00     12-16    TPro  6.5  /  Alb  2.6<L>  /  TBili  2.4<H>  /  DBili  1.2<H>  /  AST  82<H>  /  ALT  70<H>  /  AlkPhos  197<H>  12-16           Huong Padilla MD  EM/IM PGY-2  Pager # [phone] or Teams  --------------------------------    INTERVAL HPI/OVERNIGHT EVENTS: No acute events overnight, vitals remained stable although HR was briefly in the 30s (slow a-fib with occasional 2-3 second pauses) with BP stable at 110s-120s systolic. All scheduled medications given, No PRN medications required.     SUBJECTIVE: Patient seen and examined at bedside. Feels well, no abdominal pain, eating and drinking without trouble.    VITAL SIGNS:  T(C): 36.6 (12-17-22 @ 05:30), Max: 36.9 (12-16-22 @ 20:28)  HR: 75 (12-17-22 @ 05:30) (34 - 75)  BP: 112/68 (12-17-22 @ 05:30) (108/67 - 121/60)  RR: 17 (12-17-22 @ 05:30) (17 - 18)  SpO2: 100% (12-17-22 @ 05:30) (98% - 100%)      PHYSICAL EXAM:  GENERAL: Sitting comfortably in bed in no acute distress  NEURO: Alert and Oriented to person, place, date and situation. Pupils symmetric, No ptosis. No facial asymmetry or dysarthria, no tremor noted.  HEENT: No conjunctival injection , mild scleral icterus  CARD: Normal rate and regular rhythm, no murmurs and no gallops appreciated.  RESP: Clear to auscultation bilaterally in anterior lung fields, No wheezes, rales or rhonchi. Good respiratory effort.  ABD:Nondistended, Soft and nontender to palpation in all quadrants, no guarding, no rigidity.   EXT: No pedal edema.     MEDICATIONS:  MEDICATIONS  (STANDING):  allopurinol 200 milliGRAM(s) Oral daily  apixaban 5 milliGRAM(s) Oral every 12 hours  atorvastatin 20 milliGRAM(s) Oral at bedtime  chlorhexidine 0.12% Liquid 15 milliLiter(s) Swish and Spit two times a day  glucagon  Injectable 1 milliGRAM(s) IntraMuscular once  influenza  Vaccine (HIGH DOSE) 0.7 milliLiter(s) IntraMuscular once  lactulose Syrup 10 Gram(s) Oral daily  levothyroxine 112 MICROGram(s) Oral daily  metoprolol tartrate 200 milliGRAM(s) Oral two times a day  NIFEdipine XL 60 milliGRAM(s) Oral daily  polyethylene glycol 3350 17 Gram(s) Oral two times a day  senna 2 Tablet(s) Oral at bedtime  tamsulosin 0.4 milliGRAM(s) Oral at bedtime  traZODone 450 milliGRAM(s) Oral at bedtime    MEDICATIONS  (PRN):  acetaminophen     Tablet .. 650 milliGRAM(s) Oral every 6 hours PRN Temp greater or equal to 38C (100.4F), Mild Pain (1 - 3)  melatonin 3 milliGRAM(s) Oral at bedtime PRN Insomnia      LABS:                        13.8   7.47  )-----------( 170      ( 16 Dec 2022 05:55 )             40.4     12-16    133<L>  |  103  |  8   ----------------------------<  117<H>  4.0   |  21<L>  |  0.61    Ca    9.2      16 Dec 2022 05:55  Phos  2.6     12-16  Mg     2.00     12-16    TPro  6.5  /  Alb  2.6<L>  /  TBili  2.4<H>  /  DBili  1.2<H>  /  AST  82<H>  /  ALT  70<H>  /  AlkPhos  197<H>  12-16

## 2022-12-17 NOTE — DISCHARGE NOTE PROVIDER - NSDCFUSCHEDAPPT_GEN_ALL_CORE_FT
Eneida Sharma  [facility]  GASTRO [facility] [id number]  Scheduled Appointment: 12/23/2022    Eneida Sharma  [facility]  [facility] Amb Surg Endoscopy  Scheduled Appointment: 12/23/2022

## 2022-12-17 NOTE — DISCHARGE NOTE PROVIDER - NSDCCPCAREPLAN_GEN_ALL_CORE_FT
PRINCIPAL DISCHARGE DIAGNOSIS  Diagnosis: Choledocholithiasis with obstruction  Assessment and Plan of Treatment: You were found to have a stone in your biliary tract that was causing a back up of bile into your pancreas and liver. You had an ERCP on 12/9 which put in a stent to help the bile drain. You will need a repeat ERCP to take the stent out and attempt to remove the stone in 8-12 weeks with the Gastroenterology team.   The stone came from your gallbladder. There are more stones seen in there on imaging as well, this means that you will likely need to have our gallbladder out (a cholecystectomy) at some point in the future in order to prevent stones from blocking your biliary tree again. Please follow up with General surgery outpatient to contineu this discussion and determine the safest time to have this procedure.  Please follow up with your primary care doctor within 2 weeks of discharge.      SECONDARY DISCHARGE DIAGNOSES  Diagnosis: Cholelithiasis with cholangitis  Assessment and Plan of Treatment: The stone caused your bile to back up and we found that there was an infection in the blocked up fluid. We treated you with antibiotics until 5 days after the procedure. Please return to the ER if you develop worsening abdominal pain, jaundice (yellow skin or eyes), fever or chills.    Diagnosis: Atrial fibrillation  Assessment and Plan of Treatment: You were on warfarin for your a-fib when you arrived at the hospital. We transitioned you to a twice daily pill called Eliquis. You do not need to check an INR when on this pill.    Diagnosis: Transaminitis  Assessment and Plan of Treatment: You had a small elevation in your liver numbers in the blood during your hospitalization. You may have mild liver disease. This can be followed and additional testing done by the gastroenterologists at your follow up appointment.

## 2022-12-17 NOTE — PROGRESS NOTE ADULT - PROBLEM SELECTOR PLAN 4
- Home regimen Metoprolol tartrate 200mg BID and coumadin 12-14mg/day, he measures INR at home and gets medication adjustments weekly  - INR was 3.99 on arrival, received Kcentra 1500mg + Vit K in ER for reversal so he could go urgently to ERCP  - Transitioned to Eliquis on 12/12, pt spoke with his cardiologist Dr. Johnson and now that BMI is <40 he qualifies for DOAC - Home regimen Metoprolol tartrate 200mg BID and coumadin 12-14mg/day, he measures INR at home and gets medication adjustments weekly  - INR was 3.99 on arrival, received Kcentra 1500mg + Vit K in ER for reversal so he could go urgently to ERCP  - Transitioned to Eliquis on 12/12, pt spoke with his cardiologist Dr. Johnson and now that BMI is <40 he qualifies for DOAC  - Keep current dose of metoprolol despite bradycardia, he is hemodynamically stable and has had no episodes of RVR while on this regimen

## 2022-12-17 NOTE — DISCHARGE NOTE PROVIDER - NSDCMRMEDTOKEN_GEN_ALL_CORE_FT
allopurinol 100 mg oral tablet: 2 tab(s) orally once a day  apixaban 5 mg oral tablet: 1 tab(s) orally every 12 hours  atorvastatin 20 mg oral tablet: 1 tab(s) orally once a day  furosemide 40 mg oral tablet: 1 tab(s) orally once a day  levothyroxine 112 mcg (0.112 mg) oral tablet: 1 tab(s) orally once a day  Metoprolol Tartrate 100 mg oral tablet: 2 tab(s) orally 2 times a day  NIFEdipine 60 mg oral tablet, extended release: 1 tab(s) orally once a day  Ozempic 2 mg/1.5 mL (0.25 mg or 0.5 mg dose) subcutaneous solution: subcutaneous once a week  potassium chloride 10 mEq oral tablet, extended release: 1 tab(s) orally once a day  tamsulosin 0.4 mg oral capsule: 1 cap(s) orally once a day  traZODone 150 mg oral tablet: 3 tab(s) orally once a day (at bedtime)   allopurinol 100 mg oral tablet: 2 tab(s) orally once a day  apixaban 5 mg oral tablet: 1 tab(s) orally every 12 hours MDD:2 tabs  atorvastatin 20 mg oral tablet: 1 tab(s) orally once a day  furosemide 40 mg oral tablet: 1 tab(s) orally once a day  levothyroxine 112 mcg (0.112 mg) oral tablet: 1 tab(s) orally once a day  Metoprolol Tartrate 100 mg oral tablet: 2 tab(s) orally 2 times a day  NIFEdipine 60 mg oral tablet, extended release: 1 tab(s) orally once a day  Ozempic 2 mg/1.5 mL (0.25 mg or 0.5 mg dose) subcutaneous solution: subcutaneous once a week  potassium chloride 10 mEq oral tablet, extended release: 1 tab(s) orally once a day  tamsulosin 0.4 mg oral capsule: 1 cap(s) orally once a day  traZODone 150 mg oral tablet: 3 tab(s) orally once a day (at bedtime)   allopurinol 100 mg oral tablet: 2 tab(s) orally once a day  apixaban 5 mg oral tablet: 1 tab(s) orally every 12 hours MDD:2 tabs  atorvastatin 20 mg oral tablet: 1 tab(s) orally once a day  furosemide 40 mg oral tablet: 1 tab(s) orally once a day  levothyroxine 112 mcg (0.112 mg) oral tablet: 1 tab(s) orally once a day  Metoprolol Tartrate 100 mg oral tablet: 2 tab(s) orally 2 times a day  NIFEdipine 60 mg oral tablet, extended release: 1 tab(s) orally once a day  Ozempic 2 mg/1.5 mL (0.25 mg or 0.5 mg dose) subcutaneous solution: subcutaneous once a week  Physical Therapy 3-5 days per week:   potassium chloride 10 mEq oral tablet, extended release: 1 tab(s) orally once a day  tamsulosin 0.4 mg oral capsule: 1 cap(s) orally once a day  traZODone 150 mg oral tablet: 3 tab(s) orally once a day (at bedtime)

## 2022-12-17 NOTE — PROVIDER CONTACT NOTE (OTHER) - ACTION/TREATMENT ORDERED:
RN educated pt on importance of CPAP, pt still continues to refuse, pt verbalizes understanding of education via teach back method
Administer Metoprolol once patient more awake and has breakfast.

## 2022-12-17 NOTE — DISCHARGE NOTE PROVIDER - HOSPITAL COURSE
72yM with PMHx a-fib on warfarin, HTN, hypothyroidism, recent hospitalization from 8/19-8/24 when he presented with fever and abd pain found to have transaminitis and choledocholithiasis s/p ERCP(8/19) with stent placement with a course complicated by sepsis with bacteremia with pan-sensitive E. Coli and post ERCP pancreatitis, 2 other ERCPs attempting to remove stent 10/28 and 11/16 aborted due to food residue in the stomach, presents with 2 days of fever and abdominal pain. In the ED, he was found to have jaundice and an elevated WBC, bilirubin with RUQ US and CT abd/pel showing dilated CBD with 8mm stone at the ampulla with distended gallbladder. S/p ERCP with GI on 12/9, had stent placed and there was pus draining from behind obstruction so we started Zosyn on 12/10. Of note, pt had an INR of 3.99 on presentation and required a dose of Kcentra and Vit K prior to procedure. Patient was kept on antibiotics for cholangitis for 5 days post ERCP. Bilirubin decreased daily and patient did not require any pain medications after the procedure.     PT saw the patient and recommended inpatient sub acute rehab. 72yM with PMHx a-fib on warfarin, HTN, hypothyroidism, recent hospitalization from 8/19-8/24 when he presented with fever and abd pain found to have transaminitis and choledocholithiasis s/p ERCP(8/19) with stent placement with a course complicated by sepsis with bacteremia with pan-sensitive E. Coli and post ERCP pancreatitis, 2 other ERCPs attempting to remove stent 10/28 and 11/16 aborted due to food residue in the stomach, presents with 2 days of fever and abdominal pain. In the ED, he was found to have jaundice and an elevated WBC, bilirubin with RUQ US and CT abd/pel showing dilated CBD with 8mm stone at the ampulla with distended gallbladder. S/p ERCP with GI on 12/9, had stent placed and there was pus draining from behind obstruction so we started Zosyn on 12/10. Of note, pt had an INR of 3.99 on presentation and required a dose of Kcentra and Vit K prior to procedure. Patient was kept on antibiotics for cholangitis for 5 days post ERCP. Bilirubin decreased daily and patient did not require any pain medications after the procedure.     PT saw the patient and recommended inpatient sub acute rehab, however patient decided he would rather go home and get outpatient or in home Physical therapy. Pt was stable on DC.

## 2022-12-17 NOTE — PROGRESS NOTE ADULT - ATTENDING COMMENTS
Pt feeling well this morning, eating breakfast without biliary colic. Afebrile, VS stable, exam with benign abd. Labs reassuring. Continue supportive management; anticipate outpatient evaluation for cholecystectomy. Continue apixaban for afib. Anticipate RUDDY next week.

## 2022-12-18 ENCOUNTER — TRANSCRIPTION ENCOUNTER (OUTPATIENT)
Age: 72
End: 2022-12-18

## 2022-12-18 VITALS
SYSTOLIC BLOOD PRESSURE: 133 MMHG | TEMPERATURE: 98 F | DIASTOLIC BLOOD PRESSURE: 75 MMHG | HEART RATE: 54 BPM | OXYGEN SATURATION: 99 % | RESPIRATION RATE: 18 BRPM

## 2022-12-18 LAB
ALBUMIN SERPL ELPH-MCNC: 2.8 G/DL — LOW (ref 3.3–5)
ALP SERPL-CCNC: 188 U/L — HIGH (ref 40–120)
ALT FLD-CCNC: 70 U/L — HIGH (ref 4–41)
ANION GAP SERPL CALC-SCNC: 8 MMOL/L — SIGNIFICANT CHANGE UP (ref 7–14)
AST SERPL-CCNC: 85 U/L — HIGH (ref 4–40)
BILIRUB DIRECT SERPL-MCNC: 1.2 MG/DL — HIGH (ref 0–0.3)
BILIRUB INDIRECT FLD-MCNC: 0.7 MG/DL — SIGNIFICANT CHANGE UP (ref 0–1)
BILIRUB SERPL-MCNC: 1.9 MG/DL — HIGH (ref 0.2–1.2)
BILIRUB SERPL-MCNC: 1.9 MG/DL — HIGH (ref 0.2–1.2)
BUN SERPL-MCNC: 8 MG/DL — SIGNIFICANT CHANGE UP (ref 7–23)
CALCIUM SERPL-MCNC: 9.4 MG/DL — SIGNIFICANT CHANGE UP (ref 8.4–10.5)
CHLORIDE SERPL-SCNC: 103 MMOL/L — SIGNIFICANT CHANGE UP (ref 98–107)
CO2 SERPL-SCNC: 25 MMOL/L — SIGNIFICANT CHANGE UP (ref 22–31)
CREAT SERPL-MCNC: 0.7 MG/DL — SIGNIFICANT CHANGE UP (ref 0.5–1.3)
EGFR: 97 ML/MIN/1.73M2 — SIGNIFICANT CHANGE UP
GLUCOSE SERPL-MCNC: 132 MG/DL — HIGH (ref 70–99)
MAGNESIUM SERPL-MCNC: 1.7 MG/DL — SIGNIFICANT CHANGE UP (ref 1.6–2.6)
MITOCHONDRIA AB SER-ACNC: SIGNIFICANT CHANGE UP
PHOSPHATE SERPL-MCNC: 3.1 MG/DL — SIGNIFICANT CHANGE UP (ref 2.5–4.5)
POTASSIUM SERPL-MCNC: 4 MMOL/L — SIGNIFICANT CHANGE UP (ref 3.5–5.3)
POTASSIUM SERPL-SCNC: 4 MMOL/L — SIGNIFICANT CHANGE UP (ref 3.5–5.3)
PROT SERPL-MCNC: 6.6 G/DL — SIGNIFICANT CHANGE UP (ref 6–8.3)
SMOOTH MUSCLE AB SER-ACNC: ABNORMAL
SODIUM SERPL-SCNC: 136 MMOL/L — SIGNIFICANT CHANGE UP (ref 135–145)

## 2022-12-18 PROCEDURE — 99232 SBSQ HOSP IP/OBS MODERATE 35: CPT | Mod: GC

## 2022-12-18 RX ORDER — APIXABAN 2.5 MG/1
1 TABLET, FILM COATED ORAL
Qty: 120 | Refills: 0
Start: 2022-12-18 | End: 2023-02-15

## 2022-12-18 RX ADMIN — Medication 112 MICROGRAM(S): at 06:02

## 2022-12-18 RX ADMIN — Medication 200 MILLIGRAM(S): at 13:28

## 2022-12-18 RX ADMIN — CHLORHEXIDINE GLUCONATE 15 MILLILITER(S): 213 SOLUTION TOPICAL at 06:03

## 2022-12-18 RX ADMIN — Medication 200 MILLIGRAM(S): at 13:27

## 2022-12-18 RX ADMIN — Medication 60 MILLIGRAM(S): at 13:28

## 2022-12-18 RX ADMIN — APIXABAN 5 MILLIGRAM(S): 2.5 TABLET, FILM COATED ORAL at 06:01

## 2022-12-18 NOTE — PROGRESS NOTE ADULT - PROBLEM SELECTOR PROBLEM 1
Choledocholithiasis with obstruction

## 2022-12-18 NOTE — PROGRESS NOTE ADULT - REASON FOR ADMISSION
Choledocholithiasis

## 2022-12-18 NOTE — PROGRESS NOTE ADULT - PROVIDER SPECIALTY LIST ADULT
Gastroenterology
Internal Medicine
Internal Medicine
Surgery
Gastroenterology
Internal Medicine

## 2022-12-18 NOTE — PROGRESS NOTE ADULT - PROBLEM SELECTOR PLAN 4
- Home regimen Metoprolol tartrate 200mg BID and coumadin 12-14mg/day, he measures INR at home and gets medication adjustments weekly  - INR was 3.99 on arrival, received Kcentra 1500mg + Vit K in ER for reversal so he could go urgently to ERCP  - Transitioned to Eliquis on 12/12, pt spoke with his cardiologist Dr. Johnson and now that BMI is <40 he qualifies for DOAC  - Keep current dose of metoprolol despite bradycardia, he is hemodynamically stable and has had no episodes of RVR while on this regimen

## 2022-12-18 NOTE — PROGRESS NOTE ADULT - ATTENDING COMMENTS
Pt feeling well this morning without complaint. Tolerating diet. F/u with GI outpatient. Will work with Care Coordination team to arrange safe discharge. Time planning discharge 35 minutes.

## 2022-12-18 NOTE — PROGRESS NOTE ADULT - SUBJECTIVE AND OBJECTIVE BOX
Huong Padilla MD  EM/IM PGY-2  Pager # [phone] or Teams  --------------------------------    INTERVAL HPI/OVERNIGHT EVENTS: No acute events overnight, vitals remained stable. All scheduled medications given, No PRN medications required.     SUBJECTIVE: Patient seen and examined at bedside. Feels well, no pain and having BMs and eating without pain or nausea. Decided he would prefer to go home rather than to rehab, he is worried about being exposed to so many other people with it being Flu and COVID season, and was only accepted by his 5th choice so feels home PT would be preferred to this particular facility.    VITAL SIGNS:  T(C): 36.4 (12-18-22 @ 06:05), Max: 36.6 (12-17-22 @ 11:57)  HR: 72 (12-18-22 @ 08:13) (57 - 72)  BP: 102/63 (12-18-22 @ 06:05) (102/63 - 116/67)  RR: 18 (12-18-22 @ 06:05) (17 - 18)  SpO2: 100% (12-18-22 @ 08:13) (99% - 100%)      PHYSICAL EXAM:        MEDICATIONS:  MEDICATIONS  (STANDING):  allopurinol 200 milliGRAM(s) Oral daily  apixaban 5 milliGRAM(s) Oral every 12 hours  atorvastatin 20 milliGRAM(s) Oral at bedtime  chlorhexidine 0.12% Liquid 15 milliLiter(s) Swish and Spit two times a day  glucagon  Injectable 1 milliGRAM(s) IntraMuscular once  influenza  Vaccine (HIGH DOSE) 0.7 milliLiter(s) IntraMuscular once  lactulose Syrup 10 Gram(s) Oral daily  levothyroxine 112 MICROGram(s) Oral daily  metoprolol tartrate 200 milliGRAM(s) Oral two times a day  NIFEdipine XL 60 milliGRAM(s) Oral daily  polyethylene glycol 3350 17 Gram(s) Oral two times a day  senna 2 Tablet(s) Oral at bedtime  tamsulosin 0.4 milliGRAM(s) Oral at bedtime  traZODone 450 milliGRAM(s) Oral at bedtime    MEDICATIONS  (PRN):  acetaminophen     Tablet .. 650 milliGRAM(s) Oral every 6 hours PRN Temp greater or equal to 38C (100.4F), Mild Pain (1 - 3)  melatonin 3 milliGRAM(s) Oral at bedtime PRN Insomnia      LABS:    12-18    136  |  103  |  8   ----------------------------<  132<H>  4.0   |  25  |  0.70    Ca    9.4      18 Dec 2022 05:30  Phos  3.1     12-18  Mg     1.70     12-18    TPro  6.6  /  Alb  2.8<L>  /  TBili  1.9<H>  /  DBili  1.2<H>  /  AST  85<H>  /  ALT  70<H>  /  AlkPhos  188<H>  12-18

## 2022-12-18 NOTE — PROGRESS NOTE ADULT - NUTRITIONAL ASSESSMENT
This patient has been assessed with a concern for Malnutrition and has been determined to have a diagnosis/diagnoses of Morbid obesity (BMI > 40).    This patient is being managed with:   Diet Regular-  Entered: Dec 10 2022  2:29PM    
This patient has been assessed with a concern for Malnutrition and has been determined to have a diagnosis/diagnoses of Morbid obesity (BMI > 40).    This patient is being managed with:   Diet Regular-  Entered: Dec 10 2022  2:29PM

## 2022-12-18 NOTE — DISCHARGE NOTE NURSING/CASE MANAGEMENT/SOCIAL WORK - PATIENT PORTAL LINK FT
You can access the FollowMyHealth Patient Portal offered by [facility] by registering at the following website: [email or web address]. By joining [facility]’s FollowMyHealth portal, you will also be able to view your health information using other applications (apps) compatible with our system.

## 2022-12-18 NOTE — PROGRESS NOTE ADULT - ATTENDING SUPERVISION STATEMENT
Resident
Fellow
Fellow
Resident
Fellow
Resident

## 2022-12-18 NOTE — PROGRESS NOTE ADULT - PROBLEM SELECTOR PLAN 8
DVT Prophylaxis: Eliquis 5mg BID full AC for a-fib  Diet: Regular  Lines/Tubes: peripheral IVs only  PT: Eval 12/10 - Home with home PT, Re-eval 12/13 RUDDY  Social Work: Rehab  Code Status: Full Code    Home Situation: At baseline he lives with his son and has a home assistant/friend Terrell. The pt is wheelchair bound at baseline for the last 3 years after a fall where he injured his leg and became severely deconditioned, at the time of the fall the pt reports he was 540lbs so had difficulty with PT. He is able to stand for brief periods. He manages all his medications himself and is A&Ox4, able to feed and dress himself but has help with bathing and cooking.    Discharge Plan: To Rehab
DVT Prophylaxis: Eliquis 5mg BID full AC for a-fib  Diet: Regular  Lines/Tubes: peripheral IVs only  PT: Eval 12/10 - Home with home PT, Re-eval 12/13 RUDDY  Social Work: Rehab vs home, CM to speak with patient today  Code Status: Full Code    Home Situation: At baseline he lives with his son and has a home assistant/friend Terrell. The pt is wheelchair bound at baseline for the last 3 years after a fall where he injured his leg and became severely deconditioned, at the time of the fall the pt reports he was 540lbs so had difficulty with PT. He is able to stand for brief periods. He manages all his medications himself and is A&Ox4, able to feed and dress himself but has help with bathing and cooking.    Discharge Plan: To Rehab
DVT Prophylaxis: Eliquis 5mg BID full AC for a-fib  Diet: Regular  Lines/Tubes: peripheral IVs only  PT: Eval 12/10 - Home with home PT, Re-eval 12/13 RUDDY  Social Work: Rehab  Code Status: Full Code    Home Situation: At baseline he lives with his son and has a home assistant/friend Terrell. The pt is wheelchair bound at baseline for the last 3 years after a fall where he injured his leg and became severely deconditioned, at the time of the fall the pt reports he was 540lbs so had difficulty with PT. He is able to stand for brief periods. He manages all his medications himself and is A&Ox4, able to feed and dress himself but has help with bathing and cooking.    Discharge Plan: To Rehab
DVT Prophylaxis: Eliquis 5mg BID full AC for a-fib  Diet: Regular  Lines/Tubes: peripheral IVs only  PT: Eval 12/10 - Home with home PT, Re-eval 12/13 RUDDY  Social Work: Rehab  Code Status: Full Code    Home Situation: At baseline he lives with his son and has a home assistant/friend Terrell. The pt is wheelchair bound at baseline for the last 3 years after a fall where he injured his leg and became severely deconditioned, at the time of the fall the pt reports he was 540lbs so had difficulty with PT. He is able to stand for brief periods. He manages all his medications himself and is A&Ox4, able to feed and dress himself but has help with bathing and cooking.    Discharge Plan: To Rehab

## 2022-12-19 LAB
ANA TITR SER: NEGATIVE — SIGNIFICANT CHANGE UP
IGA FLD-MCNC: 530 MG/DL — HIGH (ref 84–499)
IGG FLD-MCNC: 1450 MG/DL — SIGNIFICANT CHANGE UP (ref 610–1660)
IGM SERPL-MCNC: 131 MG/DL — SIGNIFICANT CHANGE UP (ref 35–242)
KAPPA LC SER QL IFE: 6.29 MG/DL — HIGH (ref 0.33–1.94)
KAPPA/LAMBDA FREE LIGHT CHAIN RATIO, SERUM: 1.54 RATIO — SIGNIFICANT CHANGE UP (ref 0.26–1.65)
LAMBDA LC SER QL IFE: 4.08 MG/DL — HIGH (ref 0.57–2.63)

## 2022-12-21 LAB — HEV AB FLD QL: POSITIVE

## 2022-12-23 ENCOUNTER — OUTPATIENT (OUTPATIENT)
Dept: OUTPATIENT SERVICES | Facility: HOSPITAL | Age: 72
LOS: 1 days | Discharge: HOME | End: 2022-12-23

## 2022-12-23 ENCOUNTER — APPOINTMENT (OUTPATIENT)
Dept: GASTROENTEROLOGY | Facility: HOSPITAL | Age: 72
End: 2022-12-23

## 2022-12-23 VITALS
HEART RATE: 71 BPM | OXYGEN SATURATION: 95 % | DIASTOLIC BLOOD PRESSURE: 85 MMHG | SYSTOLIC BLOOD PRESSURE: 120 MMHG | RESPIRATION RATE: 14 BRPM

## 2022-12-23 VITALS
WEIGHT: 315 LBS | SYSTOLIC BLOOD PRESSURE: 130 MMHG | TEMPERATURE: 97 F | RESPIRATION RATE: 12 BRPM | OXYGEN SATURATION: 96 % | HEIGHT: 73 IN | HEART RATE: 70 BPM | DIASTOLIC BLOOD PRESSURE: 76 MMHG

## 2022-12-23 DIAGNOSIS — Z98.890 OTHER SPECIFIED POSTPROCEDURAL STATES: Chronic | ICD-10-CM

## 2022-12-23 DIAGNOSIS — K83.01 PRIMARY SCLEROSING CHOLANGITIS: ICD-10-CM

## 2022-12-23 LAB
GLUCOSE BLDC GLUCOMTR-MCNC: 113 MG/DL — HIGH (ref 70–99)
GLUCOSE BLDC GLUCOMTR-MCNC: 175 MG/DL — HIGH (ref 70–99)
HEV IGM SER QL: SIGNIFICANT CHANGE UP

## 2022-12-23 PROCEDURE — 43262 ENDO CHOLANGIOPANCREATOGRAPH: CPT | Mod: GC

## 2022-12-23 PROCEDURE — 43275 ERCP REMOVE FORGN BODY DUCT: CPT | Mod: GC

## 2022-12-23 PROCEDURE — 43264 ERCP REMOVE DUCT CALCULI: CPT | Mod: GC

## 2022-12-23 PROCEDURE — 74328 X-RAY BILE DUCT ENDOSCOPY: CPT | Mod: 26

## 2022-12-23 DEVICE — HYDRATOME 44: Type: IMPLANTABLE DEVICE | Status: FUNCTIONAL

## 2022-12-23 DEVICE — CATH BLLN EXTRACT DIST GUIDE WIRE 15MM 3LUM: Type: IMPLANTABLE DEVICE | Status: FUNCTIONAL

## 2023-01-30 NOTE — DIETITIAN INITIAL EVALUATION ADULT - WEIGHT USED FOR CALCULATIONS
[FreeTextEntry1] : Collected Date/Time:                   1/18/2023 16:13 EST\par Received Date/Time:                    1/18/2023 16:13 EST\par \par Fine Needle Aspiration Report - Auth (Verified)\par \par Specimen(s) Submitted\par 1. LUNG, RIGHT UPPER LOBE, ROBOTIC ASSISTED ION FLUORO BIOPSY AND FNA\par 2. LUNG, RIGHT UPPER LOBE, ROBOTIC, BRONCHIAL BRUSH\par \par Final Diagnosis\par 1. LUNG, RIGHT UPPER LOBE, ROBOTIC ASSISTED ION FLUORO BIOPSY AND FNA\par POSITIVE FOR MALIGNANT CELLS.\par Non-Small Cell Carcinoma, consistent with squamous cell carcinoma, see\par \par comment\par \par The cytology smears and cell block are composed of scattered syncytial\par clusters and rare single pleomorphic cells with irregular, hyperchromatic\par nuclei and dense to vacuolated cytoplasm. Forceps biopsy show benign lung\par parenchyma only. On immunohistochemistry(on cell block), tumor cells\par are positive for p40 and are negative for TTF1. This immunoprofile is\par compatible with above diagnosis.\par \par Material insufficient to perform further ancillary testing.\par \par \par 2. LUNG, RIGHT UPPER LOBE, ROBOTIC, BRONCHIAL BRUSH\par ATYPICAL FINDINGS.\par \par The cytology slides and cell block consist of rare atypical cells\par displaying enlarged and hyperchromatic nuclei in background of\par histiocytes.\par \par \par Please also refer to specimen number [id number].\par \par This case was reviewed as an intradepartmental consultation with other\par staff cytopathologist who concur with the diagnosis.\par \par Report faxed to Dr. Neri Martin office on 01/24/2023.\par \par \par \par \par \par \par \par Patient:   JONE SOUTH\par \par Accession:                             [id number]\par \par Collected Date/Time:                   1/18/2023 16:25 EST\par Received Date/Time:                    1/18/2023 16:25 EST\par \par Fine Needle Aspiration Report - Auth (Verified)\par \par Specimen(s) Submitted\par 1. LYMPH NODE, R4, EBUS-GUIDED FNA\par 2. LYMPH NODE, LEVEL 7, EBUS-GUIDED FNA\par 3. LYMPH NODE, R10, EBUS-GUIDED FNA\par 4. LUNG, BRONCHOALVEOLAR LAVAGE\par \par Final Diagnosis\par 1. LYMPH NODE, R4, EBUS-GUIDED FNA\par \par POSITIVE FOR MALIGNANT CELLS.\par Squamous Cell Carcinoma.\par \par The cytology slides are composed of syncytial sheets and single\par pleomorphic cells with irregular, hyperchromatic nuclei and dense\par cytoplasm in a background of necrosis and keratinized debris. No lymphoid\par cells present in the background.\par \par Note:  This may represent direct extension of the tumor or complete\par replacement of the lymph node by tumor.\par \par 2. LYMPH NODE, LEVEL 7, EBUS-GUIDED FNA\par Squamous Cell Carcinoma.\par \par The cytology slides are composed of syncytial sheets and single\par pleomorphic cells with irregular, hyperchromatic nuclei and dense\par cytoplasm in a background of necrosis and keratinized debris. No lymphoid\par cells present in the background.\par \par Note:  This may represent direct extension of the tumor or complete\par replacement of the lymph node by tumor.\par \par 3. LYMPH NODE, R10, EBUS-GUIDED FNA\par POSITIVE FOR MALIGNANT CELLS.\par Metastatic squamous Cell Carcinoma.\par \par The cytology slides are composed of syncytial sheets and single\par pleomorphic cells with irregular, hyperchromatic nuclei and dense\par cytoplasm in a background of necrosis and keratinized debris admixed with\par lymphoid cells.\par \par Note:  This may represent direct extension of the tumor or complete\par replacement of the lymph node by tumor.\par \par 4. LUNG, BRONCHOALVEOLAR LAVAGE\par NEGATIVE FOR MALIGNANT CELLS.\par \par The cytology slide and cell block are composed of groups of reactive\par ciliated bronchial epithelial cells, scattered alveolar macrophages and\par mixed inflammatory cells.\par \par \par Please also refer to specimen number [id number].\par \par \par Comment: Part1, Part2 and Part3: No cell block available to do any further\par ancillary testing.\par \par Screened by: Cristhian LI(ASCP)\par Verified by: Rosa Hilton MD\par (Electronic Signature)\par  IBW

## 2023-02-27 ENCOUNTER — NON-APPOINTMENT (OUTPATIENT)
Age: 73
End: 2023-02-27

## 2023-04-20 ENCOUNTER — APPOINTMENT (OUTPATIENT)
Dept: GASTROENTEROLOGY | Facility: CLINIC | Age: 73
End: 2023-04-20

## 2023-06-06 ENCOUNTER — OFFICE (OUTPATIENT)
Dept: URBAN - METROPOLITAN AREA CLINIC 6 | Facility: CLINIC | Age: 73
Setting detail: OPHTHALMOLOGY
End: 2023-06-06
Payer: COMMERCIAL

## 2023-06-06 DIAGNOSIS — E11.9: ICD-10-CM

## 2023-06-06 DIAGNOSIS — H01.004: ICD-10-CM

## 2023-06-06 DIAGNOSIS — D31.32: ICD-10-CM

## 2023-06-06 DIAGNOSIS — H01.001: ICD-10-CM

## 2023-06-06 DIAGNOSIS — H02.831: ICD-10-CM

## 2023-06-06 DIAGNOSIS — H01.002: ICD-10-CM

## 2023-06-06 DIAGNOSIS — H02.403: ICD-10-CM

## 2023-06-06 DIAGNOSIS — H25.13: ICD-10-CM

## 2023-06-06 DIAGNOSIS — H02.834: ICD-10-CM

## 2023-06-06 DIAGNOSIS — H01.005: ICD-10-CM

## 2023-06-06 PROCEDURE — 99213 OFFICE O/P EST LOW 20 MIN: CPT | Performed by: OPHTHALMOLOGY

## 2023-06-06 ASSESSMENT — REFRACTION_MANIFEST
OD_AXIS: 20
OD_VA1: 20/20-3
OD_CYLINDER: -1.25
OS_CYLINDER: +3.75
OS_ADD: +2.50
OS_SPHERE: +0.75
OS_AXIS: 80
OD_AXIS: 95
OD_CYLINDER: +2.75
OD_ADD: +2.50
OS_CYLINDER: -3.00
OS_ADD: +2.50
OS_SPHERE: -4.25
OD_VA1: 20/20
OS_AXIS: 180
OD_ADD: +2.50
OS_VA1: 20/20-
OD_SPHERE: -3.00
OD_SPHERE: +0.25
OS_VA1: 20/25-

## 2023-06-06 ASSESSMENT — SPHEQUIV_DERIVED
OD_SPHEQUIV: 0
OD_SPHEQUIV: -0.375
OS_SPHEQUIV: -2.375
OS_SPHEQUIV: -0.75
OS_SPHEQUIV: -0.375
OD_SPHEQUIV: -1.625

## 2023-06-06 ASSESSMENT — LID POSITION - PTOSIS
OS_PTOSIS: 1+
OD_PTOSIS: 1+

## 2023-06-06 ASSESSMENT — REFRACTION_CURRENTRX
OS_SPHERE: +0.75
OS_SPHERE: -3.75
OD_ADD: +2.00
OS_CYLINDER: +3.25
OD_ADD: +2.50
OS_ADD: +2.00
OS_VPRISM_DIRECTION: PROGS
OD_OVR_VA: 20/
OD_AXIS: 27
OS_OVR_VA: 20/
OD_OVR_VA: 20/
OD_VPRISM_DIRECTION: PROGS
OS_AXIS: 005
OS_CYLINDER: -3.00
OD_SPHERE: +0.25
OS_OVR_VA: 20/
OD_VPRISM_DIRECTION: PROGS
OD_CYLINDER: -1.50
OS_AXIS: 80
OD_CYLINDER: +2.00
OS_VPRISM_DIRECTION: PROGS
OS_ADD: +2.50
OD_SPHERE: -2.50
OD_AXIS: 105

## 2023-06-06 ASSESSMENT — TONOMETRY
OS_IOP_MMHG: 14
OD_IOP_MMHG: 14

## 2023-06-06 ASSESSMENT — REFRACTION_AUTOREFRACTION
OS_AXIS: 177
OD_CYLINDER: -1.50
OS_CYLINDER: -2.75
OS_SPHERE: +1.00
OD_AXIS: 17
OD_SPHERE: +0.75

## 2023-06-06 ASSESSMENT — AXIALLENGTH_DERIVED
OD_AL: 25.0956
OS_AL: 24.5564
OD_AL: 24.5564
OS_AL: 25.4307
OD_AL: 24.3991
OS_AL: 24.7157

## 2023-06-06 ASSESSMENT — LID POSITION - DERMATOCHALASIS
OS_DERMATOCHALASIS: 1+
OD_DERMATOCHALASIS: 1+

## 2023-06-06 ASSESSMENT — CONFRONTATIONAL VISUAL FIELD TEST (CVF)
OD_FINDINGS: FULL
OS_FINDINGS: FULL

## 2023-06-06 ASSESSMENT — LID EXAM ASSESSMENTS
OS_BLEPHARITIS: 1+ 2+
OD_BLEPHARITIS: 1+ 2+

## 2023-06-06 ASSESSMENT — KERATOMETRY
OS_K2POWER_DIOPTERS: 42.50
OD_K1POWER_DIOPTERS: 41.00
OS_AXISANGLE_DEGREES: 87
OD_K2POWER_DIOPTERS: 41.75
OD_AXISANGLE_DEGREES: 111
OS_K1POWER_DIOPTERS: 40.25
METHOD_AUTO_MANUAL: AUTO

## 2023-06-06 ASSESSMENT — VISUAL ACUITY
OS_BCVA: 20/20-
OD_BCVA: 20/20

## 2023-07-28 ENCOUNTER — APPOINTMENT (OUTPATIENT)
Dept: PULMONOLOGY | Facility: CLINIC | Age: 73
End: 2023-07-28
Payer: COMMERCIAL

## 2023-07-28 DIAGNOSIS — E66.9 OBESITY, UNSPECIFIED: ICD-10-CM

## 2023-07-28 LAB — POCT - HEMOGLOBIN (HGB), QUANTITATIVE, TRANSCUTANEOUS: 17.8

## 2023-07-28 PROCEDURE — 94727 GAS DIL/WSHOT DETER LNG VOL: CPT

## 2023-07-28 PROCEDURE — 88738 HGB QUANT TRANSCUTANEOUS: CPT

## 2023-07-28 PROCEDURE — 99205 OFFICE O/P NEW HI 60 MIN: CPT | Mod: 25

## 2023-07-28 PROCEDURE — 94729 DIFFUSING CAPACITY: CPT

## 2023-07-28 PROCEDURE — 94010 BREATHING CAPACITY TEST: CPT

## 2023-07-28 PROCEDURE — ZZZZZ: CPT

## 2023-07-28 NOTE — PHYSICAL EXAM
[No Acute Distress] : no acute distress [Low Lying Soft Palate] : low lying soft palate [IV] : Mallampati Class: IV [Normal Appearance] : normal appearance [Supple] : supple [No JVD] : no jvd [No Clubbing] : no clubbing [No Cyanosis] : no cyanosis [No Edema] : no edema [No Focal Deficits] : no focal deficits [Oriented x3] : oriented x3 [Normal Affect] : normal affect [TextBox_2] : Wheelchair obesity [TextBox_54] : S1-S2 irregular irregular [TextBox_68] : Decreased breath sounds at the bases but no wheeze rhonchi or crackle [TextBox_89] : Increased abdominal girth\par \par Nontender positive bowel sounds

## 2023-07-28 NOTE — HISTORY OF PRESENT ILLNESS
[Former] : former [TextBox_4] : 73-year-old patient\par Seen greater than 12 years ago for obstructive sleep apnea\par States has been using a CPAP device that has been very old for months has been nonfunctional\par He then underwent a 1 night Bishnu Respironics sleep study which was unremarkable for obstructive sleep apnea\par \par He did follow-up with cardiology Jena and told that his echocardiogram results but due to underlying obstructive sleep apnea\par He completed his echocardiogram June 2, 2023\par Estimated ejection fraction 55%\par Normal RV\par Severely dilated left atrium\par Pulmonary artery systolic pressure 19 mmHg\par Chronic shortness of breath chronic chronic exertional dyspnea\par Extremity wheeze chest pain chest tightness purulent sputum no pleuritic chest pain\par Patient ambulates primarily with use of a wheelchair\par \par Cardiac history\par Chronic atrial fibrillation\par Long-term anticoagulation Eliquis\par History of l reflux disease\par

## 2023-07-28 NOTE — PROCEDURE
[FreeTextEntry1] : End-tidal CO2 33\par HGB 17.8\par \par CT July 28, 2023\par Mild reduction flow rates\par TLC 85% predicted\par RV/% predicted\par Diffusion 73% predicted\par Hemoglobin 17.8\par \par

## 2023-07-28 NOTE — REVIEW OF SYSTEMS
[Fatigue] : fatigue [Negative] : Endocrine [TextBox_14] : History allergic rhinitis, postnasal drip [TextBox_30] : HPI [TextBox_44] : Atrial fibrillation, dilated left atrium with mild mitral regurgitation, hypertension [TextBox_69] : History cholangitis,\par \par History of ERCP with stent replacement [TextBox_91] : Ambulates with wheelchair

## 2023-07-28 NOTE — DISCUSSION/SUMMARY
[FreeTextEntry1] : My clinical suspicion for obstructive sleep apnea even with a 1 night Bishnu Respironics sleep study that was  negative \par Polycythemia\par Shortness of breath etiology multifactorial\par Physical deconditioning morbid obesity\par Atrial fibrillation likely in the setting atrial kick\par \par Recommendations\par Repeat formal sleep studies patient still prefers to do a home sleep study we will set up tonight study\par Laboratory study may be considered\par Follow-up chest x-ray imaging\par With alcohol before bed\par Avoid [place] before bed\par Avoid tranquilizers sedatives before bed\par \par

## 2023-08-03 NOTE — PHYSICAL THERAPY INITIAL EVALUATION ADULT - MANUAL MUSCLE TESTING RESULTS, REHAB EVAL
BUE/BLE grossly at least 4/5 throughout/grossly assessed due to Consent (Scalp)/Introductory Paragraph: The rationale for Mohs was explained to the patient and consent was obtained. The risks, benefits and alternatives to therapy were discussed in detail. Specifically, the risks of changes in hair growth pattern secondary to repair, infection, scarring, bleeding, prolonged wound healing, incomplete removal, allergy to anesthesia, nerve injury and recurrence were addressed. Prior to the procedure, the treatment site was clearly identified and confirmed by the patient. All components of Universal Protocol/PAUSE Rule completed.

## 2023-08-25 ENCOUNTER — APPOINTMENT (OUTPATIENT)
Dept: PULMONOLOGY | Facility: CLINIC | Age: 73
End: 2023-08-25

## 2023-08-29 ENCOUNTER — APPOINTMENT (OUTPATIENT)
Dept: PULMONOLOGY | Facility: CLINIC | Age: 73
End: 2023-08-29
Payer: COMMERCIAL

## 2023-08-30 PROCEDURE — 95800 SLP STDY UNATTENDED: CPT

## 2023-08-31 PROCEDURE — 95800 SLP STDY UNATTENDED: CPT

## 2023-09-06 ENCOUNTER — NON-APPOINTMENT (OUTPATIENT)
Age: 73
End: 2023-09-06

## 2023-09-06 NOTE — PRE-OP CHECKLIST - ANTIBIOTIC
n/a Qbrexza Counseling:  I discussed with the patient the risks of Qbrexza including but not limited to headache, mydriasis, blurred vision, dry eyes, nasal dryness, dry mouth, dry throat, dry skin, urinary hesitation, and constipation.  Local skin reactions including erythema, burning, stinging, and itching can also occur.

## 2023-09-14 ENCOUNTER — APPOINTMENT (OUTPATIENT)
Dept: PULMONOLOGY | Facility: CLINIC | Age: 73
End: 2023-09-14
Payer: COMMERCIAL

## 2023-09-14 VITALS — HEART RATE: 68 BPM | OXYGEN SATURATION: 96 % | SYSTOLIC BLOOD PRESSURE: 115 MMHG | DIASTOLIC BLOOD PRESSURE: 82 MMHG

## 2023-09-14 DIAGNOSIS — R06.02 SHORTNESS OF BREATH: ICD-10-CM

## 2023-09-14 DIAGNOSIS — G47.33 OBSTRUCTIVE SLEEP APNEA (ADULT) (PEDIATRIC): ICD-10-CM

## 2023-09-14 PROCEDURE — 99213 OFFICE O/P EST LOW 20 MIN: CPT

## 2023-09-21 ENCOUNTER — APPOINTMENT (OUTPATIENT)
Dept: RADIOLOGY | Facility: CLINIC | Age: 73
End: 2023-09-21

## 2023-09-21 ENCOUNTER — OUTPATIENT (OUTPATIENT)
Dept: OUTPATIENT SERVICES | Facility: HOSPITAL | Age: 73
LOS: 1 days | End: 2023-09-21

## 2023-09-21 DIAGNOSIS — Z00.8 ENCOUNTER FOR OTHER GENERAL EXAMINATION: ICD-10-CM

## 2023-09-21 DIAGNOSIS — Z98.890 OTHER SPECIFIED POSTPROCEDURAL STATES: Chronic | ICD-10-CM

## 2023-09-28 ENCOUNTER — RESULT REVIEW (OUTPATIENT)
Age: 73
End: 2023-09-28

## 2023-09-28 ENCOUNTER — OUTPATIENT (OUTPATIENT)
Dept: OUTPATIENT SERVICES | Facility: HOSPITAL | Age: 73
LOS: 1 days | End: 2023-09-28
Payer: COMMERCIAL

## 2023-09-28 ENCOUNTER — NON-APPOINTMENT (OUTPATIENT)
Age: 73
End: 2023-09-28

## 2023-09-28 ENCOUNTER — APPOINTMENT (OUTPATIENT)
Dept: RADIOLOGY | Facility: CLINIC | Age: 73
End: 2023-09-28
Payer: COMMERCIAL

## 2023-09-28 DIAGNOSIS — Z98.890 OTHER SPECIFIED POSTPROCEDURAL STATES: Chronic | ICD-10-CM

## 2023-09-28 DIAGNOSIS — R06.02 SHORTNESS OF BREATH: ICD-10-CM

## 2023-09-28 PROCEDURE — 71046 X-RAY EXAM CHEST 2 VIEWS: CPT

## 2023-09-28 PROCEDURE — 71046 X-RAY EXAM CHEST 2 VIEWS: CPT | Mod: 26

## 2023-10-30 ENCOUNTER — APPOINTMENT (OUTPATIENT)
Dept: PULMONOLOGY | Facility: CLINIC | Age: 73
End: 2023-10-30

## 2024-02-21 ENCOUNTER — APPOINTMENT (OUTPATIENT)
Dept: PULMONOLOGY | Facility: CLINIC | Age: 74
End: 2024-02-21

## 2024-03-19 ENCOUNTER — APPOINTMENT (OUTPATIENT)
Dept: GASTROENTEROLOGY | Facility: CLINIC | Age: 74
End: 2024-03-19

## 2024-09-08 ENCOUNTER — EMERGENCY (EMERGENCY)
Facility: HOSPITAL | Age: 74
LOS: 0 days | Discharge: HOME | End: 2024-09-08
Attending: EMERGENCY MEDICINE
Payer: COMMERCIAL

## 2024-09-08 VITALS
SYSTOLIC BLOOD PRESSURE: 121 MMHG | WEIGHT: 265 LBS | HEART RATE: 83 BPM | TEMPERATURE: 98 F | RESPIRATION RATE: 16 BRPM | OXYGEN SATURATION: 94 % | HEIGHT: 71 IN | DIASTOLIC BLOOD PRESSURE: 66 MMHG

## 2024-09-08 VITALS
SYSTOLIC BLOOD PRESSURE: 145 MMHG | DIASTOLIC BLOOD PRESSURE: 60 MMHG | TEMPERATURE: 98 F | RESPIRATION RATE: 20 BRPM | HEART RATE: 92 BPM | OXYGEN SATURATION: 100 %

## 2024-09-08 DIAGNOSIS — Z98.890 OTHER SPECIFIED POSTPROCEDURAL STATES: Chronic | ICD-10-CM

## 2024-09-08 LAB
ABO RH CONFIRMATION: SIGNIFICANT CHANGE UP
ALBUMIN SERPL ELPH-MCNC: 3.6 G/DL — SIGNIFICANT CHANGE UP (ref 3.3–5)
ALP SERPL-CCNC: 70 U/L — SIGNIFICANT CHANGE UP (ref 40–120)
ALT FLD-CCNC: 20 U/L — SIGNIFICANT CHANGE UP (ref 12–78)
ANION GAP SERPL CALC-SCNC: 9 MMOL/L — SIGNIFICANT CHANGE UP (ref 5–17)
APPEARANCE UR: CLEAR — SIGNIFICANT CHANGE UP
APTT BLD: 37 SEC — HIGH (ref 24.5–35.6)
AST SERPL-CCNC: 14 U/L — LOW (ref 15–37)
BACTERIA # UR AUTO: NEGATIVE /HPF — SIGNIFICANT CHANGE UP
BASOPHILS # BLD AUTO: 0.04 K/UL — SIGNIFICANT CHANGE UP (ref 0–0.2)
BASOPHILS NFR BLD AUTO: 0.3 % — SIGNIFICANT CHANGE UP (ref 0–2)
BILIRUB SERPL-MCNC: 0.8 MG/DL — SIGNIFICANT CHANGE UP (ref 0.2–1.2)
BILIRUB UR-MCNC: NEGATIVE — SIGNIFICANT CHANGE UP
BLD GP AB SCN SERPL QL: SIGNIFICANT CHANGE UP
BUN SERPL-MCNC: 12 MG/DL — SIGNIFICANT CHANGE UP (ref 7–23)
CALCIUM SERPL-MCNC: 10.2 MG/DL — HIGH (ref 8.5–10.1)
CAST: 6 /LPF — HIGH (ref 0–4)
CHLORIDE SERPL-SCNC: 104 MMOL/L — SIGNIFICANT CHANGE UP (ref 96–108)
CO2 SERPL-SCNC: 21 MMOL/L — LOW (ref 22–31)
COLOR SPEC: ABNORMAL
CREAT SERPL-MCNC: 1.03 MG/DL — SIGNIFICANT CHANGE UP (ref 0.5–1.3)
DIFF PNL FLD: ABNORMAL
EGFR: 76 ML/MIN/1.73M2 — SIGNIFICANT CHANGE UP
EOSINOPHIL # BLD AUTO: 0 K/UL — SIGNIFICANT CHANGE UP (ref 0–0.5)
EOSINOPHIL NFR BLD AUTO: 0 % — SIGNIFICANT CHANGE UP (ref 0–6)
GLUCOSE SERPL-MCNC: 205 MG/DL — HIGH (ref 70–99)
GLUCOSE UR QL: NEGATIVE MG/DL — SIGNIFICANT CHANGE UP
HCT VFR BLD CALC: 48.5 % — SIGNIFICANT CHANGE UP (ref 39–50)
HCV AB S/CO SERPL IA: 0.23 S/CO — SIGNIFICANT CHANGE UP (ref 0–0.99)
HCV AB SERPL-IMP: SIGNIFICANT CHANGE UP
HGB BLD-MCNC: 17 G/DL — SIGNIFICANT CHANGE UP (ref 13–17)
HIV 1 & 2 AB SERPL IA.RAPID: SIGNIFICANT CHANGE UP
IMM GRANULOCYTES NFR BLD AUTO: 0.5 % — SIGNIFICANT CHANGE UP (ref 0–0.9)
INR BLD: 1.11 RATIO — SIGNIFICANT CHANGE UP (ref 0.85–1.18)
KETONES UR-MCNC: ABNORMAL MG/DL
LEUKOCYTE ESTERASE UR-ACNC: NEGATIVE — SIGNIFICANT CHANGE UP
LIDOCAIN IGE QN: 19 U/L — SIGNIFICANT CHANGE UP (ref 13–75)
LYMPHOCYTES # BLD AUTO: 0.8 K/UL — LOW (ref 1–3.3)
LYMPHOCYTES # BLD AUTO: 5.7 % — LOW (ref 13–44)
MCHC RBC-ENTMCNC: 28.9 PG — SIGNIFICANT CHANGE UP (ref 27–34)
MCHC RBC-ENTMCNC: 35.1 GM/DL — SIGNIFICANT CHANGE UP (ref 32–36)
MCV RBC AUTO: 82.5 FL — SIGNIFICANT CHANGE UP (ref 80–100)
MONOCYTES # BLD AUTO: 0.37 K/UL — SIGNIFICANT CHANGE UP (ref 0–0.9)
MONOCYTES NFR BLD AUTO: 2.6 % — SIGNIFICANT CHANGE UP (ref 2–14)
NEUTROPHILS # BLD AUTO: 12.7 K/UL — HIGH (ref 1.8–7.4)
NEUTROPHILS NFR BLD AUTO: 90.9 % — HIGH (ref 43–77)
NITRITE UR-MCNC: NEGATIVE — SIGNIFICANT CHANGE UP
PH UR: 5 — SIGNIFICANT CHANGE UP (ref 5–8)
PLATELET # BLD AUTO: 157 K/UL — SIGNIFICANT CHANGE UP (ref 150–400)
POTASSIUM SERPL-MCNC: 3.9 MMOL/L — SIGNIFICANT CHANGE UP (ref 3.5–5.3)
POTASSIUM SERPL-SCNC: 3.9 MMOL/L — SIGNIFICANT CHANGE UP (ref 3.5–5.3)
PROT SERPL-MCNC: 7.8 GM/DL — SIGNIFICANT CHANGE UP (ref 6–8.3)
PROT UR-MCNC: NEGATIVE MG/DL — SIGNIFICANT CHANGE UP
PROTHROM AB SERPL-ACNC: 12.5 SEC — SIGNIFICANT CHANGE UP (ref 9.5–13)
RBC # BLD: 5.88 M/UL — HIGH (ref 4.2–5.8)
RBC # FLD: 14.2 % — SIGNIFICANT CHANGE UP (ref 10.3–14.5)
RBC CASTS # UR COMP ASSIST: 2 /HPF — SIGNIFICANT CHANGE UP (ref 0–4)
SODIUM SERPL-SCNC: 134 MMOL/L — LOW (ref 135–145)
SP GR SPEC: 1.03 — SIGNIFICANT CHANGE UP (ref 1–1.03)
SQUAMOUS # UR AUTO: 1 /HPF — SIGNIFICANT CHANGE UP (ref 0–5)
UROBILINOGEN FLD QL: 0.2 MG/DL — SIGNIFICANT CHANGE UP (ref 0.2–1)
WBC # BLD: 13.98 K/UL — HIGH (ref 3.8–10.5)
WBC # FLD AUTO: 13.98 K/UL — HIGH (ref 3.8–10.5)
WBC UR QL: 1 /HPF — SIGNIFICANT CHANGE UP (ref 0–5)

## 2024-09-08 PROCEDURE — 86901 BLOOD TYPING SEROLOGIC RH(D): CPT

## 2024-09-08 PROCEDURE — 85025 COMPLETE CBC W/AUTO DIFF WBC: CPT

## 2024-09-08 PROCEDURE — 99285 EMERGENCY DEPT VISIT HI MDM: CPT

## 2024-09-08 PROCEDURE — 83690 ASSAY OF LIPASE: CPT

## 2024-09-08 PROCEDURE — 86900 BLOOD TYPING SEROLOGIC ABO: CPT

## 2024-09-08 PROCEDURE — 80053 COMPREHEN METABOLIC PANEL: CPT

## 2024-09-08 PROCEDURE — 36415 COLL VENOUS BLD VENIPUNCTURE: CPT

## 2024-09-08 PROCEDURE — 86803 HEPATITIS C AB TEST: CPT

## 2024-09-08 PROCEDURE — 85730 THROMBOPLASTIN TIME PARTIAL: CPT

## 2024-09-08 PROCEDURE — 96375 TX/PRO/DX INJ NEW DRUG ADDON: CPT

## 2024-09-08 PROCEDURE — 85610 PROTHROMBIN TIME: CPT

## 2024-09-08 PROCEDURE — 96374 THER/PROPH/DIAG INJ IV PUSH: CPT | Mod: XU

## 2024-09-08 PROCEDURE — 81001 URINALYSIS AUTO W/SCOPE: CPT

## 2024-09-08 PROCEDURE — 99285 EMERGENCY DEPT VISIT HI MDM: CPT | Mod: 25

## 2024-09-08 PROCEDURE — 86850 RBC ANTIBODY SCREEN: CPT

## 2024-09-08 PROCEDURE — 74177 CT ABD & PELVIS W/CONTRAST: CPT | Mod: MC

## 2024-09-08 PROCEDURE — 93010 ELECTROCARDIOGRAM REPORT: CPT

## 2024-09-08 PROCEDURE — 86703 HIV-1/HIV-2 1 RESULT ANTBDY: CPT

## 2024-09-08 PROCEDURE — 96376 TX/PRO/DX INJ SAME DRUG ADON: CPT

## 2024-09-08 PROCEDURE — 93005 ELECTROCARDIOGRAM TRACING: CPT

## 2024-09-08 PROCEDURE — 74177 CT ABD & PELVIS W/CONTRAST: CPT | Mod: 26,MC

## 2024-09-08 RX ORDER — HYDROMORPHONE HYDROCHLORIDE 2 MG/1
1 TABLET ORAL ONCE
Refills: 0 | Status: DISCONTINUED | OUTPATIENT
Start: 2024-09-08 | End: 2024-09-08

## 2024-09-08 RX ORDER — ONDANSETRON 2 MG/ML
1 INJECTION, SOLUTION INTRAMUSCULAR; INTRAVENOUS
Qty: 1 | Refills: 0
Start: 2024-09-08

## 2024-09-08 RX ORDER — SODIUM CHLORIDE 9 MG/ML
500 INJECTION INTRAMUSCULAR; INTRAVENOUS; SUBCUTANEOUS ONCE
Refills: 0 | Status: COMPLETED | OUTPATIENT
Start: 2024-09-08 | End: 2024-09-08

## 2024-09-08 RX ORDER — ONDANSETRON 2 MG/ML
4 INJECTION, SOLUTION INTRAMUSCULAR; INTRAVENOUS ONCE
Refills: 0 | Status: COMPLETED | OUTPATIENT
Start: 2024-09-08 | End: 2024-09-08

## 2024-09-08 RX ORDER — OXYCODONE AND ACETAMINOPHEN 7.5; 325 MG/1; MG/1
1 TABLET ORAL
Qty: 12 | Refills: 0
Start: 2024-09-08

## 2024-09-08 RX ADMIN — ONDANSETRON 4 MILLIGRAM(S): 2 INJECTION, SOLUTION INTRAMUSCULAR; INTRAVENOUS at 06:47

## 2024-09-08 RX ADMIN — HYDROMORPHONE HYDROCHLORIDE 1 MILLIGRAM(S): 2 TABLET ORAL at 08:44

## 2024-09-08 RX ADMIN — SODIUM CHLORIDE 500 MILLILITER(S): 9 INJECTION INTRAMUSCULAR; INTRAVENOUS; SUBCUTANEOUS at 06:27

## 2024-09-08 RX ADMIN — HYDROMORPHONE HYDROCHLORIDE 1 MILLIGRAM(S): 2 TABLET ORAL at 06:46

## 2024-09-08 NOTE — ED ADULT NURSE REASSESSMENT NOTE - NS ED NURSE REASSESS COMMENT FT1
Received report from VARUN Malin RN and NATIVIDAD Mcqueen RN. Pt resting in stretcher with safety maintained. Respirations even and unlabored, no distress noted. Pt reports "I feel a little better after the medications". Pt family at bedside. Pt awaiting CT Scan.

## 2024-09-08 NOTE — ED ADULT NURSE NOTE - OBJECTIVE STATEMENT
Pt presents to the ED c/o abdominal pain. Pt from home reports left and right upper quadrant pain for 6 hours. Pt reports an episode of diarrhea, denies vomiting. Pt endorses nausea at this time. Pt reports hx of gallstones. Pt presents to the ED c/o abdominal pain. Pt from home reports left and right upper quadrant pain for 6 hours. Pt reports an episode of diarrhea, denies vomiting. no blood noted. Pt endorses nausea at this time, reports mild abdominal cramping and distention. Pt reports hx of gallstones.

## 2024-09-08 NOTE — ED PROVIDER NOTE - CLINICAL SUMMARY MEDICAL DECISION MAKING FREE TEXT BOX
74-year-old patient presents emergency department with left upper abdominal pain.  Plan check labs, CT scan. 74-year-old patient presents emergency department with left upper abdominal pain.  Plan check labs, CT scan.    Pt and friend updated on results od test.  pt in NAD.  Know gallbladder dz for which pt declines surg.  No RUQ pain.  Plan d/c and follow up with PMD and nephrology

## 2024-09-08 NOTE — ED PROVIDER NOTE - PHYSICAL EXAMINATION
Constitutional: NAD AAOx3  Eyes: EOMI, pupils equal  Head: Normocephalic atraumatic  Mouth: no airway obstruction  Cardiac: regular rate   Resp: Lungs CTAB  GI: Abd s/nt/nd, obese body habitus  Neuro: CN2-12 intact  Skin: No rashes

## 2024-09-08 NOTE — ED PROVIDER NOTE - OBJECTIVE STATEMENT
74-year-old patient with past medical history for A-fib on   Xarelto, diabetes on Mounjaro,  history of cholangitis, gallstones, wheelchair-bound, presents emergency department for left upper abdominal pain.  Patient presents emergency department with friend.  Patient states approximately 6 hours ago started with pain left upper quadrant.  Patient denies any falls, no rash.  Patient with 1 episode of diarrhea yesterday.  No vomiting, no nausea.

## 2024-09-08 NOTE — ED ADULT TRIAGE NOTE - CHIEF COMPLAINT QUOTE
Pt presents from home complaining of LUQ ABD pain that began yesterday. Endorsing mild nausea and diarrhea with no vomiting. Denies fevers or bloody stool. no medication taken PTA.

## 2024-09-08 NOTE — ED ADULT NURSE REASSESSMENT NOTE - NS ED NURSE REASSESS COMMENT FT1
Pt resting in stretcher with safety maintained. Respirations even and unlabored, no distress noted. Pt awaiting transport at this time.

## 2024-09-08 NOTE — ED PROVIDER NOTE - NSFOLLOWUPINSTRUCTIONS_ED_ALL_ED_FT
Please call and follow up with your doctor in 1-3 days.    Use Tylenol (acetaminophen) 650 mg every 6 hours and Motrin (Ibuprofen) 600 mg every 6 hours as need for pain.     Percocet 1 pill every 6 hours as need for breakthrough pain.    Return to the Emergency Department for worsening or persistent symptoms, and/or ANY NEW OR CONCERNING SYMPTOMS. If you have issues obtaining follow up, please call: [phone] or [phone]  to obtain a doctor or specialist who takes your insurance in your area {nephromolgy)      Acute Abdominal Pain    WHAT YOU NEED TO KNOW:    The cause of your abdominal pain may not be found. If a cause is found, treatment will depend on what the cause is.     DISCHARGE INSTRUCTIONS:    Return to the emergency department if:     You vomit blood or cannot stop vomiting.      You have blood in your bowel movement or it looks like tar.       You have bleeding from your rectum.       Your abdomen is larger than usual, more painful, and hard.       You have severe pain in your abdomen.       You stop passing gas and having bowel movements.       You feel weak, dizzy, or faint.    Contact your healthcare provider if:     You have a fever.      You have new signs and symptoms.      Your symptoms do not get better with treatment.       You have questions or concerns about your condition or care.    Medicines may be given to decrease pain, treat an infection, and manage your symptoms. Take your medicine as directed. Call your healthcare provider if you think your medicine is not helping or if you have side effects. Tell him if you are allergic to any medicine. Keep a list of the medicines, vitamins, and herbs you take. Include the amounts, and when and why you take them. Bring the list or the pill bottles to follow-up visits. Carry your medicine list with you in case of an emergency.    Manage your symptoms:     Apply heat on your abdomen for 20 to 30 minutes every 2 hours for as many days as directed. Heat helps decrease pain and muscle spasms.       Manage your stress. Stress may cause abdominal pain. Your healthcare provider may recommend relaxation techniques and deep breathing exercises to help decrease your stress. Your healthcare provider may recommend you talk to someone about your stress or anxiety, such as a counselor or a trusted friend. Get plenty of sleep and exercise regularly.       Limit or do not drink alcohol. Alcohol can make your abdominal pain worse. Ask your healthcare provider if it is safe for you to drink alcohol. Also ask how much is safe for you to drink.       Do not smoke. Nicotine and other chemicals in cigarettes can damage your esophagus and stomach. Ask your healthcare provider for information if you currently smoke and need help to quit. E-cigarettes or smokeless tobacco still contain nicotine. Talk to your healthcare provider before you use these products.     Make changes to the food you eat as directed: Do not eat foods that cause abdominal pain or other symptoms. Eat small meals more often.     Eat more high-fiber foods if you are constipated. High-fiber foods include fruits, vegetables, whole-grain foods, and legumes.       Do not eat foods that cause gas if you have bloating. Examples include broccoli, cabbage, and cauliflower. Do not drink soda or carbonated drinks, because these may also cause gas.       Do not eat foods or drinks that contain sorbitol or fructose if you have diarrhea and bloating. Some examples are fruit juices, candy, jelly, and sugar-free gum.       Do not eat high-fat foods, such as fried foods, cheeseburgers, hot dogs, and desserts.      Limit or do not drink caffeine. Caffeine may make symptoms, such as heart burn or nausea, worse.       Drink plenty of liquids to prevent dehydration from diarrhea or vomiting. Ask your healthcare provider how much liquid to drink each day and which liquids are best for you.     Follow up with your healthcare provider as directed: Write down your questions so you remember to ask them during your visits.      A round, firm lump  A lump that changes size and may disappear or reappear  Pain, numbness, swelling, or muscle weakness where you have the cyst  Stiffness or tightness in your knee that may get worse with movement  In women with an ovarian cyst, pain during sex, abdominal swelling, pain before or during a monthly period  How is a cyst diagnosed?  Your healthcare provider will ask if you have symptoms, such as pain or stiffness. Tell him or her if you have ever had a cyst in the same area. You may need any of the following:    Blood tests may show what type of cyst you have and if you need treatment.  Ultrasound pictures may be used to check the pocket of fluid.  Transillumination is a test that can show if the cyst is filled with fluid. Your healthcare provider will shine a light through your cyst.  X-ray or MRI pictures may show any problems, such as arthritis, an injury, or fluid buildup. You may be given contrast liquid to help problems show up better in the pictures. Tell the healthcare provider if you have ever had an allergic reaction to contrast liquid. Do not enter the MRI room with anything metal. Metal can cause serious injury. Tell the healthcare provider if you have any metal in or on your body.  How is a cyst treated?  You may need any of the following:    NSAIDs , such as ibuprofen, help decrease swelling, pain, and fever. NSAIDs can cause stomach bleeding or kidney problems in certain people. If you take blood thinner medicine, always ask your healthcare provider if NSAIDs are safe for you. Always read the medicine label and follow directions.  For women, birth control pills may help control your monthly cycle, prevent ovarian cysts, or cause them to shrink.  Aspiration is a procedure used to drain fluid from the cyst through a needle.  Steroid medicine may be injected into the cyst to decrease inflammation.  Surgery may be needed to remove the cyst.  When should I seek immediate care?  You have a fever.  The area around your cyst becomes swollen, red, and painful.  Your cyst continues to drain for 2 days after you start antibiotics.  When should I call my doctor?  You continue to have pain, even after treatment.  Your cyst returns or gets larger.  Your limb that has the cyst gets weak, numb, stiff, or unstable.  You have questions or concerns about your condition or care.

## 2024-09-08 NOTE — ED ADULT NURSE NOTE - NSFALLRISKASMT_ED_ALL_ED_DT
Patient ID: Steven Joyner is a 71 y.o. male.  /84   Pulse 67   Wt 215 lb 14.4 oz (97.9 kg)   SpO2 97%   BMI 35.93 kg/m      Assessment/Plan:                   Diagnoses and all orders for this visit:    Erectile dysfunction, unspecified erectile dysfunction type  -     tadalafil (CIALIS) 20 MG tablet; Take 1 tablet (20 mg total) by mouth daily as needed for erectile dysfunction.  Dispense: 10 tablet; Refill: 2    Acute pain of left shoulder  -     methylPREDNISolone acetate injection 40 mg (DEPO-MEDROL)  -     lidocaine 10 mg/mL (1 %) injection 2 mL        DISCUSSION  Corticosteroid injection performed as discussed below findings consistent with rotator cuff tendinopathy.  No evidence of significant muscle tear requiring any further intervention at this time.  He should contact me immediately if there are signs of complication or if in 1 week he feels he is not beginning to notice some improvement from the injection done today see procedure note below.  Subjective:     HPI    Setven Joyner is a 71 y.o. male is somewhat of a complex medical history.  He has untreated sleep apnea and is likely chronically symptomatic from it.  He has testosterone deficiency and has been on replacement.  He is seeing a new endocrinologist.  He did diagnosis of diabetes with an A1c of 8.6 when I saw him last year.  He was adamant that he wanted to work hard on dietary change which she has done and got his A1c down to 5.6 when last checked by his new endocrinologist in March.  He has hypertension his blood pressure is not controlled today but he has not taken his medications so I recommend rechecking blood pressure before considering any adjustment.  He tends to be a person of extremes.  He overall seems to be doing well.  This is my first meeting with him since his initial diagnosis of diabetes which was found somewhat incidentally during a preop evaluation last year.  His primary reason for his visit today is left shoulder  pain and this is what our focus has been.  He has several specialists involved in his care of his complex medical concerns, I reviewed briefly his chronic medical concerns and noted no acute issues and feel it is best to leave chronic management of these concerns to the specialist providers for which he states he has follow-up.  We did review information as discussed.    In regard to his shoulder he is an avid weightlifter.  He is a very high muscle mass.  He reports doing shoulder press about 2 weeks ago and developed significant pain in both shoulders.  The pain quickly resolved in his right shoulder but has been persistent in the left.  He has a past history of rotator cuff muscle irritation which has been persistent and he has had treatment with corticosteroid injection in the past that he found helpful.  Would like to have this type of treatment again at this time.  He denies any bruising or redness in the shoulder.  He denies any numbness or tingling involving the arm.  He otherwise states he feels well currently.    Review of Systems   Complete review of systems is obtained.  Other than the specific considerations noted above complete review of systems is negative.          Objective:   Medications:  Current Outpatient Medications   Medication Sig Note     amLODIPine (NORVASC) 5 MG tablet TAKE 1 TABLET BY MOUTH EVERY DAY      aspirin 81 MG EC tablet TAKE 1 TABLET BY MOUTH EVERY DAY      blood glucose meter (GLUCOMETER) Use 1 each As Directed daily. Dispense glucometer brand per patient's insurance at pharmacy discretion.      BREO ELLIPTA 100-25 mcg/dose DsDv inhaler INHALE 1 PUFF DAILY 6/26/2017: Received from: External Pharmacy     gemfibrozil (LOPID) 600 MG tablet TAKE 1 TABLET (600 MG TOTAL) BY MOUTH 2 (TWO) TIMES A DAY BEFORE MEALS.      generic lancets (FINGERSTIX LANCETS) Check blood sugar once or twice a week. Dispense brand per patient's insurance at pharmacy discretion.      glimepiride (AMARYL) 1  "MG tablet Take 1 tablet (1 mg total) by mouth daily.      HYPODERMIC NEEDLES 18 gauge x 1 1/2\" Ndle USE EVERY 2 WEEKS TO WITHDRAW TESTOSTERONE FROM VIAL      losartan (COZAAR) 25 MG tablet TAKE 1 TABLET BY MOUTH EVERY DAY      ONETOUCH ULTRA BLUE TEST STRIP strips CHECK BLOOD SUGARS ONCE OR TWICE A WEEK      syringe with cannula,disposabl (LIFESHIELD BLUNT CANNULA) 3 mL 18 x 1\" Syrg As directed. use every 2 weeks for testosterone      syringe, disposable, 3 mL Syrg Use As Directed. Use for testosterone injections      tadalafil (CIALIS) 20 MG tablet Take 1 tablet (20 mg total) by mouth daily as needed for erectile dysfunction.      testosterone cypionate (DEPOTESTOTERONE CYPIONATE) 200 mg/mL injection INJECT 0.5MLS INTRAMUSCULARLY EVERY 2 WEEKS      Allergies:  Allergies   Allergen Reactions     Cat's Claw (Uncaria Tomentosa) Unknown     Dog Dander      Tobacco:   reports that he has quit smoking. He has never used smokeless tobacco.     Physical Exam      /84   Pulse 67   Wt 215 lb 14.4 oz (97.9 kg)   SpO2 97%   BMI 35.93 kg/m      General: Patient alert no signs of distress    Patient is shoulder reveals no muscle asymmetry as compared to the right, there is no bruising redness or swelling.  No significant pain on palpation.  He does report pain with movement just past 90 degrees of abduction but is able to move through the complete range of motion.  There is no detectable weakness.  Forward flexion is similar to abduction.  Internal rotation there is less discomfort.      Left shoulder corticosteroid injection procedure Note     Steven Joyner is a 71 y.o. male is here today for cortical steroid injection into the left shoulder for treatment of muscle pain without signs of muscle tear or more significant problems.     Indications:   See above no contraindication identified.    Procedure Details   In the examination room with patient seated comfortably in the area and the posterior shoulder was cleansed " with iodine.  Prior to this cleansing the nature the procedure was discussed in great detail including risks benefits and alternatives.  With patient demonstrating a full understanding of the procedure we elected to proceed with the procedure as described.    Using a 3 cc syringe and a 27-gauge 1-1/4 inch needle 1 cc of Depo-Medrol 40 mg/mL and 2 cc of lidocaine 1% without epinephrine are combined and injected into the posterior shoulder in the area that have been cleansed with iodine.  Patient had no complications or concerns following this injection.    Findings:  None    Specimens: See orders    Complications:   None    Plan:   Monitor closely over the next week.  Try to refrain from any significant heavy lifting especially in the short-term.  Anticipate improvement from this injection to occur in about 1 week.      Kashmir Jackson             08-Sep-2024 06:29

## 2024-09-08 NOTE — ED PROVIDER NOTE - PATIENT PORTAL LINK FT
You can access the FollowMyHealth Patient Portal offered by [facility] by registering at the following website: [email or web address]. By joining [employer] by [employer]’s FollowMyHealth portal, you will also be able to view your health information using other applications (apps) compatible with our system.

## 2024-09-08 NOTE — ED ADULT NURSE NOTE - NSFALLRISKINTERV_ED_ALL_ED
Assistance OOB with selected safe patient handling equipment if applicable/Assistance with ambulation/Communicate fall risk and risk factors to all staff, patient, and family/Monitor gait and stability/Provide visual cue: yellow wristband, yellow gown, etc/Reinforce activity limits and safety measures with patient and family/Call bell, personal items and telephone in reach/Instruct patient to call for assistance before getting out of bed/chair/stretcher/Non-slip footwear applied when patient is off stretcher/[place] to call system/Physically safe environment - no spills, clutter or unnecessary equipment/Purposeful Proactive Rounding/Room/bathroom lighting operational, light cord in reach

## 2024-09-10 ENCOUNTER — INPATIENT (INPATIENT)
Facility: HOSPITAL | Age: 74
LOS: 7 days | Discharge: NURSING, REHAB OR LONG-TERM CARE | DRG: 446 | End: 2024-09-18
Attending: STUDENT IN AN ORGANIZED HEALTH CARE EDUCATION/TRAINING PROGRAM | Admitting: INTERNAL MEDICINE
Payer: COMMERCIAL

## 2024-09-10 VITALS
RESPIRATION RATE: 19 BRPM | TEMPERATURE: 98 F | SYSTOLIC BLOOD PRESSURE: 84 MMHG | OXYGEN SATURATION: 94 % | DIASTOLIC BLOOD PRESSURE: 64 MMHG | HEIGHT: 71 IN | WEIGHT: 315 LBS | HEART RATE: 90 BPM

## 2024-09-10 DIAGNOSIS — Z98.890 OTHER SPECIFIED POSTPROCEDURAL STATES: Chronic | ICD-10-CM

## 2024-09-10 DIAGNOSIS — K81.0 ACUTE CHOLECYSTITIS: ICD-10-CM

## 2024-09-10 LAB
ALBUMIN SERPL ELPH-MCNC: 2.6 G/DL — LOW (ref 3.3–5)
ALP SERPL-CCNC: 73 U/L — SIGNIFICANT CHANGE UP (ref 40–120)
ALT FLD-CCNC: 19 U/L — SIGNIFICANT CHANGE UP (ref 12–78)
ANION GAP SERPL CALC-SCNC: 7 MMOL/L — SIGNIFICANT CHANGE UP (ref 5–17)
APPEARANCE UR: ABNORMAL
APTT BLD: 33.1 SEC — SIGNIFICANT CHANGE UP (ref 24.5–35.6)
AST SERPL-CCNC: 26 U/L — SIGNIFICANT CHANGE UP (ref 15–37)
BACTERIA # UR AUTO: NEGATIVE /HPF — SIGNIFICANT CHANGE UP
BASOPHILS # BLD AUTO: 0 K/UL — SIGNIFICANT CHANGE UP (ref 0–0.2)
BASOPHILS NFR BLD AUTO: 0 % — SIGNIFICANT CHANGE UP (ref 0–2)
BILIRUB DIRECT SERPL-MCNC: 1 MG/DL — HIGH (ref 0–0.3)
BILIRUB INDIRECT FLD-MCNC: 1.7 MG/DL — HIGH (ref 0.2–1)
BILIRUB SERPL-MCNC: 2.7 MG/DL — HIGH (ref 0.2–1.2)
BILIRUB SERPL-MCNC: 3.3 MG/DL — HIGH (ref 0.2–1.2)
BILIRUB UR-MCNC: ABNORMAL
BLD GP AB SCN SERPL QL: SIGNIFICANT CHANGE UP
BUN SERPL-MCNC: 28 MG/DL — HIGH (ref 7–23)
CALCIUM SERPL-MCNC: 9.3 MG/DL — SIGNIFICANT CHANGE UP (ref 8.5–10.1)
CAST: 14 /LPF — HIGH (ref 0–4)
CHLORIDE SERPL-SCNC: 100 MMOL/L — SIGNIFICANT CHANGE UP (ref 96–108)
CO2 SERPL-SCNC: 21 MMOL/L — LOW (ref 22–31)
COLOR SPEC: ABNORMAL
CREAT SERPL-MCNC: 1.36 MG/DL — HIGH (ref 0.5–1.3)
DIFF PNL FLD: NEGATIVE — SIGNIFICANT CHANGE UP
EGFR: 54 ML/MIN/1.73M2 — LOW
EOSINOPHIL # BLD AUTO: 0 K/UL — SIGNIFICANT CHANGE UP (ref 0–0.5)
EOSINOPHIL NFR BLD AUTO: 0 % — SIGNIFICANT CHANGE UP (ref 0–6)
FLUAV AG NPH QL: SIGNIFICANT CHANGE UP
FLUBV AG NPH QL: SIGNIFICANT CHANGE UP
GLUCOSE SERPL-MCNC: 184 MG/DL — HIGH (ref 70–99)
GLUCOSE UR QL: NEGATIVE MG/DL — SIGNIFICANT CHANGE UP
HCT VFR BLD CALC: 44.7 % — SIGNIFICANT CHANGE UP (ref 39–50)
HGB BLD-MCNC: 15.6 G/DL — SIGNIFICANT CHANGE UP (ref 13–17)
INR BLD: 1.65 RATIO — HIGH (ref 0.85–1.18)
KETONES UR-MCNC: 15 MG/DL
LACTATE SERPL-SCNC: 2 MMOL/L — SIGNIFICANT CHANGE UP (ref 0.7–2)
LACTATE SERPL-SCNC: 2.3 MMOL/L — HIGH (ref 0.7–2)
LEUKOCYTE ESTERASE UR-ACNC: ABNORMAL
LIDOCAIN IGE QN: 17 U/L — SIGNIFICANT CHANGE UP (ref 13–75)
LYMPHOCYTES # BLD AUTO: 1.08 K/UL — SIGNIFICANT CHANGE UP (ref 1–3.3)
LYMPHOCYTES # BLD AUTO: 5 % — LOW (ref 13–44)
MAGNESIUM SERPL-MCNC: 1.6 MG/DL — SIGNIFICANT CHANGE UP (ref 1.6–2.6)
MANUAL SMEAR VERIFICATION: SIGNIFICANT CHANGE UP
MCHC RBC-ENTMCNC: 29.2 PG — SIGNIFICANT CHANGE UP (ref 27–34)
MCHC RBC-ENTMCNC: 34.9 GM/DL — SIGNIFICANT CHANGE UP (ref 32–36)
MCV RBC AUTO: 83.6 FL — SIGNIFICANT CHANGE UP (ref 80–100)
MONOCYTES # BLD AUTO: 0.86 K/UL — SIGNIFICANT CHANGE UP (ref 0–0.9)
MONOCYTES NFR BLD AUTO: 4 % — SIGNIFICANT CHANGE UP (ref 2–14)
NEUTROPHILS # BLD AUTO: 19.61 K/UL — HIGH (ref 1.8–7.4)
NEUTROPHILS NFR BLD AUTO: 91 % — HIGH (ref 43–77)
NITRITE UR-MCNC: POSITIVE
NRBC # BLD: 0 /100 WBCS — SIGNIFICANT CHANGE UP (ref 0–0)
NRBC # BLD: SIGNIFICANT CHANGE UP /100 WBCS (ref 0–0)
PH UR: 5 — SIGNIFICANT CHANGE UP (ref 5–8)
PLAT MORPH BLD: NORMAL — SIGNIFICANT CHANGE UP
PLATELET # BLD AUTO: 148 K/UL — LOW (ref 150–400)
POTASSIUM SERPL-MCNC: 4.2 MMOL/L — SIGNIFICANT CHANGE UP (ref 3.5–5.3)
POTASSIUM SERPL-SCNC: 4.2 MMOL/L — SIGNIFICANT CHANGE UP (ref 3.5–5.3)
PROT SERPL-MCNC: 7.1 GM/DL — SIGNIFICANT CHANGE UP (ref 6–8.3)
PROT UR-MCNC: 30 MG/DL
PROTHROM AB SERPL-ACNC: 18.4 SEC — HIGH (ref 9.5–13)
RBC # BLD: 5.35 M/UL — SIGNIFICANT CHANGE UP (ref 4.2–5.8)
RBC # FLD: 14.5 % — SIGNIFICANT CHANGE UP (ref 10.3–14.5)
RBC BLD AUTO: NORMAL — SIGNIFICANT CHANGE UP
RBC CASTS # UR COMP ASSIST: 2 /HPF — SIGNIFICANT CHANGE UP (ref 0–4)
RSV RNA NPH QL NAA+NON-PROBE: SIGNIFICANT CHANGE UP
SARS-COV-2 RNA SPEC QL NAA+PROBE: SIGNIFICANT CHANGE UP
SODIUM SERPL-SCNC: 128 MMOL/L — LOW (ref 135–145)
SP GR SPEC: 1.03 — SIGNIFICANT CHANGE UP (ref 1–1.03)
SQUAMOUS # UR AUTO: 9 /HPF — HIGH (ref 0–5)
TROPONIN I, HIGH SENSITIVITY RESULT: 4.7 NG/L — SIGNIFICANT CHANGE UP
UROBILINOGEN FLD QL: 1 MG/DL — SIGNIFICANT CHANGE UP (ref 0.2–1)
WBC # BLD: 21.55 K/UL — HIGH (ref 3.8–10.5)
WBC # FLD AUTO: 21.55 K/UL — HIGH (ref 3.8–10.5)
WBC UR QL: 3 /HPF — SIGNIFICANT CHANGE UP (ref 0–5)

## 2024-09-10 PROCEDURE — 36600 WITHDRAWAL OF ARTERIAL BLOOD: CPT

## 2024-09-10 PROCEDURE — 47490 INCISION OF GALLBLADDER: CPT

## 2024-09-10 PROCEDURE — 82247 BILIRUBIN TOTAL: CPT

## 2024-09-10 PROCEDURE — 82248 BILIRUBIN DIRECT: CPT

## 2024-09-10 PROCEDURE — 93005 ELECTROCARDIOGRAM TRACING: CPT

## 2024-09-10 PROCEDURE — 83605 ASSAY OF LACTIC ACID: CPT

## 2024-09-10 PROCEDURE — 94660 CPAP INITIATION&MGMT: CPT

## 2024-09-10 PROCEDURE — 0241U: CPT

## 2024-09-10 PROCEDURE — 87150 DNA/RNA AMPLIFIED PROBE: CPT

## 2024-09-10 PROCEDURE — 87040 BLOOD CULTURE FOR BACTERIA: CPT

## 2024-09-10 PROCEDURE — 87186 SC STD MICRODIL/AGAR DIL: CPT

## 2024-09-10 PROCEDURE — 83880 ASSAY OF NATRIURETIC PEPTIDE: CPT

## 2024-09-10 PROCEDURE — 74177 CT ABD & PELVIS W/CONTRAST: CPT | Mod: 26,MC

## 2024-09-10 PROCEDURE — 87640 STAPH A DNA AMP PROBE: CPT

## 2024-09-10 PROCEDURE — 80076 HEPATIC FUNCTION PANEL: CPT

## 2024-09-10 PROCEDURE — 84443 ASSAY THYROID STIM HORMONE: CPT

## 2024-09-10 PROCEDURE — 82550 ASSAY OF CK (CPK): CPT

## 2024-09-10 PROCEDURE — 87070 CULTURE OTHR SPECIMN AEROBIC: CPT

## 2024-09-10 PROCEDURE — C1729: CPT

## 2024-09-10 PROCEDURE — 93010 ELECTROCARDIOGRAM REPORT: CPT

## 2024-09-10 PROCEDURE — 97162 PT EVAL MOD COMPLEX 30 MIN: CPT | Mod: GP

## 2024-09-10 PROCEDURE — 83735 ASSAY OF MAGNESIUM: CPT

## 2024-09-10 PROCEDURE — 83036 HEMOGLOBIN GLYCOSYLATED A1C: CPT

## 2024-09-10 PROCEDURE — 87075 CULTR BACTERIA EXCEPT BLOOD: CPT

## 2024-09-10 PROCEDURE — 85610 PROTHROMBIN TIME: CPT

## 2024-09-10 PROCEDURE — 97530 THERAPEUTIC ACTIVITIES: CPT | Mod: GP

## 2024-09-10 PROCEDURE — 99285 EMERGENCY DEPT VISIT HI MDM: CPT

## 2024-09-10 PROCEDURE — 84100 ASSAY OF PHOSPHORUS: CPT

## 2024-09-10 PROCEDURE — 87015 SPECIMEN INFECT AGNT CONCNTJ: CPT

## 2024-09-10 PROCEDURE — 82803 BLOOD GASES ANY COMBINATION: CPT

## 2024-09-10 PROCEDURE — 80053 COMPREHEN METABOLIC PANEL: CPT

## 2024-09-10 PROCEDURE — 76705 ECHO EXAM OF ABDOMEN: CPT | Mod: 26

## 2024-09-10 PROCEDURE — 87641 MR-STAPH DNA AMP PROBE: CPT

## 2024-09-10 PROCEDURE — 71045 X-RAY EXAM CHEST 1 VIEW: CPT

## 2024-09-10 PROCEDURE — 82330 ASSAY OF CALCIUM: CPT

## 2024-09-10 PROCEDURE — 97116 GAIT TRAINING THERAPY: CPT | Mod: GP

## 2024-09-10 PROCEDURE — 82962 GLUCOSE BLOOD TEST: CPT

## 2024-09-10 PROCEDURE — 85730 THROMBOPLASTIN TIME PARTIAL: CPT

## 2024-09-10 PROCEDURE — C1769: CPT

## 2024-09-10 PROCEDURE — 36415 COLL VENOUS BLD VENIPUNCTURE: CPT

## 2024-09-10 PROCEDURE — 93306 TTE W/DOPPLER COMPLETE: CPT

## 2024-09-10 PROCEDURE — 85025 COMPLETE CBC W/AUTO DIFF WBC: CPT

## 2024-09-10 PROCEDURE — 80048 BASIC METABOLIC PNL TOTAL CA: CPT

## 2024-09-10 PROCEDURE — 87077 CULTURE AEROBIC IDENTIFY: CPT

## 2024-09-10 PROCEDURE — 85027 COMPLETE CBC AUTOMATED: CPT

## 2024-09-10 RX ORDER — SODIUM CHLORIDE 9 MG/ML
1000 INJECTION INTRAMUSCULAR; INTRAVENOUS; SUBCUTANEOUS ONCE
Refills: 0 | Status: COMPLETED | OUTPATIENT
Start: 2024-09-10 | End: 2024-09-10

## 2024-09-10 RX ORDER — ONDANSETRON 2 MG/ML
4 INJECTION, SOLUTION INTRAMUSCULAR; INTRAVENOUS ONCE
Refills: 0 | Status: DISCONTINUED | OUTPATIENT
Start: 2024-09-10 | End: 2024-09-18

## 2024-09-10 RX ORDER — ACETAMINOPHEN 325 MG/1
650 TABLET ORAL ONCE
Refills: 0 | Status: DISCONTINUED | OUTPATIENT
Start: 2024-09-10 | End: 2024-09-12

## 2024-09-10 RX ORDER — PIPERACILLIN SODIUM AND TAZOBACTAM SODIUM 3; .375 G/15ML; G/15ML
3.38 INJECTION, POWDER, FOR SOLUTION INTRAVENOUS ONCE
Refills: 0 | Status: COMPLETED | OUTPATIENT
Start: 2024-09-10 | End: 2024-09-10

## 2024-09-10 RX ORDER — SODIUM CHLORIDE 9 MG/ML
500 INJECTION INTRAMUSCULAR; INTRAVENOUS; SUBCUTANEOUS ONCE
Refills: 0 | Status: COMPLETED | OUTPATIENT
Start: 2024-09-10 | End: 2024-09-10

## 2024-09-10 RX ADMIN — SODIUM CHLORIDE 1000 MILLILITER(S): 9 INJECTION INTRAMUSCULAR; INTRAVENOUS; SUBCUTANEOUS at 17:30

## 2024-09-10 RX ADMIN — SODIUM CHLORIDE 500 MILLILITER(S): 9 INJECTION INTRAMUSCULAR; INTRAVENOUS; SUBCUTANEOUS at 17:57

## 2024-09-10 RX ADMIN — PIPERACILLIN SODIUM AND TAZOBACTAM SODIUM 200 GRAM(S): 3; .375 INJECTION, POWDER, FOR SOLUTION INTRAVENOUS at 17:31

## 2024-09-10 RX ADMIN — SODIUM CHLORIDE 500 MILLILITER(S): 9 INJECTION INTRAMUSCULAR; INTRAVENOUS; SUBCUTANEOUS at 18:30

## 2024-09-10 RX ADMIN — SODIUM CHLORIDE 1000 MILLILITER(S): 9 INJECTION INTRAMUSCULAR; INTRAVENOUS; SUBCUTANEOUS at 17:45

## 2024-09-10 RX ADMIN — SODIUM CHLORIDE 1000 MILLILITER(S): 9 INJECTION INTRAMUSCULAR; INTRAVENOUS; SUBCUTANEOUS at 16:50

## 2024-09-10 RX ADMIN — SODIUM CHLORIDE 1000 MILLILITER(S): 9 INJECTION INTRAMUSCULAR; INTRAVENOUS; SUBCUTANEOUS at 16:22

## 2024-09-10 RX ADMIN — PIPERACILLIN SODIUM AND TAZOBACTAM SODIUM 3.38 GRAM(S): 3; .375 INJECTION, POWDER, FOR SOLUTION INTRAVENOUS at 17:55

## 2024-09-10 NOTE — ED ADULT NURSE NOTE - OBJECTIVE STATEMENT
Pt is a 73 y/o male, alert and oriented x3, presenting to ED c/o fever. Pt reports, "I was discharged from  2 days ago for abdominal pain/gallstones and was told by the doctor to return if I develop a fever. Today around 1 PM, I recorded my temperature and it was 100.7. I am also still having right sided mid-abdominal pain." Pt denies taking OTC medications for fever today. Pt denies urinary complaints, chest pain, dizziness, SOB. Pt on Eliquis for h/o A-fib.   Pt placed on cardiac monitor and IV lines placed on RIGHT arm.

## 2024-09-10 NOTE — ED PROVIDER NOTE - CROS ED RESP ALL NEG
----- Message from Selma Yepez sent at 2/8/2022 10:36 AM CST -----  Type:  Needs Medical Advice    Who Called:Esteban  from [facility]   Would the patient rather a call back or a response via [employer]? Asif back  Best Call Back Number: [phone] ext [phone]  Additional Information: Needs to know if pt is on a specific treatment. Please call to advice         negative...

## 2024-09-10 NOTE — ED ADULT TRIAGE NOTE - IDEAL BODY WEIGHT(KG)
Infusion Nursing Note:  Lauri Soto presents today for ZIEXTENZO injection.    Patient seen by provider today: No   present during visit today: Not Applicable.    Note: N/A.      Treatment Conditions:  Per treatment plan.      Post Infusion Assessment:  Patient tolerated injection without incident.       Discharge Plan:   Patient discharged in stable condition accompanied by: self.  Departure Mode: Ambulatory.      Rosy Martinez RN                      
74

## 2024-09-10 NOTE — ED ADULT NURSE REASSESSMENT NOTE - NS ED NURSE REASSESS COMMENT FT1
Received bedside report from previous RN Lexus. Pt lying in stretcher with unlabored respirations, denies any pain, and has no complaints at this time. Safety and comfort maintained.

## 2024-09-10 NOTE — ED ADULT TRIAGE NOTE - CHIEF COMPLAINT QUOTE
pt brought in by EMS from home c/o fever x1 day tmax 100.7. no meds taken prior to arrival. was here 2 days ago for gallstones. a/o4 in triage.

## 2024-09-10 NOTE — ED PROVIDER NOTE - OBJECTIVE STATEMENT
74M c/o fever 101 and weakness today. Intermittent abd pain x 2 days. Pt seen in ED 2 days ago for similar symptoms and was DCed, advised to return if developed fever. No N/V. Pt hypotensive 95 systolic, baseline normally round 120. 74M c/o fever 101 and weakness today. Intermittent abd pain x 2 days. Pt seen in ED 2 days ago for similar symptoms, diagnosed w/ gallstones and was DCed, advised to return if developed fever. No N/V. Pt hypotensive 95 systolic, baseline normally round 120.

## 2024-09-10 NOTE — ED PROVIDER NOTE - CLINICAL SUMMARY MEDICAL DECISION MAKING FREE TEXT BOX
Krystal El MD, ED Attending: meeting SIRS criteria on arrival, pt given IBW fluids, zosyn. CT and RUQ US with cholecystitis. Case discussed w. surgery; pt refusing surgery. Surgery recommended medicine admit for possible MRCP. Case discussed w/ hospitalist and admitted to their service.  UA with sterile pyuria in setting of cholecystitis  pt informed of all labs and imaging.   admitted to medicine. Krystal El MD, ED Attending: meeting SIRS criteria on arrival, pt given IBW fluids, zosyn.   initially came to ED w/ systolic of 95; this resolved with ivf's. no pressors started.  CT and RUQ US with cholecystitis. Case discussed w. surgery; pt refusing surgery. Surgery recommended medicine admit for possible MRCP. Case discussed w/ hospitalist and admitted to their service.  labs w/ SUKUMAR, UA with sterile pyuria in setting of cholecystitis  pt informed of all labs and imaging.   admitted to medicine.

## 2024-09-10 NOTE — ED PROVIDER NOTE - PROGRESS NOTE DETAILS
Antolin Talbot for Dr. El  Surgical resident called and will be down to see the pt. signed Carito Perry PA-C   I spoke to surgery resident. Pt refusing surgery at this time. recommend admit to medicine, sx to follow as consult, MRCP as inpt. Pt agrees with admission and plan of care. hospitalist paged  mild SUKUMAR on labs signed Carito Perry PA-C   Case discussed with and admission accepted by hospitalist Dr. Sandoval

## 2024-09-10 NOTE — ED PROVIDER NOTE - CARE PLAN
Principal Discharge DX:	Acute cholecystitis  Secondary Diagnosis:	SUKUMAR (acute kidney injury)   1 Principal Discharge DX:	Acute cholecystitis  Secondary Diagnosis:	SUKUMAR (acute kidney injury)  Secondary Diagnosis:	Bilateral renal masses

## 2024-09-10 NOTE — CONSULT NOTE ADULT - SUBJECTIVE AND OBJECTIVE BOX
HPI: 73 yo M with PMHx of h/o obesity (BMI 50.4), Afib on Eliquis (last dose 9/10 AM), BPH, hypothyroid, gout, HTN, DM, presenting with worsening abdominal pain starting a couple of days ago. States that these episodes have happened in the past. On chart review previously has had ERCP, and choledocholithiasis before. Tbili at 3.3. Overtly jaundiced.         PAST MEDICAL & SURGICAL HISTORY:  Dyslipidemia      Hypertension      Afib  on coumadin      Chronic Gout      Anxiety      History of Transient Ischemic Attack (TIA)  approx       History of Obesity      Obstructive Sleep Apnea  CPAP currenlty      Hypothyroid      BPH (benign prostatic hyperplasia)      H/O cholangitis      Multiple falls  now wheelchair bound      DM (diabetes mellitus)      History of Tonsillectomy      S/P ERCP          MEDICATIONS  (STANDING):    MEDICATIONS  (PRN):      Allergies    metformin (Unknown)    Intolerances        SOCIAL HISTORY:    FAMILY HISTORY:  FH: myocardial infarction (Father)            Physical Exam:  General: Obese male, in some distress, jaundiced  HEENT: NC/AT, EOMI, normal hearing,  Pulmonary: normal resp effort  Abdominal: soft, tender to epigastric and RUQ, non distended, not peritoneal  Extremities: WWP, normal strength  Neuro: A/O x 3, CNs II-XII grossly intact, normal sensation, no focal deficits    Vital Signs Last 24 Hrs  T(C): 36.4 (10 Sep 2024 15:58), Max: 36.4 (10 Sep 2024 15:58)  T(F): 97.5 (10 Sep 2024 15:58), Max: 97.5 (10 Sep 2024 15:58)  HR: 86 (10 Sep 2024 17:30) (83 - 91)  BP: 111/68 (10 Sep 2024 17:30) (84/64 - 111/68)  BP(mean): 81 (10 Sep 2024 17:30) (72 - 89)  RR: 25 (10 Sep 2024 17:30) (19 - 33)  SpO2: 99% (10 Sep 2024 17:30) (94% - 99%)    Parameters below as of 10 Sep 2024 17:30  Patient On (Oxygen Delivery Method): room air        I&O's Summary          LABS:                        15.6   21.55 )-----------( 148      ( 10 Sep 2024 16:16 )             44.7     09-10    128<L>  |  100  |  28<H>  ----------------------------<  184<H>  4.2   |  21<L>  |  1.36<H>    Ca    9.3      10 Sep 2024 16:16  Mg     1.6     09-10    TPro  7.1  /  Alb  2.6<L>  /  TBili  3.3<H>  /  DBili  x   /  AST  26  /  ALT  19  /  AlkPhos  73  -10    PT/INR - ( 10 Sep 2024 16:16 )   PT: 18.4 sec;   INR: 1.65 ratio         PTT - ( 10 Sep 2024 16:16 )  PTT:33.1 sec  Urinalysis Basic - ( 10 Sep 2024 17:25 )    Color: Orange / Appearance: Cloudy / S.026 / pH: x  Gluc: x / Ketone: 15 mg/dL  / Bili: Moderate / Urobili: 1.0 mg/dL   Blood: x / Protein: 30 mg/dL / Nitrite: Positive   Leuk Esterase: Small / RBC: 2 /HPF / WBC 3 /HPF   Sq Epi: x / Non Sq Epi: 9 /HPF / Bacteria: Negative /HPF      CAPILLARY BLOOD GLUCOSE        LIVER FUNCTIONS - ( 10 Sep 2024 16:16 )  Alb: 2.6 g/dL / Pro: 7.1 gm/dL / ALK PHOS: 73 U/L / ALT: 19 U/L / AST: 26 U/L / GGT: x             Cultures:      RADIOLOGY & ADDITIONAL STUDIES:  CTAP: New severe gallbladder wall thickening and pericholecystic   stranding in the setting of cholelithiasis is suspicious for acute   cholecystitis. Recommend clinical correlation and consider right upper   quadrant ultrasound.      75yo M with extensive MHx coming in with acute cholecystitis, likely choledocholithiasis  Plan:  Medical eval, admission, optimization  GI consult for poss ERCP  Add on lab for Direct and indirect bilirubin  IV Abx  MRCP  Trend LFTs  Patient currently refusing surgery, will like time to reconsider, explained risks of refusal, will continue discussions  Consider cholecystomy tube if not amendable to surgery  Surgery will follow     d/w Dr. Glynn         HPI: 75 yo M with PMHx of h/o obesity (BMI >50), Afib on Eliquis (last dose 910 AM), BPH, hypothyroid, gout, HTN, DM, presenting with worsening abdominal pain starting a couple of days ago. States that these episodes have happened in the past. On chart review previously has had ERCP, and choledocholithiasis before. Tbili at 3.3. Overtly jaundiced.         PAST MEDICAL & SURGICAL HISTORY:  Dyslipidemia      Hypertension      Afib  on coumadin      Chronic Gout      Anxiety      History of Transient Ischemic Attack (TIA)  approx       History of Obesity      Obstructive Sleep Apnea  CPAP currenlty      Hypothyroid      BPH (benign prostatic hyperplasia)      H/O cholangitis      Multiple falls  now wheelchair bound      DM (diabetes mellitus)      History of Tonsillectomy      S/P ERCP          MEDICATIONS  (STANDING):    MEDICATIONS  (PRN):      Allergies    metformin (Unknown)    Intolerances        SOCIAL HISTORY:    FAMILY HISTORY:  FH: myocardial infarction (Father)            Physical Exam:  General: Obese male, in some distress, jaundiced  HEENT: NC/AT, EOMI, normal hearing,  Pulmonary: normal resp effort  Abdominal: soft, tender to epigastric and RUQ, non distended, not peritoneal  Extremities: WWP, normal strength  Neuro: A/O x 3, CNs II-XII grossly intact, normal sensation, no focal deficits    Vital Signs Last 24 Hrs  T(C): 36.4 (10 Sep 2024 15:58), Max: 36.4 (10 Sep 2024 15:58)  T(F): 97.5 (10 Sep 2024 15:58), Max: 97.5 (10 Sep 2024 15:58)  HR: 86 (10 Sep 2024 17:30) (83 - 91)  BP: 111/68 (10 Sep 2024 17:30) (84/64 - 111/68)  BP(mean): 81 (10 Sep 2024 17:30) (72 - 89)  RR: 25 (10 Sep 2024 17:30) (19 - 33)  SpO2: 99% (10 Sep 2024 17:30) (94% - 99%)    Parameters below as of 10 Sep 2024 17:30  Patient On (Oxygen Delivery Method): room air        I&O's Summary          LABS:                        15.6   21.55 )-----------( 148      ( 10 Sep 2024 16:16 )             44.7     09-10    128<L>  |  100  |  28<H>  ----------------------------<  184<H>  4.2   |  21<L>  |  1.36<H>    Ca    9.3      10 Sep 2024 16:16  Mg     1.6     09-10    TPro  7.1  /  Alb  2.6<L>  /  TBili  3.3<H>  /  DBili  x   /  AST  26  /  ALT  19  /  AlkPhos  73  -10    PT/INR - ( 10 Sep 2024 16:16 )   PT: 18.4 sec;   INR: 1.65 ratio         PTT - ( 10 Sep 2024 16:16 )  PTT:33.1 sec  Urinalysis Basic - ( 10 Sep 2024 17:25 )    Color: Orange / Appearance: Cloudy / S.026 / pH: x  Gluc: x / Ketone: 15 mg/dL  / Bili: Moderate / Urobili: 1.0 mg/dL   Blood: x / Protein: 30 mg/dL / Nitrite: Positive   Leuk Esterase: Small / RBC: 2 /HPF / WBC 3 /HPF   Sq Epi: x / Non Sq Epi: 9 /HPF / Bacteria: Negative /HPF      CAPILLARY BLOOD GLUCOSE        LIVER FUNCTIONS - ( 10 Sep 2024 16:16 )  Alb: 2.6 g/dL / Pro: 7.1 gm/dL / ALK PHOS: 73 U/L / ALT: 19 U/L / AST: 26 U/L / GGT: x             Cultures:      RADIOLOGY & ADDITIONAL STUDIES:  CTAP: New severe gallbladder wall thickening and pericholecystic   stranding in the setting of cholelithiasis is suspicious for acute   cholecystitis. Recommend clinical correlation and consider right upper   quadrant ultrasound.      75yo M with extensive MHx coming in with acute cholecystitis, likely choledocholithiasis  Plan:  Medical eval, admission, optimization  GI consult for poss ERCP  Add on lab for Direct and indirect bilirubin  IV Abx  MRCP  Trend LFTs  Patient currently refusing surgery, will like time to reconsider, explained risks of refusal, will continue discussions  Consider cholecystomy tube if not amendable to surgery  Surgery will follow     d/w Dr. Glynn

## 2024-09-10 NOTE — ED ADULT NURSE NOTE - NSFALLHARMRISKINTERV_ED_ALL_ED

## 2024-09-11 ENCOUNTER — RESULT REVIEW (OUTPATIENT)
Age: 74
End: 2024-09-11

## 2024-09-11 DIAGNOSIS — K81.9 CHOLECYSTITIS, UNSPECIFIED: ICD-10-CM

## 2024-09-11 DIAGNOSIS — G47.33 OBSTRUCTIVE SLEEP APNEA (ADULT) (PEDIATRIC): ICD-10-CM

## 2024-09-11 DIAGNOSIS — Z01.810 ENCOUNTER FOR PREPROCEDURAL CARDIOVASCULAR EXAMINATION: ICD-10-CM

## 2024-09-11 DIAGNOSIS — Z29.9 ENCOUNTER FOR PROPHYLACTIC MEASURES, UNSPECIFIED: ICD-10-CM

## 2024-09-11 DIAGNOSIS — N40.0 BENIGN PROSTATIC HYPERPLASIA WITHOUT LOWER URINARY TRACT SYMPTOMS: ICD-10-CM

## 2024-09-11 DIAGNOSIS — M10.9 GOUT, UNSPECIFIED: ICD-10-CM

## 2024-09-11 DIAGNOSIS — K80.50 CALCULUS OF BILE DUCT WITHOUT CHOLANGITIS OR CHOLECYSTITIS WITHOUT OBSTRUCTION: ICD-10-CM

## 2024-09-11 DIAGNOSIS — E03.9 HYPOTHYROIDISM, UNSPECIFIED: ICD-10-CM

## 2024-09-11 DIAGNOSIS — I48.91 UNSPECIFIED ATRIAL FIBRILLATION: ICD-10-CM

## 2024-09-11 LAB
ADD ON TEST-SPECIMEN IN LAB: SIGNIFICANT CHANGE UP
ADD ON TEST-SPECIMEN IN LAB: SIGNIFICANT CHANGE UP
ALBUMIN SERPL ELPH-MCNC: 2.3 G/DL — LOW (ref 3.3–5)
ALBUMIN SERPL ELPH-MCNC: 2.4 G/DL — LOW (ref 3.3–5)
ALP SERPL-CCNC: 72 U/L — SIGNIFICANT CHANGE UP (ref 40–120)
ALP SERPL-CCNC: 89 U/L — SIGNIFICANT CHANGE UP (ref 40–120)
ALT FLD-CCNC: 18 U/L — SIGNIFICANT CHANGE UP (ref 12–78)
ALT FLD-CCNC: 27 U/L — SIGNIFICANT CHANGE UP (ref 12–78)
ANION GAP SERPL CALC-SCNC: 8 MMOL/L — SIGNIFICANT CHANGE UP (ref 5–17)
ANION GAP SERPL CALC-SCNC: 8 MMOL/L — SIGNIFICANT CHANGE UP (ref 5–17)
APTT BLD: 40.3 SEC — HIGH (ref 24.5–35.6)
APTT BLD: 67.5 SEC — HIGH (ref 24.5–35.6)
AST SERPL-CCNC: 15 U/L — SIGNIFICANT CHANGE UP (ref 15–37)
AST SERPL-CCNC: 21 U/L — SIGNIFICANT CHANGE UP (ref 15–37)
BASE EXCESS BLDV CALC-SCNC: -0.9 MMOL/L — SIGNIFICANT CHANGE UP (ref -2–3)
BASOPHILS # BLD AUTO: 0.03 K/UL — SIGNIFICANT CHANGE UP (ref 0–0.2)
BASOPHILS NFR BLD AUTO: 0.2 % — SIGNIFICANT CHANGE UP (ref 0–2)
BILIRUB DIRECT SERPL-MCNC: 1.1 MG/DL — HIGH (ref 0–0.3)
BILIRUB DIRECT SERPL-MCNC: 1.3 MG/DL — HIGH (ref 0–0.3)
BILIRUB INDIRECT FLD-MCNC: 1.4 MG/DL — HIGH (ref 0.2–1)
BILIRUB INDIRECT FLD-MCNC: 1.5 MG/DL — HIGH (ref 0.2–1)
BILIRUB SERPL-MCNC: 2.6 MG/DL — HIGH (ref 0.2–1.2)
BILIRUB SERPL-MCNC: 2.7 MG/DL — HIGH (ref 0.2–1.2)
BILIRUB SERPL-MCNC: 2.7 MG/DL — HIGH (ref 0.2–1.2)
BUN SERPL-MCNC: 15 MG/DL — SIGNIFICANT CHANGE UP (ref 7–23)
BUN SERPL-MCNC: 17 MG/DL — SIGNIFICANT CHANGE UP (ref 7–23)
CALCIUM SERPL-MCNC: 9.1 MG/DL — SIGNIFICANT CHANGE UP (ref 8.5–10.1)
CALCIUM SERPL-MCNC: 9.4 MG/DL — SIGNIFICANT CHANGE UP (ref 8.5–10.1)
CHLORIDE SERPL-SCNC: 104 MMOL/L — SIGNIFICANT CHANGE UP (ref 96–108)
CHLORIDE SERPL-SCNC: 105 MMOL/L — SIGNIFICANT CHANGE UP (ref 96–108)
CO2 SERPL-SCNC: 22 MMOL/L — SIGNIFICANT CHANGE UP (ref 22–31)
CO2 SERPL-SCNC: 22 MMOL/L — SIGNIFICANT CHANGE UP (ref 22–31)
CREAT SERPL-MCNC: 0.87 MG/DL — SIGNIFICANT CHANGE UP (ref 0.5–1.3)
CREAT SERPL-MCNC: 0.88 MG/DL — SIGNIFICANT CHANGE UP (ref 0.5–1.3)
EGFR: 90 ML/MIN/1.73M2 — SIGNIFICANT CHANGE UP
EGFR: 90 ML/MIN/1.73M2 — SIGNIFICANT CHANGE UP
EOSINOPHIL # BLD AUTO: 0 K/UL — SIGNIFICANT CHANGE UP (ref 0–0.5)
EOSINOPHIL NFR BLD AUTO: 0 % — SIGNIFICANT CHANGE UP (ref 0–6)
GAS PNL BLDV: SIGNIFICANT CHANGE UP
GLUCOSE SERPL-MCNC: 146 MG/DL — HIGH (ref 70–99)
GLUCOSE SERPL-MCNC: 175 MG/DL — HIGH (ref 70–99)
HCO3 BLDV-SCNC: 22 MMOL/L — SIGNIFICANT CHANGE UP (ref 22–29)
HCT VFR BLD CALC: 41.8 % — SIGNIFICANT CHANGE UP (ref 39–50)
HCT VFR BLD CALC: 41.9 % — SIGNIFICANT CHANGE UP (ref 39–50)
HGB BLD-MCNC: 14.4 G/DL — SIGNIFICANT CHANGE UP (ref 13–17)
HGB BLD-MCNC: 14.7 G/DL — SIGNIFICANT CHANGE UP (ref 13–17)
IMM GRANULOCYTES NFR BLD AUTO: 1 % — HIGH (ref 0–0.9)
LACTATE SERPL-SCNC: 1.3 MMOL/L — SIGNIFICANT CHANGE UP (ref 0.7–2)
LACTATE SERPL-SCNC: 3.9 MMOL/L — HIGH (ref 0.7–2)
LYMPHOCYTES # BLD AUTO: 0.88 K/UL — LOW (ref 1–3.3)
LYMPHOCYTES # BLD AUTO: 5.8 % — LOW (ref 13–44)
MAGNESIUM SERPL-MCNC: 1.6 MG/DL — SIGNIFICANT CHANGE UP (ref 1.6–2.6)
MAGNESIUM SERPL-MCNC: 1.6 MG/DL — SIGNIFICANT CHANGE UP (ref 1.6–2.6)
MCHC RBC-ENTMCNC: 28.8 PG — SIGNIFICANT CHANGE UP (ref 27–34)
MCHC RBC-ENTMCNC: 29 PG — SIGNIFICANT CHANGE UP (ref 27–34)
MCHC RBC-ENTMCNC: 34.4 GM/DL — SIGNIFICANT CHANGE UP (ref 32–36)
MCHC RBC-ENTMCNC: 35.1 GM/DL — SIGNIFICANT CHANGE UP (ref 32–36)
MCV RBC AUTO: 82.6 FL — SIGNIFICANT CHANGE UP (ref 80–100)
MCV RBC AUTO: 83.6 FL — SIGNIFICANT CHANGE UP (ref 80–100)
MONOCYTES # BLD AUTO: 0.84 K/UL — SIGNIFICANT CHANGE UP (ref 0–0.9)
MONOCYTES NFR BLD AUTO: 5.5 % — SIGNIFICANT CHANGE UP (ref 2–14)
NEUTROPHILS # BLD AUTO: 13.37 K/UL — HIGH (ref 1.8–7.4)
NEUTROPHILS NFR BLD AUTO: 87.5 % — HIGH (ref 43–77)
PCO2 BLDV: 31 MMHG — LOW (ref 42–55)
PH BLDV: 7.46 — HIGH (ref 7.32–7.43)
PHOSPHATE SERPL-MCNC: 1.2 MG/DL — LOW (ref 2.5–4.5)
PHOSPHATE SERPL-MCNC: 2.1 MG/DL — LOW (ref 2.5–4.5)
PLATELET # BLD AUTO: 124 K/UL — LOW (ref 150–400)
PLATELET # BLD AUTO: 129 K/UL — LOW (ref 150–400)
PO2 BLDV: 127 MMHG — HIGH (ref 25–45)
POTASSIUM SERPL-MCNC: 3.3 MMOL/L — LOW (ref 3.5–5.3)
POTASSIUM SERPL-MCNC: 3.6 MMOL/L — SIGNIFICANT CHANGE UP (ref 3.5–5.3)
POTASSIUM SERPL-SCNC: 3.3 MMOL/L — LOW (ref 3.5–5.3)
POTASSIUM SERPL-SCNC: 3.6 MMOL/L — SIGNIFICANT CHANGE UP (ref 3.5–5.3)
PROT SERPL-MCNC: 6.4 GM/DL — SIGNIFICANT CHANGE UP (ref 6–8.3)
PROT SERPL-MCNC: 6.8 GM/DL — SIGNIFICANT CHANGE UP (ref 6–8.3)
RBC # BLD: 5 M/UL — SIGNIFICANT CHANGE UP (ref 4.2–5.8)
RBC # BLD: 5.07 M/UL — SIGNIFICANT CHANGE UP (ref 4.2–5.8)
RBC # FLD: 14.4 % — SIGNIFICANT CHANGE UP (ref 10.3–14.5)
RBC # FLD: 14.6 % — HIGH (ref 10.3–14.5)
SAO2 % BLDV: 99 % — HIGH (ref 67–88)
SODIUM SERPL-SCNC: 134 MMOL/L — LOW (ref 135–145)
SODIUM SERPL-SCNC: 135 MMOL/L — SIGNIFICANT CHANGE UP (ref 135–145)
WBC # BLD: 15.27 K/UL — HIGH (ref 3.8–10.5)
WBC # BLD: 16.13 K/UL — HIGH (ref 3.8–10.5)
WBC # FLD AUTO: 15.27 K/UL — HIGH (ref 3.8–10.5)
WBC # FLD AUTO: 16.13 K/UL — HIGH (ref 3.8–10.5)

## 2024-09-11 PROCEDURE — 93010 ELECTROCARDIOGRAM REPORT: CPT

## 2024-09-11 PROCEDURE — 71045 X-RAY EXAM CHEST 1 VIEW: CPT | Mod: 26,76

## 2024-09-11 PROCEDURE — 99223 1ST HOSP IP/OBS HIGH 75: CPT

## 2024-09-11 PROCEDURE — 99222 1ST HOSP IP/OBS MODERATE 55: CPT

## 2024-09-11 PROCEDURE — 99233 SBSQ HOSP IP/OBS HIGH 50: CPT

## 2024-09-11 PROCEDURE — 93306 TTE W/DOPPLER COMPLETE: CPT | Mod: 26

## 2024-09-11 RX ORDER — ACETAMINOPHEN 325 MG/1
1000 TABLET ORAL ONCE
Refills: 0 | Status: COMPLETED | OUTPATIENT
Start: 2024-09-11 | End: 2024-09-11

## 2024-09-11 RX ORDER — METOPROLOL TARTRATE 100 MG/1
5 TABLET ORAL ONCE
Refills: 0 | Status: COMPLETED | OUTPATIENT
Start: 2024-09-11 | End: 2024-09-11

## 2024-09-11 RX ORDER — NYSTATIN 100000/G
1 CREAM (GRAM) TOPICAL
Refills: 0 | Status: DISCONTINUED | OUTPATIENT
Start: 2024-09-11 | End: 2024-09-18

## 2024-09-11 RX ORDER — DEXMEDETOMIDINE HYDROCHLORIDE IN 0.9% SODIUM CHLORIDE 4 UG/ML
0.2 INJECTION INTRAVENOUS
Qty: 400 | Refills: 0 | Status: DISCONTINUED | OUTPATIENT
Start: 2024-09-11 | End: 2024-09-12

## 2024-09-11 RX ORDER — HEPARIN SODIUM,BOVINE 1000/ML
10000 VIAL (ML) INJECTION EVERY 6 HOURS
Refills: 0 | Status: DISCONTINUED | OUTPATIENT
Start: 2024-09-11 | End: 2024-09-11

## 2024-09-11 RX ORDER — MAGNESIUM, ALUMINUM HYDROXIDE 200-225/5
30 SUSPENSION, ORAL (FINAL DOSE FORM) ORAL EVERY 4 HOURS
Refills: 0 | Status: DISCONTINUED | OUTPATIENT
Start: 2024-09-11 | End: 2024-09-18

## 2024-09-11 RX ORDER — HEPARIN SODIUM,BOVINE 1000/ML
5000 VIAL (ML) INJECTION EVERY 6 HOURS
Refills: 0 | Status: DISCONTINUED | OUTPATIENT
Start: 2024-09-11 | End: 2024-09-12

## 2024-09-11 RX ORDER — HEPARIN SODIUM,BOVINE 1000/ML
VIAL (ML) INJECTION
Qty: 25000 | Refills: 0 | Status: DISCONTINUED | OUTPATIENT
Start: 2024-09-11 | End: 2024-09-11

## 2024-09-11 RX ORDER — HEPARIN SODIUM,BOVINE 1000/ML
10000 VIAL (ML) INJECTION EVERY 6 HOURS
Refills: 0 | Status: DISCONTINUED | OUTPATIENT
Start: 2024-09-11 | End: 2024-09-12

## 2024-09-11 RX ORDER — DILTIAZEM HYDROCHLORIDE 5 MG/ML
35 INJECTION INTRAVENOUS ONCE
Refills: 0 | Status: COMPLETED | OUTPATIENT
Start: 2024-09-11 | End: 2024-09-11

## 2024-09-11 RX ORDER — SODIUM CHLORIDE 9 MG/ML
1000 INJECTION INTRAMUSCULAR; INTRAVENOUS; SUBCUTANEOUS
Refills: 0 | Status: DISCONTINUED | OUTPATIENT
Start: 2024-09-11 | End: 2024-09-11

## 2024-09-11 RX ORDER — POTASSIUM CHLORIDE 10 MEQ
10 TABLET, EXT RELEASE, PARTICLES/CRYSTALS ORAL
Refills: 0 | Status: COMPLETED | OUTPATIENT
Start: 2024-09-11 | End: 2024-09-11

## 2024-09-11 RX ORDER — POTASSIUM PHOSPHATE 236; 224 MG/ML; MG/ML
30 INJECTION, SOLUTION INTRAVENOUS ONCE
Refills: 0 | Status: COMPLETED | OUTPATIENT
Start: 2024-09-11 | End: 2024-09-11

## 2024-09-11 RX ORDER — TRAZODONE HCL 50 MG
150 TABLET ORAL AT BEDTIME
Refills: 0 | Status: DISCONTINUED | OUTPATIENT
Start: 2024-09-11 | End: 2024-09-13

## 2024-09-11 RX ORDER — CICLOPIROX OLAMINE 7.7 MG/G
1 CREAM TOPICAL
Refills: 0 | DISCHARGE

## 2024-09-11 RX ORDER — HEPARIN SODIUM,BOVINE 1000/ML
5000 VIAL (ML) INJECTION EVERY 6 HOURS
Refills: 0 | Status: DISCONTINUED | OUTPATIENT
Start: 2024-09-11 | End: 2024-09-11

## 2024-09-11 RX ORDER — FUROSEMIDE 40 MG
20 TABLET ORAL ONCE
Refills: 0 | Status: COMPLETED | OUTPATIENT
Start: 2024-09-11 | End: 2024-09-11

## 2024-09-11 RX ORDER — POTASSIUM PHOSPHATE 236; 224 MG/ML; MG/ML
15 INJECTION, SOLUTION INTRAVENOUS ONCE
Refills: 0 | Status: COMPLETED | OUTPATIENT
Start: 2024-09-11 | End: 2024-09-11

## 2024-09-11 RX ORDER — METOPROLOL TARTRATE 100 MG/1
100 TABLET ORAL
Refills: 0 | Status: DISCONTINUED | OUTPATIENT
Start: 2024-09-11 | End: 2024-09-12

## 2024-09-11 RX ORDER — LEVOTHYROXINE SODIUM 100 MCG
84 TABLET ORAL AT BEDTIME
Refills: 0 | Status: DISCONTINUED | OUTPATIENT
Start: 2024-09-11 | End: 2024-09-13

## 2024-09-11 RX ORDER — PIPERACILLIN SODIUM AND TAZOBACTAM SODIUM 3; .375 G/15ML; G/15ML
3.38 INJECTION, POWDER, FOR SOLUTION INTRAVENOUS EVERY 8 HOURS
Refills: 0 | Status: DISCONTINUED | OUTPATIENT
Start: 2024-09-11 | End: 2024-09-12

## 2024-09-11 RX ORDER — ASPIRIN 81 MG
81 TABLET, DELAYED RELEASE (ENTERIC COATED) ORAL DAILY
Refills: 0 | Status: DISCONTINUED | OUTPATIENT
Start: 2024-09-11 | End: 2024-09-18

## 2024-09-11 RX ORDER — METOPROLOL TARTRATE 100 MG/1
100 TABLET ORAL ONCE
Refills: 0 | Status: COMPLETED | OUTPATIENT
Start: 2024-09-11 | End: 2024-09-11

## 2024-09-11 RX ORDER — FOLIC ACID/MULTIVIT,IRON,MINER 0.4MG-18MG
1000 TABLET,CHEWABLE ORAL DAILY
Refills: 0 | Status: DISCONTINUED | OUTPATIENT
Start: 2024-09-11 | End: 2024-09-12

## 2024-09-11 RX ORDER — URSODIOL 500 MG/1
300 TABLET, FILM COATED ORAL
Refills: 0 | Status: DISCONTINUED | OUTPATIENT
Start: 2024-09-11 | End: 2024-09-18

## 2024-09-11 RX ORDER — LEVOTHYROXINE SODIUM 100 MCG
112 TABLET ORAL DAILY
Refills: 0 | Status: DISCONTINUED | OUTPATIENT
Start: 2024-09-11 | End: 2024-09-11

## 2024-09-11 RX ORDER — POTASSIUM CHLORIDE 10 MEQ
20 TABLET, EXT RELEASE, PARTICLES/CRYSTALS ORAL ONCE
Refills: 0 | Status: DISCONTINUED | OUTPATIENT
Start: 2024-09-11 | End: 2024-09-11

## 2024-09-11 RX ORDER — DILTIAZEM HYDROCHLORIDE 5 MG/ML
5 INJECTION INTRAVENOUS
Qty: 125 | Refills: 0 | Status: DISCONTINUED | OUTPATIENT
Start: 2024-09-11 | End: 2024-09-12

## 2024-09-11 RX ORDER — HEPARIN SODIUM,BOVINE 1000/ML
VIAL (ML) INJECTION
Qty: 25000 | Refills: 0 | Status: DISCONTINUED | OUTPATIENT
Start: 2024-09-11 | End: 2024-09-12

## 2024-09-11 RX ADMIN — Medication 100 GRAM(S): at 20:32

## 2024-09-11 RX ADMIN — Medication 2400 UNIT(S)/HR: at 19:59

## 2024-09-11 RX ADMIN — POTASSIUM PHOSPHATE 62.5 MILLIMOLE(S): 236; 224 INJECTION, SOLUTION INTRAVENOUS at 20:32

## 2024-09-11 RX ADMIN — DILTIAZEM HYDROCHLORIDE 35 MILLIGRAM(S): 5 INJECTION INTRAVENOUS at 20:25

## 2024-09-11 RX ADMIN — METOPROLOL TARTRATE 5 MILLIGRAM(S): 100 TABLET ORAL at 18:58

## 2024-09-11 RX ADMIN — Medication 2400 UNIT(S)/HR: at 21:33

## 2024-09-11 RX ADMIN — ACETAMINOPHEN 400 MILLIGRAM(S): 325 TABLET ORAL at 18:47

## 2024-09-11 RX ADMIN — Medication 100 MILLIEQUIVALENT(S): at 22:58

## 2024-09-11 RX ADMIN — Medication 1 APPLICATION(S): at 23:20

## 2024-09-11 RX ADMIN — METOPROLOL TARTRATE 100 MILLIGRAM(S): 100 TABLET ORAL at 14:56

## 2024-09-11 RX ADMIN — Medication 125 MILLILITER(S): at 05:17

## 2024-09-11 RX ADMIN — Medication 2400 UNIT(S)/HR: at 11:26

## 2024-09-11 RX ADMIN — DEXMEDETOMIDINE HYDROCHLORIDE IN 0.9% SODIUM CHLORIDE 20.5 MICROGRAM(S)/KG/HR: 4 INJECTION INTRAVENOUS at 19:53

## 2024-09-11 RX ADMIN — PIPERACILLIN SODIUM AND TAZOBACTAM SODIUM 25 GRAM(S): 3; .375 INJECTION, POWDER, FOR SOLUTION INTRAVENOUS at 14:56

## 2024-09-11 RX ADMIN — SODIUM CHLORIDE 125 MILLILITER(S): 9 INJECTION INTRAMUSCULAR; INTRAVENOUS; SUBCUTANEOUS at 06:02

## 2024-09-11 RX ADMIN — Medication 20 MILLIGRAM(S): at 23:59

## 2024-09-11 RX ADMIN — METOPROLOL TARTRATE 5 MILLIGRAM(S): 100 TABLET ORAL at 19:53

## 2024-09-11 RX ADMIN — Medication 2400 UNIT(S)/HR: at 19:56

## 2024-09-11 RX ADMIN — Medication 100 MILLIEQUIVALENT(S): at 09:40

## 2024-09-11 RX ADMIN — PIPERACILLIN SODIUM AND TAZOBACTAM SODIUM 25 GRAM(S): 3; .375 INJECTION, POWDER, FOR SOLUTION INTRAVENOUS at 23:16

## 2024-09-11 RX ADMIN — Medication 100 MILLIEQUIVALENT(S): at 11:15

## 2024-09-11 RX ADMIN — POTASSIUM PHOSPHATE 83.33 MILLIMOLE(S): 236; 224 INJECTION, SOLUTION INTRAVENOUS at 13:23

## 2024-09-11 RX ADMIN — Medication 4 MILLIGRAM(S): at 19:53

## 2024-09-11 RX ADMIN — PIPERACILLIN SODIUM AND TAZOBACTAM SODIUM 25 GRAM(S): 3; .375 INJECTION, POWDER, FOR SOLUTION INTRAVENOUS at 06:03

## 2024-09-11 NOTE — RAPID RESPONSE TEAM SUMMARY - NSUNITLOCATIONRRT_GEN_ALL_CORE
5S Claritin 10mg daily  Flonase daily  [facility] with [facility]    Concussion in Children    Your child was seen in the Emergency Department today for a concussion.       A concussion is a mild traumatic brain injury which occurs when the head experiences a hit (or an indirect blow) that causes stress to brain cells. Concussions are not life-threatening and are self-resolving. Often it is described as a “bruise to the brain.”  The symptoms of a concussion can range from: slowing or clouding in one’s regular thinking, changes in mood, disturbances in sleep, or physical complaints such as balance problems, nausea, headaches, or being more sensitive to light or noise. However, the symptoms may be different for every individual.    Concussions are diagnosed and managed based on the history given and symptoms experienced after the injury.  Currently there is no imaging test (no CT or standard MRI) that can show a concussion.      General tips for managing a concussion at home:  -The symptoms of a concussion may last only a day or may last several weeks (the majority resolve within 1 week).  -Treatment for a concussion involves 3 main components:  1.	Return to Activity  A brief period of rest during the early phase (first day or two) is recommended before a gradual return to normal activity. If specific activities worsen the symptoms, those activities should not be continued until they can be performed without discomfort.   2.	Return to School  The goal is to minimize the distribution in a child’s life when possible and getting back to school is important. You can attend school even if you are experiencing some symptoms. Discussing that your child had a concussion with the school is important and coming up with a plan on how to address their needs will be essential.  3.	Return to Play  Prior to returning to normal sports, a student should be performing at their academic baseline. Engaging in early non-contact light aerobic activity (walking) will likely be helpful. Returning to sports is gradual in stages and should be discussed with your .      *If at any point any activity worsens the concussion symptoms that activity should be stopped and only restarted when feeling better.    -Pain medications (such as acetaminophen or ibuprofen) and nausea medications (such as ondansetron) may relieve some symptoms.    Follow-up with your pediatrician in 1-2 days to make sure that your child is doing better.  If you child is still having symptoms in a few days follow-up with our [facility] (our [facility]rologists) by calling to make an appointment [phone].    Return to the Emergency Department if:  -Your child loses consciousness.  -Your child has weakness or numbness in any part of the body.  -Your child's coordination gets worse.  -It is difficult to wake your child.  -Your child has slurred speech.  -Your child has a seizure or convulsions.  -Your child has severe or worsening headaches.  -Your child's fatigue, confusion, or irritability gets worse.  -Your child keeps persistently vomiting.  -Your child will not stop crying.  -Your child's behavior changes significantly.

## 2024-09-11 NOTE — PROGRESS NOTE ADULT - ASSESSMENT
72M with h/o Afib, HTN, hypothyroidism, and pre-DM presented with complaints on abdominal pain. Patient initially presented with abdominal pain on 9/8/24 and was discharged, in the interim he developed high grade fever and worsening abdominal pain and came back to ED. In ED pts BP soft, otherwise stable, WBC noted for 21.55, H/H 15/44, Plt 148. CMP noted for BUN/Cr 28/1.36, Lactate 2.3 > 2, T. bili 3.3, UA cloudy, with proteinuria, ketonuria, small leuks. COVID/Flu swab negative, CT abdomen noted for new severe gallbladder wall thickening and pericholecystic stranding in the setting of cholelithiasis is suspicious for acute cholecystitis. RUQ US also noted for cholecystitis. Patient seen by surgery, and wanted some time to consider the option for surgery. Patient admitted to  for medical management.     Sepsis poa due to acute cholecystitis   Cholelithiasis   -CT: New severe gallbladder wall thickening and pericholecystic   stranding in the setting of cholelithiasis  -Leukocytosis 21.55, HR: 90/min, lactate 2.3  -F/u blood and urine cx  -Continue Zosyn   -S/p IVF 2500cc in ER  -Continue IVF maintenance  -Trend and replete electrolytes   -General sx consult appreciated, d/w Dr. Glynn today   -GI consult appreciated, d/w CHINTAN Hanson, plan for ERCP tomorrow   -Cardiology consulted for clearance  -TTE ordered  -NPO after midnight     Hx Chronic Afib  -[id number]: 5 ( age, HTN, Hx TIA)  -On Eliquis at home, last dose 9/9 as per pt  -on heparin gtt, d/c at 6am tomorrow   -Continue metoprolol for rate control, decrease to 100mg BID for now given borderline low bp    HTN  -On ramipril 5mg, metoprolol tartrate 200mg BID, Nifedipine 60mg daily, Furosemide 40mg daily  -BP borderline, will reinstate BP meds as BP allows     SUKUMAR   -Cr baseline 0.8-0.9 (noted in july, august)  -Cr: 1.36 > 0.      Hx Diabetes Mellitus, diet controlled   -Last A1C was 5.6% - 8/14/24  -Noted to be 7.5% in 2018    DANIELLE  -Non compliant with CPAP     72M with h/o Afib, HTN, hypothyroidism, and pre-DM presented with complaints on abdominal pain. Patient initially presented with abdominal pain on 9/8/24 and was discharged, in the interim he developed high grade fever and worsening abdominal pain and came back to ED. In ED pts BP soft, otherwise stable, WBC noted for 21.55, H/H 15/44, Plt 148. CMP noted for BUN/Cr 28/1.36, Lactate 2.3 > 2, T. bili 3.3, UA cloudy, with proteinuria, ketonuria, small leuks. COVID/Flu swab negative, CT abdomen noted for new severe gallbladder wall thickening and pericholecystic stranding in the setting of cholelithiasis is suspicious for acute cholecystitis. RUQ US also noted for cholecystitis. Patient seen by surgery, and wanted some time to consider the option for surgery. Patient admitted to  for medical management.     Sepsis poa due to acute cholecystitis   Cholelithiasis   -CT: New severe gallbladder wall thickening and pericholecystic   stranding in the setting of cholelithiasis  -Leukocytosis 21.55, HR: 90/min, lactate 2.3  -F/u blood and urine cx  -Continue Zosyn   -S/p IVF 2500cc in ER  -Continue IVF maintenance  -Trend and replete electrolytes   -General sx consult appreciated, d/w Dr. Glynn today   -GI consult appreciated, d/w CHINTAN Hanson, plan for ERCP tomorrow   -Cardiology consulted for clearance  -TTE ordered  -NPO after midnight     Hx Chronic Afib  -[id number]: 5 ( age, HTN, Hx TIA)  -On Eliquis at home, last dose 9/9 as per pt  -on heparin gtt, d/c at 6am tomorrow   -Continue metoprolol for rate control, decrease to 100mg BID for now given borderline low bp    HTN  -On ramipril 5mg, metoprolol tartrate 200mg BID, Nifedipine 60mg daily, Furosemide 40mg daily  -BP borderline, will reinstate BP meds as BP allows     SUKUMAR   -Cr baseline 0.8-0.9 (noted in july, august)  -Cr: 1.36 > 0.87    Hx Diabetes Mellitus, ? diet controlled   -Last A1C was 5.6% - 8/14/24  -Noted to be 7.5% in 2018    Hypothyroidism   -Continue levothyroxine 112mcg daily     DANIELLE  -uses CPAP but inconsistently     VTE ppx: on heparin gtt

## 2024-09-11 NOTE — CONSULT NOTE ADULT - NS ATTEND AMEND GEN_ALL_CORE FT
74 year old man, morbidly obese, with prior cholangitis s/p ERCP but GB in situ, here with abdominal pain, fevers. Imaging with Gb wall thickening.     LFT's normal, direct bili less than 2 and non dilated ducts on imaging with GB wall thickening, also with prior ERCPw.   Highly unlikely he has cbd stones, and due to his age, obesity, don't think a diagnostic EUS which requires anesthesia  is useful for him right now all things considered.   Will follow clinical course and decide.   F/u HIDA scan result which will dictate plan along with labs.

## 2024-09-11 NOTE — CONSULT NOTE ADULT - SUBJECTIVE AND OBJECTIVE BOX
REASON FOR CONSULT: ***    CONSULT REQUESTED BY: ***    Patient is a 74y old  Male who presents with a chief complaint of abdominal discomfort (11 Sep 2024 16:22)      BRIEF HOSPITAL COURSE: ***    Events last 24 hours: ***    PAST MEDICAL & SURGICAL HISTORY:  Dyslipidemia      Hypertension      Afib  on coumadin      Chronic Gout      Anxiety      History of Transient Ischemic Attack (TIA)  approx 1994      History of Obesity      Obstructive Sleep Apnea  CPAP currenlty      Hypothyroid      BPH (benign prostatic hyperplasia)      H/O cholangitis      Multiple falls  now wheelchair bound      DM (diabetes mellitus)      History of Tonsillectomy      S/P ERCP        Allergies    metformin (Unknown)    Intolerances      FAMILY HISTORY:  FH: myocardial infarction (Father)        Review of Systems:  CONSTITUTIONAL: No fever, chills, or fatigue  EYES: No eye pain, visual disturbances, or discharge  ENMT:  No difficulty hearing, tinnitus, vertigo; No sinus or throat pain  NECK: No pain or stiffness  RESPIRATORY: No cough, wheezing, chills or hemoptysis; No shortness of breath  CARDIOVASCULAR: No chest pain, palpitations, dizziness, or leg swelling  GASTROINTESTINAL: No abdominal or epigastric pain. No nausea, vomiting, or hematemesis; No diarrhea or constipation. No melena or hematochezia.  GENITOURINARY: No dysuria, frequency, hematuria, or incontinence  NEUROLOGICAL: No headaches, memory loss, loss of strength, numbness, or tremors  SKIN: No itching, burning, rashes, or lesions   MUSCULOSKELETAL: No joint pain or swelling; No muscle, back, or extremity pain  PSYCHIATRIC: No depression, anxiety, mood swings, or difficulty sleeping      Medications:  piperacillin/tazobactam IVPB.. 3.375 Gram(s) IV Intermittent every 8 hours    diltiazem Infusion 5 mG/Hr IV Continuous <Continuous>  metoprolol tartrate 100 milliGRAM(s) Oral two times a day      acetaminophen     Tablet .. 650 milliGRAM(s) Oral once PRN  dexMEDEtomidine Infusion 0.2 MICROgram(s)/kG/Hr IV Continuous <Continuous>  melatonin 3 milliGRAM(s) Oral at bedtime PRN  ondansetron Injectable 4 milliGRAM(s) IV Push once PRN  traZODone 150 milliGRAM(s) Oral at bedtime PRN      aspirin enteric coated 81 milliGRAM(s) Oral daily  heparin   Injectable 22162 Unit(s) IV Push every 6 hours PRN  heparin   Injectable 5000 Unit(s) IV Push every 6 hours PRN  heparin  Infusion.  Unit(s)/Hr IV Continuous <Continuous>    aluminum hydroxide/magnesium hydroxide/simethicone Suspension 30 milliLiter(s) Oral every 4 hours PRN  ursodiol Capsule 300 milliGRAM(s) Oral two times a day      levothyroxine 112 MICROGram(s) Oral daily    cholecalciferol 1000 Unit(s) Oral daily  magnesium sulfate  IVPB 2 Gram(s) IV Intermittent once  potassium chloride  10 mEq/100 mL IVPB 10 milliEquivalent(s) IV Intermittent every 1 hour  sodium chloride 0.9%. 1000 milliLiter(s) IV Continuous <Continuous>                ICU Vital Signs Last 24 Hrs  T(C): 39.1 (11 Sep 2024 18:31), Max: 39.1 (11 Sep 2024 18:31)  T(F): 102.3 (11 Sep 2024 18:31), Max: 102.3 (11 Sep 2024 18:31)  HR: 128 (11 Sep 2024 19:29) (79 - 135)  BP: 153/85 (11 Sep 2024 18:50) (98/59 - 153/85)  BP(mean): 85 (11 Sep 2024 01:08) (80 - 85)  ABP: --  ABP(mean): --  RR: 24 (11 Sep 2024 18:50) (19 - 25)  SpO2: 99% (11 Sep 2024 19:29) (95% - 100%)    O2 Parameters below as of 11 Sep 2024 18:50  Patient On (Oxygen Delivery Method): nasal cannula  O2 Flow (L/min): 5        Vital Signs Last 24 Hrs  T(C): 39.1 (11 Sep 2024 18:31), Max: 39.1 (11 Sep 2024 18:31)  T(F): 102.3 (11 Sep 2024 18:31), Max: 102.3 (11 Sep 2024 18:31)  HR: 128 (11 Sep 2024 19:29) (79 - 135)  BP: 153/85 (11 Sep 2024 18:50) (98/59 - 153/85)  BP(mean): 85 (11 Sep 2024 01:08) (80 - 85)  RR: 24 (11 Sep 2024 18:50) (19 - 25)  SpO2: 99% (11 Sep 2024 19:29) (95% - 100%)    Parameters below as of 11 Sep 2024 18:50  Patient On (Oxygen Delivery Method): nasal cannula  O2 Flow (L/min): 5          I&O's Detail    10 Sep 2024 07:01  -  11 Sep 2024 07:00  --------------------------------------------------------  IN:  Total IN: 0 mL    OUT:    Voided (mL): 350 mL  Total OUT: 350 mL    Total NET: -350 mL      11 Sep 2024 07:01  -  11 Sep 2024 21:19  --------------------------------------------------------  IN:  Total IN: 0 mL    OUT:    Voided (mL): 300 mL  Total OUT: 300 mL    Total NET: -300 mL            LABS:                        14.4   16.13 )-----------( 129      ( 11 Sep 2024 17:26 )             41.8     09-11    135  |  105  |  15  ----------------------------<  175<H>  3.6   |  22  |  0.88    Ca    9.1      11 Sep 2024 18:03  Phos  2.1     09-11  Mg     1.6     09-11    TPro  6.8  /  Alb  2.4<L>  /  TBili  2.6<H>  /  DBili  1.1<H>  /  AST  21  /  ALT  27  /  AlkPhos  89  09-11          CAPILLARY BLOOD GLUCOSE      POCT Blood Glucose.: 173 mg/dL (11 Sep 2024 19:02)    PT/INR - ( 10 Sep 2024 16:16 )   PT: 18.4 sec;   INR: 1.65 ratio         PTT - ( 11 Sep 2024 17:26 )  PTT:67.5 sec  Urinalysis Basic - ( 11 Sep 2024 18:03 )    Color: x / Appearance: x / SG: x / pH: x  Gluc: 175 mg/dL / Ketone: x  / Bili: x / Urobili: x   Blood: x / Protein: x / Nitrite: x   Leuk Esterase: x / RBC: x / WBC x   Sq Epi: x / Non Sq Epi: x / Bacteria: x      CULTURES:  Culture Results:   No growth at 24 hours (09-10 @ 16:42)  Culture Results:   No growth at 24 hours (09-10 @ 16:17)      Physical Examination:    General: No acute distress.  Alert, oriented, interactive, nonfocal    HEENT: Pupils equal, reactive to light.  Symmetric.    PULM: Clear to auscultation bilaterally, no significant sputum production    CVS: Regular rate and rhythm, no murmurs, rubs, or gallops    ABD: Soft, nondistended, nontender, normoactive bowel sounds, no masses    EXT: No edema, nontender    SKIN: Warm and well perfused, no rashes noted.    RADIOLOGY: ***    CRITICAL CARE TIME SPENT: ***   Patient is a 74y old  Male who presents with a chief complaint of abdominal discomfort (11 Sep 2024 16:22)      BRIEF HOSPITAL COURSE:72M with h/o Afib, HTN, hypothyroidism, and pre-DM presented with complaints on abdominal pain. Patient initially presented with abdominal pain on 9/8/24 and was discharged, in the interim he developed high grade fever and worsening abdominal pain and came back to ED. In ED pts BP soft, otherwise stable, WBC noted for 21.55, H/H 15/44, Plt 148. CMP noted for BUN/Cr 28/1.36, Lactate 2.3 > 2, T. bili 3.3, UA cloudy, with proteinuria, ketonuria, small leuks. COVID/Flu swab negative, CT abdomen noted for new severe gallbladder wall thickening and pericholecystic stranding in the setting of cholelithiasis is suspicious for acute cholecystitis. RUQ US also noted for cholecystitis. Patient seen by surgery, and wanted some time to consider the option for surgery. Started on antibiotics    Events last 24 hours:   RRT for tachycardia, increased work of breathing, fever. Transferred to ICU     PAST MEDICAL & SURGICAL HISTORY:  Dyslipidemia      Hypertension      Afib  on coumadin      Chronic Gout      Anxiety      History of Transient Ischemic Attack (TIA)  approx 1994      History of Obesity      Obstructive Sleep Apnea  CPAP currenlty      Hypothyroid      BPH (benign prostatic hyperplasia)      H/O cholangitis      Multiple falls  now wheelchair bound      DM (diabetes mellitus)      History of Tonsillectomy      S/P ERCP        Allergies    metformin (Unknown)    Intolerances      FAMILY HISTORY:  FH: myocardial infarction (Father)          Medications:  piperacillin/tazobactam IVPB.. 3.375 Gram(s) IV Intermittent every 8 hours    diltiazem Infusion 5 mG/Hr IV Continuous <Continuous>  metoprolol tartrate 100 milliGRAM(s) Oral two times a day      acetaminophen     Tablet .. 650 milliGRAM(s) Oral once PRN  dexMEDEtomidine Infusion 0.2 MICROgram(s)/kG/Hr IV Continuous <Continuous>  melatonin 3 milliGRAM(s) Oral at bedtime PRN  ondansetron Injectable 4 milliGRAM(s) IV Push once PRN  traZODone 150 milliGRAM(s) Oral at bedtime PRN      aspirin enteric coated 81 milliGRAM(s) Oral daily  heparin   Injectable 52845 Unit(s) IV Push every 6 hours PRN  heparin   Injectable 5000 Unit(s) IV Push every 6 hours PRN  heparin  Infusion.  Unit(s)/Hr IV Continuous <Continuous>    aluminum hydroxide/magnesium hydroxide/simethicone Suspension 30 milliLiter(s) Oral every 4 hours PRN  ursodiol Capsule 300 milliGRAM(s) Oral two times a day      levothyroxine 112 MICROGram(s) Oral daily    cholecalciferol 1000 Unit(s) Oral daily  magnesium sulfate  IVPB 2 Gram(s) IV Intermittent once  potassium chloride  10 mEq/100 mL IVPB 10 milliEquivalent(s) IV Intermittent every 1 hour  sodium chloride 0.9%. 1000 milliLiter(s) IV Continuous <Continuous>                ICU Vital Signs Last 24 Hrs  T(C): 39.1 (11 Sep 2024 18:31), Max: 39.1 (11 Sep 2024 18:31)  T(F): 102.3 (11 Sep 2024 18:31), Max: 102.3 (11 Sep 2024 18:31)  HR: 128 (11 Sep 2024 19:29) (79 - 135)  BP: 153/85 (11 Sep 2024 18:50) (98/59 - 153/85)  BP(mean): 85 (11 Sep 2024 01:08) (80 - 85)  ABP: --  ABP(mean): --  RR: 24 (11 Sep 2024 18:50) (19 - 25)  SpO2: 99% (11 Sep 2024 19:29) (95% - 100%)    O2 Parameters below as of 11 Sep 2024 18:50  Patient On (Oxygen Delivery Method): nasal cannula  O2 Flow (L/min): 5        Vital Signs Last 24 Hrs  T(C): 39.1 (11 Sep 2024 18:31), Max: 39.1 (11 Sep 2024 18:31)  T(F): 102.3 (11 Sep 2024 18:31), Max: 102.3 (11 Sep 2024 18:31)  HR: 128 (11 Sep 2024 19:29) (79 - 135)  BP: 153/85 (11 Sep 2024 18:50) (98/59 - 153/85)  BP(mean): 85 (11 Sep 2024 01:08) (80 - 85)  RR: 24 (11 Sep 2024 18:50) (19 - 25)  SpO2: 99% (11 Sep 2024 19:29) (95% - 100%)    Parameters below as of 11 Sep 2024 18:50  Patient On (Oxygen Delivery Method): nasal cannula  O2 Flow (L/min): 5          I&O's Detail    10 Sep 2024 07:01  -  11 Sep 2024 07:00  --------------------------------------------------------  IN:  Total IN: 0 mL    OUT:    Voided (mL): 350 mL  Total OUT: 350 mL    Total NET: -350 mL      11 Sep 2024 07:01  -  11 Sep 2024 21:19  --------------------------------------------------------  IN:  Total IN: 0 mL    OUT:    Voided (mL): 300 mL  Total OUT: 300 mL    Total NET: -300 mL            LABS:                        14.4   16.13 )-----------( 129      ( 11 Sep 2024 17:26 )             41.8     09-11    135  |  105  |  15  ----------------------------<  175<H>  3.6   |  22  |  0.88    Ca    9.1      11 Sep 2024 18:03  Phos  2.1     09-11  Mg     1.6     09-11    TPro  6.8  /  Alb  2.4<L>  /  TBili  2.6<H>  /  DBili  1.1<H>  /  AST  21  /  ALT  27  /  AlkPhos  89  09-11          CAPILLARY BLOOD GLUCOSE      POCT Blood Glucose.: 173 mg/dL (11 Sep 2024 19:02)    PT/INR - ( 10 Sep 2024 16:16 )   PT: 18.4 sec;   INR: 1.65 ratio         PTT - ( 11 Sep 2024 17:26 )  PTT:67.5 sec  Urinalysis Basic - ( 11 Sep 2024 18:03 )    Color: x / Appearance: x / SG: x / pH: x  Gluc: 175 mg/dL / Ketone: x  / Bili: x / Urobili: x   Blood: x / Protein: x / Nitrite: x   Leuk Esterase: x / RBC: x / WBC x   Sq Epi: x / Non Sq Epi: x / Bacteria: x      CULTURES:  Culture Results:   No growth at 24 hours (09-10 @ 16:42)  Culture Results:   No growth at 24 hours (09-10 @ 16:17)    Radiology  < from: US Abdomen Upper Quadrant Right (09.10.24 @ 21:09) >  IMPRESSION:  The sonographic findings are suspicious for acute cholecystitis in the   proper clinical scenario.  Correlate with clinical symptoms and   laboratory values.  A follow-up nuclear medicine HIDA scan may be   obtained as clinically warranted.      < end of copied text >  < from: CT Abdomen and Pelvis w/ IV Cont (09.10.24 @ 17:56) >  IMPRESSION: New severe gallbladder wall thickening and pericholecystic   stranding in the setting of cholelithiasis is suspicious for acute   cholecystitis. Recommend clinical correlation and consider right upper   quadrant ultrasound.    ---End of Report ---      < end of copied text >  < from: CT Abdomen and Pelvis w/ IV Cont (09.08.24 @ 07:36) >    IMPRESSION:    Cholelithiasis.  Dilated CBD measuring up to 1.2 cm.  Possible intraluminal density periampullary CBD.  Consider further evaluation with MRCP to evaluate for   choledocholithiasis, which was noted on a prior CT exam.    Other findings as discussed above.        --- End of Report ---    < end of copied text >

## 2024-09-11 NOTE — PHARMACOTHERAPY INTERVENTION NOTE - COMMENTS
Medication history complete, reviewed medication with patients family member Polo at [phone] and confirmed with DrFirst.

## 2024-09-11 NOTE — CONSULT NOTE ADULT - ASSESSMENT
74 year old male with history HTN, HLD, AF on Eliquis last dose 9/10 AM, morbid obesity, DM on Mounjaro last dose 3 weeks ago, sleep apnea on cpap, cholangitis s/p ercp in the past presenting with acute abdominal pain, severe cholecystitis/cholelithiasis, leukocytosis, fevers with elevated tbili although 1:1 unconjugated/conjugated with normal transaminases.     Imp: Abdominal pain/fever 2/2 severe cholecystitis with less likely biliary obstruction as no evidence of ductal dilatation, normal transaminases, and 1:1 indirect/direct bili    Rec:  ::May obtain HIDA to rule in cholecystitis and evaluate biliary tree flow  ::Unable to obtain MRCP due to habitus, but HIDA will assist with evaluation for choledocho  ::Trend LFT's  ::EUS/ERCP pending HIDA result  ::Choley per surgery  ::Cardiac optimization appreciated   74 year old male with history HTN, HLD, AF on Eliquis last dose 9/10 AM, morbid obesity, DM on Mounjaro last dose 3 weeks ago, sleep apnea on cpap, cholangitis s/p ercp in the past presenting with acute abdominal pain, severe cholecystitis/cholelithiasis, leukocytosis, fevers with elevated tbili although 1:1 unconjugated/conjugated with normal transaminases.     Imp: Abdominal pain/fever 2/2 severe cholecystitis with less likely biliary obstruction as no evidence of ductal dilatation, normal transaminases, and 1:1 indirect/direct bili    Rec:  ::May obtain HIDA to rule in cholecystitis and evaluate biliary tree flow  ::Unable to obtain MRCP due to habitus, but HIDA will assist with evaluation for choledocho  ::Trend LFT's  :: plan pending HIDA result  ::Choley per surgery  ::Cardiac optimization appreciated

## 2024-09-11 NOTE — CONSULT NOTE ADULT - SUBJECTIVE AND OBJECTIVE BOX
Patient is a 74y old  Male who presents with a chief complaint of abdominal pain    HPI:  This is a 72 year old male with significant past medical history of morbid obesity, atrial fibrillation on Eliquis, HTN, hypothyroidism, DM on Monjouro, wheelchair bound presenting to [facility] with abdominal pain, fever. Patient evaluated at bedside this morning with son at bedside. Per patient, presented initially to ED with abdominal pain, evaluated and discharged to home. Pain recurred primarily in upper abdomen without radiation with fever prompting patient to return. Relays history of cholangitis with necessity for ERCP and stent (not at this hospital) and was advised to remove gallbladder but had declined. Denies nausea, vomiting, or diarrhea. Admits to constipation with last BM over weekend. Denies chest pain, shortness of breath. CT abdomen with severe gb wall thickening, cholelithiasis, with on US cholecystitis and no remarkable biliary dilatation. Transaminases unremarkable, tbili elevated with almost 1:1 ratio indirect and direct. Currently afebrile. Leukocytosis with imprvement on IV abx. Hemodynamically stable. On UFH for VTE prophylaxis as history AF. Last dose Eliquis 9/10 am.    PAST MEDICAL & SURGICAL HISTORY:  Dyslipidemia      Hypertension      Afib on Eliquis      Chronic Gout      Anxiety      History of Transient Ischemic Attack (TIA)  approx 1994      History of Obesity      Obstructive Sleep Apnea  CPAP currently      Hypothyroid      BPH (benign prostatic hyperplasia)      H/O cholangitis      Multiple falls  now wheelchair bound      DM (diabetes mellitus)      History of Tonsillectomy      S/P ERCP    MEDICATIONS  (STANDING):  aspirin enteric coated 81 milliGRAM(s) Oral daily  cholecalciferol 1000 Unit(s) Oral daily  heparin  Infusion.  Unit(s)/Hr (24 mL/Hr) IV Continuous <Continuous>  levothyroxine 112 MICROGram(s) Oral daily  metoprolol tartrate 100 milliGRAM(s) Oral two times a day  piperacillin/tazobactam IVPB.. 3.375 Gram(s) IV Intermittent every 8 hours  sodium chloride 0.9%. 1000 milliLiter(s) (75 mL/Hr) IV Continuous <Continuous>  ursodiol Capsule 300 milliGRAM(s) Oral two times a day    MEDICATIONS  (PRN):  acetaminophen     Tablet .. 650 milliGRAM(s) Oral once PRN Temp greater or equal to 38C (100.4F), Mild Pain (1 - 3)  aluminum hydroxide/magnesium hydroxide/simethicone Suspension 30 milliLiter(s) Oral every 4 hours PRN Dyspepsia  heparin   Injectable 77212 Unit(s) IV Push every 6 hours PRN For aPTT less than 40  heparin   Injectable 5000 Unit(s) IV Push every 6 hours PRN For aPTT between 40 - 57  melatonin 3 milliGRAM(s) Oral at bedtime PRN Insomnia  ondansetron Injectable 4 milliGRAM(s) IV Push once PRN Nausea and/or Vomiting  traZODone 150 milliGRAM(s) Oral at bedtime PRN for insomnia    Allergies    metformin (Unknown)    Intolerances    SOCIAL HISTORY:    FAMILY HISTORY:  FH: myocardial infarction (Father)    REVIEW OF SYSTEMS:    CONSTITUTIONAL: No weakness, fevers or chills  EYES/ENT: No visual changes;  No vertigo or throat pain   NECK: No pain or stiffness  RESPIRATORY: No cough, wheezing, hemoptysis; No shortness of breath  CARDIOVASCULAR: No chest pain or palpitations  GASTROINTESTINAL: See HPI  GENITOURINARY: No dysuria, frequency or hematuria  NEUROLOGICAL: No numbness or weakness  SKIN: No itching, burning, rashes, or lesions   PSYCH: Normal mood and affect  All other review of systems is negative unless indicated above.    Vital Signs Last 24 Hrs  T(C): 37 (11 Sep 2024 14:57), Max: 37.3 (11 Sep 2024 02:50)  T(F): 98.6 (11 Sep 2024 14:57), Max: 99.1 (11 Sep 2024 02:50)  HR: 97 (11 Sep 2024 14:57) (77 - 98)  BP: 123/75 (11 Sep 2024 14:57) (84/64 - 127/87)  BP(mean): 85 (11 Sep 2024 01:08) (72 - 102)  RR: 20 (11 Sep 2024 14:57) (19 - 33)  SpO2: 98% (11 Sep 2024 14:57) (94% - 100%)    Parameters below as of 11 Sep 2024 14:57  Patient On (Oxygen Delivery Method): room air    PHYSICAL EXAM:    Constitutional: No acute distress, non-toxic appearing  HEENT:  good phonation, not icteric  Neck: supple, no lymphadenopathy  Respiratory: clear to ascultation bilaterally, no wheezing  Cardiovascular: S1 and S2, regular rate and rhythm, no murmurs rubs or gallops  Gastrointestinal: soft, TTP b/l UQ, morbidly obese, +bowel sounds, no rebound or guarding, no surgical scars, no drains  Extremities: No peripheral edema, no cyanosis or clubbing  Vascular: 2+ peripheral pulses, no venous stasis  Neurological: A/O x 3, no focal deficits, no asterixis  Psychiatric: Normal mood, normal affect  Skin: No rashes, not jaundiced    LABS:                        14.7   15.27 )-----------( 124      ( 11 Sep 2024 07:13 )             41.9     09-11    134<L>  |  104  |  17  ----------------------------<  146<H>  3.3<L>   |  22  |  0.87    Ca    9.4      11 Sep 2024 07:13  Phos  1.2     09-11  Mg     1.6     09-11    TPro  6.4  /  Alb  2.3<L>  /  TBili  2.7<H>  /  DBili  1.3<H>  /  AST  15  /  ALT  18  /  AlkPhos  72  09-11    PT/INR - ( 10 Sep 2024 16:16 )   PT: 18.4 sec;   INR: 1.65 ratio         PTT - ( 11 Sep 2024 05:27 )  PTT:40.3 sec  LIVER FUNCTIONS - ( 11 Sep 2024 07:13 )  Alb: 2.3 g/dL / Pro: 6.4 gm/dL / ALK PHOS: 72 U/L / ALT: 18 U/L / AST: 15 U/L / GGT: x           RADIOLOGY & ADDITIONAL STUDIES:  FINDINGS:  LOWER CHEST: Linear atelectasis at the lung bases.    LIVER: Within normal limits.  BILE DUCTS: Normal caliber.  GALLBLADDER: Significant wall thickening and pericholecystic stranding in   the presence of cholelithiasis which is a change from the prior study   raising possibility of acute cholecystitis  SPLEEN: Within normal limits.  PANCREAS: Within normal limits.  ADRENALS: Within normal limits.  KIDNEYS/URETERS: Multiple punctate nonobstructive left renal stones.   Large exophytic posterior left mid to lower pole renal cyst measuring up   to 10.4 cm. Small exophytic right upper pole renal cyst appreciated    BLADDER: Within normal limits.  REPRODUCTIVE ORGANS: Prostate within normal limits.    BOWEL: No bowel obstruction. Appendix is not visualized. No evidence of   inflammation in the pericecal region.  PERITONEUM/RETROPERITONEUM: Within normal limits.  VESSELS: Atherosclerotic changes.  LYMPH NODES: No lymphadenopathy.  ABDOMINAL WALL: Within normal limits.  BONES: Degenerative changes.    IMPRESSION: New severe gallbladder wall thickening and pericholecystic   stranding in the setting of cholelithiasis is suspicious for acute   cholecystitis. Recommend clinical correlation and consider right upper   quadrant ultrasound.   Patient is a 74y old  Male who presents with a chief complaint of abdominal pain     74 year old male with significant past medical history of morbid obesity, atrial fibrillation on Eliquis, HTN, hypothyroidism, DM on Monjouro, wheelchair bound, prior episode of cholangitis, presenting to [facility] with abdominal pain, fever. Concern for biliary source, GB vs cbd.      Patient evaluated at bedside this morning with son at bedside. Per patient, presented initially to ED with abdominal pain, evaluated and discharged to home. Pain recurred primarily in upper abdomen without radiation with fever prompting patient to return. Pain is epigastric, sharp, sudden onset, 7/10, constant, non radiating, without known alleviating or exacerbating factors. Fevers but no rigors or chills.  Relays history of cholangitis with necessity for ERCP  (not at this hospital) and was advised to remove gallbladder but had declined. Denies nausea, vomiting, or diarrhea. Admits to constipation with last BM over weekend. Denies chest pain, shortness of breath. CT abdomen with severe gb wall thickening, cholelithiasis,and no remarkable biliary dilatation. Transaminases unremarkable, tbili elevated with almost 1:1 ratio indirect and direct. Currently afebrile. Leukocytosis with imprvement on IV abx. Hemodynamically stable. On UFH for VTE prophylaxis as history AF. Last dose Eliquis 9/10 am.    PAST MEDICAL & SURGICAL HISTORY:  Dyslipidemia      Hypertension      Afib on Eliquis      Chronic Gout      Anxiety      History of Transient Ischemic Attack (TIA)  approx 1994      History of Obesity      Obstructive Sleep Apnea  CPAP currently      Hypothyroid      BPH (benign prostatic hyperplasia)      H/O cholangitis      Multiple falls  now wheelchair bound      DM (diabetes mellitus)      History of Tonsillectomy      S/P ERCP    MEDICATIONS  (STANDING):  aspirin enteric coated 81 milliGRAM(s) Oral daily  cholecalciferol 1000 Unit(s) Oral daily  heparin  Infusion.  Unit(s)/Hr (24 mL/Hr) IV Continuous <Continuous>  levothyroxine 112 MICROGram(s) Oral daily  metoprolol tartrate 100 milliGRAM(s) Oral two times a day  piperacillin/tazobactam IVPB.. 3.375 Gram(s) IV Intermittent every 8 hours  sodium chloride 0.9%. 1000 milliLiter(s) (75 mL/Hr) IV Continuous <Continuous>  ursodiol Capsule 300 milliGRAM(s) Oral two times a day    MEDICATIONS  (PRN):  acetaminophen     Tablet .. 650 milliGRAM(s) Oral once PRN Temp greater or equal to 38C (100.4F), Mild Pain (1 - 3)  aluminum hydroxide/magnesium hydroxide/simethicone Suspension 30 milliLiter(s) Oral every 4 hours PRN Dyspepsia  heparin   Injectable 63932 Unit(s) IV Push every 6 hours PRN For aPTT less than 40  heparin   Injectable 5000 Unit(s) IV Push every 6 hours PRN For aPTT between 40 - 57  melatonin 3 milliGRAM(s) Oral at bedtime PRN Insomnia  ondansetron Injectable 4 milliGRAM(s) IV Push once PRN Nausea and/or Vomiting  traZODone 150 milliGRAM(s) Oral at bedtime PRN for insomnia    Allergies    metformin (Unknown)    Intolerances    SOCIAL HISTORY:  no smoking, drinking or drugs    FAMILY HISTORY:   myocardial infarction (Father)  no colon or stomach cance    REVIEW OF SYSTEMS:    CONSTITUTIONAL: No weakness, +fevers no chills  EYES/ENT: No visual changes;  No vertigo or throat pain   NECK: No pain or stiffness  RESPIRATORY: No cough, wheezing, hemoptysis; No shortness of breath  CARDIOVASCULAR: No chest pain or palpitations  GASTROINTESTINAL: See HPI  GENITOURINARY: No dysuria, frequency or hematuria  NEUROLOGICAL: No numbness or weakness  SKIN: No itching, burning, rashes, or lesions   PSYCH: Normal mood and affect  All other review of systems is negative unless indicated above.    Vital Signs Last 24 Hrs  T(C): 37 (11 Sep 2024 14:57), Max: 37.3 (11 Sep 2024 02:50)  T(F): 98.6 (11 Sep 2024 14:57), Max: 99.1 (11 Sep 2024 02:50)  HR: 97 (11 Sep 2024 14:57) (77 - 98)  BP: 123/75 (11 Sep 2024 14:57) (84/64 - 127/87)  BP(mean): 85 (11 Sep 2024 01:08) (72 - 102)  RR: 20 (11 Sep 2024 14:57) (19 - 33)  SpO2: 98% (11 Sep 2024 14:57) (94% - 100%)    Parameters below as of 11 Sep 2024 14:57  Patient On (Oxygen Delivery Method): room air    PHYSICAL EXAM:    Constitutional: No acute distress, non-toxic appearing, super morbidly obese  HEENT:  good phonation, not icteric  Neck: supple, no lymphadenopathy  Respiratory: clear to ascultation bilaterally, no wheezing  Cardiovascular: S1 and S2, regular rate and irregular rhythm, no murmurs rubs or gallops  Gastrointestinal: soft, TTP b/l UQ, morbidly obese, +bowel sounds, no rebound or guarding,   Extremities: No peripheral edema, no cyanosis or clubbing  Vascular: 2+ peripheral pulses, no venous stasis  Neurological: A/O x 3, no focal deficits, no asterixis  Psychiatric: Normal mood, normal affect  Skin: No rashes, not jaundiced    LABS:                        14.7   15.27 )-----------( 124      ( 11 Sep 2024 07:13 )             41.9     09-11    134<L>  |  104  |  17  ----------------------------<  146<H>  3.3<L>   |  22  |  0.87    Ca    9.4      11 Sep 2024 07:13  Phos  1.2     09-11  Mg     1.6     09-11    TPro  6.4  /  Alb  2.3<L>  /  TBili  2.7<H>  /  DBili  1.3<H>  /  AST  15  /  ALT  18  /  AlkPhos  72  09-11    PT/INR - ( 10 Sep 2024 16:16 )   PT: 18.4 sec;   INR: 1.65 ratio         PTT - ( 11 Sep 2024 05:27 )  PTT:40.3 sec  LIVER FUNCTIONS - ( 11 Sep 2024 07:13 )  Alb: 2.3 g/dL / Pro: 6.4 gm/dL / ALK PHOS: 72 U/L / ALT: 18 U/L / AST: 15 U/L / GGT: x           RADIOLOGY & ADDITIONAL STUDIES:  FINDINGS:  LOWER CHEST: Linear atelectasis at the lung bases.    LIVER: Within normal limits.  BILE DUCTS: Normal caliber.  GALLBLADDER: Significant wall thickening and pericholecystic stranding in   the presence of cholelithiasis which is a change from the prior study   raising possibility of acute cholecystitis  SPLEEN: Within normal limits.  PANCREAS: Within normal limits.  ADRENALS: Within normal limits.  KIDNEYS/URETERS: Multiple punctate nonobstructive left renal stones.   Large exophytic posterior left mid to lower pole renal cyst measuring up   to 10.4 cm. Small exophytic right upper pole renal cyst appreciated    BLADDER: Within normal limits.  REPRODUCTIVE ORGANS: Prostate within normal limits.    BOWEL: No bowel obstruction. Appendix is not visualized. No evidence of   inflammation in the pericecal region.  PERITONEUM/RETROPERITONEUM: Within normal limits.  VESSELS: Atherosclerotic changes.  LYMPH NODES: No lymphadenopathy.  ABDOMINAL WALL: Within normal limits.  BONES: Degenerative changes.    IMPRESSION: New severe gallbladder wall thickening and pericholecystic   stranding in the setting of cholelithiasis is suspicious for acute   cholecystitis. Recommend clinical correlation and consider right upper   quadrant ultrasound.

## 2024-09-11 NOTE — PROGRESS NOTE ADULT - SUBJECTIVE AND OBJECTIVE BOX
Hospital  Day#: 1      The patient is doing well without complaints, afebrile, stable, decreasing Bilirubin:2.7<--3.3. Patient awaiting MRCP, but maybe to large/obese to undergo imaging. Patient has had previous episodes of CBD stones with hospital admissions requiring ERCP, but refusing to undergo cholecystectomy. Risk & benefit's of cholecystectomy d/w patient in laymen's terms. Patient still doesn't want to surgery at this point. Also, d/w patient may need cholecystostomy tube.      Vital Signs Last 24 Hrs  T(C): 37.2 (11 Sep 2024 08:52), Max: 37.3 (11 Sep 2024 02:50)  T(F): 98.9 (11 Sep 2024 08:52), Max: 99.1 (11 Sep 2024 02:50)  HR: 79 (11 Sep 2024 08:52) (77 - 98)  BP: 123/57 (11 Sep 2024 08:52) (84/64 - 127/87)  BP(mean): 85 (11 Sep 2024 01:08) (72 - 102)  RR: 19 (11 Sep 2024 08:52) (19 - 33)  SpO2: 95% (11 Sep 2024 08:52) (94% - 100%)    Parameters below as of 11 Sep 2024 08:52  Patient On (Oxygen Delivery Method): room air        PHYSICAL EXAM:  General: NAD.  HEENT: no JVD, no jaundice.  LUNGS: CTAB.  Heart: S1 S2 RRR  Abd: soft nt/nd                             14.7   15.27 )-----------( 124      ( 11 Sep 2024 07:13 )             41.9       09-11    134<L>  |  104  |  17  ----------------------------<  146<H>  3.3<L>   |  22  |  0.87    Ca    9.4      11 Sep 2024 07:13  Phos  1.2     09-11  Mg     1.6     09-11    TPro  6.4  /  Alb  2.3<L>  /  TBili  2.7<H>  /  DBili  1.3<H>  /  AST  15  /  ALT  18  /  AlkPhos  72  09-11      PT/INR - ( 10 Sep 2024 16:16 )   PT: 18.4 sec;   INR: 1.65 ratio         PTT - ( 11 Sep 2024 05:27 )  PTT:40.3 sec

## 2024-09-11 NOTE — H&P ADULT - NSHPLABSRESULTS_GEN_ALL_CORE
LABS:  cret                        15.6   21.55 )-----------( 148      ( 10 Sep 2024 16:16 )             44.7     09-10    128<L>  |  100  |  28<H>  ----------------------------<  184<H>  4.2   |  21<L>  |  1.36<H>    Ca    9.3      10 Sep 2024 16:16  Mg     1.6     09-10    TPro  x   /  Alb  x   /  TBili  2.7<H>  /  DBili  1.0<H>  /  AST  x   /  ALT  x   /  AlkPhos  x   09-10    PT/INR - ( 10 Sep 2024 16:16 )   PT: 18.4 sec;   INR: 1.65 ratio         PTT - ( 10 Sep 2024 16:16 )  PTT:33.1 sec      Urinalysis with Rflx Culture (collected 09-10-24 @ 17:25)    < from: US Abdomen Upper Quadrant Right (09.10.24 @ 21:09) >    IMPRESSION:  The sonographic findings are suspicious for acute cholecystitis in the   proper clinical scenario.  Correlate with clinical symptoms and   laboratory values.  A follow-up nuclear medicine HIDA scan may be   obtained as clinically warranted.        --- End of Report ---    < end of copied text >    < from: CT Abdomen and Pelvis w/ IV Cont (09.10.24 @ 17:56) >      IMPRESSION: New severe gallbladder wall thickening and pericholecystic   stranding in the setting of cholelithiasis is suspicious for acute   cholecystitis. Recommend clinical correlation and consider right upper   quadrant ultrasound.    ---End of Report ---    < end of copied text >

## 2024-09-11 NOTE — PROGRESS NOTE ADULT - SUBJECTIVE AND OBJECTIVE BOX
HPI: 72M with h/o Afib, HTN, hypothyroidism, and pre-DM presented with complaints on abdominal pain. Patient initially presented with abdominal pain on 9/8/24 and was discharged, in the interim he developed high grade fever and worsening abdominal pain and came back to ED. In ED pts BP soft, otherwise stable, WBC noted for 21.55, H/H 15/44, Plt 148. CMP noted for BUN/Cr 28/1.36, Lactate 2.3 > 2, T. bili 3.3, UA cloudy, with proteinuria, ketonuria, small leuks. COVID/Flu swab negative, CT abdomen noted for new severe gallbladder wall thickening and pericholecystic stranding in the setting of cholelithiasis is suspicious for acute cholecystitis. RUQ US also noted for cholecystitis. Patient seen by surgery, and wanted some time to consider the option for surgery. Patient admitted to  for medical management.  (11 Sep 2024 04:07)      MEDICATIONS  (STANDING):  aspirin enteric coated 81 milliGRAM(s) Oral daily  cholecalciferol 1000 Unit(s) Oral daily  heparin  Infusion.  Unit(s)/Hr (24 mL/Hr) IV Continuous <Continuous>  levothyroxine 112 MICROGram(s) Oral daily  metoprolol tartrate 100 milliGRAM(s) Oral once  metoprolol tartrate 100 milliGRAM(s) Oral two times a day  piperacillin/tazobactam IVPB.. 3.375 Gram(s) IV Intermittent every 8 hours  sodium chloride 0.9%. 1000 milliLiter(s) (125 mL/Hr) IV Continuous <Continuous>  ursodiol Capsule 300 milliGRAM(s) Oral two times a day    MEDICATIONS  (PRN):  acetaminophen     Tablet .. 650 milliGRAM(s) Oral once PRN Temp greater or equal to 38C (100.4F), Mild Pain (1 - 3)  aluminum hydroxide/magnesium hydroxide/simethicone Suspension 30 milliLiter(s) Oral every 4 hours PRN Dyspepsia  heparin   Injectable 73207 Unit(s) IV Push every 6 hours PRN For aPTT less than 40  heparin   Injectable 5000 Unit(s) IV Push every 6 hours PRN For aPTT between 40 - 57  melatonin 3 milliGRAM(s) Oral at bedtime PRN Insomnia  ondansetron Injectable 4 milliGRAM(s) IV Push once PRN Nausea and/or Vomiting  traZODone 150 milliGRAM(s) Oral at bedtime PRN for insomnia      Vital Signs Last 24 Hrs  T(C): 37.2 (11 Sep 2024 08:52), Max: 37.3 (11 Sep 2024 02:50)  T(F): 98.9 (11 Sep 2024 08:52), Max: 99.1 (11 Sep 2024 02:50)  HR: 79 (11 Sep 2024 08:52) (77 - 98)  BP: 123/57 (11 Sep 2024 08:52) (84/64 - 127/87)  BP(mean): 85 (11 Sep 2024 01:08) (72 - 102)  RR: 19 (11 Sep 2024 08:52) (19 - 33)  SpO2: 95% (11 Sep 2024 08:52) (94% - 100%)    Parameters below as of 11 Sep 2024 08:52  Patient On (Oxygen Delivery Method): room air        GEN: NAD, comfortable, resting in bed  HEENT: NC/AT, EOMI, PERRLA, MMM, clear conjunctiva and sclera, normal hearing, no nasal discharge, throat clear, no thrush, normal dentition.   NECK: supple, no JVD, no LAD, no thyromegaly  BACK:  ROM intact, no spinal/paraspinal tenderness  CV: S1S2, RRR, no mumur  RESP: good air movement, CTABL, no rales, rhonchi or wheezing, respirations unlabored  ABD: +BS, soft, ND, NT, no guarding, no rigidity, no HSM  EXT: +2 radial and pedal pulses, no edema, no calve tenderness  SKIN: No visible Rashes or Ulcers  MSK: ROM intact in all extremities  NEURO:  sensory and CN 2-12 Grossly intact, no motor deficits, no, saddle anesthesia, AOx3  PSYCH: normal behavior         LABS:                          14.7   15.27 )-----------( 124      ( 11 Sep 2024 07:13 )             41.9     11 Sep 2024 07:13    134    |  104    |  17     ----------------------------<  146    3.3     |  22     |  0.87     Ca    9.4        11 Sep 2024 07:13  Phos  1.2       11 Sep 2024 07:13  Mg     1.6       11 Sep 2024 07:13    TPro  6.4    /  Alb  2.3    /  TBili  2.7    /  DBili  1.3    /  AST  15     /  ALT  18     /  AlkPhos  72     11 Sep 2024 07:13    LIVER FUNCTIONS - ( 11 Sep 2024 07:13 )  Alb: 2.3 g/dL / Pro: 6.4 gm/dL / ALK PHOS: 72 U/L / ALT: 18 U/L / AST: 15 U/L / GGT: x           PT/INR - ( 10 Sep 2024 16:16 )   PT: 18.4 sec;   INR: 1.65 ratio         PTT - ( 11 Sep 2024 05:27 )  PTT:40.3 sec  CAPILLARY BLOOD GLUCOSE      POCT Blood Glucose.: 150 mg/dL (11 Sep 2024 12:00)        Urinalysis Basic - ( 11 Sep 2024 07:13 )    Color: x / Appearance: x / SG: x / pH: x  Gluc: 146 mg/dL / Ketone: x  / Bili: x / Urobili: x   Blood: x / Protein: x / Nitrite: x   Leuk Esterase: x / RBC: x / WBC x   Sq Epi: x / Non Sq Epi: x / Bacteria: x        RADIOLOGY:    < from: CT Abdomen and Pelvis w/ IV Cont (09.10.24 @ 17:56) >  IMPRESSION: New severe gallbladder wall thickening and pericholecystic   stranding in the setting of cholelithiasis is suspicious for acute   cholecystitis. Recommend clinical correlation and consider right upper   quadrant ultrasound.       HPI: 72M with h/o Afib, HTN, hypothyroidism, and pre-DM presented with complaints on abdominal pain. Patient initially presented with abdominal pain on 9/8/24 and was discharged, in the interim he developed high grade fever and worsening abdominal pain and came back to ED. In ED pts BP soft, otherwise stable, WBC noted for 21.55, H/H 15/44, Plt 148. CMP noted for BUN/Cr 28/1.36, Lactate 2.3 > 2, T. bili 3.3, UA cloudy, with proteinuria, ketonuria, small leuks. COVID/Flu swab negative, CT abdomen noted for new severe gallbladder wall thickening and pericholecystic stranding in the setting of cholelithiasis is suspicious for acute cholecystitis. RUQ US also noted for cholecystitis. Patient seen by surgery, and wanted some time to consider the option for surgery. Patient admitted to  for medical management.  (11 Sep 2024 04:07)    Subjective: pt seen this AM, c/o abdominal pain, ~ 6/10, no N/V, currently NPO for MRCP.     REVIEW OF SYSTEMS:    CONSTITUTIONAL: No weakness, fevers or chills  EYES/ENT: No visual changes;  No vertigo or throat pain   NECK: No pain or stiffness  RESPIRATORY: No cough, wheezing, hemoptysis; No shortness of breath  CARDIOVASCULAR: No chest pain or palpitations  GASTROINTESTINAL: + abdominal pain. No nausea, vomiting, or hematemesis; No diarrhea or constipation. No melena or hematochezia.  GENITOURINARY: No dysuria, frequency or hematuria  NEUROLOGICAL: No numbness or weakness  SKIN: No itching, rashes    MEDICATIONS  (STANDING):  aspirin enteric coated 81 milliGRAM(s) Oral daily  cholecalciferol 1000 Unit(s) Oral daily  heparin  Infusion.  Unit(s)/Hr (24 mL/Hr) IV Continuous <Continuous>  levothyroxine 112 MICROGram(s) Oral daily  metoprolol tartrate 100 milliGRAM(s) Oral once  metoprolol tartrate 100 milliGRAM(s) Oral two times a day  piperacillin/tazobactam IVPB.. 3.375 Gram(s) IV Intermittent every 8 hours  sodium chloride 0.9%. 1000 milliLiter(s) (125 mL/Hr) IV Continuous <Continuous>  ursodiol Capsule 300 milliGRAM(s) Oral two times a day    MEDICATIONS  (PRN):  acetaminophen     Tablet .. 650 milliGRAM(s) Oral once PRN Temp greater or equal to 38C (100.4F), Mild Pain (1 - 3)  aluminum hydroxide/magnesium hydroxide/simethicone Suspension 30 milliLiter(s) Oral every 4 hours PRN Dyspepsia  heparin   Injectable 87467 Unit(s) IV Push every 6 hours PRN For aPTT less than 40  heparin   Injectable 5000 Unit(s) IV Push every 6 hours PRN For aPTT between 40 - 57  melatonin 3 milliGRAM(s) Oral at bedtime PRN Insomnia  ondansetron Injectable 4 milliGRAM(s) IV Push once PRN Nausea and/or Vomiting  traZODone 150 milliGRAM(s) Oral at bedtime PRN for insomnia      Vital Signs Last 24 Hrs  T(C): 37.2 (11 Sep 2024 08:52), Max: 37.3 (11 Sep 2024 02:50)  T(F): 98.9 (11 Sep 2024 08:52), Max: 99.1 (11 Sep 2024 02:50)  HR: 79 (11 Sep 2024 08:52) (77 - 98)  BP: 123/57 (11 Sep 2024 08:52) (84/64 - 127/87)  BP(mean): 85 (11 Sep 2024 01:08) (72 - 102)  RR: 19 (11 Sep 2024 08:52) (19 - 33)  SpO2: 95% (11 Sep 2024 08:52) (94% - 100%)    Parameters below as of 11 Sep 2024 08:52  Patient On (Oxygen Delivery Method): room air    PHYSICAL EXAM:  GENERAL: NAD, lying in bed comfortably  HEAD:  Atraumatic, Normocephalic  EYES: conjunctiva and sclera clear  ENT: Moist mucous membranes  NECK: Supple, No JVD  CHEST/LUNG: Clear to auscultation bilaterally; No rales, rhonchi, wheezing. Unlabored respirations  HEART: Regular rate and rhythm; No murmurs  ABDOMEN: Bowel sounds present; Soft, Nontender, Nondistended.   EXTREMITIES:  2+ Peripheral Pulses, brisk capillary refill. No clubbing, cyanosis, or edema  NERVOUS SYSTEM:  Alert & Oriented X3, speech clear. No deficits   MSK: FROM all 4 extremities, full and equal strength        LABS:                          14.7   15.27 )-----------( 124      ( 11 Sep 2024 07:13 )             41.9     11 Sep 2024 07:13    134    |  104    |  17     ----------------------------<  146    3.3     |  22     |  0.87     Ca    9.4        11 Sep 2024 07:13  Phos  1.2       11 Sep 2024 07:13  Mg     1.6       11 Sep 2024 07:13    TPro  6.4    /  Alb  2.3    /  TBili  2.7    /  DBili  1.3    /  AST  15     /  ALT  18     /  AlkPhos  72     11 Sep 2024 07:13    LIVER FUNCTIONS - ( 11 Sep 2024 07:13 )  Alb: 2.3 g/dL / Pro: 6.4 gm/dL / ALK PHOS: 72 U/L / ALT: 18 U/L / AST: 15 U/L / GGT: x           PT/INR - ( 10 Sep 2024 16:16 )   PT: 18.4 sec;   INR: 1.65 ratio         PTT - ( 11 Sep 2024 05:27 )  PTT:40.3 sec  CAPILLARY BLOOD GLUCOSE      POCT Blood Glucose.: 150 mg/dL (11 Sep 2024 12:00)        Urinalysis Basic - ( 11 Sep 2024 07:13 )    Color: x / Appearance: x / SG: x / pH: x  Gluc: 146 mg/dL / Ketone: x  / Bili: x / Urobili: x   Blood: x / Protein: x / Nitrite: x   Leuk Esterase: x / RBC: x / WBC x   Sq Epi: x / Non Sq Epi: x / Bacteria: x        RADIOLOGY:    < from: CT Abdomen and Pelvis w/ IV Cont (09.10.24 @ 17:56) >  IMPRESSION: New severe gallbladder wall thickening and pericholecystic   stranding in the setting of cholelithiasis is suspicious for acute   cholecystitis. Recommend clinical correlation and consider right upper   quadrant ultrasound.

## 2024-09-11 NOTE — H&P ADULT - PROBLEM SELECTOR PLAN 7
DVT ppx: Hep gtt  Fall and aspiration precautions.  Turn and reposition q2h to prevent bedsores  Skin checks as per RN

## 2024-09-11 NOTE — CONSULT NOTE ADULT - SUBJECTIVE AND OBJECTIVE BOX
PCP:    REQUESTING PHYSICIAN:    REASON FOR CONSULT:    CHIEF COMPLAINT:    HPI:  72M with h/o Afib, HTN, hypothyroidism, and pre-DM presents with complaints on abdominal pain. Patient initially presented with abdominal pain on 9/8/24 and was discharged, in the interim he developed high grade fever and worsening abdominal pain and came back to ED. In ED pts BP soft, otherwise stable, WBC noted for 21.55, H/H 15/44, Plt 148. CMP noted for BUN/Cr 28/1.36, Lactate 2.3 > 2, T. bili 3.3, UA cloudy, with proteinuria, ketonuria, small leuks. COVID/Flu swab negative, CT abdomen noted for new severe gallbladder wall thickening and pericholecystic stranding in the setting of cholelithiasis is suspicious for acute cholecystitis. RUQ US also noted for cholecystitis. Patient seen by surgery, and wanted some time to consider the option for surgery. Patient admitted to  for medical management.  (11 Sep 2024 04:07) Cardiology called to evaluate preoperative risk. Pt is followed by a cardiologist elsewhere and has seen him appx 4 months ago. He is morbidly obese and does not ambulate. He denies chest pain or shortness of breath. He denies an MI or coronary intervention.      PAST MEDICAL & SURGICAL HISTORY:  Dyslipidemia      Hypertension      Afib  on coumadin      Chronic Gout      Anxiety      History of Transient Ischemic Attack (TIA)  approx 1994      History of Obesity      Obstructive Sleep Apnea  CPAP currenlty      Hypothyroid      BPH (benign prostatic hyperplasia)      H/O cholangitis      Multiple falls  now wheelchair bound      DM (diabetes mellitus)      History of Tonsillectomy      S/P ERCP          Allergies    metformin (Unknown)    Intolerances        SOCIAL HISTORY:    FAMILY HISTORY:  FH: myocardial infarction (Father)        MEDICATIONS:  MEDICATIONS  (STANDING):  aspirin enteric coated 81 milliGRAM(s) Oral daily  cholecalciferol 1000 Unit(s) Oral daily  heparin  Infusion.  Unit(s)/Hr (24 mL/Hr) IV Continuous <Continuous>  levothyroxine 112 MICROGram(s) Oral daily  metoprolol tartrate 100 milliGRAM(s) Oral two times a day  piperacillin/tazobactam IVPB.. 3.375 Gram(s) IV Intermittent every 8 hours  sodium chloride 0.9%. 1000 milliLiter(s) (75 mL/Hr) IV Continuous <Continuous>  ursodiol Capsule 300 milliGRAM(s) Oral two times a day    MEDICATIONS  (PRN):  acetaminophen     Tablet .. 650 milliGRAM(s) Oral once PRN Temp greater or equal to 38C (100.4F), Mild Pain (1 - 3)  aluminum hydroxide/magnesium hydroxide/simethicone Suspension 30 milliLiter(s) Oral every 4 hours PRN Dyspepsia  heparin   Injectable 66855 Unit(s) IV Push every 6 hours PRN For aPTT less than 40  heparin   Injectable 5000 Unit(s) IV Push every 6 hours PRN For aPTT between 40 - 57  melatonin 3 milliGRAM(s) Oral at bedtime PRN Insomnia  ondansetron Injectable 4 milliGRAM(s) IV Push once PRN Nausea and/or Vomiting  traZODone 150 milliGRAM(s) Oral at bedtime PRN for insomnia      REVIEW OF SYSTEMS:    CONSTITUTIONAL: No weakness, fevers or chills  EYES/ENT: No visual changes;  No vertigo or throat pain   NECK: No pain or stiffness  RESPIRATORY: No cough, wheezing, hemoptysis; No shortness of breath  CARDIOVASCULAR: No chest pain or palpitations  GASTROINTESTINAL: Abdominal discomfort  GENITOURINARY: No dysuria, frequency or hematuria  NEUROLOGICAL: No numbness or weakness  SKIN: No itching, burning, rashes, or lesions   All other review of systems is negative unless indicated above    Vital Signs Last 24 Hrs  T(C): 37 (11 Sep 2024 14:57), Max: 37.3 (11 Sep 2024 02:50)  T(F): 98.6 (11 Sep 2024 14:57), Max: 99.1 (11 Sep 2024 02:50)  HR: 97 (11 Sep 2024 14:57) (77 - 98)  BP: 123/75 (11 Sep 2024 14:57) (88/66 - 127/87)  BP(mean): 85 (11 Sep 2024 01:08) (72 - 102)  RR: 20 (11 Sep 2024 14:57) (19 - 33)  SpO2: 98% (11 Sep 2024 14:57) (94% - 100%)    Parameters below as of 11 Sep 2024 14:57  Patient On (Oxygen Delivery Method): room air        I&O's Summary    10 Sep 2024 07:01  -  11 Sep 2024 07:00  --------------------------------------------------------  IN: 0 mL / OUT: 350 mL / NET: -350 mL    11 Sep 2024 07:01  -  11 Sep 2024 16:24  --------------------------------------------------------  IN: 0 mL / OUT: 300 mL / NET: -300 mL        PHYSICAL EXAM:    Constitutional: NAD, awake and alert, morbidly obeese  HEENT: PERR, EOMI,  No oral cyananosis.  Neck:  supple,  No JVD  Respiratory: Breath sounds are clear bilaterally, No wheezing, rales or rhonchi  Cardiovascular: S1 and S2, regular rate and rhythm, no Murmurs, gallops or rubs  Gastrointestinal: Bowel Sounds present, soft, mildly tender  Extremities: No peripheral edema. No clubbing or cyanosis.  Vascular: 2+ peripheral pulses  Neurological: A/O x 3, no focal deficits  Musculoskeletal: no calf tenderness.  Skin: No rashes.      LABS: All Labs Reviewed:                        14.7   15.27 )-----------( 124      ( 11 Sep 2024 07:13 )             41.9                         15.6   21.55 )-----------( 148      ( 10 Sep 2024 16:16 )             44.7     11 Sep 2024 07:13    134    |  104    |  17     ----------------------------<  146    3.3     |  22     |  0.87   10 Sep 2024 16:16    128    |  100    |  28     ----------------------------<  184    4.2     |  21     |  1.36     Ca    9.4        11 Sep 2024 07:13  Ca    9.3        10 Sep 2024 16:16  Phos  1.2       11 Sep 2024 07:13  Mg     1.6       11 Sep 2024 07:13  Mg     1.6       10 Sep 2024 16:16    TPro  6.4    /  Alb  2.3    /  TBili  2.7    /  DBili  1.3    /  AST  15     /  ALT  18     /  AlkPhos  72     11 Sep 2024 07:13  TPro  x      /  Alb  x      /  TBili  2.7    /  DBili  1.0    /  AST  x      /  ALT  x      /  AlkPhos  x      10 Sep 2024 19:47  TPro  7.1    /  Alb  2.6    /  TBili  3.3    /  DBili  x      /  AST  26     /  ALT  19     /  AlkPhos  73     10 Sep 2024 16:16    PT/INR - ( 10 Sep 2024 16:16 )   PT: 18.4 sec;   INR: 1.65 ratio         PTT - ( 11 Sep 2024 05:27 )  PTT:40.3 sec      Blood Culture:         RADIOLOGY/EKG:< from: 12 Lead ECG (09.10.24 @ 16:10) >  Diagnosis Line Atrial fibrillation  Left axis deviation  Inferior infarct (cited on or before 08-SEP-2024)  Abnormal ECG  When compared with ECG of 08-SEP-2024 06:38,  Criteria for Septal infarct are no longer Present  Nonspecific T wave abnormality no longer evident in Anterolateral leads  Confirmed by SONU MORELOS ([id number]) on 9/11/2024 8:44:17 AM    < end of copied text >

## 2024-09-11 NOTE — CONSULT NOTE ADULT - ASSESSMENT
# CHolecystitis  # Sepsis  # AFw/RVR  # Hypoxic respiratory failure requiring NIPPV   # Lactic acidosis  # HFpEF   # Hypophosphatemia  # Hypomagnesemia  # Thrombocytopenia     Neuro:   - Altered mental status per son at the bedside   - Morphine and precedex for anxiolysis while on BiPAP  - Pain control PRN     CV  - TTE 9/11 with HFpEF, LVEF 60%, severely dilated LA and dilated RA with pHTN  - Rapid AF s/p multiple pushes of metoprolol  - Given diltiazem push, started on diltiazem infusion, actively titrating with goal  to improve ventricular filling in the setting of HFpEF    Resp:  - Severely dyspneic during RRT on NC. Placed on BiPAP  - Initially ripping BiPAP off 2/2 anxiety, now imrpoved after anxiolysis  - Actively titrating BiPAP settings to improve WOB, maintain SpO2 >92%    GI:  - NPO while on BiPAP  - Protonix IVP for GI stress ulcer PPX  - Abdominal pain 2/2 cholecystitis, planned HIDA scan 9/12    Renal:   - Replacing phos, mag   - Keep K >4, phos >3, MG>2 for optimal arrhythmia suppression     Heme:   - Full AC with heparin gtt for AF   - Thrombocytopenia     Endo:   - DM on SGLT2 inhibitor outpatient. ISS for glycemic control  72M with h/o Afib on coumadin, HTN, hypothyroidism, DM vs pre-DM, currently admitted to  with sepsis 2/2 acute cholecystitis and cholelithiasis. Worsening clinical condition on medicine floor, with RRT called on 9/11. Patient placed on BiPAP, taken to ICU.    # Acute Cholecystitis  # Sepsis  # AF w/RVR  # Hypoxic respiratory failure requiring NIPPV   # Lactic acidosis  # HFpEF   # Hypophosphatemia  # Hypomagnesemia  # Thrombocytopenia     Plan:   Patient seen and evaluated at rapid response. Patient appears unwell, with increased work of breathing, in AF w/ RVR. 12 lead seen and reviewed at RRT.   Patient upgraded to ICU, placed on BiPAP.     Patient re-examined in ICU, tolerating BiPAP. GOC discussion with patient and family, patient full code.     Neuro:   - Altered mental status per son at the bedside   - Morphine and precedex for anxiolysis while on BiPAP  - Pain control PRN     CV  - TTE 9/11 with HFpEF, LVEF 60%, severely dilated LA and dilated RA with pHTN  - Rapid AF s/p multiple pushes of metoprolol  - Given diltiazem push, with adequate rate control.     Resp:  - Severely dyspneic during RRT on NC. Placed on BiPAP  - Initially ripping BiPAP off 2/2 anxiety, now imrpoved after anxiolysis  - Actively titrating BiPAP settings to improve WOB, maintain SpO2 >92%    GI:  - NPO while on BiPAP  - Protonix IVP for GI stress ulcer PPX  - Abdominal pain 2/2 cholecystitis, planned HIDA scan 9/12    Renal:   - Replacing phos, mag   - Keep K >4, phos >3, MG>2 for optimal arrhythmia suppression     Heme:   - Full AC with heparin gtt for AF   - Thrombocytopenia, may be 2/2 sepsis    Endo:   - DM on SGLT2 inhibitor outpatient. ISS for glycemic control   - A1C ordered for AM     Will discuss case with ICU attending, Dr. Rodríguez    Critical Care Time (EXCLUSIVE of any non bundled procedures) :  110 minutes were spent assessing the patient's presenting problems of acute illness that pose a high probability of life threatening  deterioration or end organ damage / dysfunction.  Medical decision making includes initiation / continuation of plan or care review data/ labwork/ radiographic study, direct patient care at bedside, discussions with consultants regarding care, evaluation and interpretation of vital signs, any necessary ventilator management, NIV or BIPAP changes or initiation, discussions with multidisciplinary team,  am or pm rounds, discussions of goals of care with patient and family, all non-inclusive of procedures.    Date of entry of this note is equal to the date of services rendered

## 2024-09-11 NOTE — RAPID RESPONSE TEAM SUMMARY - NSSITUATIONBACKGROUNDRRT_GEN_ALL_CORE
RRT called for fever, increased WOB, atrial fibrillation with RVR.   Patient taken to ICU, see consult note for full details.

## 2024-09-11 NOTE — PATIENT PROFILE ADULT - FALL HARM RISK - HARM RISK INTERVENTIONS

## 2024-09-11 NOTE — H&P ADULT - NSHPPHYSICALEXAM_GEN_ALL_CORE
T(C): 37.3 (09-11-24 @ 02:50), Max: 37.3 (09-11-24 @ 02:50)  HR: 92 (09-11-24 @ 02:50) (77 - 98)  BP: 98/59 (09-11-24 @ 02:50) (84/64 - 127/87)  RR: 20 (09-11-24 @ 02:50) (19 - 33)  SpO2: 95% (09-11-24 @ 02:50) (94% - 100%)    General: Morbid obesity  HEENT: non-traumatic, perrla, eomi  Cardio: s1s2 regular rate and rhythm  Lungs: comfortable breathing, clear to auscultation  Abdomen: Soft, non-tender, non-distended  Neuro: AOx4  Ext: Pulses +2

## 2024-09-11 NOTE — CONSULT NOTE ADULT - PROBLEM SELECTOR RECOMMENDATION 3
Pt is asymptomatic from a cardiovascular standpoint. His ECG reveals inferior Q waves but no acute changes. Echo reveals normal LV fx without significant valvulopathy. Pt denies hx of CAD. I will try to contact his office for background. History suggests h/o TIA which pt did not disclose. If he has a history of cerebrovascular disease that increases his operative risk. His cardiologist Dr. Raleigh Montenegro  was unavailable.

## 2024-09-11 NOTE — H&P ADULT - HISTORY OF PRESENT ILLNESS
72M with h/o Afib, HTN, hypothyroidism, and pre-DM presents with complaints on abdominal pain. Patient initially presented with abdominal pain on 9/8/24 and was discharged, in the interim he developed high grade fever and worsening abdominal pain and came back to ED. In ED pts BP soft, otherwise stable, WBC noted for 21.55, H/H 15/44, Plt 148. CMP noted for BUN/Cr 28/1.36, Lactate 2.3 > 2, T. bili 3.3, UA cloudy, with proteinuria, ketonuria, small leuks. COVID/Flu swab negative, CT abdomen noted for new severe gallbladder wall thickening and pericholecystic stranding in the setting of cholelithiasis is suspicious for acute cholecystitis. RUQ US also noted for cholecystitis. Patient seen by surgery, and wanted some time to consider the option for surgery. Patient admitted to  for medical management.

## 2024-09-11 NOTE — CONSULT NOTE ADULT - PROBLEM SELECTOR RECOMMENDATION 9
Plan for possible cholecystectomy. Labs are improving. Surgery to follow. Continue hydration and other measures.

## 2024-09-12 LAB
A1C WITH ESTIMATED AVERAGE GLUCOSE RESULT: 5.7 % — HIGH (ref 4–5.6)
ADD ON TEST-SPECIMEN IN LAB: SIGNIFICANT CHANGE UP
ADD ON TEST-SPECIMEN IN LAB: SIGNIFICANT CHANGE UP
ALBUMIN SERPL ELPH-MCNC: 2.2 G/DL — LOW (ref 3.3–5)
ALBUMIN SERPL ELPH-MCNC: 2.2 G/DL — LOW (ref 3.3–5)
ALP SERPL-CCNC: 135 U/L — HIGH (ref 40–120)
ALP SERPL-CCNC: 136 U/L — HIGH (ref 40–120)
ALT FLD-CCNC: 41 U/L — SIGNIFICANT CHANGE UP (ref 12–78)
ALT FLD-CCNC: 44 U/L — SIGNIFICANT CHANGE UP (ref 12–78)
ANION GAP SERPL CALC-SCNC: 11 MMOL/L — SIGNIFICANT CHANGE UP (ref 5–17)
ANION GAP SERPL CALC-SCNC: 12 MMOL/L — SIGNIFICANT CHANGE UP (ref 5–17)
ANION GAP SERPL CALC-SCNC: 8 MMOL/L — SIGNIFICANT CHANGE UP (ref 5–17)
APTT BLD: 38.1 SEC — HIGH (ref 24.5–35.6)
APTT BLD: 58.5 SEC — HIGH (ref 24.5–35.6)
APTT BLD: 76.5 SEC — HIGH (ref 24.5–35.6)
AST SERPL-CCNC: 52 U/L — HIGH (ref 15–37)
AST SERPL-CCNC: 53 U/L — HIGH (ref 15–37)
BASE EXCESS BLDA CALC-SCNC: -8.2 MMOL/L — LOW (ref -2–3)
BASE EXCESS BLDV CALC-SCNC: -4.8 MMOL/L — LOW (ref -2–3)
BASOPHILS # BLD AUTO: 0.11 K/UL — SIGNIFICANT CHANGE UP (ref 0–0.2)
BASOPHILS # BLD AUTO: 0.14 K/UL — SIGNIFICANT CHANGE UP (ref 0–0.2)
BASOPHILS NFR BLD AUTO: 0.3 % — SIGNIFICANT CHANGE UP (ref 0–2)
BASOPHILS NFR BLD AUTO: 0.4 % — SIGNIFICANT CHANGE UP (ref 0–2)
BILIRUB SERPL-MCNC: 3.6 MG/DL — HIGH (ref 0.2–1.2)
BILIRUB SERPL-MCNC: 3.9 MG/DL — HIGH (ref 0.2–1.2)
BLOOD GAS COMMENTS ARTERIAL: SIGNIFICANT CHANGE UP
BUN SERPL-MCNC: 17 MG/DL — SIGNIFICANT CHANGE UP (ref 7–23)
BUN SERPL-MCNC: 17 MG/DL — SIGNIFICANT CHANGE UP (ref 7–23)
BUN SERPL-MCNC: 18 MG/DL — SIGNIFICANT CHANGE UP (ref 7–23)
CA-I BLD-SCNC: 1.2 MMOL/L — SIGNIFICANT CHANGE UP (ref 1.15–1.33)
CALCIUM SERPL-MCNC: 8.8 MG/DL — SIGNIFICANT CHANGE UP (ref 8.5–10.1)
CALCIUM SERPL-MCNC: 8.8 MG/DL — SIGNIFICANT CHANGE UP (ref 8.5–10.1)
CALCIUM SERPL-MCNC: 9 MG/DL — SIGNIFICANT CHANGE UP (ref 8.5–10.1)
CHLORIDE SERPL-SCNC: 107 MMOL/L — SIGNIFICANT CHANGE UP (ref 96–108)
CHLORIDE SERPL-SCNC: 107 MMOL/L — SIGNIFICANT CHANGE UP (ref 96–108)
CHLORIDE SERPL-SCNC: 109 MMOL/L — HIGH (ref 96–108)
CK SERPL-CCNC: 92 U/L — SIGNIFICANT CHANGE UP (ref 26–308)
CO2 SERPL-SCNC: 16 MMOL/L — LOW (ref 22–31)
CO2 SERPL-SCNC: 18 MMOL/L — LOW (ref 22–31)
CO2 SERPL-SCNC: 22 MMOL/L — SIGNIFICANT CHANGE UP (ref 22–31)
CREAT SERPL-MCNC: 1.17 MG/DL — SIGNIFICANT CHANGE UP (ref 0.5–1.3)
CREAT SERPL-MCNC: 1.3 MG/DL — SIGNIFICANT CHANGE UP (ref 0.5–1.3)
CREAT SERPL-MCNC: 1.5 MG/DL — HIGH (ref 0.5–1.3)
CULTURE RESULTS: SIGNIFICANT CHANGE UP
EGFR: 48 ML/MIN/1.73M2 — LOW
EGFR: 57 ML/MIN/1.73M2 — LOW
EGFR: 65 ML/MIN/1.73M2 — SIGNIFICANT CHANGE UP
EOSINOPHIL # BLD AUTO: 0 K/UL — SIGNIFICANT CHANGE UP (ref 0–0.5)
EOSINOPHIL # BLD AUTO: 0.09 K/UL — SIGNIFICANT CHANGE UP (ref 0–0.5)
EOSINOPHIL NFR BLD AUTO: 0 % — SIGNIFICANT CHANGE UP (ref 0–6)
EOSINOPHIL NFR BLD AUTO: 0.3 % — SIGNIFICANT CHANGE UP (ref 0–6)
ESTIMATED AVERAGE GLUCOSE: 117 MG/DL — HIGH (ref 68–114)
GAS PNL BLDV: SIGNIFICANT CHANGE UP
GLUCOSE BLDC GLUCOMTR-MCNC: 159 MG/DL — HIGH (ref 70–99)
GLUCOSE BLDC GLUCOMTR-MCNC: 166 MG/DL — HIGH (ref 70–99)
GLUCOSE BLDC GLUCOMTR-MCNC: 215 MG/DL — HIGH (ref 70–99)
GLUCOSE SERPL-MCNC: 185 MG/DL — HIGH (ref 70–99)
GLUCOSE SERPL-MCNC: 188 MG/DL — HIGH (ref 70–99)
GLUCOSE SERPL-MCNC: 254 MG/DL — HIGH (ref 70–99)
GRAM STN FLD: ABNORMAL
HCO3 BLDA-SCNC: 16 MMOL/L — LOW (ref 21–28)
HCO3 BLDV-SCNC: 20 MMOL/L — LOW (ref 22–29)
HCT VFR BLD CALC: 37.5 % — LOW (ref 39–50)
HCT VFR BLD CALC: 40.7 % — SIGNIFICANT CHANGE UP (ref 39–50)
HCT VFR BLD CALC: 40.8 % — SIGNIFICANT CHANGE UP (ref 39–50)
HCT VFR BLD CALC: 40.9 % — SIGNIFICANT CHANGE UP (ref 39–50)
HGB BLD-MCNC: 13.3 G/DL — SIGNIFICANT CHANGE UP (ref 13–17)
HGB BLD-MCNC: 14.4 G/DL — SIGNIFICANT CHANGE UP (ref 13–17)
HGB BLD-MCNC: 14.5 G/DL — SIGNIFICANT CHANGE UP (ref 13–17)
HGB BLD-MCNC: 14.5 G/DL — SIGNIFICANT CHANGE UP (ref 13–17)
IMM GRANULOCYTES NFR BLD AUTO: 2.2 % — HIGH (ref 0–0.9)
IMM GRANULOCYTES NFR BLD AUTO: 3.1 % — HIGH (ref 0–0.9)
INR BLD: 1.46 RATIO — HIGH (ref 0.85–1.18)
LACTATE SERPL-SCNC: 2.5 MMOL/L — HIGH (ref 0.7–2)
LACTATE SERPL-SCNC: 2.9 MMOL/L — HIGH (ref 0.7–2)
LACTATE SERPL-SCNC: 4.2 MMOL/L — CRITICAL HIGH (ref 0.7–2)
LYMPHOCYTES # BLD AUTO: 0.38 K/UL — LOW (ref 1–3.3)
LYMPHOCYTES # BLD AUTO: 0.43 K/UL — LOW (ref 1–3.3)
LYMPHOCYTES # BLD AUTO: 1 % — LOW (ref 13–44)
LYMPHOCYTES # BLD AUTO: 1.3 % — LOW (ref 13–44)
MAGNESIUM SERPL-MCNC: 1.7 MG/DL — SIGNIFICANT CHANGE UP (ref 1.6–2.6)
MAGNESIUM SERPL-MCNC: 1.9 MG/DL — SIGNIFICANT CHANGE UP (ref 1.6–2.6)
MAGNESIUM SERPL-MCNC: 2 MG/DL — SIGNIFICANT CHANGE UP (ref 1.6–2.6)
MCHC RBC-ENTMCNC: 28.9 PG — SIGNIFICANT CHANGE UP (ref 27–34)
MCHC RBC-ENTMCNC: 29 PG — SIGNIFICANT CHANGE UP (ref 27–34)
MCHC RBC-ENTMCNC: 29 PG — SIGNIFICANT CHANGE UP (ref 27–34)
MCHC RBC-ENTMCNC: 29.3 PG — SIGNIFICANT CHANGE UP (ref 27–34)
MCHC RBC-ENTMCNC: 35.2 GM/DL — SIGNIFICANT CHANGE UP (ref 32–36)
MCHC RBC-ENTMCNC: 35.5 GM/DL — SIGNIFICANT CHANGE UP (ref 32–36)
MCHC RBC-ENTMCNC: 35.5 GM/DL — SIGNIFICANT CHANGE UP (ref 32–36)
MCHC RBC-ENTMCNC: 35.6 GM/DL — SIGNIFICANT CHANGE UP (ref 32–36)
MCV RBC AUTO: 81.6 FL — SIGNIFICANT CHANGE UP (ref 80–100)
MCV RBC AUTO: 81.9 FL — SIGNIFICANT CHANGE UP (ref 80–100)
MCV RBC AUTO: 82.1 FL — SIGNIFICANT CHANGE UP (ref 80–100)
MCV RBC AUTO: 82.2 FL — SIGNIFICANT CHANGE UP (ref 80–100)
MONOCYTES # BLD AUTO: 1.22 K/UL — HIGH (ref 0–0.9)
MONOCYTES # BLD AUTO: 1.32 K/UL — HIGH (ref 0–0.9)
MONOCYTES NFR BLD AUTO: 3.4 % — SIGNIFICANT CHANGE UP (ref 2–14)
MONOCYTES NFR BLD AUTO: 3.8 % — SIGNIFICANT CHANGE UP (ref 2–14)
MRSA PCR RESULT.: SIGNIFICANT CHANGE UP
NEUTROPHILS # BLD AUTO: 29.25 K/UL — HIGH (ref 1.8–7.4)
NEUTROPHILS # BLD AUTO: 35.88 K/UL — HIGH (ref 1.8–7.4)
NEUTROPHILS NFR BLD AUTO: 91.2 % — HIGH (ref 43–77)
NEUTROPHILS NFR BLD AUTO: 93 % — HIGH (ref 43–77)
NT-PROBNP SERPL-SCNC: 2769 PG/ML — HIGH (ref 0–450)
NT-PROBNP SERPL-SCNC: 3807 PG/ML — HIGH (ref 0–450)
PCO2 BLDA: 28 MMHG — LOW (ref 35–48)
PCO2 BLDV: 38 MMHG — LOW (ref 42–55)
PH BLDA: 7.36 — SIGNIFICANT CHANGE UP (ref 7.35–7.45)
PH BLDV: 7.34 — SIGNIFICANT CHANGE UP (ref 7.32–7.43)
PHOSPHATE SERPL-MCNC: 2 MG/DL — LOW (ref 2.5–4.5)
PHOSPHATE SERPL-MCNC: 2 MG/DL — LOW (ref 2.5–4.5)
PHOSPHATE SERPL-MCNC: 3.3 MG/DL — SIGNIFICANT CHANGE UP (ref 2.5–4.5)
PLATELET # BLD AUTO: 142 K/UL — LOW (ref 150–400)
PLATELET # BLD AUTO: 157 K/UL — SIGNIFICANT CHANGE UP (ref 150–400)
PLATELET # BLD AUTO: 174 K/UL — SIGNIFICANT CHANGE UP (ref 150–400)
PLATELET # BLD AUTO: 196 K/UL — SIGNIFICANT CHANGE UP (ref 150–400)
PO2 BLDA: 150 MMHG — HIGH (ref 83–108)
PO2 BLDV: 134 MMHG — HIGH (ref 25–45)
POTASSIUM SERPL-MCNC: 3.5 MMOL/L — SIGNIFICANT CHANGE UP (ref 3.5–5.3)
POTASSIUM SERPL-MCNC: 3.8 MMOL/L — SIGNIFICANT CHANGE UP (ref 3.5–5.3)
POTASSIUM SERPL-MCNC: 3.8 MMOL/L — SIGNIFICANT CHANGE UP (ref 3.5–5.3)
POTASSIUM SERPL-SCNC: 3.5 MMOL/L — SIGNIFICANT CHANGE UP (ref 3.5–5.3)
POTASSIUM SERPL-SCNC: 3.8 MMOL/L — SIGNIFICANT CHANGE UP (ref 3.5–5.3)
POTASSIUM SERPL-SCNC: 3.8 MMOL/L — SIGNIFICANT CHANGE UP (ref 3.5–5.3)
PROT SERPL-MCNC: 6.2 GM/DL — SIGNIFICANT CHANGE UP (ref 6–8.3)
PROT SERPL-MCNC: 6.4 GM/DL — SIGNIFICANT CHANGE UP (ref 6–8.3)
PROTHROM AB SERPL-ACNC: 16.3 SEC — HIGH (ref 9.5–13)
RBC # BLD: 4.58 M/UL — SIGNIFICANT CHANGE UP (ref 4.2–5.8)
RBC # BLD: 4.95 M/UL — SIGNIFICANT CHANGE UP (ref 4.2–5.8)
RBC # BLD: 4.98 M/UL — SIGNIFICANT CHANGE UP (ref 4.2–5.8)
RBC # BLD: 5 M/UL — SIGNIFICANT CHANGE UP (ref 4.2–5.8)
RBC # FLD: 14.7 % — HIGH (ref 10.3–14.5)
RBC # FLD: 14.9 % — HIGH (ref 10.3–14.5)
S AUREUS DNA NOSE QL NAA+PROBE: SIGNIFICANT CHANGE UP
SAO2 % BLDA: 99 % — HIGH (ref 94–98)
SAO2 % BLDV: 99 % — HIGH (ref 67–88)
SODIUM SERPL-SCNC: 136 MMOL/L — SIGNIFICANT CHANGE UP (ref 135–145)
SODIUM SERPL-SCNC: 137 MMOL/L — SIGNIFICANT CHANGE UP (ref 135–145)
SODIUM SERPL-SCNC: 137 MMOL/L — SIGNIFICANT CHANGE UP (ref 135–145)
SPECIMEN SOURCE: SIGNIFICANT CHANGE UP
SPECIMEN SOURCE: SIGNIFICANT CHANGE UP
TSH SERPL-MCNC: 4.17 UU/ML — SIGNIFICANT CHANGE UP (ref 0.34–4.82)
WBC # BLD: 14.97 K/UL — HIGH (ref 3.8–10.5)
WBC # BLD: 18.83 K/UL — HIGH (ref 3.8–10.5)
WBC # BLD: 32.11 K/UL — HIGH (ref 3.8–10.5)
WBC # BLD: 38.55 K/UL — HIGH (ref 3.8–10.5)
WBC # FLD AUTO: 14.97 K/UL — HIGH (ref 3.8–10.5)
WBC # FLD AUTO: 18.83 K/UL — HIGH (ref 3.8–10.5)
WBC # FLD AUTO: 32.11 K/UL — HIGH (ref 3.8–10.5)
WBC # FLD AUTO: 38.55 K/UL — HIGH (ref 3.8–10.5)

## 2024-09-12 PROCEDURE — 99291 CRITICAL CARE FIRST HOUR: CPT

## 2024-09-12 PROCEDURE — 47490 INCISION OF GALLBLADDER: CPT

## 2024-09-12 RX ORDER — PANTOPRAZOLE SODIUM 40 MG
40 TABLET, DELAYED RELEASE (ENTERIC COATED) ORAL DAILY
Refills: 0 | Status: DISCONTINUED | OUTPATIENT
Start: 2024-09-12 | End: 2024-09-14

## 2024-09-12 RX ORDER — HEPARIN SODIUM,BOVINE 1000/ML
VIAL (ML) INJECTION
Qty: 25000 | Refills: 0 | Status: DISCONTINUED | OUTPATIENT
Start: 2024-09-12 | End: 2024-09-14

## 2024-09-12 RX ORDER — FLU VACCINE TS 2012-2013(5YR+) 45MCG/.5ML
0.5 VIAL (ML) INTRAMUSCULAR ONCE
Refills: 0 | Status: COMPLETED | OUTPATIENT
Start: 2024-09-12 | End: 2024-09-12

## 2024-09-12 RX ORDER — PHENYLEPHRINE HYDROCHLORIDE 10 MG/ML
0.1 INJECTION INTRAVENOUS
Qty: 40 | Refills: 0 | Status: DISCONTINUED | OUTPATIENT
Start: 2024-09-12 | End: 2024-09-12

## 2024-09-12 RX ORDER — POTASSIUM PHOSPHATE 236; 224 MG/ML; MG/ML
30 INJECTION, SOLUTION INTRAVENOUS ONCE
Refills: 0 | Status: COMPLETED | OUTPATIENT
Start: 2024-09-12 | End: 2024-09-12

## 2024-09-12 RX ORDER — MEROPENEM 500 MG/10ML
INJECTION, POWDER, FOR SOLUTION INTRAVENOUS
Refills: 0 | Status: DISCONTINUED | OUTPATIENT
Start: 2024-09-12 | End: 2024-09-12

## 2024-09-12 RX ORDER — SODIUM BICARBONATE 84 MG/ML
0.07 INJECTION, SOLUTION INTRAVENOUS
Qty: 150 | Refills: 0 | Status: DISCONTINUED | OUTPATIENT
Start: 2024-09-12 | End: 2024-09-12

## 2024-09-12 RX ORDER — POTASSIUM CHLORIDE 10 MEQ
40 TABLET, EXT RELEASE, PARTICLES/CRYSTALS ORAL ONCE
Refills: 0 | Status: COMPLETED | OUTPATIENT
Start: 2024-09-12 | End: 2024-09-12

## 2024-09-12 RX ORDER — MEROPENEM 500 MG/10ML
1000 INJECTION, POWDER, FOR SOLUTION INTRAVENOUS EVERY 8 HOURS
Refills: 0 | Status: DISCONTINUED | OUTPATIENT
Start: 2024-09-12 | End: 2024-09-14

## 2024-09-12 RX ORDER — POTASSIUM PHOSPHATE 236; 224 MG/ML; MG/ML
15 INJECTION, SOLUTION INTRAVENOUS ONCE
Refills: 0 | Status: COMPLETED | OUTPATIENT
Start: 2024-09-12 | End: 2024-09-12

## 2024-09-12 RX ORDER — SODIUM BICARBONATE 84 MG/ML
50 INJECTION, SOLUTION INTRAVENOUS ONCE
Refills: 0 | Status: COMPLETED | OUTPATIENT
Start: 2024-09-12 | End: 2024-09-12

## 2024-09-12 RX ORDER — DEXTROSE 15 G/33 G
15 GEL IN PACKET (GRAM) ORAL ONCE
Refills: 0 | Status: DISCONTINUED | OUTPATIENT
Start: 2024-09-12 | End: 2024-09-14

## 2024-09-12 RX ORDER — FUROSEMIDE 40 MG
40 TABLET ORAL ONCE
Refills: 0 | Status: COMPLETED | OUTPATIENT
Start: 2024-09-12 | End: 2024-09-12

## 2024-09-12 RX ORDER — MIDODRINE HYDROCHLORIDE 5 MG/1
10 TABLET ORAL EVERY 8 HOURS
Refills: 0 | Status: DISCONTINUED | OUTPATIENT
Start: 2024-09-12 | End: 2024-09-14

## 2024-09-12 RX ORDER — ACETAMINOPHEN 325 MG/1
1000 TABLET ORAL ONCE
Refills: 0 | Status: COMPLETED | OUTPATIENT
Start: 2024-09-12 | End: 2024-09-12

## 2024-09-12 RX ORDER — DEXTROSE 15 G/33 G
25 GEL IN PACKET (GRAM) ORAL ONCE
Refills: 0 | Status: DISCONTINUED | OUTPATIENT
Start: 2024-09-12 | End: 2024-09-14

## 2024-09-12 RX ORDER — MEROPENEM 500 MG/10ML
1000 INJECTION, POWDER, FOR SOLUTION INTRAVENOUS ONCE
Refills: 0 | Status: COMPLETED | OUTPATIENT
Start: 2024-09-12 | End: 2024-09-12

## 2024-09-12 RX ORDER — DEXTROSE 15 G/33 G
12.5 GEL IN PACKET (GRAM) ORAL ONCE
Refills: 0 | Status: DISCONTINUED | OUTPATIENT
Start: 2024-09-12 | End: 2024-09-14

## 2024-09-12 RX ORDER — CHLORHEXIDINE GLUCONATE 40 MG/ML
1 SOLUTION TOPICAL DAILY
Refills: 0 | Status: DISCONTINUED | OUTPATIENT
Start: 2024-09-12 | End: 2024-09-18

## 2024-09-12 RX ORDER — MEROPENEM 500 MG/10ML
1000 INJECTION, POWDER, FOR SOLUTION INTRAVENOUS EVERY 12 HOURS
Refills: 0 | Status: DISCONTINUED | OUTPATIENT
Start: 2024-09-12 | End: 2024-09-12

## 2024-09-12 RX ORDER — ROPIVACAINE IN 0.9% SOD CHL/PF 0.1 %
0.15 PLASTIC BAG, INJECTION (ML) EPIDURAL
Qty: 8 | Refills: 0 | Status: DISCONTINUED | OUTPATIENT
Start: 2024-09-12 | End: 2024-09-13

## 2024-09-12 RX ORDER — HEPARIN SODIUM,BOVINE 1000/ML
5000 VIAL (ML) INJECTION EVERY 6 HOURS
Refills: 0 | Status: DISCONTINUED | OUTPATIENT
Start: 2024-09-12 | End: 2024-09-14

## 2024-09-12 RX ORDER — PHENYLEPHRINE HYDROCHLORIDE 10 MG/ML
200 INJECTION INTRAVENOUS ONCE
Refills: 0 | Status: COMPLETED | OUTPATIENT
Start: 2024-09-12 | End: 2024-09-12

## 2024-09-12 RX ORDER — HEPARIN SODIUM,BOVINE 1000/ML
10000 VIAL (ML) INJECTION EVERY 6 HOURS
Refills: 0 | Status: DISCONTINUED | OUTPATIENT
Start: 2024-09-12 | End: 2024-09-14

## 2024-09-12 RX ORDER — GLUCAGON INJECTION, SOLUTION 1 MG/.2ML
1 INJECTION, SOLUTION SUBCUTANEOUS ONCE
Refills: 0 | Status: DISCONTINUED | OUTPATIENT
Start: 2024-09-12 | End: 2024-09-14

## 2024-09-12 RX ORDER — SODIUM BICARBONATE 84 MG/ML
100 INJECTION, SOLUTION INTRAVENOUS ONCE
Refills: 0 | Status: DISCONTINUED | OUTPATIENT
Start: 2024-09-12 | End: 2024-09-12

## 2024-09-12 RX ADMIN — Medication 46.1 MICROGRAM(S)/KG/MIN: at 02:30

## 2024-09-12 RX ADMIN — SODIUM BICARBONATE 75 MEQ/KG/HR: 84 INJECTION, SOLUTION INTRAVENOUS at 04:15

## 2024-09-12 RX ADMIN — MIDODRINE HYDROCHLORIDE 10 MILLIGRAM(S): 5 TABLET ORAL at 21:13

## 2024-09-12 RX ADMIN — ACETAMINOPHEN 400 MILLIGRAM(S): 325 TABLET ORAL at 20:07

## 2024-09-12 RX ADMIN — Medication 125 MILLILITER(S): at 23:07

## 2024-09-12 RX ADMIN — Medication 100 MILLIEQUIVALENT(S): at 01:27

## 2024-09-12 RX ADMIN — Medication 2400 UNIT(S)/HR: at 03:23

## 2024-09-12 RX ADMIN — Medication 46.1 MICROGRAM(S)/KG/MIN: at 14:21

## 2024-09-12 RX ADMIN — ACETAMINOPHEN 400 MILLIGRAM(S): 325 TABLET ORAL at 13:24

## 2024-09-12 RX ADMIN — MEROPENEM 1000 MILLIGRAM(S): 500 INJECTION, POWDER, FOR SOLUTION INTRAVENOUS at 12:28

## 2024-09-12 RX ADMIN — MEROPENEM 1000 MILLIGRAM(S): 500 INJECTION, POWDER, FOR SOLUTION INTRAVENOUS at 21:13

## 2024-09-12 RX ADMIN — POTASSIUM PHOSPHATE 62.5 MILLIMOLE(S): 236; 224 INJECTION, SOLUTION INTRAVENOUS at 16:13

## 2024-09-12 RX ADMIN — Medication 2400 UNIT(S)/HR: at 23:26

## 2024-09-12 RX ADMIN — Medication 40 MILLIGRAM(S): at 09:43

## 2024-09-12 RX ADMIN — URSODIOL 300 MILLIGRAM(S): 500 TABLET, FILM COATED ORAL at 21:14

## 2024-09-12 RX ADMIN — Medication 84 MICROGRAM(S): at 21:13

## 2024-09-12 RX ADMIN — Medication 125 MILLILITER(S): at 14:57

## 2024-09-12 RX ADMIN — Medication 2400 UNIT(S)/HR: at 19:14

## 2024-09-12 RX ADMIN — Medication 40 MILLIEQUIVALENT(S): at 16:01

## 2024-09-12 RX ADMIN — ACETAMINOPHEN 1000 MILLIGRAM(S): 325 TABLET ORAL at 13:54

## 2024-09-12 RX ADMIN — ACETAMINOPHEN 1000 MILLIGRAM(S): 325 TABLET ORAL at 20:30

## 2024-09-12 RX ADMIN — PHENYLEPHRINE HYDROCHLORIDE 200 MICROGRAM(S): 10 INJECTION INTRAVENOUS at 01:53

## 2024-09-12 RX ADMIN — Medication 2: at 10:41

## 2024-09-12 RX ADMIN — POTASSIUM PHOSPHATE 83.33 MILLIMOLE(S): 236; 224 INJECTION, SOLUTION INTRAVENOUS at 03:46

## 2024-09-12 RX ADMIN — Medication 1 APPLICATION(S): at 09:18

## 2024-09-12 RX ADMIN — Medication 81 MILLIGRAM(S): at 16:01

## 2024-09-12 RX ADMIN — Medication 2400 UNIT(S)/HR: at 07:41

## 2024-09-12 RX ADMIN — Medication 1 APPLICATION(S): at 21:14

## 2024-09-12 RX ADMIN — MIDODRINE HYDROCHLORIDE 10 MILLIGRAM(S): 5 TABLET ORAL at 13:24

## 2024-09-12 RX ADMIN — Medication 40 MILLIGRAM(S): at 04:17

## 2024-09-12 RX ADMIN — SODIUM BICARBONATE 50 MILLIEQUIVALENT(S): 84 INJECTION, SOLUTION INTRAVENOUS at 03:29

## 2024-09-12 RX ADMIN — Medication 2400 UNIT(S)/HR: at 07:11

## 2024-09-12 RX ADMIN — Medication 25 GRAM(S): at 01:46

## 2024-09-12 RX ADMIN — Medication 1: at 17:12

## 2024-09-12 RX ADMIN — Medication 2400 UNIT(S)/HR: at 16:04

## 2024-09-12 RX ADMIN — Medication 1: at 23:07

## 2024-09-12 RX ADMIN — MEROPENEM 1000 MILLIGRAM(S): 500 INJECTION, POWDER, FOR SOLUTION INTRAVENOUS at 04:16

## 2024-09-12 RX ADMIN — Medication 46.1 MICROGRAM(S)/KG/MIN: at 09:18

## 2024-09-12 NOTE — PROGRESS NOTE ADULT - ASSESSMENT
A/P: 72M with Afib, HTN, hypothyroidism, and pre-DM presents with septic shock likely secondary to acute cholecystitis.     Zosyn broaden to Meropenem   -NPO for IR consult for cholecystostomy tube  -Patient reports his understanding and all questions answered    d/w Dr. Glynn

## 2024-09-12 NOTE — PRE PROCEDURE NOTE - PRE PROCEDURE EVALUATION
71yo M with septic shock 2/2 acute cholecystitis/cholelithiasis, now admitted to ICU with acute hypoxic respiratory failure requiring NIPPV, pressor support  - Reviewed with Dr. Plunkett, plan for cholecystostomy placement today  - As per ICU, heparin drip held since 8AM, pt is NPO

## 2024-09-12 NOTE — PROVIDER CONTACT NOTE (EICU) - BACKGROUND
labs reviewed.  < from: CT Abdomen and Pelvis w/ IV Cont (09.10.24 @ 17:56) >    New severe gallbladder wall thickening and pericholecystic   stranding in the setting of cholelithiasis is suspicious for acute   cholecystitis.     < end of copied text >

## 2024-09-12 NOTE — PROGRESS NOTE ADULT - ASSESSMENT
71 y/o male PMH chronic A.fib on apixaban, HTN, HLD, DANIELLE on CPAP, ERCP with sphincterotomy, physical debility (non-ambulatory)    Initially admitted with acute cholecystitis and sepsis, refused surgery, treated with abx with plan for HIDA     On 9/11, he developed high fever, A.fib with RVR, hypotension, and resp distress - transferred to ICU       Problem list:     Severe sepsis with septic shock (POA) due to acute cholecystitis   Acute hypoxic respiratory failure   A.fib with RVR  Prerenal SUKUMAR   Metabolic acidosis       -neuro/mental status stable, AAOx4, interactive   -on moderate dose levo, titrating for goal MAP>65  -HR improved overall, intermittent RVR, hold nancy blockers at this time (on metoprolol at home)   -hold iv heparin for IR procedure   -TTE from 9/11 shows normal EF, dilated LA, moderate pulm HTN  -resp improved, feels better than last night, now off CPAP and tolerating 3L NC. Will use qhs and prn CPAP   -given clinical decline consulted IR for perc cholecystotomy after d/w GI and surgery, will cancel HIDA scan   -abx expanded to meropenem overnight, temps improved, trend WBC, initial BCx neg, will repeat today   -continue HCO3 drip for now, will repeat labs in pm and reassess   -UO low normal but consistent, maintain Monet and monitor UO and renal fn   -DVT ppx with iv heparin       Patient critically ill with risk for decompensation       Son updated at bedside

## 2024-09-12 NOTE — PROCEDURE NOTE - ADDITIONAL PROCEDURE DETAILS
A-line inserted for monitoring given shock state with questionable cuff pressures given body habitus. Explained to patient before procedure, with his consent obtained.     Ecchymosis in place distal to insertion site prior to insertion, due to previous blood draw or peripheral IV attempt.   Radial A-line inserted under ultrasound guidance, secured in place.    Procedure performed independent of critical care time.     Dx:   septic shock  acute cholecystitis   atrial fibrillation    Date of entry is equal to date of service.

## 2024-09-12 NOTE — PROGRESS NOTE ADULT - ASSESSMENT
75 y/o male with with significant h/o afib, htn, hypothyroidism, pre-DM presented with abdominal pain found to have acute cholecystitis. Seen by surgery, reluctant to proceed with surgery, admitted to medicine for medical management, RRT was called last night for a fib with RVR, fever, increased WOB; patient accepted to ICU on BiPAP, Levo. Plan for perc abdi today.     Problem List:  Acute cholecystitis  SUKUMAR  Transamnitis  Sepsis  A fib with RVR  HFpEF  Hypoxemic respiratory failure requiring NIPPV  Lacticemia  Primary metabolic acidosis with secondary respiratory alkalosis and additional NAGMA    Neuro: Mentating well, cognitive function appears stable  CV: A fib persists but is better rate-controlled. SBP is in the 110s with a MAP > 65. Heparin infusion is paused for today's IR procedure.   Pulm: NIPPV discontinued, patient is now on 3L NC saturating well SpO2 98%, speaking in full sentences.  GI: Patient remains NPO. receiving ppx PPI. Diet advancement when appropriate.  Renal: Monet catheter is in place with an output of approx. 45 cc/hr. Serum Cr improving, 1.50 --> 1.30. eGFR is 57. Bicarbonate drip infusing for metabolic acidosis.   Hepatic: Bili, pt/inr, ast/alkphos remain elevated. ALT up trending 27 --> 41 --> 44. Albumin down trending 2.4 --> 2.2 Continue to trend LFTs.   ID: WBC trending up, 32.11 --> 38.55 with a left shift, indicating bandemia and neutrophilia (neutrophil % 93.0). repeat UA shows nitrite positivity, cloudy appearance, proteinuria and a few leukocytes. Blood cultures NGTD. RVP negative. Cont. with Meropenem for broad-spectrum coverage. Lactate has improved 4.2 --> 2.5. Continue to trend.   Heme: Cont. Heparin infusion for a fib/DVT ppx s/p IR procedure. SCD are in use.  Endo: Glucose is elevated at 254, continue to monitor sugars, likely needs ISS. Pending hga1c results, obtain lipid panel.  75 y/o male with with significant h/o afib, htn, hypothyroidism, pre-DM presented with abdominal pain found to have acute cholecystitis. Seen by surgery, reluctant to proceed with surgery, admitted to medicine for medical management, RRT was called last night for a fib with RVR, fever, increased WOB; patient accepted to ICU on BiPAP, Levo. Plan for perc abdi today.     Problem List:  Acute cholecystitis  SUKUMAR  Transamnitis  Sepsis  A fib with RVR  HFpEF  Hypoxemic respiratory failure requiring NIPPV  Lacticemia  Primary metabolic acidosis with secondary respiratory alkalosis and additional NAGMA    Neuro: Mentating well, A&OX4, cognitive function appears stable  CV: A fib persists but is better rate-controlled. SBP is in the 110s with a MAP > 65. On Levophed, titrating for MAP goal > 65.   Pulm: NIPPV discontinued, patient is now on 3L NC saturating well SpO2 98%, speaking in full sentences. states he feels better than he did last night.   GI: Patient remains NPO. receiving ppx PPI. Diet advancement when appropriate.  Renal: Montalvo catheter is in place with an output of approx. 45 cc/hr. Serum Cr improving, 1.50 --> 1.30. eGFR is 57. Bicarbonate drip infusing for metabolic acidosis. Maintain montalvo catheter and monitor UO and renal function.  Hepatic: Bili, pt/inr, ast/alkphos remain elevated. ALT up trending 27 --> 41 --> 44. Albumin down trending 2.4 --> 2.2 Continue to trend LFTs.   ID: WBC trending up, 32.11 --> 38.55 with a left shift, indicating bandemia and neutrophilia (neutrophil % 93.0). repeat UA shows nitrite positivity, cloudy appearance, proteinuria and a few leukocytes. Blood cultures NGTD. RVP negative. Abx broadened to Meropenem last night.  Lactate has improved 4.2 --> 2.5. Temps have improved. Initial blood cx negative, repeat. Continue to trend labs.  Heme: Currently holding Heparin gtt for IR procedure, but cont. w/ Heparin infusion for a fib/DVT ppx s/p. SCD are in use.  Endo: Glucose is elevated at 254, continue to monitor sugars, likely needs ISS. Pending hga1c results, obtain lipid panel.     Yes past 3 months

## 2024-09-12 NOTE — PROCEDURE NOTE - NSALLENTEST_VASC_A_CORE
On call late entry for [facility] with mother, child with fever 102 since 12/29  Cough, congestion, rhinorrhea, is playful, decreased appetite but drinking well. Urinating well. No labored breathing, sometimes with hacky cough and post tussive emesis.   H
Yes

## 2024-09-12 NOTE — PROGRESS NOTE ADULT - SUBJECTIVE AND OBJECTIVE BOX
The patient is doing well without complaints.  RLow grade temp, increasing WBC. Reports understanding and benefit of cholecystostomy tube to which he is agreeable.      aluminum hydroxide/magnesium hydroxide/simethicone Suspension 30 milliLiter(s) Oral every 4 hours PRN  aspirin enteric coated 81 milliGRAM(s) Oral daily  dextrose 5%. 1000 milliLiter(s) IV Continuous <Continuous>  dextrose 5%. 1000 milliLiter(s) IV Continuous <Continuous>  dextrose 50% Injectable 25 Gram(s) IV Push once  dextrose 50% Injectable 12.5 Gram(s) IV Push once  dextrose 50% Injectable 25 Gram(s) IV Push once  dextrose Oral Gel 15 Gram(s) Oral once PRN  glucagon  Injectable 1 milliGRAM(s) IntraMuscular once  insulin lispro (ADMELOG) corrective regimen sliding scale   SubCutaneous every 6 hours  levothyroxine Injectable 84 MICROGram(s) IV Push at bedtime  melatonin 3 milliGRAM(s) Oral at bedtime PRN  meropenem Injectable 1000 milliGRAM(s) IV Push every 8 hours  norepinephrine Infusion 0.15 MICROgram(s)/kG/Min IV Continuous <Continuous>  nystatin Powder 1 Application(s) Topical two times a day  ondansetron Injectable 4 milliGRAM(s) IV Push once PRN  pantoprazole  Injectable 40 milliGRAM(s) IV Push daily  sodium bicarbonate  Infusion 0.069 mEq/kG/Hr IV Continuous <Continuous>  traZODone 150 milliGRAM(s) Oral at bedtime PRN  ursodiol Capsule 300 milliGRAM(s) Oral two times a day      Vital Signs Last 24 Hrs  T(C): 37.7 (12 Sep 2024 07:00), Max: 39.3 (11 Sep 2024 20:00)  T(F): 99.9 (12 Sep 2024 07:00), Max: 102.8 (11 Sep 2024 20:00)  HR: 88 (12 Sep 2024 10:00) (86 - 143)  BP: 78/45 (12 Sep 2024 02:15) (57/21 - 181/94)  BP(mean): 30 (12 Sep 2024 02:15) (30 - 120)  RR: 24 (12 Sep 2024 10:00) (20 - 48)  SpO2: 96% (12 Sep 2024 10:00) (96% - 100%)    Parameters below as of 12 Sep 2024 07:00  Patient On (Oxygen Delivery Method): BiPAP/CPAP, 12    O2 Concentration (%): 30    I&O's Summary    11 Sep 2024 07:01  -  12 Sep 2024 07:00  --------------------------------------------------------  IN: 2401.4 mL / OUT: 555 mL / NET: 1846.4 mL    12 Sep 2024 07:01  -  12 Sep 2024 10:37  --------------------------------------------------------  IN: 612.8 mL / OUT: 445 mL / NET: 167.8 mL        Physical Exam  Gen: NAD, comfortable in bed  Abd: morbidly obese, Soft, ND, tender to deep palpation                           14.4   38.55 )-----------( 196      ( 12 Sep 2024 05:35 )             40.9       09-12    137  |  107  |  18  ----------------------------<  254<H>  3.8   |  18<L>  |  1.30    Ca    8.8      12 Sep 2024 05:35  Phos  3.3     09-12  Mg     2.0     09-12    TPro  6.4  /  Alb  2.2<L>  /  TBili  3.6<H>  /  DBili  2.7<H>  /  AST  52<H>  /  ALT  44  /  AlkPhos  135<H>  09-12

## 2024-09-12 NOTE — CONSULT NOTE ADULT - ASSESSMENT
Assessment:  72M with Afib, HTN, hypothyroidism, and pre-DM presents 9/11 with abdominal pain associated with fevers  Febrile 102F on admission  Cr 1.3  Bili 2.7  UA negative  CTAP and US RUQ suggestive of acute abdi, CBD normal   BCx 9/10 negative  RVP negative  Prior BCx 2022, with Oakley S Ecoli  Course complicated by worsening tachycardia, increase work of breathing and fever, now on BIPAP  Leukocytosis worsen 21 > 38  Zosyn broaden to Meropenem    Antimicrobials:  s/p Zosyn (9/10 - 9/11)  meropenem Injectable 1000 every 8 hours (9/12 --- )    Impression:   #Acute Cholecystitis  #Acute hypoxic respiratory failure  #Fever  #Leukocytosis  #Transaminitis with elevated Bili  - given critically ill state, can empirical keep Meropenem though prior cultures did not show resistant strains     Recommendations:  - continue Meropenem 1G q8  - monitor temperature curve  - trend WBC  - side effects of antibiotic discussed, tolerating abx well so far  - follow up BCx  - Surgery following, OR planning?    Clinical team may change from intravenous to oral antibiotics when the following criteria are met:   1. Patient is clinically improving/stable       a)	Improved signs and symptoms of infection from initial presentation       b)	Afebrile for 24 hours       c)	Leukocytosis trending towards normal range   2. Patient is tolerating oral intake   3. Initial/repeat blood cultures are negative OR do not need to wait for preliminary blood cultures to result    Cannot advise changing to oral antibiotic therapy until culture sensitivity is available.   Assessment:  72M with Afib, HTN, hypothyroidism, and pre-DM presents 9/11 with abdominal pain associated with fevers  Febrile 102F on admission  Cr 1.3  Bili 2.7  UA negative  CTAP and US RUQ suggestive of acute abdi, CBD normal   BCx 9/10 negative  RVP negative  Prior BCx 2022, with Oakley S Ecoli  Course complicated by worsening tachycardia, increase work of breathing and fever, now on BIPAP  Leukocytosis worsen 21 > 38  Zosyn broaden to Meropenem    Antimicrobials:  s/p Zosyn (9/10 - 9/11)  meropenem Injectable 1000 every 8 hours (9/12 --- )    Impression:   #Acute Cholecystitis  #Acute hypoxic respiratory failure  #Fever  #Leukocytosis  #Transaminitis with elevated Bili  - given critically ill state, can empirical keep Meropenem though prior cultures did not show resistant strains     Recommendations:  - continue Meropenem 1G q8  - monitor temperature curve  - trend WBC  - side effects of antibiotic discussed, tolerating abx well so far  - follow up BCx  - Surgery following, OR planning? versus IR cholecystostomy?    Clinical team may change from intravenous to oral antibiotics when the following criteria are met:   1. Patient is clinically improving/stable       a)	Improved signs and symptoms of infection from initial presentation       b)	Afebrile for 24 hours       c)	Leukocytosis trending towards normal range   2. Patient is tolerating oral intake   3. Initial/repeat blood cultures are negative OR do not need to wait for preliminary blood cultures to result    Cannot advise changing to oral antibiotic therapy until culture sensitivity is available.   Assessment:  72M with Afib, HTN, hypothyroidism, and pre-DM presents 9/11 with abdominal pain associated with fevers  Febrile 102F on admission  Cr 1.3  Bili 2.7  UA negative  CTAP and US RUQ suggestive of acute abdi, CBD normal   BCx 9/10 negative  RVP negative  Prior BCx 2022, with Oakley S Ecoli  Course complicated by worsening tachycardia, increase work of breathing and fever, now on BIPAP  Leukocytosis worsen 21 > 38  Zosyn broaden to Meropenem    Antimicrobials:  s/p Zosyn (9/10 - 9/11)  meropenem Injectable 1000 every 8 hours (9/12 --- )    Impression:   #Septic Shock 2/2 Acute Cholecystitis  #Acute hypoxic respiratory failure  #Fever  #Leukocytosis  #Transaminitis with elevated Bili  - given critically ill state, can empirical keep Meropenem though prior cultures did not show resistant strains     Recommendations:  - continue Meropenem 1G q8  - monitor temperature curve  - trend WBC  - side effects of antibiotic discussed, tolerating abx well so far  - follow up BCx  - Surgery following, OR planning? versus IR cholecystostomy?    Clinical team may change from intravenous to oral antibiotics when the following criteria are met:   1. Patient is clinically improving/stable       a)	Improved signs and symptoms of infection from initial presentation       b)	Afebrile for 24 hours       c)	Leukocytosis trending towards normal range   2. Patient is tolerating oral intake   3. Initial/repeat blood cultures are negative OR do not need to wait for preliminary blood cultures to result    Cannot advise changing to oral antibiotic therapy until culture sensitivity is available.   Assessment:  72M with Afib, HTN, hypothyroidism, and pre-DM presents 9/11 with abdominal pain associated with fevers  Febrile 102F on admission  Cr 1.3  Bili 2.7  UA negative  CTAP and US RUQ suggestive of acute abdi, CBD normal   BCx 9/10 negative  RVP negative  Prior BCx 2022, with Oakley S Ecoli  Course complicated by worsening tachycardia, increase work of breathing and fever, now on BIPAP  Leukocytosis worsen 21 > 38  Zosyn broaden to Meropenem    Antimicrobials:  s/p Zosyn (9/10 - 9/11)  meropenem Injectable 1000 every 8 hours (9/12 --- )    Impression:   #Septic Shock 2/2 Acute Cholecystitis  #Acute hypoxic respiratory failure  #Fever  #Leukocytosis  #Transaminitis with elevated Bili  - given critically ill state, can empirical keep Meropenem though prior cultures did not show resistant strains   - so far feels better, now on NL    Recommendations:  - continue Meropenem 1G q8  - monitor temperature curve  - trend WBC  - side effects of antibiotic discussed, tolerating abx well so far  - follow up BCx  - Surgery following, OR planning? versus IR cholecystostomy?    Clinical team may change from intravenous to oral antibiotics when the following criteria are met:   1. Patient is clinically improving/stable       a)	Improved signs and symptoms of infection from initial presentation       b)	Afebrile for 24 hours       c)	Leukocytosis trending towards normal range   2. Patient is tolerating oral intake   3. Initial/repeat blood cultures are negative OR do not need to wait for preliminary blood cultures to result    Cannot advise changing to oral antibiotic therapy until culture sensitivity is available.   Assessment:  72M with Afib, HTN, hypothyroidism, and pre-DM presents 9/11 with abdominal pain associated with fevers  Febrile 102F on admission  Cr 1.3  Bili 2.7  UA negative  CTAP and US RUQ suggestive of acute abdi, CBD normal   BCx 9/10 negative  RVP negative  Prior BCx 2022, with Oakley S Ecoli  Course complicated by worsening tachycardia, increase work of breathing and fever, now on BIPAP  Leukocytosis worsen 21 > 38  Zosyn broaden to Meropenem    Antimicrobials:  s/p Zosyn (9/10 - 9/11)  meropenem Injectable 1000 every 8 hours (9/12 --- )    Impression:   #Septic Shock 2/2 Acute Cholecystitis  #Acute hypoxic respiratory failure  #Fever  #Leukocytosis  #Transaminitis with elevated Bili  - given critically ill state, can empirical keep Meropenem though prior cultures did not show resistant strains   - so far feels better, now on NL  - leukocytosis likely due to sepsis, no diarrhea to suggest Cdiff    Recommendations:  - continue Meropenem 1G q8  - monitor temperature curve  - trend WBC  - side effects of antibiotic discussed, tolerating abx well so far  - follow up BCx  - Surgery following, OR planning? versus IR cholecystostomy?    Clinical team may change from intravenous to oral antibiotics when the following criteria are met:   1. Patient is clinically improving/stable       a)	Improved signs and symptoms of infection from initial presentation       b)	Afebrile for 24 hours       c)	Leukocytosis trending towards normal range   2. Patient is tolerating oral intake   3. Initial/repeat blood cultures are negative OR do not need to wait for preliminary blood cultures to result    Cannot advise changing to oral antibiotic therapy until culture sensitivity is available.

## 2024-09-12 NOTE — CONSULT NOTE ADULT - SUBJECTIVE AND OBJECTIVE BOX
c/c: fall        HPI:    89M from [facility] living w/PMH CAD, COPD, HTN, Prostate CA s/p RT 25yrs ago, CKD4-5, anemia (iron def, CKD), Gout, glaucoma, severe AS (declined TAVR),  s/p mechanical fall onto the knee 7/24/2020. Pt states he was having pain in the left knee as the day progressed and was having trouble bearing weight on the extremity. Pt states he lives at an assisted living facility, where he ambulates with a walker. Pt denies taking anticoagulation. Denies any other orthopedic injury. Denies numbness or tingling.    Was seen by Ortho with DX right Occult Fx Vs exacerbation of OA, with MRI planned.  However Pain Improved and pt was amb well with pHysical therapy, MRI was cancelled.  Pt is now ready for rehab        for Physical exam, please see progress noted from 7/27                                    11.1     9.10  )-----------( 231      ( 26 Jul 2020 06:27 )               36.9             07-26        144  |  107  |  80<H>    ----------------------------<  144<H>    3.6   |  30  |  3.04<H>        Ca    9.5      26 Jul 2020 06:27                EXAM:  XR KNEE 3 VIEWS LT                                  PROCEDURE DATE:  07/24/2020                  INTERPRETATION:  Left knee. 3 views. Patient had a fall with local trauma.        No effusion is evident. There are significant arterial calcifications.        There is mild knee degeneration with some loss of space in the medial compartment. No fractures are seen.        IMPRESSION: No acute finding. Patient is a 74y old  Male who presents with a chief complaint of abdominal discomfort (11 Sep 2024 16:22)    HPI:  72M with h/o Afib, HTN, hypothyroidism, and pre-DM presents with complaints on abdominal pain. Patient initially presented with abdominal pain on 9/8/24 and was discharged, in the interim he developed high grade fever and worsening abdominal pain and came back to ED. In ED pts BP soft, otherwise stable, WBC noted for 21.55, H/H 15/44, Plt 148. CMP noted for BUN/Cr 28/1.36, Lactate 2.3 > 2, T. bili 3.3, UA cloudy, with proteinuria, ketonuria, small leuks. COVID/Flu swab negative, CT abdomen noted for new severe gallbladder wall thickening and pericholecystic stranding in the setting of cholelithiasis is suspicious for acute cholecystitis. RUQ US also noted for cholecystitis. Patient seen by surgery, and wanted some time to consider the option for surgery. Patient admitted to  for medical management.  (11 Sep 2024 04:07)  Above HPI reviewed and reconciled with patient      PAST MEDICAL & SURGICAL HISTORY:  Dyslipidemia      Hypertension      Afib  on coumadin      Chronic Gout      Anxiety      History of Transient Ischemic Attack (TIA)  approx 1994      History of Obesity      Obstructive Sleep Apnea  CPAP currenlty      Hypothyroid      BPH (benign prostatic hyperplasia)      H/O cholangitis      Multiple falls  now wheelchair bound      DM (diabetes mellitus)      History of Tonsillectomy      S/P ERCP        FAMILY HISTORY:  FH: myocardial infarction (Father)      Social Hx:    Allergies  metformin (Unknown)    ANTIMICROBIALS (past 90 days)  MEDICATIONS  (STANDING):    meropenem Injectable   1000 milliGRAM(s) IV Push (09-12-24 @ 04:16)    piperacillin/tazobactam IVPB..   25 mL/Hr IV Intermittent (09-11-24 @ 23:16)   25 mL/Hr IV Intermittent (09-11-24 @ 14:56)   25 mL/Hr IV Intermittent (09-11-24 @ 06:03)    piperacillin/tazobactam IVPB...   200 mL/Hr IV Intermittent (09-10-24 @ 17:31)        ACTIVE ANTIMICROBIALS  meropenem Injectable 1000 every 8 hours (9/12 --- )    MEDICATIONS  (STANDING):  aluminum hydroxide/magnesium hydroxide/simethicone Suspension 30 every 4 hours PRN  aspirin enteric coated 81 daily  levothyroxine Injectable 84 at bedtime  melatonin 3 at bedtime PRN  norepinephrine Infusion 0.15 <Continuous>  ondansetron Injectable 4 once PRN  pantoprazole  Injectable 40 daily  traZODone 150 at bedtime PRN  ursodiol Capsule 300 two times a day      REVIEW OF SYSTEMS  [  ] ROS unobtainable because:    [  ] All other systems negative except as noted below:	    Constitutional:  [ ] fever [ ] chills  [ ] weight loss  [ ] weakness  Skin:  [ ] rash [ ] phlebitis	  Eyes: [ ] icterus [ ] pain  [ ] discharge	  ENMT: [ ] sore throat  [ ] thrush [ ] ulcers [ ] exudates  Respiratory: [ ] dyspnea [ ] hemoptysis [ ] cough [ ] sputum	  Cardiovascular:  [ ] chest pain [ ] palpitations [ ] edema	  Gastrointestinal:  [ ] nausea [ ] vomiting [ ] diarrhea [ ] constipation [ ] pain	  Genitourinary:  [ ] dysuria [ ] frequency [ ] hematuria [ ] discharge [ ] flank pain  [ ] incontinence  Musculoskeletal:  [ ] myalgias [ ] arthralgias [ ] arthritis  [ ] back pain  Neurological:  [ ] headache [ ] seizures  [ ] confusion/altered mental status  Psychiatric:  [ ] anxiety [ ] depression	  Hematology/Lymphatics:  [ ] lymphadenopathy  Endocrine:  [ ] adrenal [ ] thyroid  Allergic/Immunologic:	 [ ] transplant [ ] seasonal    Vital Signs Last 24 Hrs  T(C): 37.7 (12 Sep 2024 07:00), Max: 39.3 (11 Sep 2024 20:00)  T(F): 99.9 (12 Sep 2024 07:00), Max: 102.8 (11 Sep 2024 20:00)  HR: 115 (12 Sep 2024 08:00) (90 - 143)  BP: 78/45 (12 Sep 2024 02:15) (57/21 - 181/94)  BP(mean): 30 (12 Sep 2024 02:15) (30 - 120)  RR: 32 (12 Sep 2024 08:00) (20 - 48)  SpO2: 98% (12 Sep 2024 08:00) (96% - 100%)    Parameters below as of 12 Sep 2024 07:00  Patient On (Oxygen Delivery Method): BiPAP/CPAP, 12    O2 Concentration (%): 30    Physical Exam:  Constitutional:  well preserved, comfortable  Head/Eyes: no icterus  LUNGS:  CTA  CVS:  regular rhythm  Abd:  soft, non-tender; non-distended  Ext:  no edema  Vascular:  IV site no erythema tenderness or discharge  Neuro: AAO X 3, non- focal    Labs: all available labs reviewed                        14.4   38.55 )-----------( 196      ( 12 Sep 2024 05:35 )             40.9     09-12    137  |  107  |  18  ----------------------------<  254<H>  3.8   |  18<L>  |  1.30    Ca    8.8      12 Sep 2024 05:35  Phos  3.3     09-12  Mg     2.0     09-12    TPro  6.4  /  Alb  2.2<L>  /  TBili  3.6<H>  /  DBili  2.7<H>  /  AST  52<H>  /  ALT  44  /  AlkPhos  135<H>  09-12     LIVER FUNCTIONS - ( 12 Sep 2024 05:35 )  Alb: 2.2 g/dL / Pro: 6.4 gm/dL / ALK PHOS: 135 U/L / ALT: 44 U/L / AST: 52 U/L / GGT: x           Urinalysis Basic - ( 12 Sep 2024 05:35 )    Color: x / Appearance: x / SG: x / pH: x  Gluc: 254 mg/dL / Ketone: x  / Bili: x / Urobili: x   Blood: x / Protein: x / Nitrite: x   Leuk Esterase: x / RBC: x / WBC x   Sq Epi: x / Non Sq Epi: x / Bacteria: x          Radiology: all available radiological tests reviewed  < from: US Abdomen Upper Quadrant Right (09.10.24 @ 21:09) >  IMPRESSION:  The sonographic findings are suspicious for acute cholecystitis in the   proper clinical scenario.  Correlate with clinical symptoms and   laboratory values.  A follow-up nuclear medicine HIDA scan may be   obtained as clinically warranted.    < end of copied text >  < from: CT Abdomen and Pelvis w/ IV Cont (09.10.24 @ 17:56) >  IMPRESSION: New severe gallbladder wall thickening and pericholecystic   stranding in the setting of cholelithiasis is suspicious for acute   cholecystitis. Recommend clinical correlation and consider right upper   quadrant ultrasound.    < end of copied text >      Advanced directives addressed: full resuscitation Patient is a 74y old  Male who presents with a chief complaint of abdominal discomfort (11 Sep 2024 16:22)    HPI:  72M with h/o Afib, HTN, hypothyroidism, and pre-DM presents with complaints on abdominal pain. Patient initially presented with abdominal pain on 9/8/24 and was discharged, in the interim he developed high grade fever and worsening abdominal pain and came back to ED. In ED pts BP soft, otherwise stable, WBC noted for 21.55, H/H 15/44, Plt 148. CMP noted for BUN/Cr 28/1.36, Lactate 2.3 > 2, T. bili 3.3, UA cloudy, with proteinuria, ketonuria, small leuks. COVID/Flu swab negative, CT abdomen noted for new severe gallbladder wall thickening and pericholecystic stranding in the setting of cholelithiasis is suspicious for acute cholecystitis. RUQ US also noted for cholecystitis. Patient seen by surgery, and wanted some time to consider the option for surgery. Patient admitted to  for medical management.  (11 Sep 2024 04:07)  Above HPI reviewed and reconciled with patient      PAST MEDICAL & SURGICAL HISTORY:  Dyslipidemia      Hypertension      Afib  on coumadin      Chronic Gout      Anxiety      History of Transient Ischemic Attack (TIA)  approx 1994      History of Obesity      Obstructive Sleep Apnea  CPAP currenlty      Hypothyroid      BPH (benign prostatic hyperplasia)      H/O cholangitis      Multiple falls  now wheelchair bound      DM (diabetes mellitus)      History of Tonsillectomy      S/P ERCP        FAMILY HISTORY:  FH: myocardial infarction (Father)      Social Hx: Denies alcohol, tobacco, recreational drug use    Allergies  metformin (Unknown)    ANTIMICROBIALS (past 90 days)  MEDICATIONS  (STANDING):    meropenem Injectable   1000 milliGRAM(s) IV Push (09-12-24 @ 04:16)    piperacillin/tazobactam IVPB..   25 mL/Hr IV Intermittent (09-11-24 @ 23:16)   25 mL/Hr IV Intermittent (09-11-24 @ 14:56)   25 mL/Hr IV Intermittent (09-11-24 @ 06:03)    piperacillin/tazobactam IVPB...   200 mL/Hr IV Intermittent (09-10-24 @ 17:31)        ACTIVE ANTIMICROBIALS  meropenem Injectable 1000 every 8 hours (9/12 --- )    MEDICATIONS  (STANDING):  aluminum hydroxide/magnesium hydroxide/simethicone Suspension 30 every 4 hours PRN  aspirin enteric coated 81 daily  levothyroxine Injectable 84 at bedtime  melatonin 3 at bedtime PRN  norepinephrine Infusion 0.15 <Continuous>  ondansetron Injectable 4 once PRN  pantoprazole  Injectable 40 daily  traZODone 150 at bedtime PRN  ursodiol Capsule 300 two times a day      REVIEW OF SYSTEMS  [  ] ROS unobtainable because:    [ x ] All other systems negative except as noted below:	    Constitutional:  [ ] fever [ ] chills  [ ] weight loss  [ ] weakness  Skin:  [ ] rash [ ] phlebitis	  Eyes: [ ] icterus [ ] pain  [ ] discharge	  ENMT: [ ] sore throat  [ ] thrush [ ] ulcers [ ] exudates  Respiratory: [ ] dyspnea [ ] hemoptysis [ ] cough [ ] sputum	  Cardiovascular:  [ ] chest pain [ ] palpitations [ ] edema	  Gastrointestinal:  [ ] nausea [ ] vomiting [ ] diarrhea [ ] constipation [ ] pain	  Genitourinary:  [ ] dysuria [ ] frequency [ ] hematuria [ ] discharge [ ] flank pain  [ ] incontinence  Musculoskeletal:  [ ] myalgias [ ] arthralgias [ ] arthritis  [ ] back pain  Neurological:  [ ] headache [ ] seizures  [ ] confusion/altered mental status  Psychiatric:  [ ] anxiety [ ] depression	  Hematology/Lymphatics:  [ ] lymphadenopathy  Endocrine:  [ ] adrenal [ ] thyroid  Allergic/Immunologic:	 [ ] transplant [ ] seasonal    Vital Signs Last 24 Hrs  T(C): 37.7 (12 Sep 2024 07:00), Max: 39.3 (11 Sep 2024 20:00)  T(F): 99.9 (12 Sep 2024 07:00), Max: 102.8 (11 Sep 2024 20:00)  HR: 115 (12 Sep 2024 08:00) (90 - 143)  BP: 78/45 (12 Sep 2024 02:15) (57/21 - 181/94)  BP(mean): 30 (12 Sep 2024 02:15) (30 - 120)  RR: 32 (12 Sep 2024 08:00) (20 - 48)  SpO2: 98% (12 Sep 2024 08:00) (96% - 100%)    Parameters below as of 12 Sep 2024 07:00  Patient On (Oxygen Delivery Method): BiPAP/CPAP, 12    O2 Concentration (%): 30    Physical Exam:  Constitutional:  morbidly obese, NAD  Head/Eyes: no icterus  LUNGS:  CTA  CVS:  regular rhythm  Abd:  soft, mild epigastric-tender; non-distended  Ext:  no edema  Vascular:  IV site no erythema tenderness or discharge  Neuro: AAO X 3, non- focal    Labs: all available labs reviewed                        14.4   38.55 )-----------( 196      ( 12 Sep 2024 05:35 )             40.9     09-12    137  |  107  |  18  ----------------------------<  254<H>  3.8   |  18<L>  |  1.30    Ca    8.8      12 Sep 2024 05:35  Phos  3.3     09-12  Mg     2.0     09-12    TPro  6.4  /  Alb  2.2<L>  /  TBili  3.6<H>  /  DBili  2.7<H>  /  AST  52<H>  /  ALT  44  /  AlkPhos  135<H>  09-12     LIVER FUNCTIONS - ( 12 Sep 2024 05:35 )  Alb: 2.2 g/dL / Pro: 6.4 gm/dL / ALK PHOS: 135 U/L / ALT: 44 U/L / AST: 52 U/L / GGT: x           Urinalysis Basic - ( 12 Sep 2024 05:35 )    Color: x / Appearance: x / SG: x / pH: x  Gluc: 254 mg/dL / Ketone: x  / Bili: x / Urobili: x   Blood: x / Protein: x / Nitrite: x   Leuk Esterase: x / RBC: x / WBC x   Sq Epi: x / Non Sq Epi: x / Bacteria: x          Radiology: all available radiological tests reviewed  < from: US Abdomen Upper Quadrant Right (09.10.24 @ 21:09) >  IMPRESSION:  The sonographic findings are suspicious for acute cholecystitis in the   proper clinical scenario.  Correlate with clinical symptoms and   laboratory values.  A follow-up nuclear medicine HIDA scan may be   obtained as clinically warranted.    < end of copied text >  < from: CT Abdomen and Pelvis w/ IV Cont (09.10.24 @ 17:56) >  IMPRESSION: New severe gallbladder wall thickening and pericholecystic   stranding in the setting of cholelithiasis is suspicious for acute   cholecystitis. Recommend clinical correlation and consider right upper   quadrant ultrasound.    < end of copied text >      Advanced directives addressed: full resuscitation c/c: fall        HPI:    89M from [facility] living w/PMH CAD, COPD, HTN, Prostate CA s/p RT 25yrs ago, CKD4-5, anemia (iron def, CKD), Gout, glaucoma, severe AS (declined TAVR),  s/p mechanical fall onto the knee 7/24/2020. Pt states he was having pain in the left knee as the day progressed and was having trouble bearing weight on the extremity. Pt states he lives at an assisted living facility, where he ambulates with a walker. Pt denies taking anticoagulation. Denies any other orthopedic injury. Denies numbness or tingling.    Was seen by Ortho, pain improved and ambulated with PT. MRI cancelled.  Patient is at his prior level of function and can return to assisted living        for Physical exam, please see progress noted from 7/27

## 2024-09-12 NOTE — PROGRESS NOTE ADULT - SUBJECTIVE AND OBJECTIVE BOX
Patient is a 74y old  Male who presents with a chief complaint of abdominal discomfort (11 Sep 2024 16:22)      Allergies    metformin (Unknown)    Intolerances      REVIEW OF SYSTEMS: SEE BELOW       ICU Vital Signs Last 24 Hrs  T(C): 37.7 (12 Sep 2024 07:00), Max: 39.3 (11 Sep 2024 20:00)  T(F): 99.9 (12 Sep 2024 07:00), Max: 102.8 (11 Sep 2024 20:00)  HR: 115 (12 Sep 2024 08:00) (90 - 143)  BP: 78/45 (12 Sep 2024 02:15) (57/21 - 181/94)  BP(mean): 30 (12 Sep 2024 02:15) (30 - 120)  ABP: 103/62 (12 Sep 2024 08:00) (78/61 - 111/72)  ABP(mean): 77 (12 Sep 2024 08:00) (2 - 88)  RR: 32 (12 Sep 2024 08:00) (20 - 48)  SpO2: 98% (12 Sep 2024 08:00) (96% - 100%)    O2 Parameters below as of 12 Sep 2024 07:00  Patient On (Oxygen Delivery Method): BiPAP/CPAP, 12    O2 Concentration (%): 30        CAPILLARY BLOOD GLUCOSE      POCT Blood Glucose.: 173 mg/dL (11 Sep 2024 19:02)  POCT Blood Glucose.: 180 mg/dL (11 Sep 2024 16:28)  POCT Blood Glucose.: 150 mg/dL (11 Sep 2024 12:00)      I&O's Summary    11 Sep 2024 07:01  -  12 Sep 2024 07:00  --------------------------------------------------------  IN: 2401.4 mL / OUT: 555 mL / NET: 1846.4 mL    12 Sep 2024 07:01  -  12 Sep 2024 09:39  --------------------------------------------------------  IN: 205.3 mL / OUT: 85 mL / NET: 120.3 mL            MEDICATIONS  (STANDING):  aspirin enteric coated 81 milliGRAM(s) Oral daily  heparin  Infusion.  Unit(s)/Hr (24 mL/Hr) IV Continuous <Continuous>  levothyroxine Injectable 84 MICROGram(s) IV Push at bedtime  meropenem Injectable 1000 milliGRAM(s) IV Push every 8 hours  norepinephrine Infusion 0.15 MICROgram(s)/kG/Min (46.1 mL/Hr) IV Continuous <Continuous>  nystatin Powder 1 Application(s) Topical two times a day  pantoprazole  Injectable 40 milliGRAM(s) IV Push daily  sodium bicarbonate  Infusion 0.069 mEq/kG/Hr (75 mL/Hr) IV Continuous <Continuous>  ursodiol Capsule 300 milliGRAM(s) Oral two times a day      MEDICATIONS  (PRN):  aluminum hydroxide/magnesium hydroxide/simethicone Suspension 30 milliLiter(s) Oral every 4 hours PRN Dyspepsia  heparin   Injectable 5000 Unit(s) IV Push every 6 hours PRN For aPTT between 40 - 57  heparin   Injectable 83299 Unit(s) IV Push every 6 hours PRN For aPTT less than 40  melatonin 3 milliGRAM(s) Oral at bedtime PRN Insomnia  ondansetron Injectable 4 milliGRAM(s) IV Push once PRN Nausea and/or Vomiting  traZODone 150 milliGRAM(s) Oral at bedtime PRN for insomnia      PHYSICAL EXAM: SEE BELOW                          14.4   38.55 )-----------( 196      ( 12 Sep 2024 05:35 )             40.9       09-12    137  |  107  |  18  ----------------------------<  254<H>  3.8   |  18<L>  |  1.30    Ca    8.8      12 Sep 2024 05:35  Phos  3.3     09-12  Mg     2.0     09-12    TPro  6.4  /  Alb  2.2<L>  /  TBili  3.6<H>  /  DBili  2.7<H>  /  AST  52<H>  /  ALT  44  /  AlkPhos  135<H>  09-12    Lactate 2.5           09-12 @ 06:38    Lactate 4.2           09-12 @ 01:44    Lactate 3.9           09-11 @ 18:51          PT/INR - ( 12 Sep 2024 02:25 )   PT: 16.3 sec;   INR: 1.46 ratio         PTT - ( 12 Sep 2024 02:25 )  PTT:76.5 sec  Urinalysis Basic - ( 12 Sep 2024 05:35 )    Color: x / Appearance: x / SG: x / pH: x  Gluc: 254 mg/dL / Ketone: x  / Bili: x / Urobili: x   Blood: x / Protein: x / Nitrite: x   Leuk Esterase: x / RBC: x / WBC x   Sq Epi: x / Non Sq Epi: x / Bacteria: x      .Blood None   No growth at 24 hours -- 09-10 @ 16:42  .Blood None   No growth at 24 hours -- 09-10 @ 16:17

## 2024-09-12 NOTE — PROGRESS NOTE ADULT - SUBJECTIVE AND OBJECTIVE BOX
**** Charting in progress ****  Patient is a 74y old  Male who presents with a chief complaint of abdominal discomfort (11 Sep 2024 16:22)      BRIEF HOSPITAL COURSE:   72M with h/o Afib, HTN, hypothyroidism, and pre-DM presented with complaints of abdominal pain. Patient initially presented with abdominal pain on 9/8/24 and was discharged, in the interim he developed high grade fever and worsening abdominal pain and came back to ED. In ED pts BP soft, otherwise stable, WBC noted for 21.55, H/H 15/44, Plt 148. CMP noted for BUN/Cr 28/1.36, Lactate 2.3 > 2, T. bili 3.3, UA cloudy, with proteinuria, ketonuria, small leuks. COVID/Flu swab negative, CT abdomen noted for new severe gallbladder wall thickening and pericholecystic stranding in the setting of cholelithiasis is suspicious for acute cholecystitis. RUQ US also noted for cholecystitis.    Patient transferred to ICU 9/11 s/p RRT for tachycardia, dyspnea, fever.    Events last 24 hours:   HR better controlled. However, now hypotensive requiring vasopressors.      PAST MEDICAL & SURGICAL HISTORY:  Dyslipidemia      Hypertension      Afib  on coumadin      Chronic Gout      Anxiety      History of Transient Ischemic Attack (TIA)  approx 1994      History of Obesity      Obstructive Sleep Apnea  CPAP currenlty      Hypothyroid      BPH (benign prostatic hyperplasia)      H/O cholangitis      Multiple falls  now wheelchair bound      DM (diabetes mellitus)      History of Tonsillectomy      S/P ERCP              SOCIAL HISTORY:            Medications:  meropenem Injectable 1000 milliGRAM(s) IV Push once  meropenem Injectable      meropenem Injectable 1000 milliGRAM(s) IV Push every 12 hours    furosemide   Injectable 40 milliGRAM(s) IV Push once  norepinephrine Infusion 0.15 MICROgram(s)/kG/Min IV Continuous <Continuous>      dexMEDEtomidine Infusion 0.2 MICROgram(s)/kG/Hr IV Continuous <Continuous>  melatonin 3 milliGRAM(s) Oral at bedtime PRN  ondansetron Injectable 4 milliGRAM(s) IV Push once PRN  traZODone 150 milliGRAM(s) Oral at bedtime PRN      aspirin enteric coated 81 milliGRAM(s) Oral daily  heparin   Injectable 5000 Unit(s) IV Push every 6 hours PRN  heparin   Injectable 64737 Unit(s) IV Push every 6 hours PRN  heparin  Infusion.  Unit(s)/Hr IV Continuous <Continuous>    aluminum hydroxide/magnesium hydroxide/simethicone Suspension 30 milliLiter(s) Oral every 4 hours PRN  ursodiol Capsule 300 milliGRAM(s) Oral two times a day      levothyroxine Injectable 84 MICROGram(s) IV Push at bedtime    sodium bicarbonate  Infusion 0.069 mEq/kG/Hr IV Continuous <Continuous>  sodium bicarbonate  Injectable 100 milliEquivalent(s) IV Push once      nystatin Powder 1 Application(s) Topical two times a day            ICU Vital Signs Last 24 Hrs  T(C): 37.2 (12 Sep 2024 00:00), Max: 39.3 (11 Sep 2024 20:00)  T(F): 99 (12 Sep 2024 00:00), Max: 102.8 (11 Sep 2024 20:00)  HR: 99 (12 Sep 2024 03:00) (79 - 143)  BP: 78/45 (12 Sep 2024 02:15) (57/21 - 181/94)  BP(mean): 30 (12 Sep 2024 02:15) (30 - 120)  ABP: 97/67 (12 Sep 2024 03:00) (78/61 - 100/69)  ABP(mean): 79 (12 Sep 2024 03:00) (68 - 81)  RR: 36 (12 Sep 2024 03:00) (19 - 48)  SpO2: 98% (12 Sep 2024 03:00) (95% - 100%)    O2 Parameters below as of 12 Sep 2024 01:00  Patient On (Oxygen Delivery Method): BiPAP/CPAP    O2 Concentration (%): 40        ABG - ( 12 Sep 2024 03:15 )  pH, Arterial: 7.36  pH, Blood: x     /  pCO2: 28    /  pO2: 150   / HCO3: 16    / Base Excess: -8.2  /  SaO2: 99.0                I&O's Detail    10 Sep 2024 07:01  -  11 Sep 2024 07:00  --------------------------------------------------------  IN:  Total IN: 0 mL    OUT:    Voided (mL): 350 mL  Total OUT: 350 mL    Total NET: -350 mL      11 Sep 2024 07:01  -  12 Sep 2024 03:55  --------------------------------------------------------  IN:  Total IN: 0 mL    OUT:    Voided (mL): 300 mL  Total OUT: 300 mL    Total NET: -300 mL            LABS:                        14.5   32.11 )-----------( 174      ( 12 Sep 2024 02:25 )             40.7     09-12    136  |  109<H>  |  17  ----------------------------<  188<H>  3.8   |  16<L>  |  1.50<H>    Ca    9.0      12 Sep 2024 01:44  Phos  2.0     09-12  Mg     1.7     09-12    TPro  6.2  /  Alb  2.2<L>  /  TBili  3.9<H>  /  DBili  x   /  AST  53<H>  /  ALT  41  /  AlkPhos  136<H>  09-12          CAPILLARY BLOOD GLUCOSE      POCT Blood Glucose.: 173 mg/dL (11 Sep 2024 19:02)    PT/INR - ( 12 Sep 2024 02:25 )   PT: 16.3 sec;   INR: 1.46 ratio         PTT - ( 12 Sep 2024 02:25 )  PTT:76.5 sec  Urinalysis Basic - ( 12 Sep 2024 01:44 )    Color: x / Appearance: x / SG: x / pH: x  Gluc: 188 mg/dL / Ketone: x  / Bili: x / Urobili: x   Blood: x / Protein: x / Nitrite: x   Leuk Esterase: x / RBC: x / WBC x   Sq Epi: x / Non Sq Epi: x / Bacteria: x      CULTURES:  Culture Results:   No growth at 24 hours (09-10 @ 16:42)  Culture Results:   No growth at 24 hours (09-10 @ 16:17)              RADIOLOGY:   Radiology  < from: US Abdomen Upper Quadrant Right (09.10.24 @ 21:09) >  IMPRESSION:  The sonographic findings are suspicious for acute cholecystitis in the   proper clinical scenario.  Correlate with clinical symptoms and   laboratory values.  A follow-up nuclear medicine HIDA scan may be   obtained as clinically warranted.      < end of copied text >  < from: CT Abdomen and Pelvis w/ IV Cont (09.10.24 @ 17:56) >  IMPRESSION: New severe gallbladder wall thickening and pericholecystic   stranding in the setting of cholelithiasis is suspicious for acute   cholecystitis. Recommend clinical correlation and consider right upper   quadrant ultrasound.    ---End of Report ---      < end of copied text >  < from: CT Abdomen and Pelvis w/ IV Cont (09.08.24 @ 07:36) >    IMPRESSION:    Cholelithiasis.  Dilated CBD measuring up to 1.2 cm.  Possible intraluminal density periampullary CBD.  Consider further evaluation with MRCP to evaluate for   choledocholithiasis, which was noted on a prior CT exam.    Other findings as discussed above.        --- End of Report ---    < end of copied text >

## 2024-09-12 NOTE — PROVIDER CONTACT NOTE (EICU) - SITUATION
72M with h/o Afib, HTN, hypothyroidism, and pre-DM presented with complaints of abdominal pain. Patient initially presented with abdominal pain on 9/8/24 and was discharged, in the interim he developed high grade fever and worsening abdominal pain and came back to ED. In ED pts BP soft, otherwise stable, WBC noted for 21.55, H/H 15/44, Plt 148. CMP noted for BUN/Cr 28/1.36, Lactate 2.3 > 2, T. bili 3.3, UA cloudy, with proteinuria, ketonuria, small leuks. COVID/Flu swab negative, CT abdomen noted for new severe gallbladder wall thickening and pericholecystic stranding in the setting of cholelithiasis is suspicious for acute cholecystitis. RUQ US also noted for cholecystitis.  Patient transferred to ICU 9/11 s/p RRT for tachycardia, dyspnea, fever, hypotension.  Case discussed with the ICU PA.

## 2024-09-12 NOTE — PROGRESS NOTE ADULT - ASSESSMENT
Patient now with septic shock  Abx broadened to lucy  jignesh inserted  s/p push dose traci  levophed gtt started, actively titrating to maintain MAP >65  Called surgery team to request evaluation given change in status 72M with h/o Afib on coumadin, HTN, hypothyroidism, DM vs pre-DM, currently admitted to  with sepsis 2/2 acute cholecystitis and cholelithiasis. Worsening clinical condition on medicine floor, with RRT called on 9/11. Patient placed on BiPAP, taken to ICU.      # Acute Cholecystitis  # Sepsis  # AF w/RVR  # Hypoxic respiratory failure requiring NIPPV   # Lactic acidosis  # HFpEF   # Hypophosphatemia  # Hypomagnesemia  # Thrombocytopenia     Patient now with septic shock, given push dose traci to temporize blood pressure while waiting for vasopressor infusion  Started on levophed gtt, actively titrating to maintain MAP >65  Given 50meq sodium bicarbonate for metabolic acidosis with improvement in hemodynamics  Ordered bicarbonate infusion  Ordered 40mg lasix for diuresis in the setting of HFpEF with congestion on CXR  Abx broadened to lucy  jignesh inserted for hemodynamic monitoring  Called surgery team to request evaluation given change in status  Updated family regarding current status, obtained consent for CVC if needed.     Will discuss case with ICU attending, Dr. Rodríguez  Critical Care Time (EXCLUSIVE of any non bundled procedures) :  90 minutes were spent assessing the patient's presenting problems of acute illness that pose a high probability of life threatening  deterioration or end organ damage / dysfunction.  Medical decision making includes initiation / continuation of plan or care review data/ labwork/ radiographic study, direct patient care at bedside, discussions with consultants regarding care, evaluation and interpretation of vital signs, any necessary ventilator management, NIV or BIPAP changes or initiation, discussions with multidisciplinary team,  am or pm rounds, discussions of goals of care with patient and family, all non-inclusive of procedures.    Date of entry of this note is equal to the date of services rendered

## 2024-09-12 NOTE — PROGRESS NOTE ADULT - SUBJECTIVE AND OBJECTIVE BOX
Patient is a 75 y/o female who presents with a chief complaint of abdominal discomfort    Brief Hospital Course:  75 y/o male with significant past medical history of atrial fibrillation on Eliquis, HTN, hypothyroidism, pre-DM (currently on Tirzepatide) who presented to the ED with c/o abdominal pain and fever. Patient initially presented with abdominal pain on 09/08, was discharged, and then returned on 09/10 because he developed a high grade fever (102.8) and worsening abdominal pain. In ED, patient was hypotensive SBP 95 with improvement s/p NS IVF. CT abdomen and pelvis and US revealing acute cholecystitis. Surgery was consulted, patient refused surgery, surgery admitted patient to medicine service for possible MRCP. Patient denies shortness of breath, chest pain, nausea, vomiting, headache, palpitations, abdominal pain.     09/11: RRT activated from medicine floor for afib with RVR, fever, increased WOB. Patient placed on NIV, was given Lopressor and Cardizem for a fib which improved HR, but led to hypotension. Phenylephrine was given while awaiting Levophed. Transfer of care to ICU service.     09/12: BiPAP removed and he is now wearing 3L NC saturating 98%. On Levophed. Heparin gtt on pause in preparation for IR today patient undergoing percutaneous cholecystostomy.     VITAL SIGNS:  ICU Vital Signs Last 24 Hrs  T(C): 37.7 (12 Sep 2024 07:00), Max: 39.3 (11 Sep 2024 20:00)  T(F): 99.9 (12 Sep 2024 07:00), Max: 102.8 (11 Sep 2024 20:00)  HR: 115 (12 Sep 2024 08:00) (79 - 143)  BP: 78/45 (12 Sep 2024 02:15) (57/21 - 181/94)  BP(mean): 30 (12 Sep 2024 02:15) (30 - 120)  ABP: 103/62 (12 Sep 2024 08:00) (78/61 - 111/72)  ABP(mean): 77 (12 Sep 2024 08:00) (2 - 88)  RR: 32 (12 Sep 2024 08:00) (19 - 48)  SpO2: 98% (12 Sep 2024 08:00) (95% - 100%)    O2 Parameters below as of 12 Sep 2024 07:00  Patient On (Oxygen Delivery Method): BiPAP/CPAP, 12    O2 Concentration (%): 30    09-11 @ 07:01 - 09-12 @ 07:00  --------------------------------------------------------  IN: 2401.4 mL / OUT: 555 mL / NET: 1846.4 mL    09-12 @ 07:01 - 09-12 @ 08:16  --------------------------------------------------------  IN: 205.3 mL / OUT: 85 mL / NET: 120.3 mL      CAPILLARY BLOOD GLUCOSE  POCT Blood Glucose.: 173 mg/dL (11 Sep 2024 19:02)    ROS:   10 point ROS was obtained.   Pertinent positive and negatives are as above.   All other review of systems is negative unless indicated above.    PHYSICAL EXAM:    General: NAD, interactive, morbidly obese  HEENT: NC/AT; PERRL, clear conjunctiva, good conjugate gaze  Neck: supple, normal ROM  Respiratory: CTA b/l  Cardiovascular: +S1/S2; irregularly irregular  Abdomen: soft, mildly tender. +BS x4  Extremities: 2+ peripheral pulses b/l; no LE edema  Skin: normal color and turgor  Neurological: A&Ox3, no focal deficits.  Lines/Tubes/Drains: Monet catheter draining. A line to L radial, adequate waveform.     MEDICATIONS:  MEDICATIONS  (STANDING):  aspirin enteric coated 81 milliGRAM(s) Oral daily  dexMEDEtomidine Infusion 0.2 MICROgram(s)/kG/Hr (8.19 mL/Hr) IV Continuous <Continuous>  heparin  Infusion.  Unit(s)/Hr (24 mL/Hr) IV Continuous <Continuous>  levothyroxine Injectable 84 MICROGram(s) IV Push at bedtime  meropenem Injectable 1000 milliGRAM(s) IV Push every 12 hours  meropenem Injectable      norepinephrine Infusion 0.15 MICROgram(s)/kG/Min (46.1 mL/Hr) IV Continuous <Continuous>  nystatin Powder 1 Application(s) Topical two times a day  pantoprazole  Injectable 40 milliGRAM(s) IV Push daily  sodium bicarbonate  Infusion 0.069 mEq/kG/Hr (75 mL/Hr) IV Continuous <Continuous>  ursodiol Capsule 300 milliGRAM(s) Oral two times a day    MEDICATIONS  (PRN):  aluminum hydroxide/magnesium hydroxide/simethicone Suspension 30 milliLiter(s) Oral every 4 hours PRN Dyspepsia  heparin   Injectable 5000 Unit(s) IV Push every 6 hours PRN For aPTT between 40 - 57  heparin   Injectable 60626 Unit(s) IV Push every 6 hours PRN For aPTT less than 40  melatonin 3 milliGRAM(s) Oral at bedtime PRN Insomnia  ondansetron Injectable 4 milliGRAM(s) IV Push once PRN Nausea and/or Vomiting  traZODone 150 milliGRAM(s) Oral at bedtime PRN for insomnia    ALLERGIES:  metformin (Unknown)    LABS:                        14.4   38.55 )-----------( 196      ( 12 Sep 2024 05:35 )             40.9     Hemoglobin: 14.4 g/dL (09-12 @ 05:35)  Hemoglobin: 14.5 g/dL (09-12 @ 02:25)  Hemoglobin: 14.4 g/dL (09-11 @ 17:26)  Hemoglobin: 14.7 g/dL (09-11 @ 07:13)  Hemoglobin: 15.6 g/dL (09-10 @ 16:16)    CBC Full  -  ( 12 Sep 2024 05:35 )  WBC Count : 38.55 K/uL  RBC Count : 4.98 M/uL  Hemoglobin : 14.4 g/dL  Hematocrit : 40.9 %  Platelet Count - Automated : 196 K/uL  Mean Cell Volume : 82.1 fl  Mean Cell Hemoglobin : 28.9 pg  Mean Cell Hemoglobin Concentration : 35.2 gm/dL  Auto Neutrophil # : 35.88 K/uL  Auto Lymphocyte # : 0.38 K/uL  Auto Monocyte # : 1.32 K/uL  Auto Eosinophil # : 0.00 K/uL  Auto Basophil # : 0.14 K/uL  Auto Neutrophil % : 93.0 %  Auto Lymphocyte % : 1.0 %  Auto Monocyte % : 3.4 %  Auto Eosinophil % : 0.0 %  Auto Basophil % : 0.4 %    09-12    137  |  107  |  18  ----------------------------<  254<H>  3.8   |  18<L>  |  1.30    Ca    8.8      12 Sep 2024 05:35  Phos  3.3     09-12  Mg     2.0     09-12    TPro  6.4  /  Alb  2.2<L>  /  TBili  3.6<H>  /  DBili  2.7<H>  /  AST  52<H>  /  ALT  44  /  AlkPhos  135<H>  09-12    Creatinine Trend: 1.30<--, 1.50<--, 0.88<--, 0.87<--, 1.36<--, 1.03<--  LIVER FUNCTIONS - ( 12 Sep 2024 05:35 )  Alb: 2.2 g/dL / Pro: 6.4 gm/dL / ALK PHOS: 135 U/L / ALT: 44 U/L / AST: 52 U/L / GGT: x           PT/INR - ( 12 Sep 2024 02:25 )   PT: 16.3 sec;   INR: 1.46 ratio    PTT - ( 12 Sep 2024 02:25 )  PTT:76.5 sec  hs Troponin:  Troponin I, High Sensitivity Result: 4.70    ABG - ( 12 Sep 2024 03:15 )  pH, Arterial: 7.36  pH, Blood: x     /  pCO2: 28    /  pO2: 150   / HCO3: 16    / Base Excess: -8.2  /  SaO2: 99.0    03:15 - ABG - pH: 7.36  |  pCO2: 28    |  pO2: 150   | Lactate:       | BE: -8.2       Urinalysis Basic - ( 12 Sep 2024 05:35 )    Color: x / Appearance: x / SG: x / pH: x  Gluc: 254 mg/dL / Ketone: x  / Bili: x / Urobili: x   Blood: x / Protein: x / Nitrite: x   Leuk Esterase: x / RBC: x / WBC x   Sq Epi: x / Non Sq Epi: x / Bacteria: x    EKG: < from: 12 Lead ECG (09.08.24 @ 06:38) >  Ventricular Rate 90 BPM  QRS Duration 90 ms  Q-T Interval 386 ms  QTC Calculation(Bazett) 472 ms  R Axis -6 degrees  T Axis 149 degrees    Diagnosis Line Atrial fibrillation  Septal infarct , age undetermined  Inferior infarct , age undetermined  Abnormal ECG  No previous ECGs available  Confirmed by MD VANE, MATILDE ([id number]) on 9/8/2024 5:44:42 PM    < end of copied text >    12 Lead ECG (09.10.24 @ 16:10)     Ventricular Rate 91 BPM  QRS Duration 102 ms  Q-T Interval 388 ms  QTC Calculation(Bazett) 477 ms  R Axis -33 degrees  T Axis -10 degrees  Diagnosis Line Atrial fibrillation  Left axis deviation  Inferior infarct (cited on or before 08-SEP-2024)  Abnormal ECG  When compared with ECG of 08-SEP-2024 06:38,  Criteria for Septal infarct are no longer Present  Nonspecific T wave abnormality no longer evident in Anterolateral leads  Confirmed by SONU MORELOS ([id number]) on 9/11/2024 8:44:17 AM  < end of copied text >    < from: 12 Lead ECG (09.11.24 @ 19:09) >    Ventricular Rate 145 BPM  Atrial Rate 133 BPM  QRS Duration 80 ms  Q-T Interval 306 ms  QTC Calculation(Bazett) 475 ms  R Axis -4 degrees  T Axis 96 degrees    Diagnosis Line *** Critical Test Result: High HR  Atrial fibrillation with rapid ventricular response  Cannot rule out Inferior infarct (cited on or before 08-SEP-2024)  Possible Anterior infarct , age undetermined  Abnormal ECG  When compared with ECG of 10-SEP-2024 16:10,  Vent. rate has increased BY  54 BPM  Questionable change in QRS duration  Confirmed by SONU MORELOS ([id number]) on 9/12/2024 8:12:53 AM    < end of copied text >    MICROBIOLOGY:    Urinalysis with Rflx Culture (collected 10 Sep 2024 17:25)    Culture - Blood (collected 10 Sep 2024 16:42)  Source: .Blood None  Preliminary Report (11 Sep 2024 20:02):    No growth at 24 hours    Culture - Blood (collected 10 Sep 2024 16:17)  Source: .Blood None  Preliminary Report (11 Sep 2024 20:02):    No growth at 24 hours    IMAGING:  < from: CT Abdomen and Pelvis w/ IV Cont (09.08.24 @ 07:36) >  IMPRESSION:    Cholelithiasis.  Dilated CBD measuring up to 1.2 cm.  Possible intraluminal density periampullary CBD.  Consider further evaluation with MRCP to evaluate for   choledocholithiasis, which was noted on a prior CT exam.    Other findings as discussed above.  < end of copied text >    < from: CT Abdomen and Pelvis w/ IV Cont (09.10.24 @ 17:56) >  IMPRESSION: New severe gallbladder wall thickening and pericholecystic   stranding in the setting of cholelithiasis is suspicious for acute   cholecystitis. Recommend clinical correlation and consider right upper   quadrant ultrasound.    ---End of Report ---    < end of copied text >    < from: US Abdomen Upper Quadrant Right (09.10.24 @ 21:09) >  IMPRESSION:  The sonographic findings are suspicious for acute cholecystitis in the   proper clinical scenario.  Correlate with clinical symptoms and   laboratory values.  A follow-up nuclear medicine HIDA scan may be   obtained as clinically warranted.    --- End of Report ---  < end of copied text >    < from: TTE W or WO Ultrasound Enhancing Agent (09.11.24 @ 14:13) >  CONCLUSIONS:      1. Left ventricular cavity is normal in size. Left ventricular systolic function is normal with an ejection fraction of 60 % by Arevalo's method of disks.   2. Left atrium is severely dilated.   3. The right atrium is dilated.   4. Trace tricuspid regurgitation.   5. Ascending aorta is dilated, measuring 3.90 cm (indexed 1.46 cm/m²).   6. Estimated pulmonary artery systolic pressure is 45 mmHg, consistent with moderate pulmonary hypertension.   7. No left ventricular hypertrophy.   8. Technically difficult image quality.    < end of copied text >    < from: TTE W or WO Ultrasound Enhancing Agent (09.11.24 @ 14:13) >  Systemic Veins:  The inferior vena cava is dilated measuring 2.61 cm in diameter, (dilated >2.1cm) with normal inspiratory collapse (normal >50%) consistent with mildly elevated right atrial pressure (~8, range 5-10mmHg).    < end of copied text >    Labs, imaging, EKG personally reviewed    RADIOLOGY & ADDITIONAL TESTS: Reviewed. Patient is a 75 y/o female who presents with a chief complaint of abdominal discomfort    Brief Hospital Course:  75 y/o male with significant past medical history of atrial fibrillation on Eliquis, HTN, hypothyroidism, pre-DM (currently on Tirzepatide) who presented to the ED with c/o abdominal pain and fever. Patient initially presented with abdominal pain on 09/08, was discharged, and then returned on 09/10 because he developed a high grade fever (102.8) and worsening abdominal pain. In ED, patient was hypotensive SBP 95 with improvement s/p NS IVF. CT abdomen and pelvis and US revealing acute cholecystitis. Surgery was consulted, patient refused surgery, surgery admitted patient to medicine service for possible MRCP. Patient denies shortness of breath, chest pain, nausea, vomiting, headache, palpitations, abdominal pain.     09/11: RRT activated from medicine floor for afib with RVR, fever, increased WOB. Patient placed on NIV, was given Lopressor and Cardizem for a fib which improved HR, but led to hypotension. Phenylephrine was given while awaiting Levophed. Transfer of care to ICU service.     09/12: BiPAP removed and he is now wearing 3L NC saturating 98%. Patient states he feels "much better" today. On Levophed. Heparin gtt on pause in preparation for IR today patient undergoing percutaneous cholecystostomy.     VITAL SIGNS:  ICU Vital Signs Last 24 Hrs  T(C): 37.7 (12 Sep 2024 07:00), Max: 39.3 (11 Sep 2024 20:00)  T(F): 99.9 (12 Sep 2024 07:00), Max: 102.8 (11 Sep 2024 20:00)  HR: 115 (12 Sep 2024 08:00) (79 - 143)  BP: 78/45 (12 Sep 2024 02:15) (57/21 - 181/94)  BP(mean): 30 (12 Sep 2024 02:15) (30 - 120)  ABP: 103/62 (12 Sep 2024 08:00) (78/61 - 111/72)  ABP(mean): 77 (12 Sep 2024 08:00) (2 - 88)  RR: 32 (12 Sep 2024 08:00) (19 - 48)  SpO2: 98% (12 Sep 2024 08:00) (95% - 100%)    O2 Parameters below as of 12 Sep 2024 07:00  Patient On (Oxygen Delivery Method): BiPAP/CPAP, 12    O2 Concentration (%): 30    09-11 @ 07:01 - 09-12 @ 07:00  --------------------------------------------------------  IN: 2401.4 mL / OUT: 555 mL / NET: 1846.4 mL    09-12 @ 07:01 - 09-12 @ 08:16  --------------------------------------------------------  IN: 205.3 mL / OUT: 85 mL / NET: 120.3 mL      CAPILLARY BLOOD GLUCOSE  POCT Blood Glucose.: 173 mg/dL (11 Sep 2024 19:02)    ROS:   10 point ROS was obtained.   Pertinent positive and negatives are as above.   All other review of systems is negative unless indicated above.    PHYSICAL EXAM:    General: NAD, interactive, morbidly obese  HEENT: NC/AT; PERRL, clear conjunctiva, good conjugate gaze  Neck: supple, normal ROM  Respiratory: CTA b/l  Cardiovascular: +S1/S2; irregularly irregular  Abdomen: soft, mildly tender. +BS x4  Extremities: 2+ peripheral pulses b/l; no LE edema  Skin: normal color and turgor  Neurological: A&Ox4, no focal deficits.  Lines/Tubes/Drains: Monet catheter draining. A line to L radial, adequate waveform.     MEDICATIONS:  MEDICATIONS  (STANDING):  aspirin enteric coated 81 milliGRAM(s) Oral daily  dexMEDEtomidine Infusion 0.2 MICROgram(s)/kG/Hr (8.19 mL/Hr) IV Continuous <Continuous>  heparin  Infusion.  Unit(s)/Hr (24 mL/Hr) IV Continuous <Continuous>  levothyroxine Injectable 84 MICROGram(s) IV Push at bedtime  meropenem Injectable 1000 milliGRAM(s) IV Push every 12 hours  meropenem Injectable      norepinephrine Infusion 0.15 MICROgram(s)/kG/Min (46.1 mL/Hr) IV Continuous <Continuous>  nystatin Powder 1 Application(s) Topical two times a day  pantoprazole  Injectable 40 milliGRAM(s) IV Push daily  sodium bicarbonate  Infusion 0.069 mEq/kG/Hr (75 mL/Hr) IV Continuous <Continuous>  ursodiol Capsule 300 milliGRAM(s) Oral two times a day    MEDICATIONS  (PRN):  aluminum hydroxide/magnesium hydroxide/simethicone Suspension 30 milliLiter(s) Oral every 4 hours PRN Dyspepsia  heparin   Injectable 5000 Unit(s) IV Push every 6 hours PRN For aPTT between 40 - 57  heparin   Injectable 87163 Unit(s) IV Push every 6 hours PRN For aPTT less than 40  melatonin 3 milliGRAM(s) Oral at bedtime PRN Insomnia  ondansetron Injectable 4 milliGRAM(s) IV Push once PRN Nausea and/or Vomiting  traZODone 150 milliGRAM(s) Oral at bedtime PRN for insomnia    ALLERGIES:  metformin (Unknown)    LABS:                        14.4   38.55 )-----------( 196      ( 12 Sep 2024 05:35 )             40.9     Hemoglobin: 14.4 g/dL (09-12 @ 05:35)  Hemoglobin: 14.5 g/dL (09-12 @ 02:25)  Hemoglobin: 14.4 g/dL (09-11 @ 17:26)  Hemoglobin: 14.7 g/dL (09-11 @ 07:13)  Hemoglobin: 15.6 g/dL (09-10 @ 16:16)    CBC Full  -  ( 12 Sep 2024 05:35 )  WBC Count : 38.55 K/uL  RBC Count : 4.98 M/uL  Hemoglobin : 14.4 g/dL  Hematocrit : 40.9 %  Platelet Count - Automated : 196 K/uL  Mean Cell Volume : 82.1 fl  Mean Cell Hemoglobin : 28.9 pg  Mean Cell Hemoglobin Concentration : 35.2 gm/dL  Auto Neutrophil # : 35.88 K/uL  Auto Lymphocyte # : 0.38 K/uL  Auto Monocyte # : 1.32 K/uL  Auto Eosinophil # : 0.00 K/uL  Auto Basophil # : 0.14 K/uL  Auto Neutrophil % : 93.0 %  Auto Lymphocyte % : 1.0 %  Auto Monocyte % : 3.4 %  Auto Eosinophil % : 0.0 %  Auto Basophil % : 0.4 %    09-12    137  |  107  |  18  ----------------------------<  254<H>  3.8   |  18<L>  |  1.30    Ca    8.8      12 Sep 2024 05:35  Phos  3.3     09-12  Mg     2.0     09-12    TPro  6.4  /  Alb  2.2<L>  /  TBili  3.6<H>  /  DBili  2.7<H>  /  AST  52<H>  /  ALT  44  /  AlkPhos  135<H>  09-12    Creatinine Trend: 1.30<--, 1.50<--, 0.88<--, 0.87<--, 1.36<--, 1.03<--  LIVER FUNCTIONS - ( 12 Sep 2024 05:35 )  Alb: 2.2 g/dL / Pro: 6.4 gm/dL / ALK PHOS: 135 U/L / ALT: 44 U/L / AST: 52 U/L / GGT: x           PT/INR - ( 12 Sep 2024 02:25 )   PT: 16.3 sec;   INR: 1.46 ratio    PTT - ( 12 Sep 2024 02:25 )  PTT:76.5 sec  hs Troponin:  Troponin I, High Sensitivity Result: 4.70    ABG - ( 12 Sep 2024 03:15 )  pH, Arterial: 7.36  pH, Blood: x     /  pCO2: 28    /  pO2: 150   / HCO3: 16    / Base Excess: -8.2  /  SaO2: 99.0    03:15 - ABG - pH: 7.36  |  pCO2: 28    |  pO2: 150   | Lactate:       | BE: -8.2       Urinalysis Basic - ( 12 Sep 2024 05:35 )    Color: x / Appearance: x / SG: x / pH: x  Gluc: 254 mg/dL / Ketone: x  / Bili: x / Urobili: x   Blood: x / Protein: x / Nitrite: x   Leuk Esterase: x / RBC: x / WBC x   Sq Epi: x / Non Sq Epi: x / Bacteria: x    EKG: < from: 12 Lead ECG (09.08.24 @ 06:38) >  Ventricular Rate 90 BPM  QRS Duration 90 ms  Q-T Interval 386 ms  QTC Calculation(Bazett) 472 ms  R Axis -6 degrees  T Axis 149 degrees    Diagnosis Line Atrial fibrillation  Septal infarct , age undetermined  Inferior infarct , age undetermined  Abnormal ECG  No previous ECGs available  Confirmed by MD CIFUENTES PAUL ([id number]) on 9/8/2024 5:44:42 PM    < end of copied text >    12 Lead ECG (09.10.24 @ 16:10)     Ventricular Rate 91 BPM  QRS Duration 102 ms  Q-T Interval 388 ms  QTC Calculation(Bazett) 477 ms  R Axis -33 degrees  T Axis -10 degrees  Diagnosis Line Atrial fibrillation  Left axis deviation  Inferior infarct (cited on or before 08-SEP-2024)  Abnormal ECG  When compared with ECG of 08-SEP-2024 06:38,  Criteria for Septal infarct are no longer Present  Nonspecific T wave abnormality no longer evident in Anterolateral leads  Confirmed by SONU MORELOS ([id number]) on 9/11/2024 8:44:17 AM  < end of copied text >    < from: 12 Lead ECG (09.11.24 @ 19:09) >    Ventricular Rate 145 BPM  Atrial Rate 133 BPM  QRS Duration 80 ms  Q-T Interval 306 ms  QTC Calculation(Bazett) 475 ms  R Axis -4 degrees  T Axis 96 degrees    Diagnosis Line *** Critical Test Result: High HR  Atrial fibrillation with rapid ventricular response  Cannot rule out Inferior infarct (cited on or before 08-SEP-2024)  Possible Anterior infarct , age undetermined  Abnormal ECG  When compared with ECG of 10-SEP-2024 16:10,  Vent. rate has increased BY  54 BPM  Questionable change in QRS duration  Confirmed by SONU MORELOS ([id number]) on 9/12/2024 8:12:53 AM    < end of copied text >    MICROBIOLOGY:    Urinalysis with Rflx Culture (collected 10 Sep 2024 17:25)    Culture - Blood (collected 10 Sep 2024 16:42)  Source: .Blood None  Preliminary Report (11 Sep 2024 20:02):    No growth at 24 hours    Culture - Blood (collected 10 Sep 2024 16:17)  Source: .Blood None  Preliminary Report (11 Sep 2024 20:02):    No growth at 24 hours    IMAGING:  < from: CT Abdomen and Pelvis w/ IV Cont (09.08.24 @ 07:36) >  IMPRESSION:    Cholelithiasis.  Dilated CBD measuring up to 1.2 cm.  Possible intraluminal density periampullary CBD.  Consider further evaluation with MRCP to evaluate for   choledocholithiasis, which was noted on a prior CT exam.    Other findings as discussed above.  < end of copied text >    < from: CT Abdomen and Pelvis w/ IV Cont (09.10.24 @ 17:56) >  IMPRESSION: New severe gallbladder wall thickening and pericholecystic   stranding in the setting of cholelithiasis is suspicious for acute   cholecystitis. Recommend clinical correlation and consider right upper   quadrant ultrasound.    ---End of Report ---    < end of copied text >    < from: US Abdomen Upper Quadrant Right (09.10.24 @ 21:09) >  IMPRESSION:  The sonographic findings are suspicious for acute cholecystitis in the   proper clinical scenario.  Correlate with clinical symptoms and   laboratory values.  A follow-up nuclear medicine HIDA scan may be   obtained as clinically warranted.    --- End of Report ---  < end of copied text >    < from: TTE W or WO Ultrasound Enhancing Agent (09.11.24 @ 14:13) >  CONCLUSIONS:      1. Left ventricular cavity is normal in size. Left ventricular systolic function is normal with an ejection fraction of 60 % by Arevalo's method of disks.   2. Left atrium is severely dilated.   3. The right atrium is dilated.   4. Trace tricuspid regurgitation.   5. Ascending aorta is dilated, measuring 3.90 cm (indexed 1.46 cm/m²).   6. Estimated pulmonary artery systolic pressure is 45 mmHg, consistent with moderate pulmonary hypertension.   7. No left ventricular hypertrophy.   8. Technically difficult image quality.    < end of copied text >    < from: TTE W or WO Ultrasound Enhancing Agent (09.11.24 @ 14:13) >  Systemic Veins:  The inferior vena cava is dilated measuring 2.61 cm in diameter, (dilated >2.1cm) with normal inspiratory collapse (normal >50%) consistent with mildly elevated right atrial pressure (~8, range 5-10mmHg).    < end of copied text >    Labs, imaging, EKG personally reviewed    RADIOLOGY & ADDITIONAL TESTS: Reviewed.

## 2024-09-12 NOTE — GOALS OF CARE CONVERSATION - ADVANCED CARE PLANNING - CONVERSATION DETAILS
Met with patient's son Dewayne at bedside upon transfer to ICU. Discussed patient's acute decompensation and acute medical conditions in the setting of underlying chronic comorbidities. Dewayne confided that patient has had similar episode about 2 years ago, which improved with antibiotics. Patient was supposed to lose weight and follow up as outpatient for cholecystectomy, however he did not follow up, nor did he lose weight. He has been immobile since 2017, and has been bedbound residing in Dewayne's house with Dewayne and a friend as his primary caretakers. Dewayne questioned what the worst case scenario would look like and we discussed possible outcomes. I explained our immediate concerns for his mental status and respiratory status despite BiPAP, with next steps being intubation. I explained that this would be high risk with his body habitus and underlying heart failure, and he may have difficulties liberating from mechanical ventilation. I questioned whether this was something that [place] would want for himself. Dewayne advised that he would want all measures taken in hopes of his survival, including intubation and CPR.

## 2024-09-13 LAB
ADD ON TEST-SPECIMEN IN LAB: SIGNIFICANT CHANGE UP
ADD ON TEST-SPECIMEN IN LAB: SIGNIFICANT CHANGE UP
ALBUMIN SERPL ELPH-MCNC: 1.9 G/DL — LOW (ref 3.3–5)
ALP SERPL-CCNC: 96 U/L — SIGNIFICANT CHANGE UP (ref 40–120)
ALT FLD-CCNC: 32 U/L — SIGNIFICANT CHANGE UP (ref 12–78)
ANION GAP SERPL CALC-SCNC: 5 MMOL/L — SIGNIFICANT CHANGE UP (ref 5–17)
APTT BLD: 76.2 SEC — HIGH (ref 24.5–35.6)
AST SERPL-CCNC: 31 U/L — SIGNIFICANT CHANGE UP (ref 15–37)
BILIRUB DIRECT SERPL-MCNC: 1.3 MG/DL — HIGH (ref 0–0.3)
BILIRUB INDIRECT FLD-MCNC: 0.6 MG/DL — SIGNIFICANT CHANGE UP (ref 0.2–1)
BILIRUB SERPL-MCNC: 1.9 MG/DL — HIGH (ref 0.2–1.2)
BUN SERPL-MCNC: 11 MG/DL — SIGNIFICANT CHANGE UP (ref 7–23)
CALCIUM SERPL-MCNC: 8.6 MG/DL — SIGNIFICANT CHANGE UP (ref 8.5–10.1)
CHLORIDE SERPL-SCNC: 108 MMOL/L — SIGNIFICANT CHANGE UP (ref 96–108)
CO2 SERPL-SCNC: 27 MMOL/L — SIGNIFICANT CHANGE UP (ref 22–31)
CREAT SERPL-MCNC: 0.67 MG/DL — SIGNIFICANT CHANGE UP (ref 0.5–1.3)
EGFR: 97 ML/MIN/1.73M2 — SIGNIFICANT CHANGE UP
GLUCOSE BLDC GLUCOMTR-MCNC: 119 MG/DL — HIGH (ref 70–99)
GLUCOSE BLDC GLUCOMTR-MCNC: 121 MG/DL — HIGH (ref 70–99)
GLUCOSE BLDC GLUCOMTR-MCNC: 139 MG/DL — HIGH (ref 70–99)
GLUCOSE BLDC GLUCOMTR-MCNC: 139 MG/DL — HIGH (ref 70–99)
GLUCOSE SERPL-MCNC: 142 MG/DL — HIGH (ref 70–99)
HCT VFR BLD CALC: 36.1 % — LOW (ref 39–50)
HGB BLD-MCNC: 12.6 G/DL — LOW (ref 13–17)
LACTATE SERPL-SCNC: 1.2 MMOL/L — SIGNIFICANT CHANGE UP (ref 0.7–2)
MAGNESIUM SERPL-MCNC: 1.8 MG/DL — SIGNIFICANT CHANGE UP (ref 1.6–2.6)
MCHC RBC-ENTMCNC: 28.8 PG — SIGNIFICANT CHANGE UP (ref 27–34)
MCHC RBC-ENTMCNC: 34.9 GM/DL — SIGNIFICANT CHANGE UP (ref 32–36)
MCV RBC AUTO: 82.4 FL — SIGNIFICANT CHANGE UP (ref 80–100)
PHOSPHATE SERPL-MCNC: 1.1 MG/DL — LOW (ref 2.5–4.5)
PLATELET # BLD AUTO: 117 K/UL — LOW (ref 150–400)
POTASSIUM SERPL-MCNC: 3.4 MMOL/L — LOW (ref 3.5–5.3)
POTASSIUM SERPL-SCNC: 3.4 MMOL/L — LOW (ref 3.5–5.3)
PROT SERPL-MCNC: 5.4 GM/DL — LOW (ref 6–8.3)
RBC # BLD: 4.38 M/UL — SIGNIFICANT CHANGE UP (ref 4.2–5.8)
RBC # FLD: 15.1 % — HIGH (ref 10.3–14.5)
SODIUM SERPL-SCNC: 140 MMOL/L — SIGNIFICANT CHANGE UP (ref 135–145)
WBC # BLD: 10.73 K/UL — HIGH (ref 3.8–10.5)
WBC # FLD AUTO: 10.73 K/UL — HIGH (ref 3.8–10.5)

## 2024-09-13 PROCEDURE — 99231 SBSQ HOSP IP/OBS SF/LOW 25: CPT

## 2024-09-13 PROCEDURE — 99291 CRITICAL CARE FIRST HOUR: CPT

## 2024-09-13 PROCEDURE — 71045 X-RAY EXAM CHEST 1 VIEW: CPT | Mod: 26

## 2024-09-13 RX ORDER — POTASSIUM CHLORIDE 10 MEQ
20 TABLET, EXT RELEASE, PARTICLES/CRYSTALS ORAL ONCE
Refills: 0 | Status: COMPLETED | OUTPATIENT
Start: 2024-09-13 | End: 2024-09-13

## 2024-09-13 RX ORDER — METOPROLOL TARTRATE 100 MG/1
5 TABLET ORAL ONCE
Refills: 0 | Status: COMPLETED | OUTPATIENT
Start: 2024-09-13 | End: 2024-09-13

## 2024-09-13 RX ORDER — LEVOTHYROXINE SODIUM 100 MCG
112 TABLET ORAL DAILY
Refills: 0 | Status: DISCONTINUED | OUTPATIENT
Start: 2024-09-14 | End: 2024-09-18

## 2024-09-13 RX ORDER — TRAZODONE HCL 50 MG
150 TABLET ORAL
Refills: 0 | Status: DISCONTINUED | OUTPATIENT
Start: 2024-09-13 | End: 2024-09-18

## 2024-09-13 RX ORDER — POTASSIUM PHOSPHATE 236; 224 MG/ML; MG/ML
30 INJECTION, SOLUTION INTRAVENOUS ONCE
Refills: 0 | Status: COMPLETED | OUTPATIENT
Start: 2024-09-13 | End: 2024-09-13

## 2024-09-13 RX ORDER — METOPROLOL TARTRATE 100 MG/1
25 TABLET ORAL
Refills: 0 | Status: DISCONTINUED | OUTPATIENT
Start: 2024-09-13 | End: 2024-09-18

## 2024-09-13 RX ADMIN — Medication 2400 UNIT(S)/HR: at 19:37

## 2024-09-13 RX ADMIN — MEROPENEM 1000 MILLIGRAM(S): 500 INJECTION, POWDER, FOR SOLUTION INTRAVENOUS at 20:54

## 2024-09-13 RX ADMIN — CHLORHEXIDINE GLUCONATE 1 APPLICATION(S): 40 SOLUTION TOPICAL at 05:18

## 2024-09-13 RX ADMIN — Medication 150 MILLIGRAM(S): at 21:17

## 2024-09-13 RX ADMIN — Medication 1 APPLICATION(S): at 23:39

## 2024-09-13 RX ADMIN — Medication 81 MILLIGRAM(S): at 10:49

## 2024-09-13 RX ADMIN — MEROPENEM 1000 MILLIGRAM(S): 500 INJECTION, POWDER, FOR SOLUTION INTRAVENOUS at 11:59

## 2024-09-13 RX ADMIN — Medication 2400 UNIT(S)/HR: at 01:28

## 2024-09-13 RX ADMIN — MIDODRINE HYDROCHLORIDE 10 MILLIGRAM(S): 5 TABLET ORAL at 21:18

## 2024-09-13 RX ADMIN — METOPROLOL TARTRATE 25 MILLIGRAM(S): 100 TABLET ORAL at 21:18

## 2024-09-13 RX ADMIN — Medication 20 MILLIEQUIVALENT(S): at 12:06

## 2024-09-13 RX ADMIN — URSODIOL 300 MILLIGRAM(S): 500 TABLET, FILM COATED ORAL at 10:50

## 2024-09-13 RX ADMIN — Medication 75 MILLILITER(S): at 16:45

## 2024-09-13 RX ADMIN — MIDODRINE HYDROCHLORIDE 10 MILLIGRAM(S): 5 TABLET ORAL at 05:19

## 2024-09-13 RX ADMIN — Medication 40 MILLIGRAM(S): at 10:49

## 2024-09-13 RX ADMIN — POTASSIUM PHOSPHATE 83.33 MILLIMOLE(S): 236; 224 INJECTION, SOLUTION INTRAVENOUS at 10:51

## 2024-09-13 RX ADMIN — MEROPENEM 1000 MILLIGRAM(S): 500 INJECTION, POWDER, FOR SOLUTION INTRAVENOUS at 05:17

## 2024-09-13 RX ADMIN — Medication 2400 UNIT(S)/HR: at 23:38

## 2024-09-13 RX ADMIN — Medication 150 MILLIGRAM(S): at 01:28

## 2024-09-13 RX ADMIN — MIDODRINE HYDROCHLORIDE 10 MILLIGRAM(S): 5 TABLET ORAL at 13:59

## 2024-09-13 RX ADMIN — Medication 46.1 MICROGRAM(S)/KG/MIN: at 02:55

## 2024-09-13 RX ADMIN — URSODIOL 300 MILLIGRAM(S): 500 TABLET, FILM COATED ORAL at 21:17

## 2024-09-13 RX ADMIN — Medication 2400 UNIT(S)/HR: at 07:30

## 2024-09-13 RX ADMIN — Medication 1 APPLICATION(S): at 12:06

## 2024-09-13 RX ADMIN — METOPROLOL TARTRATE 5 MILLIGRAM(S): 100 TABLET ORAL at 10:49

## 2024-09-13 RX ADMIN — Medication 2400 UNIT(S)/HR: at 11:58

## 2024-09-13 NOTE — PROGRESS NOTE ADULT - ASSESSMENT
Assessment:  72M with Afib, HTN, hypothyroidism, and pre-DM presents 9/11 with abdominal pain associated with fevers  Febrile 102F on admission  Cr 1.3  Bili 2.7  UA negative  CTAP and US RUQ suggestive of acute abdi, CBD normal   BCx 9/10 negative  RVP negative  Prior BCx 2022, with Oakley S Ecoli  Course complicated by worsening tachycardia, increase work of breathing and fever, now on BIPAP  Leukocytosis worsen 21 > 38  Zosyn broaden to Meropenem  Bile Cx 9/12 with GVR and GPC    Antimicrobials:  s/p Zosyn (9/10 - 9/11)  meropenem Injectable 1000 every 8 hours (9/12 --- )    Impression:   #Septic Shock 2/2 Acute Cholecystitis  #Acute hypoxic respiratory failure  #Fever  #Leukocytosis  #Transaminitis with elevated Bili  - s/p cholecystostomy 9/12  - low grade fever, but clinically reports feeling better, leukocytosis improving    Recommendations:  - continue Meropenem 1G q8  - monitor temperature curve  - trend WBC  - side effects of antibiotic discussed, tolerating abx well so far  - follow up Bile culture  - follow up BCx x2 (9/12); if negative and continues to improve, would narrow antibiotic back to Zosyn  - Surgery following    Clinical team may change from intravenous to oral antibiotics when the following criteria are met:   1. Patient is clinically improving/stable       a)	Improved signs and symptoms of infection from initial presentation       b)	Afebrile for 24 hours       c)	Leukocytosis trending towards normal range   2. Patient is tolerating oral intake   3. Initial/repeat blood cultures are negative OR do not need to wait for preliminary blood cultures to result    Cannot advise changing to oral antibiotic therapy until culture sensitivity is available.

## 2024-09-13 NOTE — PROGRESS NOTE ADULT - SUBJECTIVE AND OBJECTIVE BOX
Date of Service:09-13-24 @ 14:32  Interval History/ROS: Low grade fever 100.6F. Patient assessed at bedside this morning. Denied any acute complaints.      REVIEW OF SYSTEMS  [  ] ROS unobtainable because:    [ x ] All other systems negative except as noted below    Constitutional:  [ ] fever [ ] chills  [ ] weight loss  [ ]night sweat  [ ]poor appetite/PO intake [ ]fatigue   Skin:  [ ] rash [ ] phlebitis	  Eyes: [ ] icterus [ ] pain  [ ] discharge	  ENMT: [ ] sore throat  [ ] thrush [ ] ulcers [ ] exudates [ ]anosmia  Respiratory: [ ] dyspnea [ ] hemoptysis [ ] cough [ ] sputum	  Cardiovascular:  [ ] chest pain [ ] palpitations [ ] edema	  Gastrointestinal:  [ ] nausea [ ] vomiting [ ] diarrhea [ ] constipation [ ] pain	  Genitourinary:  [ ] dysuria [ ] frequency [ ] hematuria [ ] discharge [ ] flank pain  [ ] incontinence  Musculoskeletal:  [ ] myalgias [ ] arthralgias [ ] arthritis  [ ] back pain  Neurological:  [ ] headache [ ] weakness [ ] seizures  [ ] confusion/altered mental status    Allergies  metformin (Unknown)        ANTIMICROBIALS:    meropenem Injectable 1000 every 8 hours        OTHER MEDS: MEDICATIONS  (STANDING):  aluminum hydroxide/magnesium hydroxide/simethicone Suspension 30 every 4 hours PRN  aspirin enteric coated 81 daily  dextrose 50% Injectable 25 once  dextrose 50% Injectable 12.5 once  dextrose 50% Injectable 25 once  dextrose Oral Gel 15 once PRN  glucagon  Injectable 1 once  heparin   Injectable 44084 every 6 hours PRN  heparin   Injectable 5000 every 6 hours PRN  heparin  Infusion.  <Continuous>  influenza  Vaccine (HIGH DOSE) 0.5 once  insulin lispro (ADMELOG) corrective regimen sliding scale  Before meals and at bedtime  melatonin 3 at bedtime PRN  metoprolol tartrate 25 two times a day  midodrine 10 every 8 hours  ondansetron Injectable 4 once PRN  pantoprazole  Injectable 40 daily  traZODone 150 at bedtime PRN  ursodiol Capsule 300 two times a day      Vital Signs Last 24 Hrs  T(F): 99.9 (09-13-24 @ 11:00), Max: 102.8 (09-11-24 @ 20:00)    Vital Signs Last 24 Hrs  HR: 113 (09-13-24 @ 13:50) (95 - 152)  BP: 101/68 (09-13-24 @ 13:50) (94/75 - 109/57)  RR: 28 (09-13-24 @ 13:50)  SpO2: 96% (09-13-24 @ 13:50) (94% - 98%)  Wt(kg): --    EXAM:    Constitutional:  morbidly obese, NAD  Head/Eyes: no icterus  LUNGS:  CTA  CVS:  regular rhythm  Abd:  soft, mild epigastric-tender; non-distended  Ext:  no edema  Vascular:  IV site no erythema tenderness or discharge  Neuro: AAO X 3, non- focal    Labs:                        12.6   10.73 )-----------( 117      ( 13 Sep 2024 05:30 )             36.1     09-13    140  |  108  |  11  ----------------------------<  142<H>  3.4<L>   |  27  |  0.67    Ca    8.6      13 Sep 2024 05:30  Phos  1.1     09-13  Mg     1.8     09-13    TPro  5.4<L>  /  Alb  1.9<L>  /  TBili  1.9<H>  /  DBili  1.3<H>  /  AST  31  /  ALT  32  /  AlkPhos  96  09-13      WBC Trend:  WBC Count: 10.73 (09-13-24 @ 05:30)  WBC Count: 14.97 (09-12-24 @ 22:20)  WBC Count: 18.83 (09-12-24 @ 13:15)  WBC Count: 38.55 (09-12-24 @ 05:35)      Creatine Trend:  Creatinine: 0.67 (09-13)  Creatinine: 1.17 (09-12)  Creatinine: 1.30 (09-12)  Creatinine: 1.50 (09-12)      Liver Biochemical Testing Trend:  Alanine Aminotransferase (ALT/SGPT): 32 (09-13)  Alanine Aminotransferase (ALT/SGPT): 44 (09-12)  Alanine Aminotransferase (ALT/SGPT): 41 (09-12)  Alanine Aminotransferase (ALT/SGPT): 27 (09-11)  Alanine Aminotransferase (ALT/SGPT): 18 (09-11)  Aspartate Aminotransferase (AST/SGOT): 31 (09-13-24 @ 05:30)  Aspartate Aminotransferase (AST/SGOT): 52 (09-12-24 @ 05:35)  Aspartate Aminotransferase (AST/SGOT): 53 (09-12-24 @ 01:44)  Aspartate Aminotransferase (AST/SGOT): 21 (09-11-24 @ 18:03)  Aspartate Aminotransferase (AST/SGOT): 15 (09-11-24 @ 07:13)  Bilirubin Direct: 1.3 (09-13)  Bilirubin Total: 1.9 (09-13)  Bilirubin Total: 3.6 (09-12)  Bilirubin Direct: 2.7 (09-12)  Bilirubin Total: 3.9 (09-12)      Trend LDH      Urinalysis Basic - ( 13 Sep 2024 05:30 )    Color: x / Appearance: x / SG: x / pH: x  Gluc: 142 mg/dL / Ketone: x  / Bili: x / Urobili: x   Blood: x / Protein: x / Nitrite: x   Leuk Esterase: x / RBC: x / WBC x   Sq Epi: x / Non Sq Epi: x / Bacteria: x        MICROBIOLOGY:    MRSA PCR Result.: NotDetec (09-12-24 @ 05:00)      Culture - Body Fluid with Gram Stain (collected 12 Sep 2024 11:27)  Source: Bile Bile Fluid    Culture - Blood (collected 12 Sep 2024 02:25)  Source: .Blood Blood-Arterial  Preliminary Report:    No growth at 24 hours    Culture - Urine (collected 10 Sep 2024 17:25)  Source: .Urine None  Final Report:    <10,000 CFU/mL Normal Urogenital Ira    Urinalysis with Rflx Culture (collected 10 Sep 2024 17:25)    Culture - Blood (collected 10 Sep 2024 16:42)  Source: .Blood None  Preliminary Report:    No growth at 48 Hours    Culture - Blood (collected 10 Sep 2024 16:17)  Source: .Blood None  Preliminary Report:    No growth at 48 Hours    Urinalysis with Rflx Culture (collected 08 Sep 2024 08:44)    Culture - Blood (collected 09 Dec 2022 09:15)  Source: .Blood Blood-Peripheral  Final Report:    No Growth Final    Culture - Blood (collected 09 Dec 2022 09:10)  Source: .Blood Blood-Peripheral  Final Report:    No Growth Final      Lactate, Blood: 1.2 (09-13 @ 05:30)  Lactate, Blood: 2.9 (09-12 @ 13:15)  Lactate, Blood: 2.5 (09-12 @ 06:38)  Lactate, Blood: 4.2 (09-12 @ 01:44)        RADIOLOGY:  imaging below personally reviewed    Xray Chest 1 View- PORTABLE-Urgent:  (11 Sep 2024 22:35)              CT Abdomen and Pelvis w/ IV Cont:   ACC: [id number] EXAM:  CT ABDOMEN AND PELVIS IC   ORDERED BY: RICHA FIGUEREDO     PROCEDURE DATE:  09/10/2024          INTERPRETATION:  CLINICAL INFORMATION: Left-sided pain and fever    COMPARISON: 9/20/2024    CONTRAST/COMPLICATIONS:  IV Contrast: Omnipaque 350  90 cc administered   0 cc discarded  Oral Contrast: NONE  Complications: None reported at time of study completion    PROCEDURE:  CT of the Abdomen and Pelvis was performed.  Sagittal and coronal reformats were performed.    FINDINGS:  LOWER CHEST: Linear atelectasis at the lung bases.    LIVER: Within normal limits.  BILE DUCTS: Normal caliber.  GALLBLADDER: Significant wall thickening and pericholecystic stranding in   the presence of cholelithiasis which is a change from the prior study   raising possibility of acute cholecystitis  SPLEEN: Within normal limits.  PANCREAS: Within normal limits.  ADRENALS: Within normal limits.  KIDNEYS/URETERS: Multiple punctate nonobstructive left renal stones.   Large exophytic posterior left mid to lower pole renal cyst measuring up   to 10.4 cm. Small exophytic right upper pole renal cyst appreciated    BLADDER: Within normal limits.  REPRODUCTIVE ORGANS: Prostate within normal limits.    BOWEL: No bowel obstruction. Appendix is notvisualized. No evidence of   inflammation in the pericecal region.  PERITONEUM/RETROPERITONEUM: Within normal limits.  VESSELS: Atherosclerotic changes.  LYMPH NODES: No lymphadenopathy.  ABDOMINAL WALL: Within normal limits.  BONES: Degenerative changes.    IMPRESSION: New severe gallbladder wall thickening and pericholecystic   stranding in the setting of cholelithiasis is suspicious for acute   cholecystitis. Recommend clinical correlation and consider right upper   quadrant ultrasound.    ---End of Report ---      JAMIN YATES MD; Attending Radiologist  This document has been electronically signed. Sep 10 2024  6:15PM (09-10-24 @ 17:56)

## 2024-09-13 NOTE — CHART NOTE - NSCHARTNOTEFT_GEN_A_CORE
Patient seen and evaluated this evening, after being found tachycardic and febrile, T 102.3 rectally, seen at bedside, awake, alert, + chills, increased work of breathing. Feels cold and some SOB.     Vital Signs Last 24 Hrs  T(C): 39.1 (11 Sep 2024 18:31), Max: 39.1 (11 Sep 2024 18:31)  T(F): 102.3 (11 Sep 2024 18:31), Max: 102.3 (11 Sep 2024 18:31)  HR: 135 (11 Sep 2024 18:50) (77 - 135)  BP: 153/85 (11 Sep 2024 18:50) (98/59 - 153/85)  BP(mean): 85 (11 Sep 2024 01:08) (80 - 102)  RR: 24 (11 Sep 2024 18:50) (19 - 25)  SpO2: 96% (11 Sep 2024 18:50) (95% - 100%)    Parameters below as of 11 Sep 2024 18:50  Patient On (Oxygen Delivery Method): nasal cannula  O2 Flow (L/min): 5    PHYSICAL EXAM:  GENERAL: + chills, +increased work of breathing   ENT: Moist mucous membranes  NECK: Supple, No JVD  CHEST/LUNG: Clear to auscultation bilaterally; No rales, rhonchi, wheezing. + labored respirations  HEART: irregular irreg, tachycardic  ABDOMEN: Bowel sounds present; Soft, Nontender  EXTREMITIES:  2+ Peripheral Pulses, brisk capillary refill. No clubbing, cyanosis, or edema  NERVOUS SYSTEM:  Alert & Oriented X3    Labs:                14.4   16.13 )-----------( 129      ( 11 Sep 2024 17:26 )             41.8   09-11    135  |  105  |  15  ----------------------------<  175<H>  3.6   |  22  |  0.88    Ca    9.1      11 Sep 2024 18:03  Phos  2.1     09-11  Mg     1.6     09-11    TPro  6.4  /  Alb  2.3<L>  /  TBili  2.7<H>  /  DBili  1.3<H>  /  AST  15  /  ALT  18  /  AlkPhos  72  09-11    < from: TTE W or WO Ultrasound Enhancing Agent (09.11.24 @ 14:13) >       CONCLUSIONS:      1. Left ventricular cavity is normal in size. Left ventricular systolic function is normal with an ejection fraction of 60 % by Arevalo's method of disks.   2. Left atrium is severely dilated.   3. The right atrium is dilated.   4. Trace tricuspid regurgitation.   5. Ascending aorta is dilated, measuring 3.90 cm (indexed 1.46 cm/m²).   6. Estimated pulmonary artery systolic pressure is 45 mmHg, consistent with moderate pulmonary hypertension.   7. No left ventricular hypertrophy.   8. Technically difficult image quality.              Patient is a 72M with h/o chronic Afib, HTN, hypothyroidism, and pre-DM admitted for sepsis 2/2 acute cholecystitis, now with fever and tachycardia, HR elevated to 120-170s, EKG with Afib, metoprolol 5mg IV x 1 administered, IVF bolus NS 500cc, IV tylenol. Labs repeated earlier as above. RRT called for worsening condition, hypoxia, pt SpO2 dropped to 90%, improving on supplemental O2 @ 2L/min initially then requiring 5L/min. Repeat lactate 3.9.     Plan:   -Transfer to ICU for continued management   -Continue ABx  -ID consult placed   -Potassium phos IV, Mag sulfate (will keep K>4, Mag >2)  -Trend and replete electrolytes aggressively   -D/w ICU team, RN, pt and his family
75yo male with morbid obesity s/p cholecystostomy tube placement in IR POD#1.  Pt presented to ED with overt jaundice, elevated LFTS and t bili 3.3 Pt had h/o previous ERCP performed.   Currently patient is laying comfortable in bed with cholecystostomy tube in place RUQ, drained 50cc overnight.  Pt advanced to DASH diet and ate a small portion of food last night but not this morning.    ICU Vital Signs Last 24 Hrs  T(C): 37.7 (13 Sep 2024 11:00), Max: 38.7 (12 Sep 2024 17:00)  T(F): 99.9 (13 Sep 2024 11:00), Max: 101.7 (12 Sep 2024 17:00)  HR: 152 (13 Sep 2024 10:55) (88 - 152)  BP: 109/57 (13 Sep 2024 10:55) (109/57 - 109/57)  BP(mean): 68 (13 Sep 2024 10:55) (68 - 68)  ABP: 104/64 (13 Sep 2024 09:13) (88/58 - 117/66)  ABP(mean): 78 (13 Sep 2024 09:13) (68 - 86)  RR: 28 (13 Sep 2024 10:55) (16 - 32)  SpO2: 97% (13 Sep 2024 10:55) (92% - 99%)    O2 Parameters below as of 13 Sep 2024 04:00  Patient On (Oxygen Delivery Method): nasal cannula  O2 Flow (L/min): 3    LABS             12.6   10.73 )-----------( 117      ( 13 Sep 2024 05:30 )             36.1     PT/INR - ( 12 Sep 2024 02:25 )   PT: 16.3 sec;   INR: 1.46 ratio         PTT - ( 13 Sep 2024 05:30 )  PTT:76.2 sec  09-13    140  |  108  |  11  ----------------------------<  142<H>  3.4<L>   |  27  |  0.67    Ca    8.6      13 Sep 2024 05:30  Phos  1.1     09-13  Mg     1.8     09-13    TPro  6.4  /  Alb  2.2<L>  /  TBili  3.6<H>  /  DBili  2.7<H>  /  AST  52<H>  /  ALT  44  /  AlkPhos  135<H>  09-12    Type and Screen Antibody Screen: NEG (09-10 @ 16:16)    PE-  CV-RRR, S1, S2  Lungs- CTA b/L  ABD- morbidly obese with large pannus, cholecystostomy tube in place and draining bile.  Skin- mild jaundice, subsiding as per prior notes.    ASSESSMENT-  cholecystitis with choledocholithiasis s/p cholecystostomy tube placement POD#1, improving.  sepsis improved  no current pressor requirements (d/c at 3am today)      PLAN-   OOB-chair today  [facility] bed for patient comfort  bring CPAP from home tonight  continue current med mgmt    d/w Dr. Glynn
Attempted to contact patient's son, Jevon, with no answer.   Contacted patient's son, Dewayne, regarding patient's worsening status. Consented for central venous access. Dewayne will come to the hospital.

## 2024-09-13 NOTE — PROGRESS NOTE ADULT - ASSESSMENT
71yo M with septic shock 2/2 cholecystitis/cholelithiasis now s/p IR choelcystostomy  - WBC improved to 10.73  - low grade temps continue  - 190cc output overnight

## 2024-09-13 NOTE — PROVIDER CONTACT NOTE (CRITICAL VALUE NOTIFICATION) - TEST AND RESULT REPORTED:
Lactate 4.2
body fluids cultures from the 12th preliminary numerous ecoli numerous klebsiella pneumoniae
Culture bile fluid rare polymorphonuclear leukocytes,mod Gram variable rods,mod gram pos cocci in pairs
Lactate 2.3

## 2024-09-13 NOTE — PROGRESS NOTE ADULT - SUBJECTIVE AND OBJECTIVE BOX
72M with h/o Afib, HTN, hypothyroidism, and pre-DM presents with complaints on abdominal pain. Patient initially presented with abdominal pain on 9/8/24 and was discharged, in the interim he developed high grade fever and worsening abdominal pain and came back to ED. In ED pts BP soft, otherwise stable, WBC noted for 21.55, H/H 15/44, Plt 148. CMP noted for BUN/Cr 28/1.36, Lactate 2.3 > 2, T. bili 3.3, UA cloudy, with proteinuria, ketonuria, small leuks. COVID/Flu swab negative, CT abdomen noted for new severe gallbladder wall thickening and pericholecystic stranding in the setting of cholelithiasis is suspicious for acute cholecystitis. Pt now s/p IR cholecystostomy placement.       Vital Signs Last 24 Hrs  T(C): 37.7 (13 Sep 2024 11:00), Max: 38.7 (12 Sep 2024 17:00)  T(F): 99.9 (13 Sep 2024 11:00), Max: 101.7 (12 Sep 2024 17:00)  HR: 108 (13 Sep 2024 14:00) (95 - 152)  BP: 82/67 (13 Sep 2024 14:00) (82/67 - 109/57)  BP(mean): 73 (13 Sep 2024 14:00) (68 - 81)  RR: 25 (13 Sep 2024 14:00) (16 - 41)  SpO2: 97% (13 Sep 2024 14:00) (94% - 98%)    Parameters below as of 13 Sep 2024 04:00  Patient On (Oxygen Delivery Method): nasal cannula  O2 Flow (L/min): 3      GENERAL APPEARANCE: Well developed, in no acute distress.    LUNGS: Auscultation of the lungs revealed normal breath sounds without any other adventitious sounds or rubs.    CARDIOVASCULAR: There was a regular rate and rhythm    ABDOMEN: Soft and nontender with normal bowel sounds.     NEUROLOGIC: Alert and oriented x 3. Normal affect.       CBC                        12.6   10.73 )-----------( 117      ( 13 Sep 2024 05:30 )             36.1       Chemistry  09-13    140  |  108  |  11  ----------------------------<  142<H>  3.4<L>   |  27  |  0.67    Ca    8.6      13 Sep 2024 05:30  Phos  1.1     09-13  Mg     1.8     09-13    TPro  5.4<L>  /  Alb  1.9<L>  /  TBili  1.9<H>  /  DBili  1.3<H>  /  AST  31  /  ALT  32  /  AlkPhos  96  09-13    Coags  PT/INR - ( 12 Sep 2024 02:25 )   PT: 16.3 sec;   INR: 1.46 ratio    PTT - ( 13 Sep 2024 05:30 )  PTT:76.2 sec

## 2024-09-13 NOTE — PROGRESS NOTE ADULT - SUBJECTIVE AND OBJECTIVE BOX
OVERNIGHT EVENTS / SUBJECTIVE: Patient seen and examined at bedside.     Pt off levo since 3am. This AM pt reports some mild abd pain, states he feels significantly improved. Denies n/v. HR increased to 140s with transfer to chair, AF w/ RVR on monitor, given lopressor 5mg IV x1 w/ improvement to 110s.    OBJECTIVE:    VITAL SIGNS:  ICU Vital Signs Last 24 Hrs  T(C): 37.7 (13 Sep 2024 11:00), Max: 38.7 (12 Sep 2024 17:00)  T(F): 99.9 (13 Sep 2024 11:00), Max: 101.7 (12 Sep 2024 17:00)  HR: 108 (13 Sep 2024 14:00) (95 - 152)  BP: 82/67 (13 Sep 2024 14:00) (82/67 - 109/57)  BP(mean): 73 (13 Sep 2024 14:00) (68 - 81)  ABP: 104/64 (13 Sep 2024 09:13) (88/58 - 109/65)  ABP(mean): 78 (13 Sep 2024 09:13) (68 - 82)  RR: 25 (13 Sep 2024 14:00) (16 - 41)  SpO2: 97% (13 Sep 2024 14:00) (94% - 98%)    O2 Parameters below as of 13 Sep 2024 04:00  Patient On (Oxygen Delivery Method): nasal cannula  O2 Flow (L/min): 3            09-12 @ 07:01 - 09-13 @ 07:00  --------------------------------------------------------  IN: 4219.7 mL / OUT: 1680 mL / NET: 2539.7 mL    09-13 @ 07:01  -  09-13 @ 14:56  --------------------------------------------------------  IN: 250 mL / OUT: 335 mL / NET: -85 mL      CAPILLARY BLOOD GLUCOSE      POCT Blood Glucose.: 139 mg/dL (13 Sep 2024 14:01)      PHYSICAL EXAM:    General: NAD  HEENT: NC/AT; PERRL, clear conjunctiva  Neck: supple  Respiratory: CTA b/l  Cardiovascular: Tachycardic, irregular rhythm  Abdomen: soft, mild upper abd tenderness, no rebound or guarding, perc choley tube in place draining clear bilious fluid  Extremities: WWP, 2+ peripheral pulses b/l; no LE edema  Skin: normal color and turgor; no rash  Neurological: A&Ox3, no focal deficits    MEDICATIONS:  MEDICATIONS  (STANDING):  aspirin enteric coated 81 milliGRAM(s) Oral daily  chlorhexidine 4% Liquid 1 Application(s) Topical daily  dextrose 5%. 1000 milliLiter(s) (50 mL/Hr) IV Continuous <Continuous>  dextrose 5%. 1000 milliLiter(s) (100 mL/Hr) IV Continuous <Continuous>  dextrose 50% Injectable 25 Gram(s) IV Push once  dextrose 50% Injectable 12.5 Gram(s) IV Push once  dextrose 50% Injectable 25 Gram(s) IV Push once  glucagon  Injectable 1 milliGRAM(s) IntraMuscular once  heparin  Infusion.  Unit(s)/Hr (24 mL/Hr) IV Continuous <Continuous>  influenza  Vaccine (HIGH DOSE) 0.5 milliLiter(s) IntraMuscular once  insulin lispro (ADMELOG) corrective regimen sliding scale   SubCutaneous Before meals and at bedtime  lactated ringers. 1000 milliLiter(s) (75 mL/Hr) IV Continuous <Continuous>  meropenem Injectable 1000 milliGRAM(s) IV Push every 8 hours  metoprolol tartrate 25 milliGRAM(s) Oral two times a day  midodrine 10 milliGRAM(s) Oral every 8 hours  nystatin Powder 1 Application(s) Topical two times a day  pantoprazole  Injectable 40 milliGRAM(s) IV Push daily  ursodiol Capsule 300 milliGRAM(s) Oral two times a day    MEDICATIONS  (PRN):  aluminum hydroxide/magnesium hydroxide/simethicone Suspension 30 milliLiter(s) Oral every 4 hours PRN Dyspepsia  dextrose Oral Gel 15 Gram(s) Oral once PRN Blood Glucose LESS THAN 70 milliGRAM(s)/deciliter  heparin   Injectable 74913 Unit(s) IV Push every 6 hours PRN For aPTT less than 40  heparin   Injectable 5000 Unit(s) IV Push every 6 hours PRN For aPTT between 40 - 57  melatonin 3 milliGRAM(s) Oral at bedtime PRN Insomnia  ondansetron Injectable 4 milliGRAM(s) IV Push once PRN Nausea and/or Vomiting  traZODone 150 milliGRAM(s) Oral at bedtime PRN for insomnia      ALLERGIES:  Allergies    metformin (Unknown)    Intolerances        LABS:                        12.6   10.73 )-----------( 117      ( 13 Sep 2024 05:30 )             36.1     Hemoglobin: 12.6 g/dL (09-13 @ 05:30)  Hemoglobin: 13.3 g/dL (09-12 @ 22:20)  Hemoglobin: 14.5 g/dL (09-12 @ 13:15)  Hemoglobin: 14.4 g/dL (09-12 @ 05:35)  Hemoglobin: 14.5 g/dL (09-12 @ 02:25)    CBC Full  -  ( 13 Sep 2024 05:30 )  WBC Count : 10.73 K/uL  RBC Count : 4.38 M/uL  Hemoglobin : 12.6 g/dL  Hematocrit : 36.1 %  Platelet Count - Automated : 117 K/uL  Mean Cell Volume : 82.4 fl  Mean Cell Hemoglobin : 28.8 pg  Mean Cell Hemoglobin Concentration : 34.9 gm/dL  Auto Neutrophil # : x  Auto Lymphocyte # : x  Auto Monocyte # : x  Auto Eosinophil # : x  Auto Basophil # : x  Auto Neutrophil % : x  Auto Lymphocyte % : x  Auto Monocyte % : x  Auto Eosinophil % : x  Auto Basophil % : x    09-13    140  |  108  |  11  ----------------------------<  142<H>  3.4<L>   |  27  |  0.67    Ca    8.6      13 Sep 2024 05:30  Phos  1.1     09-13  Mg     1.8     09-13    TPro  5.4<L>  /  Alb  1.9<L>  /  TBili  1.9<H>  /  DBili  1.3<H>  /  AST  31  /  ALT  32  /  AlkPhos  96  09-13    Creatinine Trend: 0.67<--, 1.17<--, 1.30<--, 1.50<--, 0.88<--, 0.87<--  LIVER FUNCTIONS - ( 13 Sep 2024 05:30 )  Alb: 1.9 g/dL / Pro: 5.4 gm/dL / ALK PHOS: 96 U/L / ALT: 32 U/L / AST: 31 U/L / GGT: x           PT/INR - ( 12 Sep 2024 02:25 )   PT: 16.3 sec;   INR: 1.46 ratio         PTT - ( 13 Sep 2024 05:30 )  PTT:76.2 sec    hs Troponin:    ABG - ( 12 Sep 2024 03:15 )  pH, Arterial: 7.36  pH, Blood: x     /  pCO2: 28    /  pO2: 150   / HCO3: 16    / Base Excess: -8.2  /  SaO2: 99.0      Urinalysis Basic - ( 13 Sep 2024 05:30 )    Color: x / Appearance: x / SG: x / pH: x  Gluc: 142 mg/dL / Ketone: x  / Bili: x / Urobili: x   Blood: x / Protein: x / Nitrite: x   Leuk Esterase: x / RBC: x / WBC x   Sq Epi: x / Non Sq Epi: x / Bacteria: x    CSF:    EKG:   MICROBIOLOGY:    Culture - Body Fluid with Gram Stain (collected 12 Sep 2024 11:27)  Source: Bile Bile Fluid  Gram Stain (12 Sep 2024 21:27):    Rare polymorphonuclear leukocytes seen per low power field    Moderate Gram Variable Rods seen per oil power field    Moderate Gram positive cocci in pairs per oil power field    Culture - Blood (collected 12 Sep 2024 02:25)  Source: .Blood Blood-Arterial  Preliminary Report (13 Sep 2024 10:01):    No growth at 24 hours    Culture - Urine (collected 10 Sep 2024 17:25)  Source: .Urine None  Final Report (12 Sep 2024 14:51):    <10,000 CFU/mL Normal Urogenital Ira    Urinalysis with Rflx Culture (collected 10 Sep 2024 17:25)    Culture - Blood (collected 10 Sep 2024 16:42)  Source: .Blood None  Preliminary Report (12 Sep 2024 20:01):    No growth at 48 Hours    Culture - Blood (collected 10 Sep 2024 16:17)  Source: .Blood None  Preliminary Report (12 Sep 2024 20:01):    No growth at 48 Hours      IMAGING:      Labs, imaging, EKG personally reviewed    RADIOLOGY & ADDITIONAL TESTS: Reviewed.

## 2024-09-13 NOTE — PROVIDER CONTACT NOTE (OTHER) - REASON
Patient: Dayron Neri    * No procedures listed *    Diagnosis: * No pre-op diagnosis entered *  Diagnosis Additional Information: No value filed.    Anesthesia Type:   No value filed.     Note:    Oropharynx: endotracheal tube in place  Level of Consciousness: iatrogenic sedation      Independent Airway: airway patency not satisfactory and stable  Dentition: dentition unchanged  Vital Signs Stable: post-procedure vital signs reviewed and stable  Report to RN Given: handoff report given  Patient transferred to: ICU  Comments: Intubation ordered by Dr. Starr for respiratory insufficiency, desaturation this morning just prior to intubation. He became hypotensive after intubation and was treated with phenylephrine. The ICU team was informed that he would be paralyzed for 60 minutes and would require sedation.       ICU Handoff: Call for PAUSE to initiate/utilize ICU HANDOFF, Identified Patient, Identified Responsible Provider, Reviewed the Pertinent Medical History, Discussed Surgical Course, Reviewed Intra-OP Anesthesia Management and Issues during Anesthesia, Set Expectations for Post Procedure Period and Allowed Opportunity for Questions and Acknowledgement of Understanding      Vitals: (Last set prior to Anesthesia Care Transfer)    Electronically Signed By: Sunita Smalls MD  June 6, 2021  7:21 AM  
Quality Management - Sepsis
Tachypnea, Fever 102.3, HR 120s

## 2024-09-13 NOTE — PROGRESS NOTE ADULT - ASSESSMENT
71 y/o male PMH chronic A.fib on apixaban, HTN, HLD, DANIELLE on CPAP, ERCP with sphincterotomy, physical debility (non-ambulatory)    Initially admitted with acute cholecystitis and sepsis, refused surgery, treated with abx with plan for HIDA     On 9/11, he developed high fever, A.fib with RVR, hypotension, and resp distress - transferred to ICU     Now s/p perc choley drain placement on 9/12, improving clinically      Problem list:     Severe sepsis with septic shock (POA) due to acute cholecystitis   Acute hypoxic respiratory failure   A.fib with RVR  Prerenal SUKUMAR   Metabolic acidosis       -neuro/mental status stable, AAOx4, interactive   -Levo off, c/w midodrine 10mg TID,  -AF w/ RVR today to 140s w/ transfer, improved w/ lopressor 5mg IV x1, will restart lower dose of home metoprolol 25mg BID  -c/w hep gtt for AF  -TTE from 9/11 shows normal EF, dilated LA, moderate pulm HTN  -resp improved, feels better than last night, now off CPAP and tolerating 3L NC. Will use qhs and prn CPAP   -s/p perc choley 9/12, c/w 10cc flush BID, monitor output, c/w DASH diet   -c/w meropenem, temps improved, trend WBC, initial BCx neg, repeat blood cxs NGTD, biliary culture w/ mod gram variable rods and mod GPCs in pairs  -SUKUMAR resolved, bicarb wnl, lytes repleted, cont to monitor renal indices  -UO low normal but consistent, maintain Monet and monitor UO and renal fn, c/w maintenance fluids at lower rate as pt not taking much PO  -DVT ppx with iv heparin       Dispo: ICU, now off pressors, HR control, c/w hep gtt, c/w lucy f/u cultures, maintenance fluids, patient critically ill with risk for decompensation, full code

## 2024-09-14 LAB
-  AMOXICILLIN/CLAVULANIC ACID: SIGNIFICANT CHANGE UP
-  AMOXICILLIN/CLAVULANIC ACID: SIGNIFICANT CHANGE UP
-  AMPICILLIN/SULBACTAM: SIGNIFICANT CHANGE UP
-  AMPICILLIN/SULBACTAM: SIGNIFICANT CHANGE UP
-  AMPICILLIN: SIGNIFICANT CHANGE UP
-  AMPICILLIN: SIGNIFICANT CHANGE UP
-  AZTREONAM: SIGNIFICANT CHANGE UP
-  AZTREONAM: SIGNIFICANT CHANGE UP
-  CEFAZOLIN: SIGNIFICANT CHANGE UP
-  CEFAZOLIN: SIGNIFICANT CHANGE UP
-  CEFEPIME: SIGNIFICANT CHANGE UP
-  CEFEPIME: SIGNIFICANT CHANGE UP
-  CEFOXITIN: SIGNIFICANT CHANGE UP
-  CEFOXITIN: SIGNIFICANT CHANGE UP
-  CEFTRIAXONE: SIGNIFICANT CHANGE UP
-  CEFTRIAXONE: SIGNIFICANT CHANGE UP
-  CIPROFLOXACIN: SIGNIFICANT CHANGE UP
-  CIPROFLOXACIN: SIGNIFICANT CHANGE UP
-  ERTAPENEM: SIGNIFICANT CHANGE UP
-  ERTAPENEM: SIGNIFICANT CHANGE UP
-  GENTAMICIN: SIGNIFICANT CHANGE UP
-  GENTAMICIN: SIGNIFICANT CHANGE UP
-  IMIPENEM: SIGNIFICANT CHANGE UP
-  IMIPENEM: SIGNIFICANT CHANGE UP
-  LEVOFLOXACIN: SIGNIFICANT CHANGE UP
-  LEVOFLOXACIN: SIGNIFICANT CHANGE UP
-  MEROPENEM: SIGNIFICANT CHANGE UP
-  MEROPENEM: SIGNIFICANT CHANGE UP
-  PIPERACILLIN/TAZOBACTAM: SIGNIFICANT CHANGE UP
-  PIPERACILLIN/TAZOBACTAM: SIGNIFICANT CHANGE UP
-  TOBRAMYCIN: SIGNIFICANT CHANGE UP
-  TOBRAMYCIN: SIGNIFICANT CHANGE UP
-  TRIMETHOPRIM/SULFAMETHOXAZOLE: SIGNIFICANT CHANGE UP
-  TRIMETHOPRIM/SULFAMETHOXAZOLE: SIGNIFICANT CHANGE UP
ALBUMIN SERPL ELPH-MCNC: 1.9 G/DL — LOW (ref 3.3–5)
ALP SERPL-CCNC: 95 U/L — SIGNIFICANT CHANGE UP (ref 40–120)
ALT FLD-CCNC: 34 U/L — SIGNIFICANT CHANGE UP (ref 12–78)
ANION GAP SERPL CALC-SCNC: 5 MMOL/L — SIGNIFICANT CHANGE UP (ref 5–17)
APTT BLD: 67 SEC — HIGH (ref 24.5–35.6)
AST SERPL-CCNC: 35 U/L — SIGNIFICANT CHANGE UP (ref 15–37)
BILIRUB SERPL-MCNC: 1.6 MG/DL — HIGH (ref 0.2–1.2)
BUN SERPL-MCNC: 11 MG/DL — SIGNIFICANT CHANGE UP (ref 7–23)
CALCIUM SERPL-MCNC: 8.8 MG/DL — SIGNIFICANT CHANGE UP (ref 8.5–10.1)
CHLORIDE SERPL-SCNC: 106 MMOL/L — SIGNIFICANT CHANGE UP (ref 96–108)
CO2 SERPL-SCNC: 26 MMOL/L — SIGNIFICANT CHANGE UP (ref 22–31)
CREAT SERPL-MCNC: 0.63 MG/DL — SIGNIFICANT CHANGE UP (ref 0.5–1.3)
EGFR: 99 ML/MIN/1.73M2 — SIGNIFICANT CHANGE UP
GLUCOSE BLDC GLUCOMTR-MCNC: 112 MG/DL — HIGH (ref 70–99)
GLUCOSE SERPL-MCNC: 121 MG/DL — HIGH (ref 70–99)
HCT VFR BLD CALC: 38.8 % — LOW (ref 39–50)
HGB BLD-MCNC: 13.3 G/DL — SIGNIFICANT CHANGE UP (ref 13–17)
MAGNESIUM SERPL-MCNC: 1.8 MG/DL — SIGNIFICANT CHANGE UP (ref 1.6–2.6)
MCHC RBC-ENTMCNC: 28.5 PG — SIGNIFICANT CHANGE UP (ref 27–34)
MCHC RBC-ENTMCNC: 34.3 GM/DL — SIGNIFICANT CHANGE UP (ref 32–36)
MCV RBC AUTO: 83.3 FL — SIGNIFICANT CHANGE UP (ref 80–100)
METHOD TYPE: SIGNIFICANT CHANGE UP
METHOD TYPE: SIGNIFICANT CHANGE UP
PHOSPHATE SERPL-MCNC: 1.3 MG/DL — LOW (ref 2.5–4.5)
PLATELET # BLD AUTO: 109 K/UL — LOW (ref 150–400)
POTASSIUM SERPL-MCNC: 3.9 MMOL/L — SIGNIFICANT CHANGE UP (ref 3.5–5.3)
POTASSIUM SERPL-SCNC: 3.9 MMOL/L — SIGNIFICANT CHANGE UP (ref 3.5–5.3)
PROT SERPL-MCNC: 5.8 GM/DL — LOW (ref 6–8.3)
RBC # BLD: 4.66 M/UL — SIGNIFICANT CHANGE UP (ref 4.2–5.8)
RBC # FLD: 15.2 % — HIGH (ref 10.3–14.5)
SODIUM SERPL-SCNC: 137 MMOL/L — SIGNIFICANT CHANGE UP (ref 135–145)
WBC # BLD: 5.63 K/UL — SIGNIFICANT CHANGE UP (ref 3.8–10.5)
WBC # FLD AUTO: 5.63 K/UL — SIGNIFICANT CHANGE UP (ref 3.8–10.5)

## 2024-09-14 PROCEDURE — 99233 SBSQ HOSP IP/OBS HIGH 50: CPT

## 2024-09-14 PROCEDURE — 99232 SBSQ HOSP IP/OBS MODERATE 35: CPT

## 2024-09-14 RX ORDER — MIDODRINE HYDROCHLORIDE 5 MG/1
10 TABLET ORAL EVERY 8 HOURS
Refills: 0 | Status: DISCONTINUED | OUTPATIENT
Start: 2024-09-14 | End: 2024-09-16

## 2024-09-14 RX ORDER — SODIUM PHOSPHATE, MONOBASIC, MONOHYDRATE AND SODIUM PHOSPHATE, DIBASIC ANHYDROUS 276; 142 MG/ML; MG/ML
30 INJECTION, SOLUTION INTRAVENOUS ONCE
Refills: 0 | Status: COMPLETED | OUTPATIENT
Start: 2024-09-14 | End: 2024-09-14

## 2024-09-14 RX ORDER — APIXABAN 5 MG/1
5 TABLET, FILM COATED ORAL EVERY 12 HOURS
Refills: 0 | Status: DISCONTINUED | OUTPATIENT
Start: 2024-09-14 | End: 2024-09-18

## 2024-09-14 RX ORDER — METRONIDAZOLE 250 MG
500 TABLET ORAL EVERY 8 HOURS
Refills: 0 | Status: DISCONTINUED | OUTPATIENT
Start: 2024-09-14 | End: 2024-09-18

## 2024-09-14 RX ADMIN — Medication 2400 UNIT(S)/HR: at 06:37

## 2024-09-14 RX ADMIN — SODIUM PHOSPHATE, MONOBASIC, MONOHYDRATE AND SODIUM PHOSPHATE, DIBASIC ANHYDROUS 85 MILLIMOLE(S): 276; 142 INJECTION, SOLUTION INTRAVENOUS at 08:25

## 2024-09-14 RX ADMIN — URSODIOL 300 MILLIGRAM(S): 500 TABLET, FILM COATED ORAL at 21:06

## 2024-09-14 RX ADMIN — CHLORHEXIDINE GLUCONATE 1 APPLICATION(S): 40 SOLUTION TOPICAL at 05:45

## 2024-09-14 RX ADMIN — Medication 2400 UNIT(S)/HR: at 07:27

## 2024-09-14 RX ADMIN — Medication 81 MILLIGRAM(S): at 08:25

## 2024-09-14 RX ADMIN — METOPROLOL TARTRATE 25 MILLIGRAM(S): 100 TABLET ORAL at 09:30

## 2024-09-14 RX ADMIN — Medication 1 APPLICATION(S): at 11:01

## 2024-09-14 RX ADMIN — Medication 40 MILLIGRAM(S): at 08:25

## 2024-09-14 RX ADMIN — MEROPENEM 1000 MILLIGRAM(S): 500 INJECTION, POWDER, FOR SOLUTION INTRAVENOUS at 05:00

## 2024-09-14 RX ADMIN — Medication 100 GRAM(S): at 08:26

## 2024-09-14 RX ADMIN — Medication 2400 UNIT(S)/HR: at 10:55

## 2024-09-14 RX ADMIN — Medication 500 MILLIGRAM(S): at 21:06

## 2024-09-14 RX ADMIN — MIDODRINE HYDROCHLORIDE 10 MILLIGRAM(S): 5 TABLET ORAL at 05:44

## 2024-09-14 RX ADMIN — URSODIOL 300 MILLIGRAM(S): 500 TABLET, FILM COATED ORAL at 11:02

## 2024-09-14 RX ADMIN — APIXABAN 5 MILLIGRAM(S): 5 TABLET, FILM COATED ORAL at 19:06

## 2024-09-14 RX ADMIN — Medication 2000 MILLIGRAM(S): at 21:06

## 2024-09-14 RX ADMIN — Medication 112 MICROGRAM(S): at 05:44

## 2024-09-14 RX ADMIN — MEROPENEM 1000 MILLIGRAM(S): 500 INJECTION, POWDER, FOR SOLUTION INTRAVENOUS at 15:24

## 2024-09-14 RX ADMIN — Medication 1 APPLICATION(S): at 22:04

## 2024-09-14 RX ADMIN — METOPROLOL TARTRATE 25 MILLIGRAM(S): 100 TABLET ORAL at 21:06

## 2024-09-14 RX ADMIN — Medication 150 MILLIGRAM(S): at 21:06

## 2024-09-14 NOTE — PHYSICAL THERAPY INITIAL EVALUATION ADULT - ACTIVE RANGE OF MOTION EXAMINATION, REHAB EVAL
B hip/knee flex ltd- weakness, positioning, body habitus, B KE ~0, DF ~0/bilateral upper extremity Active ROM was WFL (within functional limits)

## 2024-09-14 NOTE — PHYSICAL THERAPY INITIAL EVALUATION ADULT - PERTINENT HX OF CURRENT PROBLEM, REHAB EVAL
Initially admitted with acute cholecystitis and sepsis, refused surgery, treated with abx with plan for HIDA   On 9/11, he developed high fever, A.fib with RVR, hypotension, and resp distress - transferred to ICU . 9/12 s/p placement cholecystostomy drain w/ IR. pt w/ AF w/ RVR 9/13. HR better controlled today per notes. off levo.

## 2024-09-14 NOTE — PHYSICAL THERAPY INITIAL EVALUATION ADULT - ADDITIONAL COMMENTS
pt reports being bedbound x ~3 1/2 yrs, reports d/t obesity and had had falls, one in particular resulted in B ankle injury adding to pt becoming more bedridden. was receiving HPT. reports was starting to work on transfers but as yet not successful in completing transition to chair/WC. Reg. bed, just obtained lift-not used yet. has WC, RW.

## 2024-09-14 NOTE — PROGRESS NOTE ADULT - ASSESSMENT
74 year old man with morbid obesity, with prior cholangitiis and ERCP, admitted with RUQ pain and cholecystitis.     There was originally a concern for sludge/stone in cbd although duct were not dilated and LFT's were never so impressive, so despite being unable to undergo MRCP due to size I did not think clinical picture warranted an EUS.   He is now s/p drain with resolution of white count.   ast/alt and alk phos normal, with just isolated direct bili that is mildly elevated but below 2.   No concern from my end over a biliary obstruction in light of all of this plus hx of prior ercp and sphincterotomy.   Signing off. Defer to surgery, IR for management of drain/choly.

## 2024-09-14 NOTE — PROGRESS NOTE ADULT - SUBJECTIVE AND OBJECTIVE BOX
HOSPITALIST PROGRESS NOTE    HPI - 74M with h/o Afib, HTN, hypothyroidism, and pre-DM presents with complaints on abdominal pain.  Patient initially presented with abdominal pain on 9/8/24 and was discharged, in the interim he developed high grade fever and worsening abdominal pain and came back to ED. CT abdomen noted for new severe gallbladder wall thickening and pericholecystic stranding in the setting of cholelithiasis is suspicious for acute cholecystitis. Pt now s/p IR cholecystostomy placement.       INTERVAL HPI/OVERNIGHT EVENTS:  As per night team, no overnight events. Patient seen and examined at bedside.     VITALS  Vital Signs Last 24 Hrs  T(C): 37.7 (14 Sep 2024 08:00), Max: 38 (14 Sep 2024 03:00)  T(F): 99.9 (14 Sep 2024 08:00), Max: 100.4 (14 Sep 2024 03:00)  HR: 102 (14 Sep 2024 09:00) (83 - 135)  BP: 100/72 (14 Sep 2024 09:00) (84/73 - 132/80)  BP(mean): 79 (14 Sep 2024 09:00) (74 - 99)  RR: 37 (14 Sep 2024 09:00) (8 - 37)  SpO2: 96% (14 Sep 2024 09:00) (94% - 99%)    Parameters below as of 14 Sep 2024 09:00  Patient On (Oxygen Delivery Method): room air    O2 Concentration (%): 30    CAPILLARY BLOOD GLUCOSE      POCT Blood Glucose.: 112 mg/dL (14 Sep 2024 07:43)  POCT Blood Glucose.: 139 mg/dL (13 Sep 2024 20:55)  POCT Blood Glucose.: 121 mg/dL (13 Sep 2024 17:51)      PHYSICAL EXAM  General: NAD, sitting comfortably in bed   HEENT: PERRL/ EOMI, no scleral icterus, MMM  Respiratory: lungs CTA b/l, no wheezes/crackles, no accessory muscle use  Cardiovascular: Regular rhythm/rate; +S1 +S2  Gastrointestinal: Soft, NTND  Genitourinary: no suprapubic tenderness  Extremities: WWP, no cyanosis, no edema, pulses equal  Neurological: A&Ox3, no gross focal deficits, follows commands  Skin: Normal temperature, warm, dry    MEDICATIONS  (STANDING):  apixaban 5 milliGRAM(s) Oral every 12 hours  aspirin enteric coated 81 milliGRAM(s) Oral daily  chlorhexidine 4% Liquid 1 Application(s) Topical daily  dextrose 5%. 1000 milliLiter(s) (50 mL/Hr) IV Continuous <Continuous>  dextrose 5%. 1000 milliLiter(s) (100 mL/Hr) IV Continuous <Continuous>  dextrose 50% Injectable 25 Gram(s) IV Push once  dextrose 50% Injectable 12.5 Gram(s) IV Push once  dextrose 50% Injectable 25 Gram(s) IV Push once  glucagon  Injectable 1 milliGRAM(s) IntraMuscular once  heparin  Infusion.  Unit(s)/Hr (24 mL/Hr) IV Continuous <Continuous>  influenza  Vaccine (HIGH DOSE) 0.5 milliLiter(s) IntraMuscular once  insulin lispro (ADMELOG) corrective regimen sliding scale   SubCutaneous Before meals and at bedtime  levothyroxine 112 MICROGram(s) Oral daily  meropenem Injectable 1000 milliGRAM(s) IV Push every 8 hours  metoprolol tartrate 25 milliGRAM(s) Oral two times a day  midodrine 10 milliGRAM(s) Oral every 8 hours  nystatin Powder 1 Application(s) Topical two times a day  ursodiol Capsule 300 milliGRAM(s) Oral two times a day    MEDICATIONS  (PRN):  aluminum hydroxide/magnesium hydroxide/simethicone Suspension 30 milliLiter(s) Oral every 4 hours PRN Dyspepsia  dextrose Oral Gel 15 Gram(s) Oral once PRN Blood Glucose LESS THAN 70 milliGRAM(s)/deciliter  heparin   Injectable 83782 Unit(s) IV Push every 6 hours PRN For aPTT less than 40  heparin   Injectable 5000 Unit(s) IV Push every 6 hours PRN For aPTT between 40 - 57  melatonin 3 milliGRAM(s) Oral at bedtime PRN Insomnia  ondansetron Injectable 4 milliGRAM(s) IV Push once PRN Nausea and/or Vomiting  traZODone 150 milliGRAM(s) Oral <User Schedule> PRN insomnia      metformin (Unknown)      LABS                        13.3   5.63  )-----------( 109      ( 14 Sep 2024 05:45 )             38.8     09-14    137  |  106  |  11  ----------------------------<  121<H>  3.9   |  26  |  0.63    Ca    8.8      14 Sep 2024 05:45  Phos  1.3     09-14  Mg     1.8     09-14    TPro  5.8<L>  /  Alb  1.9<L>  /  TBili  1.6<H>  /  DBili  x   /  AST  35  /  ALT  34  /  AlkPhos  95  09-14    PTT - ( 14 Sep 2024 05:45 )  PTT:67.0 sec  Urinalysis Basic - ( 14 Sep 2024 05:45 )    Color: x / Appearance: x / SG: x / pH: x  Gluc: 121 mg/dL / Ketone: x  / Bili: x / Urobili: x   Blood: x / Protein: x / Nitrite: x   Leuk Esterase: x / RBC: x / WBC x   Sq Epi: x / Non Sq Epi: x / Bacteria: x            RADIOLOGY & ADDITIONAL TESTS: Reviewed HOSPITALIST PROGRESS NOTE    HPI - 74M with h/o Afib, HTN, hypothyroidism, and pre-DM presents with complaints on abdominal pain.  Patient initially presented with abdominal pain on 9/8/24 and was discharged, in the interim he developed high grade fever and worsening abdominal pain and came back to ED. CT abdomen noted for new severe gallbladder wall thickening and pericholecystic stranding in the setting of cholelithiasis is suspicious for acute cholecystitis. Pt now s/p IR cholecystostomy placement.       INTERVAL HPI/OVERNIGHT EVENTS:  As per night team, no overnight events. Patient seen and examined at bedside. Pt reports overall improvement, no pain.     VITALS  Vital Signs Last 24 Hrs  T(C): 37.7 (14 Sep 2024 08:00), Max: 38 (14 Sep 2024 03:00)  T(F): 99.9 (14 Sep 2024 08:00), Max: 100.4 (14 Sep 2024 03:00)  HR: 102 (14 Sep 2024 09:00) (83 - 135)  BP: 100/72 (14 Sep 2024 09:00) (84/73 - 132/80)  BP(mean): 79 (14 Sep 2024 09:00) (74 - 99)  RR: 37 (14 Sep 2024 09:00) (8 - 37)  SpO2: 96% (14 Sep 2024 09:00) (94% - 99%)    Parameters below as of 14 Sep 2024 09:00  Patient On (Oxygen Delivery Method): room air    O2 Concentration (%): 30    CAPILLARY BLOOD GLUCOSE      POCT Blood Glucose.: 112 mg/dL (14 Sep 2024 07:43)  POCT Blood Glucose.: 139 mg/dL (13 Sep 2024 20:55)  POCT Blood Glucose.: 121 mg/dL (13 Sep 2024 17:51)      PHYSICAL EXAM  General: NAD, lying comfortably in bed   HEENT: EOMI, no scleral icterus, MMM  Respiratory: lungs CTA b/l, no accessory muscle use  Cardiovascular: Regular rhythm/rate; +S1 +S2  Gastrointestinal: Soft, NTND, cholysystomy in place draining yellow fluid, dressing c/d/i,   Extremities: WWP, no cyanosis, no edema, pulses equal  Neurological: A&Ox3, no gross focal deficits, follows commands  Skin: Normal temperature, warm, dry    MEDICATIONS  (STANDING):  apixaban 5 milliGRAM(s) Oral every 12 hours  aspirin enteric coated 81 milliGRAM(s) Oral daily  chlorhexidine 4% Liquid 1 Application(s) Topical daily  dextrose 5%. 1000 milliLiter(s) (50 mL/Hr) IV Continuous <Continuous>  dextrose 5%. 1000 milliLiter(s) (100 mL/Hr) IV Continuous <Continuous>  dextrose 50% Injectable 25 Gram(s) IV Push once  dextrose 50% Injectable 12.5 Gram(s) IV Push once  dextrose 50% Injectable 25 Gram(s) IV Push once  glucagon  Injectable 1 milliGRAM(s) IntraMuscular once  heparin  Infusion.  Unit(s)/Hr (24 mL/Hr) IV Continuous <Continuous>  influenza  Vaccine (HIGH DOSE) 0.5 milliLiter(s) IntraMuscular once  insulin lispro (ADMELOG) corrective regimen sliding scale   SubCutaneous Before meals and at bedtime  levothyroxine 112 MICROGram(s) Oral daily  meropenem Injectable 1000 milliGRAM(s) IV Push every 8 hours  metoprolol tartrate 25 milliGRAM(s) Oral two times a day  midodrine 10 milliGRAM(s) Oral every 8 hours  nystatin Powder 1 Application(s) Topical two times a day  ursodiol Capsule 300 milliGRAM(s) Oral two times a day    MEDICATIONS  (PRN):  aluminum hydroxide/magnesium hydroxide/simethicone Suspension 30 milliLiter(s) Oral every 4 hours PRN Dyspepsia  dextrose Oral Gel 15 Gram(s) Oral once PRN Blood Glucose LESS THAN 70 milliGRAM(s)/deciliter  heparin   Injectable 13398 Unit(s) IV Push every 6 hours PRN For aPTT less than 40  heparin   Injectable 5000 Unit(s) IV Push every 6 hours PRN For aPTT between 40 - 57  melatonin 3 milliGRAM(s) Oral at bedtime PRN Insomnia  ondansetron Injectable 4 milliGRAM(s) IV Push once PRN Nausea and/or Vomiting  traZODone 150 milliGRAM(s) Oral <User Schedule> PRN insomnia      metformin (Unknown)      LABS                        13.3   5.63  )-----------( 109      ( 14 Sep 2024 05:45 )             38.8     09-14    137  |  106  |  11  ----------------------------<  121<H>  3.9   |  26  |  0.63    Ca    8.8      14 Sep 2024 05:45  Phos  1.3     09-14  Mg     1.8     09-14    TPro  5.8<L>  /  Alb  1.9<L>  /  TBili  1.6<H>  /  DBili  x   /  AST  35  /  ALT  34  /  AlkPhos  95  09-14    PTT - ( 14 Sep 2024 05:45 )  PTT:67.0 sec  Urinalysis Basic - ( 14 Sep 2024 05:45 )    Color: x / Appearance: x / SG: x / pH: x  Gluc: 121 mg/dL / Ketone: x  / Bili: x / Urobili: x   Blood: x / Protein: x / Nitrite: x   Leuk Esterase: x / RBC: x / WBC x   Sq Epi: x / Non Sq Epi: x / Bacteria: x            RADIOLOGY & ADDITIONAL TESTS: Reviewed

## 2024-09-14 NOTE — PHYSICAL THERAPY INITIAL EVALUATION ADULT - DIAGNOSIS, PT EVAL
Severe sepsis with septic shock (POA) due to acute cholecystitis, acute hypoxic resp failure, A.fib with RVR, SUKUMAR, S/P perc cholecystostomy

## 2024-09-14 NOTE — PROGRESS NOTE ADULT - SUBJECTIVE AND OBJECTIVE BOX
Patient is a 74y old  Male who presents with a chief complaint of Acute cholecystitis, SUKUMAR (12 Sep 2024 08:14)    Follow up for: choledocholithiasis, elevated bilrubin    Patient asleep but arousable, no complaints.       MEDICATIONS  (STANDING):  aspirin enteric coated 81 milliGRAM(s) Oral daily  chlorhexidine 4% Liquid 1 Application(s) Topical daily  dextrose 5%. 1000 milliLiter(s) (100 mL/Hr) IV Continuous <Continuous>  dextrose 5%. 1000 milliLiter(s) (50 mL/Hr) IV Continuous <Continuous>  dextrose 50% Injectable 25 Gram(s) IV Push once  dextrose 50% Injectable 12.5 Gram(s) IV Push once  dextrose 50% Injectable 25 Gram(s) IV Push once  glucagon  Injectable 1 milliGRAM(s) IntraMuscular once  heparin  Infusion.  Unit(s)/Hr (24 mL/Hr) IV Continuous <Continuous>  influenza  Vaccine (HIGH DOSE) 0.5 milliLiter(s) IntraMuscular once  insulin lispro (ADMELOG) corrective regimen sliding scale   SubCutaneous Before meals and at bedtime  lactated ringers. 1000 milliLiter(s) (75 mL/Hr) IV Continuous <Continuous>  levothyroxine 112 MICROGram(s) Oral daily  magnesium sulfate  IVPB 1 Gram(s) IV Intermittent once  meropenem Injectable 1000 milliGRAM(s) IV Push every 8 hours  metoprolol tartrate 25 milliGRAM(s) Oral two times a day  midodrine 10 milliGRAM(s) Oral every 8 hours  nystatin Powder 1 Application(s) Topical two times a day  pantoprazole  Injectable 40 milliGRAM(s) IV Push daily  sodium phosphate 30 milliMole(s)/500 mL IVPB 30 milliMole(s) IV Intermittent once  ursodiol Capsule 300 milliGRAM(s) Oral two times a day    MEDICATIONS  (PRN):  aluminum hydroxide/magnesium hydroxide/simethicone Suspension 30 milliLiter(s) Oral every 4 hours PRN Dyspepsia  dextrose Oral Gel 15 Gram(s) Oral once PRN Blood Glucose LESS THAN 70 milliGRAM(s)/deciliter  heparin   Injectable 26800 Unit(s) IV Push every 6 hours PRN For aPTT less than 40  heparin   Injectable 5000 Unit(s) IV Push every 6 hours PRN For aPTT between 40 - 57  melatonin 3 milliGRAM(s) Oral at bedtime PRN Insomnia  ondansetron Injectable 4 milliGRAM(s) IV Push once PRN Nausea and/or Vomiting  traZODone 150 milliGRAM(s) Oral <User Schedule> PRN insomnia      Vital Signs Last 24 Hrs  T(C): 37.8 (14 Sep 2024 07:00), Max: 38 (14 Sep 2024 03:00)  T(F): 100 (14 Sep 2024 07:00), Max: 100.4 (14 Sep 2024 03:00)  HR: 118 (14 Sep 2024 06:00) (83 - 152)  BP: 128/72 (14 Sep 2024 06:00) (82/67 - 132/80)  BP(mean): 89 (14 Sep 2024 06:00) (68 - 99)  RR: 8 (14 Sep 2024 06:00) (8 - 41)  SpO2: 96% (14 Sep 2024 06:00) (94% - 99%)    Parameters below as of 14 Sep 2024 06:00  Patient On (Oxygen Delivery Method): room air        PHYSICAL EXAM:    Constitutional: No acute distress, morbidly obese, non-toxic appearing  HEENT: unmasked, good phonation, not icteric  Neck: supple, no lymphadenopathy  Gastrointestinal: +drain  Extremities: No peripheral edema, no cyanosis or clubbing  Vascular: 2+ peripheral pulses, no venous stasis  Skin: No rashes, not jaundiced    LABS:                        13.3   5.63  )-----------( 109      ( 14 Sep 2024 05:45 )             38.8     09-14    137  |  106  |  11  ----------------------------<  121<H>  3.9   |  26  |  0.63    Ca    8.8      14 Sep 2024 05:45  Phos  1.3     09-14  Mg     1.8     09-14    TPro  5.8<L>  /  Alb  1.9<L>  /  TBili  1.6<H>  /  DBili  x   /  AST  35  /  ALT  34  /  AlkPhos  95  09-14    PTT - ( 14 Sep 2024 05:45 )  PTT:67.0 sec  LIVER FUNCTIONS - ( 14 Sep 2024 05:45 )  Alb: 1.9 g/dL / Pro: 5.8 gm/dL / ALK PHOS: 95 U/L / ALT: 34 U/L / AST: 35 U/L / GGT: x             RADIOLOGY & ADDITIONAL STUDIES: IR drain placement reviewed

## 2024-09-14 NOTE — PROGRESS NOTE ADULT - ASSESSMENT
74M with h/o Afib, HTN, hypothyroidism, and pre-DM presents with complaints on abdominal pain found to have acute cholecystitis now s/p IR cholecystostomy placement.       #Septic shock due to acute cholecystitis s/p IR cholecystostomy tube  - abdi culture growing: Kleb and E. coli, f/u sens  - continue meropenem for now  - blood Cx NGTD  - WBC improved, afebrile, off pressors   - ID recs appreciated       #A fib  - TTE from 9/11 shows normal EF, dilated LA, moderate pulm HTN  - briefly on heparin gtt now back on eliquis  - resume metoprolol w/ hold parameters given borderline hypotension      #Hypotension:   - c/w midodrine 10mg q8hr     #Hypothyroidism  - c/w synthroid 112mcg     #thrombocytopenia: likely 2/2 sepsis  - monitor     #dvt ppx - eliquis  74M with h/o Afib, HTN, hypothyroidism, and pre-DM presents with complaints on abdominal pain found to have acute cholecystitis now s/p IR cholecystostomy placement.       #Septic shock due to acute cholecystitis s/p IR cholecystostomy tube  - abdi culture growing: Kleb and E. coli, f/u sens  - continue meropenem for now  - blood Cx NGTD  - WBC improved, afebrile, off pressors   - ID recs appreciated   - IR recs appreciated, tube in place for likely 6-8 weeks      #A fib  - TTE from 9/11 shows normal EF, dilated LA, moderate pulm HTN  - briefly on heparin gtt now back on eliquis  - resume metoprolol w/ hold parameters given borderline hypotension      #Hypotension:   - c/w midodrine 10mg q8hr     #Hypothyroidism  - c/w synthroid 112mcg     #thrombocytopenia: likely 2/2 sepsis  - monitor     #dvt ppx - eliquis

## 2024-09-14 NOTE — PHYSICAL THERAPY INITIAL EVALUATION ADULT - NSPTDISCHREC_GEN_A_CORE
pt very interested in improving mobility- anticipates rehab setting more conducive to that goal, this writer would agree/Sub-acute Rehab

## 2024-09-14 NOTE — PHYSICAL THERAPY INITIAL EVALUATION ADULT - NSACTIVITYREC_GEN_A_PT
stand attempt from chair surface vs bed. deferred this date d/t elev HR w/ passive transfer to chair

## 2024-09-14 NOTE — PHYSICAL THERAPY INITIAL EVALUATION ADULT - MODALITIES TREATMENT COMMENTS
tends to rot. L- reports d/t chr positioning in bed. used ceiling lift for oobtc (2-3PA), pt left sitting in BS recliner, IR drain, SCDs, monitors. performed BS exer: QS, GS, katrina, APs

## 2024-09-14 NOTE — PHYSICAL THERAPY INITIAL EVALUATION ADULT - GENERAL OBSERVATIONS, REHAB EVAL
pt rec'd supine in bed in ICU, pt morbidly obese, bariatric hosp bed, abdi drain, SCDs, monitors. HR elev p/ BS exer to 120s then returned to 100s-110s and again to 130 w/ transition OOBTC w/ lift, readily returned to 100s/110s.

## 2024-09-14 NOTE — PROGRESS NOTE ADULT - SUBJECTIVE AND OBJECTIVE BOX
Patient is a 74y old  Male who presents with a chief complaint of RUQ pain (14 Sep 2024 07:56)      Allergies    metformin (Unknown)    Intolerances      REVIEW OF SYSTEMS: SEE BELOW       ICU Vital Signs Last 24 Hrs  T(C): 37.7 (14 Sep 2024 08:00), Max: 38 (14 Sep 2024 03:00)  T(F): 99.9 (14 Sep 2024 08:00), Max: 100.4 (14 Sep 2024 03:00)  HR: 102 (14 Sep 2024 09:00) (83 - 135)  BP: 100/72 (14 Sep 2024 09:00) (82/67 - 132/80)  BP(mean): 79 (14 Sep 2024 09:00) (73 - 99)  ABP: --  ABP(mean): --  RR: 37 (14 Sep 2024 09:00) (8 - 41)  SpO2: 96% (14 Sep 2024 09:00) (94% - 99%)    O2 Parameters below as of 14 Sep 2024 09:00  Patient On (Oxygen Delivery Method): room air    O2 Concentration (%): 30        CAPILLARY BLOOD GLUCOSE      POCT Blood Glucose.: 112 mg/dL (14 Sep 2024 07:43)  POCT Blood Glucose.: 139 mg/dL (13 Sep 2024 20:55)  POCT Blood Glucose.: 121 mg/dL (13 Sep 2024 17:51)  POCT Blood Glucose.: 139 mg/dL (13 Sep 2024 14:01)      I&O's Summary    13 Sep 2024 07:01  -  14 Sep 2024 07:00  --------------------------------------------------------  IN: 2918.5 mL / OUT: 1070 mL / NET: 1848.5 mL            MEDICATIONS  (STANDING):  apixaban 5 milliGRAM(s) Oral every 12 hours  aspirin enteric coated 81 milliGRAM(s) Oral daily  chlorhexidine 4% Liquid 1 Application(s) Topical daily  dextrose 5%. 1000 milliLiter(s) (50 mL/Hr) IV Continuous <Continuous>  dextrose 5%. 1000 milliLiter(s) (100 mL/Hr) IV Continuous <Continuous>  dextrose 50% Injectable 25 Gram(s) IV Push once  dextrose 50% Injectable 12.5 Gram(s) IV Push once  dextrose 50% Injectable 25 Gram(s) IV Push once  glucagon  Injectable 1 milliGRAM(s) IntraMuscular once  heparin  Infusion.  Unit(s)/Hr (24 mL/Hr) IV Continuous <Continuous>  influenza  Vaccine (HIGH DOSE) 0.5 milliLiter(s) IntraMuscular once  insulin lispro (ADMELOG) corrective regimen sliding scale   SubCutaneous Before meals and at bedtime  levothyroxine 112 MICROGram(s) Oral daily  meropenem Injectable 1000 milliGRAM(s) IV Push every 8 hours  metoprolol tartrate 25 milliGRAM(s) Oral two times a day  midodrine 10 milliGRAM(s) Oral every 8 hours  nystatin Powder 1 Application(s) Topical two times a day  pantoprazole  Injectable 40 milliGRAM(s) IV Push daily  ursodiol Capsule 300 milliGRAM(s) Oral two times a day      MEDICATIONS  (PRN):  aluminum hydroxide/magnesium hydroxide/simethicone Suspension 30 milliLiter(s) Oral every 4 hours PRN Dyspepsia  dextrose Oral Gel 15 Gram(s) Oral once PRN Blood Glucose LESS THAN 70 milliGRAM(s)/deciliter  heparin   Injectable 80998 Unit(s) IV Push every 6 hours PRN For aPTT less than 40  heparin   Injectable 5000 Unit(s) IV Push every 6 hours PRN For aPTT between 40 - 57  melatonin 3 milliGRAM(s) Oral at bedtime PRN Insomnia  ondansetron Injectable 4 milliGRAM(s) IV Push once PRN Nausea and/or Vomiting  traZODone 150 milliGRAM(s) Oral <User Schedule> PRN insomnia      PHYSICAL EXAM: SEE BELOW                          13.3   5.63  )-----------( 109      ( 14 Sep 2024 05:45 )             38.8       09-14    137  |  106  |  11  ----------------------------<  121<H>  3.9   |  26  |  0.63    Ca    8.8      14 Sep 2024 05:45  Phos  1.3     09-14  Mg     1.8     09-14    TPro  5.8<L>  /  Alb  1.9<L>  /  TBili  1.6<H>  /  DBili  x   /  AST  35  /  ALT  34  /  AlkPhos  95  09-14          PTT - ( 14 Sep 2024 05:45 )  PTT:67.0 sec  Urinalysis Basic - ( 14 Sep 2024 05:45 )    Color: x / Appearance: x / SG: x / pH: x  Gluc: 121 mg/dL / Ketone: x  / Bili: x / Urobili: x   Blood: x / Protein: x / Nitrite: x   Leuk Esterase: x / RBC: x / WBC x   Sq Epi: x / Non Sq Epi: x / Bacteria: x      .Blood None   No growth at 24 hours -- 09-12 @ 13:50  .Blood None   No growth at 24 hours -- 09-12 @ 13:49  Bile Bile Fluid   Numerous Escherichia coli  Numerous Klebsiella pneumoniae   Rare polymorphonuclear leukocytes seen per low power field  Moderate Gram Variable Rods seen per oil power field  Moderate Gram positive cocci in pairs per oil power field 09-12 @ 11:27  .Blood Blood-Arterial   No growth at 48 Hours -- 09-12 @ 02:25  .Urine None   <10,000 CFU/mL Normal Urogenital Ira -- 09-10 @ 17:25  .Blood None   No growth at 72 Hours -- 09-10 @ 16:42  .Blood None   No growth at 72 Hours -- 09-10 @ 16:17

## 2024-09-15 LAB
-  STREPTOCOCCUS SP. (NOT GRP A, B OR S PNEUMONIAE): SIGNIFICANT CHANGE UP
ALBUMIN SERPL ELPH-MCNC: 2 G/DL — LOW (ref 3.3–5)
ALP SERPL-CCNC: 92 U/L — SIGNIFICANT CHANGE UP (ref 40–120)
ALT FLD-CCNC: 34 U/L — SIGNIFICANT CHANGE UP (ref 12–78)
ANION GAP SERPL CALC-SCNC: 5 MMOL/L — SIGNIFICANT CHANGE UP (ref 5–17)
AST SERPL-CCNC: 32 U/L — SIGNIFICANT CHANGE UP (ref 15–37)
BILIRUB SERPL-MCNC: 1.1 MG/DL — SIGNIFICANT CHANGE UP (ref 0.2–1.2)
BUN SERPL-MCNC: 9 MG/DL — SIGNIFICANT CHANGE UP (ref 7–23)
CALCIUM SERPL-MCNC: 8.8 MG/DL — SIGNIFICANT CHANGE UP (ref 8.5–10.1)
CHLORIDE SERPL-SCNC: 104 MMOL/L — SIGNIFICANT CHANGE UP (ref 96–108)
CO2 SERPL-SCNC: 27 MMOL/L — SIGNIFICANT CHANGE UP (ref 22–31)
CREAT SERPL-MCNC: 0.68 MG/DL — SIGNIFICANT CHANGE UP (ref 0.5–1.3)
CULTURE RESULTS: ABNORMAL
CULTURE RESULTS: SIGNIFICANT CHANGE UP
CULTURE RESULTS: SIGNIFICANT CHANGE UP
EGFR: 97 ML/MIN/1.73M2 — SIGNIFICANT CHANGE UP
GLUCOSE SERPL-MCNC: 120 MG/DL — HIGH (ref 70–99)
GRAM STN FLD: ABNORMAL
HCT VFR BLD CALC: 38.5 % — LOW (ref 39–50)
HGB BLD-MCNC: 13.4 G/DL — SIGNIFICANT CHANGE UP (ref 13–17)
MAGNESIUM SERPL-MCNC: 1.9 MG/DL — SIGNIFICANT CHANGE UP (ref 1.6–2.6)
MCHC RBC-ENTMCNC: 29 PG — SIGNIFICANT CHANGE UP (ref 27–34)
MCHC RBC-ENTMCNC: 34.8 GM/DL — SIGNIFICANT CHANGE UP (ref 32–36)
MCV RBC AUTO: 83.3 FL — SIGNIFICANT CHANGE UP (ref 80–100)
METHOD TYPE: SIGNIFICANT CHANGE UP
ORGANISM # SPEC MICROSCOPIC CNT: ABNORMAL
ORGANISM # SPEC MICROSCOPIC CNT: ABNORMAL
ORGANISM # SPEC MICROSCOPIC CNT: SIGNIFICANT CHANGE UP
PHOSPHATE SERPL-MCNC: 2.1 MG/DL — LOW (ref 2.5–4.5)
PLATELET # BLD AUTO: 124 K/UL — LOW (ref 150–400)
POTASSIUM SERPL-MCNC: 3.7 MMOL/L — SIGNIFICANT CHANGE UP (ref 3.5–5.3)
POTASSIUM SERPL-SCNC: 3.7 MMOL/L — SIGNIFICANT CHANGE UP (ref 3.5–5.3)
PROT SERPL-MCNC: 5.9 GM/DL — LOW (ref 6–8.3)
RBC # BLD: 4.62 M/UL — SIGNIFICANT CHANGE UP (ref 4.2–5.8)
RBC # FLD: 15 % — HIGH (ref 10.3–14.5)
SODIUM SERPL-SCNC: 136 MMOL/L — SIGNIFICANT CHANGE UP (ref 135–145)
SPECIMEN SOURCE: SIGNIFICANT CHANGE UP
WBC # BLD: 5.04 K/UL — SIGNIFICANT CHANGE UP (ref 3.8–10.5)
WBC # FLD AUTO: 5.04 K/UL — SIGNIFICANT CHANGE UP (ref 3.8–10.5)

## 2024-09-15 PROCEDURE — 99233 SBSQ HOSP IP/OBS HIGH 50: CPT

## 2024-09-15 RX ORDER — SODIUM PHOSPHATE, DIBASIC, ANHYDROUS, POTASSIUM PHOSPHATE, MONOBASIC, AND SODIUM PHOSPHATE, MONOBASIC, MONOHYDRATE 852; 155; 130 MG/1; MG/1; MG/1
1 TABLET, COATED ORAL
Refills: 0 | Status: COMPLETED | OUTPATIENT
Start: 2024-09-15 | End: 2024-09-16

## 2024-09-15 RX ADMIN — Medication 1 APPLICATION(S): at 09:48

## 2024-09-15 RX ADMIN — Medication 3 MILLIGRAM(S): at 21:38

## 2024-09-15 RX ADMIN — SODIUM PHOSPHATE, DIBASIC, ANHYDROUS, POTASSIUM PHOSPHATE, MONOBASIC, AND SODIUM PHOSPHATE, MONOBASIC, MONOHYDRATE 1 PACKET(S): 852; 155; 130 TABLET, COATED ORAL at 17:20

## 2024-09-15 RX ADMIN — METOPROLOL TARTRATE 25 MILLIGRAM(S): 100 TABLET ORAL at 21:40

## 2024-09-15 RX ADMIN — Medication 150 MILLIGRAM(S): at 21:38

## 2024-09-15 RX ADMIN — Medication 500 MILLIGRAM(S): at 13:26

## 2024-09-15 RX ADMIN — Medication 500 MILLIGRAM(S): at 21:39

## 2024-09-15 RX ADMIN — APIXABAN 5 MILLIGRAM(S): 5 TABLET, FILM COATED ORAL at 06:17

## 2024-09-15 RX ADMIN — Medication 500 MILLIGRAM(S): at 06:16

## 2024-09-15 RX ADMIN — URSODIOL 300 MILLIGRAM(S): 500 TABLET, FILM COATED ORAL at 09:47

## 2024-09-15 RX ADMIN — APIXABAN 5 MILLIGRAM(S): 5 TABLET, FILM COATED ORAL at 17:20

## 2024-09-15 RX ADMIN — Medication 81 MILLIGRAM(S): at 09:47

## 2024-09-15 RX ADMIN — METOPROLOL TARTRATE 25 MILLIGRAM(S): 100 TABLET ORAL at 09:47

## 2024-09-15 RX ADMIN — SODIUM PHOSPHATE, DIBASIC, ANHYDROUS, POTASSIUM PHOSPHATE, MONOBASIC, AND SODIUM PHOSPHATE, MONOBASIC, MONOHYDRATE 1 PACKET(S): 852; 155; 130 TABLET, COATED ORAL at 23:07

## 2024-09-15 RX ADMIN — SODIUM PHOSPHATE, DIBASIC, ANHYDROUS, POTASSIUM PHOSPHATE, MONOBASIC, AND SODIUM PHOSPHATE, MONOBASIC, MONOHYDRATE 1 PACKET(S): 852; 155; 130 TABLET, COATED ORAL at 09:46

## 2024-09-15 RX ADMIN — Medication 112 MICROGRAM(S): at 06:17

## 2024-09-15 RX ADMIN — URSODIOL 300 MILLIGRAM(S): 500 TABLET, FILM COATED ORAL at 21:40

## 2024-09-15 RX ADMIN — Medication 100 GRAM(S): at 09:46

## 2024-09-15 RX ADMIN — CHLORHEXIDINE GLUCONATE 1 APPLICATION(S): 40 SOLUTION TOPICAL at 06:23

## 2024-09-15 RX ADMIN — Medication 2000 MILLIGRAM(S): at 21:38

## 2024-09-15 NOTE — PROGRESS NOTE ADULT - ASSESSMENT
72M with h/o Afib, HTN, hypothyroidism, and pre-DM presented with complaints on abdominal pain. Patient initially presented with abdominal pain on 9/8/24 and was discharged, in the interim he developed high grade fever and worsening abdominal pain and came back to ED. In ED pts BP soft, otherwise stable, WBC noted for 21.55, H/H 15/44, Plt 148. CMP noted for BUN/Cr 28/1.36, Lactate 2.3 > 2, T. bili 3.3, UA cloudy, with proteinuria, ketonuria, small leuks. COVID/Flu swab negative, CT abdomen noted for new severe gallbladder wall thickening and pericholecystic stranding in the setting of cholelithiasis is suspicious for acute cholecystitis. RUQ US also noted for cholecystitis. Patient seen by surgery, and wanted some time to consider the option for surgery. RRT called 9/11 for fever, tachycardia, Afib with RVR, hypoxia, transferred to ICU, Pt s/p IR cholecystostomy placement. Now downgraded to medicine 9/14.     Sepsis poa due to acute cholecystitis s/p cholecystostomy placement  Cholelithiasis   -CT: New severe gallbladder wall thickening and pericholecystic   stranding in the setting of cholelithiasis  -Leukocytosis improved   -lactate 2.3 initially, now <2  -Initial blood and urine cx from admission NGTD  -Blood cx: 9/12: one bottle with streptococcus species, then 2 samples that are NGTD (same day 9/12)  -s/p cholecystostomy placement with IR, bile culture with E Coli, KLPN, Bacteroides   -S/p Zosyn, was on meropenem and now abx deescalated to ceftriaxone+ flagyl (9/14)  -Trend and replete electrolytes   -General sx, GI, ID consults appreciated   -IR recommendations noted, tube to stay in place for likely 6-8 weeks    Hx Chronic Afib  -[id number]: 5 ( age, HTN, Hx TIA)  -On Eliquis at home, last dose 9/9 as per pt  -s/p heparin gtt  -restarted Eliquis, continue metoprolol - on 25mg BID for now  -Continue tele monitoring     #Hypotension   -c/w midodrine 10mg q8hr    #thrombocytopenia - improving   -likely 2/2 sepsis  -monitor     HTN  -On ramipril 5mg, metoprolol tartrate 200mg BID, Nifedipine 60mg daily, Furosemide 40mg daily at home   -BP borderline, will reinstate BP meds as BP allows     SUKUMAR - resolved   -Cr baseline 0.8-0.9 (noted in july, august)    Hx Diabetes Mellitus, ? diet controlled   -Last A1C was 5.6% - 8/14/24  -Noted to be 7.5% in 2018    Hypothyroidism   -Continue levothyroxine 112mcg daily     DANIELLE  -Continue BIPAP qhs     VTE ppx: on eliquis     Pt would like to be d/c to [facility], CM/SW aware, will need auth

## 2024-09-15 NOTE — PROGRESS NOTE ADULT - SUBJECTIVE AND OBJECTIVE BOX
HPI: 74M with h/o Afib, HTN, hypothyroidism, and pre-DM presents with complaints on abdominal pain. Patient initially presented with abdominal pain on 9/8/24 and was discharged, in the interim he developed high grade fever and worsening abdominal pain and came back to ED. CT abdomen noted for new severe gallbladder wall thickening and pericholecystic stranding in the setting of cholelithiasis is suspicious for acute cholecystitis. Pt now s/p IR cholecystostomy placement.     Subjective: pt seen this AM in ICU, awake, alert, feels much better now, no overnight issues reported, HR better controlled. Being transferred to  today.     REVIEW OF SYSTEMS:    CONSTITUTIONAL: No weakness, fevers or chills  EYES/ENT: No visual changes;  No vertigo or throat pain   NECK: No pain or stiffness  RESPIRATORY: No cough, wheezing, hemoptysis; No shortness of breath  CARDIOVASCULAR: No chest pain or palpitations  GASTROINTESTINAL: No abdominal or epigastric pain. No nausea, vomiting, or hematemesis; No diarrhea or constipation. No melena or hematochezia.  GENITOURINARY: No dysuria, frequency or hematuria  NEUROLOGICAL: No numbness or weakness  SKIN: No itching, rashes    MEDICATIONS  (STANDING):  apixaban 5 milliGRAM(s) Oral every 12 hours  aspirin enteric coated 81 milliGRAM(s) Oral daily  cefTRIAXone Injectable. 2000 milliGRAM(s) IV Push every 24 hours  chlorhexidine 4% Liquid 1 Application(s) Topical daily  influenza  Vaccine (HIGH DOSE) 0.5 milliLiter(s) IntraMuscular once  levothyroxine 112 MICROGram(s) Oral daily  metoprolol tartrate 25 milliGRAM(s) Oral two times a day  metroNIDAZOLE    Tablet 500 milliGRAM(s) Oral every 8 hours  midodrine 10 milliGRAM(s) Oral every 8 hours  nystatin Powder 1 Application(s) Topical two times a day  potassium phosphate / sodium phosphate Powder (PHOS-NaK) 1 Packet(s) Oral four times a day  ursodiol Capsule 300 milliGRAM(s) Oral two times a day    MEDICATIONS  (PRN):  aluminum hydroxide/magnesium hydroxide/simethicone Suspension 30 milliLiter(s) Oral every 4 hours PRN Dyspepsia  melatonin 3 milliGRAM(s) Oral at bedtime PRN Insomnia  ondansetron Injectable 4 milliGRAM(s) IV Push once PRN Nausea and/or Vomiting  traZODone 150 milliGRAM(s) Oral <User Schedule> PRN insomnia      Vital Signs Last 24 Hrs  T(C): 36.8 (15 Sep 2024 08:00), Max: 37.8 (14 Sep 2024 15:30)  T(F): 98.2 (15 Sep 2024 08:00), Max: 100 (14 Sep 2024 15:30)  HR: 87 (15 Sep 2024 13:23) (80 - 105)  BP: 117/87 (15 Sep 2024 13:23) (96/73 - 117/87)  BP(mean): 93 (15 Sep 2024 09:36) (78 - 93)  RR: 28 (15 Sep 2024 09:36) (19 - 35)  SpO2: 97% (15 Sep 2024 09:36) (95% - 98%)    Parameters below as of 15 Sep 2024 08:00  Patient On (Oxygen Delivery Method): room air    PHYSICAL EXAM:  GENERAL: NAD, lying in bed comfortably  HEAD:  Atraumatic, Normocephalic  EYES: conjunctiva and sclera clear  ENT: Moist mucous membranes  NECK: Supple, No JVD  CHEST/LUNG: Clear to auscultation bilaterally; No rales, rhonchi, wheezing. Unlabored respirations  HEART: Regular rate and rhythm; No murmurs  ABDOMEN: Bowel sounds present; Soft, Nontender, Nondistended, cholysystomy in place draining yellow flui  EXTREMITIES:  2+ Peripheral Pulses, brisk capillary refill. No clubbing, cyanosis, or edema  NERVOUS SYSTEM:  Alert & Oriented X3, speech clear. No deficits           LABS:                          13.4   5.04  )-----------( 124      ( 15 Sep 2024 05:16 )             38.5     15 Sep 2024 05:16    136    |  104    |  9      ----------------------------<  120    3.7     |  27     |  0.68     Ca    8.8        15 Sep 2024 05:16  Phos  2.1       15 Sep 2024 05:16  Mg     1.9       15 Sep 2024 05:16    TPro  5.9    /  Alb  2.0    /  TBili  1.1    /  DBili  x      /  AST  32     /  ALT  34     /  AlkPhos  92     15 Sep 2024 05:16    LIVER FUNCTIONS - ( 15 Sep 2024 05:16 )  Alb: 2.0 g/dL / Pro: 5.9 gm/dL / ALK PHOS: 92 U/L / ALT: 34 U/L / AST: 32 U/L / GGT: x           PTT - ( 14 Sep 2024 05:45 )  PTT:67.0 sec  CAPILLARY BLOOD GLUCOSE    CAPILLARY BLOOD GLUCOSE              Urinalysis Basic - ( 15 Sep 2024 05:16 )    Color: x / Appearance: x / SG: x / pH: x  Gluc: 120 mg/dL / Ketone: x  / Bili: x / Urobili: x   Blood: x / Protein: x / Nitrite: x   Leuk Esterase: x / RBC: x / WBC x   Sq Epi: x / Non Sq Epi: x / Bacteria: x        RADIOLOGY:      < from: CT Abdomen and Pelvis w/ IV Cont (09.10.24 @ 17:56) >  IMPRESSION: New severe gallbladder wall thickening and pericholecystic   stranding in the setting of cholelithiasis is suspicious for acute   cholecystitis. Recommend clinical correlation and consider right upper   quadrant ultrasound.

## 2024-09-16 LAB
ALBUMIN SERPL ELPH-MCNC: 2 G/DL — LOW (ref 3.3–5)
ALP SERPL-CCNC: 85 U/L — SIGNIFICANT CHANGE UP (ref 40–120)
ALT FLD-CCNC: 29 U/L — SIGNIFICANT CHANGE UP (ref 12–78)
ANION GAP SERPL CALC-SCNC: 5 MMOL/L — SIGNIFICANT CHANGE UP (ref 5–17)
AST SERPL-CCNC: 28 U/L — SIGNIFICANT CHANGE UP (ref 15–37)
BASOPHILS # BLD AUTO: 0 K/UL — SIGNIFICANT CHANGE UP (ref 0–0.2)
BASOPHILS NFR BLD AUTO: 0 % — SIGNIFICANT CHANGE UP (ref 0–2)
BILIRUB SERPL-MCNC: 0.9 MG/DL — SIGNIFICANT CHANGE UP (ref 0.2–1.2)
BUN SERPL-MCNC: 7 MG/DL — SIGNIFICANT CHANGE UP (ref 7–23)
CALCIUM SERPL-MCNC: 8.7 MG/DL — SIGNIFICANT CHANGE UP (ref 8.5–10.1)
CHLORIDE SERPL-SCNC: 104 MMOL/L — SIGNIFICANT CHANGE UP (ref 96–108)
CO2 SERPL-SCNC: 28 MMOL/L — SIGNIFICANT CHANGE UP (ref 22–31)
CREAT SERPL-MCNC: 0.72 MG/DL — SIGNIFICANT CHANGE UP (ref 0.5–1.3)
CULTURE RESULTS: ABNORMAL
EGFR: 95 ML/MIN/1.73M2 — SIGNIFICANT CHANGE UP
EOSINOPHIL # BLD AUTO: 0 K/UL — SIGNIFICANT CHANGE UP (ref 0–0.5)
EOSINOPHIL NFR BLD AUTO: 0 % — SIGNIFICANT CHANGE UP (ref 0–6)
FLUAV AG NPH QL: SIGNIFICANT CHANGE UP
FLUBV AG NPH QL: SIGNIFICANT CHANGE UP
GLUCOSE SERPL-MCNC: 116 MG/DL — HIGH (ref 70–99)
HCT VFR BLD CALC: 39.8 % — SIGNIFICANT CHANGE UP (ref 39–50)
HGB BLD-MCNC: 13.5 G/DL — SIGNIFICANT CHANGE UP (ref 13–17)
LYMPHOCYTES # BLD AUTO: 1.89 K/UL — SIGNIFICANT CHANGE UP (ref 1–3.3)
LYMPHOCYTES # BLD AUTO: 32 % — SIGNIFICANT CHANGE UP (ref 13–44)
MAGNESIUM SERPL-MCNC: 1.9 MG/DL — SIGNIFICANT CHANGE UP (ref 1.6–2.6)
MCHC RBC-ENTMCNC: 28.8 PG — SIGNIFICANT CHANGE UP (ref 27–34)
MCHC RBC-ENTMCNC: 33.9 GM/DL — SIGNIFICANT CHANGE UP (ref 32–36)
MCV RBC AUTO: 84.9 FL — SIGNIFICANT CHANGE UP (ref 80–100)
MONOCYTES # BLD AUTO: 0.47 K/UL — SIGNIFICANT CHANGE UP (ref 0–0.9)
MONOCYTES NFR BLD AUTO: 8 % — SIGNIFICANT CHANGE UP (ref 2–14)
NEUTROPHILS # BLD AUTO: 3.48 K/UL — SIGNIFICANT CHANGE UP (ref 1.8–7.4)
NEUTROPHILS NFR BLD AUTO: 57 % — SIGNIFICANT CHANGE UP (ref 43–77)
NRBC # BLD: SIGNIFICANT CHANGE UP /100 WBCS (ref 0–0)
ORGANISM # SPEC MICROSCOPIC CNT: ABNORMAL
ORGANISM # SPEC MICROSCOPIC CNT: SIGNIFICANT CHANGE UP
PHOSPHATE SERPL-MCNC: 2.6 MG/DL — SIGNIFICANT CHANGE UP (ref 2.5–4.5)
PLATELET # BLD AUTO: 141 K/UL — LOW (ref 150–400)
POTASSIUM SERPL-MCNC: 3.7 MMOL/L — SIGNIFICANT CHANGE UP (ref 3.5–5.3)
POTASSIUM SERPL-SCNC: 3.7 MMOL/L — SIGNIFICANT CHANGE UP (ref 3.5–5.3)
PROT SERPL-MCNC: 5.9 GM/DL — LOW (ref 6–8.3)
RBC # BLD: 4.69 M/UL — SIGNIFICANT CHANGE UP (ref 4.2–5.8)
RBC # FLD: 15.3 % — HIGH (ref 10.3–14.5)
RSV RNA NPH QL NAA+NON-PROBE: SIGNIFICANT CHANGE UP
SARS-COV-2 RNA SPEC QL NAA+PROBE: SIGNIFICANT CHANGE UP
SODIUM SERPL-SCNC: 137 MMOL/L — SIGNIFICANT CHANGE UP (ref 135–145)
SPECIMEN SOURCE: SIGNIFICANT CHANGE UP
WBC # BLD: 5.9 K/UL — SIGNIFICANT CHANGE UP (ref 3.8–10.5)
WBC # FLD AUTO: 5.9 K/UL — SIGNIFICANT CHANGE UP (ref 3.8–10.5)

## 2024-09-16 PROCEDURE — 99232 SBSQ HOSP IP/OBS MODERATE 35: CPT

## 2024-09-16 PROCEDURE — 99231 SBSQ HOSP IP/OBS SF/LOW 25: CPT

## 2024-09-16 RX ORDER — MIDODRINE HYDROCHLORIDE 5 MG/1
5 TABLET ORAL EVERY 8 HOURS
Refills: 0 | Status: DISCONTINUED | OUTPATIENT
Start: 2024-09-16 | End: 2024-09-18

## 2024-09-16 RX ADMIN — Medication 150 MILLIGRAM(S): at 20:50

## 2024-09-16 RX ADMIN — Medication 1 APPLICATION(S): at 13:32

## 2024-09-16 RX ADMIN — METOPROLOL TARTRATE 25 MILLIGRAM(S): 100 TABLET ORAL at 10:22

## 2024-09-16 RX ADMIN — Medication 2000 MILLIGRAM(S): at 20:50

## 2024-09-16 RX ADMIN — METOPROLOL TARTRATE 25 MILLIGRAM(S): 100 TABLET ORAL at 20:51

## 2024-09-16 RX ADMIN — Medication 500 MILLIGRAM(S): at 20:52

## 2024-09-16 RX ADMIN — Medication 500 MILLIGRAM(S): at 13:31

## 2024-09-16 RX ADMIN — URSODIOL 300 MILLIGRAM(S): 500 TABLET, FILM COATED ORAL at 10:22

## 2024-09-16 RX ADMIN — CHLORHEXIDINE GLUCONATE 1 APPLICATION(S): 40 SOLUTION TOPICAL at 06:23

## 2024-09-16 RX ADMIN — Medication 81 MILLIGRAM(S): at 10:22

## 2024-09-16 RX ADMIN — Medication 1 APPLICATION(S): at 20:57

## 2024-09-16 RX ADMIN — SODIUM PHOSPHATE, DIBASIC, ANHYDROUS, POTASSIUM PHOSPHATE, MONOBASIC, AND SODIUM PHOSPHATE, MONOBASIC, MONOHYDRATE 1 PACKET(S): 852; 155; 130 TABLET, COATED ORAL at 06:23

## 2024-09-16 RX ADMIN — APIXABAN 5 MILLIGRAM(S): 5 TABLET, FILM COATED ORAL at 06:23

## 2024-09-16 RX ADMIN — Medication 112 MICROGRAM(S): at 06:23

## 2024-09-16 RX ADMIN — URSODIOL 300 MILLIGRAM(S): 500 TABLET, FILM COATED ORAL at 20:51

## 2024-09-16 RX ADMIN — APIXABAN 5 MILLIGRAM(S): 5 TABLET, FILM COATED ORAL at 17:22

## 2024-09-16 RX ADMIN — Medication 500 MILLIGRAM(S): at 06:22

## 2024-09-16 NOTE — CONSULT NOTE ADULT - SUBJECTIVE AND OBJECTIVE BOX
Date of consult: 9/16/2024    CHIEF COMPLAINT:     S : 74y year old Male seen at bedside for Right/Left foot ulceration.  Patient relates to having the ulceration for ----.  Patient has had no previous treatment/has seen a ------ and putting on ----- daily with dry sterile dressing.    Patient states the last saw  ----, a Podiatrist about ----.    Chief Complaint : Patient is a 74y old  Male who presents with a chief complaint of RUQ pain (14 Sep 2024 07:56)    HPI : HPI:  72M with h/o Afib, HTN, hypothyroidism, and pre-DM presents with complaints on abdominal pain. Patient initially presented with abdominal pain on 9/8/24 and was discharged, in the interim he developed high grade fever and worsening abdominal pain and came back to ED. In ED pts BP soft, otherwise stable, WBC noted for 21.55, H/H 15/44, Plt 148. CMP noted for BUN/Cr 28/1.36, Lactate 2.3 > 2, T. bili 3.3, UA cloudy, with proteinuria, ketonuria, small leuks. COVID/Flu swab negative, CT abdomen noted for new severe gallbladder wall thickening and pericholecystic stranding in the setting of cholelithiasis is suspicious for acute cholecystitis. RUQ US also noted for cholecystitis. Patient seen by surgery, and wanted some time to consider the option for surgery. Patient admitted to  for medical management.  (11 Sep 2024 04:07)            PMH: Dyslipidemia    Hypertension    Afib    Chronic Gout    Anxiety    History of Transient Ischemic Attack (TIA)    History of Obesity    Obstructive Sleep Apnea    Gout    Hypothyroid    BPH (benign prostatic hyperplasia)    H/O cholangitis    Multiple falls    Borderline diabetes    DM (diabetes mellitus)      PSH:History of Tonsillectomy    S/P ERCP        Allergies:metformin (Unknown)      Labs:                          13.5   5.90  )-----------( 141      ( 16 Sep 2024 06:44 )             39.8     WBC Trend  5.90 Date (09-16 @ 06:44)  5.04 Date (09-15 @ 05:16)  5.63 Date (09-14 @ 05:45)  10.73<H> Date (09-13 @ 05:30)  14.97<H> Date (09-12 @ 22:20)  18.83<H> Date (09-12 @ 13:15)  38.55<H> Date (09-12 @ 05:35)  32.11<H> Date (09-12 @ 02:25)  16.13<H> Date (09-11 @ 17:26)  15.27<H> Date (09-11 @ 07:13)  21.55<H> Date (09-10 @ 16:16)  13.98<H> Date (09-08 @ 06:24)      Chem  09-16    137  |  104  |  7   ----------------------------<  116<H>  3.7   |  28  |  0.72    Ca    8.7      16 Sep 2024 06:44  Phos  2.6     09-16  Mg     1.9     09-16    TPro  5.9<L>  /  Alb  2.0<L>  /  TBili  0.9  /  DBili  x   /  AST  28  /  ALT  29  /  AlkPhos  85  09-16          T(F): 98.2 (09-16-24 @ 08:10), Max: 98.2 (09-15-24 @ 21:12)  HR: 92 (09-16-24 @ 13:26) (82 - 92)  BP: 115/99 (09-16-24 @ 13:26) (115/88 - 124/79)  RR: 18 (09-16-24 @ 08:10) (18 - 18)  SpO2: 98% (09-16-24 @ 08:10) (97% - 98%)  Wt(kg): --    REVIEW OF SYSTEMS:    CONSTITUTIONAL: No weakness, fevers or chills  EYES: No visual changes  RESPIRATORY: No cough, wheezing; No shortness of breath  CARDIOVASCULAR: No chest pain or palpitations  GASTROINTESTINAL: No abdominal or epigastric pain. No nausea, vomiting; No diarrhea or constipation.   GENITOURINARY: No dysuria, frequency or hematuria  NEUROLOGICAL: No numbness or weakness  SKIN: See physical examination.  All other review of systems is negative unless indicated above    Physical Exam:   Constitutional: NAD, alert;  Lower Extremity Focus  Derm:  Skin warm, dry and supple bilateral.    Ulceration Right/Left ---- in nature, +/- hyperkeratotic border, wound base Granular/Fibrogranular/Necrotic patches/ , wound size (-- cm X – cm X –cm) +/- edema, +/- ina-wound erythema, +/- purulence, +/- fluctuance, +/- tracking/tunneling, +/- probe to bone.   Vascular: Dorsalis Pedis and Posterior Tibial pulses --/4.  Capillary re-fill time less then 3 seconds digits 1-5 bilateral.    Neuro: Protective sensation intact/diminished to the level of the digits bilateral.  MSK: Muscle strength 5/5 all major muscle groups bilateral.       Date of consult: 9/16/2024     HPI:  72-year-old male with past medical history of Afib, HTN, hypothyroidism, and pre-DM presents with complaints on abdominal pain. Patient initially presented with abdominal pain on 9/8/24 and was discharged. In the interim, he developed high grade fever and worsening abdominal pain and came back to ED. In the ED the patient BP soft, otherwise stable, WBC noted for 21.55, H/H 15/44, Plt 148. CMP noted for BUN/Cr 28/1.36, Lactate 2.3 > 2, T. bili 3.3, UA cloudy, with proteinuria, ketonuria, small leuks. COVID/Flu swab negative, CT abdomen noted for new severe gallbladder wall thickening and pericholecystic stranding in the setting of cholelithiasis is suspicious for acute cholecystitis. RUQ US also noted for cholecystitis. Patient seen by surgery, and wanted some time to consider the option for surgery. Patient admitted to  for medical management.  (11 Sep 2024 04:07)  Podiatry was consulted for "small discolored lesion on the left foot". Patient states he does not remember how it started but he does not have any pain from it. Patient states he noticed it few days ago. Patient admits to having poor lower extremity sensation. Patient also states he follows an outpatient podiatrist.             PMH: Dyslipidemia    Hypertension    Afib    Chronic Gout    Anxiety    History of Transient Ischemic Attack (TIA)    History of Obesity    Obstructive Sleep Apnea    Gout    Hypothyroid    BPH (benign prostatic hyperplasia)    H/O cholangitis    Multiple falls    Borderline diabetes    DM (diabetes mellitus)      PSH: History of Tonsillectomy    S/P ERCP        Allergies: metformin (Unknown)      Labs:                          13.5   5.90  )-----------( 141      ( 16 Sep 2024 06:44 )             39.8     WBC Trend  5.90 Date (09-16 @ 06:44)  5.04 Date (09-15 @ 05:16)  5.63 Date (09-14 @ 05:45)  10.73<H> Date (09-13 @ 05:30)  14.97<H> Date (09-12 @ 22:20)  18.83<H> Date (09-12 @ 13:15)  38.55<H> Date (09-12 @ 05:35)  32.11<H> Date (09-12 @ 02:25)  16.13<H> Date (09-11 @ 17:26)  15.27<H> Date (09-11 @ 07:13)  21.55<H> Date (09-10 @ 16:16)  13.98<H> Date (09-08 @ 06:24)      Chem  09-16    137  |  104  |  7   ----------------------------<  116<H>  3.7   |  28  |  0.72    Ca    8.7      16 Sep 2024 06:44  Phos  2.6     09-16  Mg     1.9     09-16    TPro  5.9<L>  /  Alb  2.0<L>  /  TBili  0.9  /  DBili  x   /  AST  28  /  ALT  29  /  AlkPhos  85  09-16          T(F): 98.2 (09-16-24 @ 08:10), Max: 98.2 (09-15-24 @ 21:12)  HR: 92 (09-16-24 @ 13:26) (82 - 92)  BP: 115/99 (09-16-24 @ 13:26) (115/88 - 124/79)  RR: 18 (09-16-24 @ 08:10) (18 - 18)  SpO2: 98% (09-16-24 @ 08:10) (97% - 98%)  Wt(kg): --    REVIEW OF SYSTEMS:    CONSTITUTIONAL: No weakness, fevers or chills  EYES: No visual changes  RESPIRATORY: No cough, wheezing; No shortness of breath  CARDIOVASCULAR: No chest pain or palpitations  GASTROINTESTINAL: No abdominal or epigastric pain. No nausea, vomiting; No diarrhea or constipation.   GENITOURINARY: No dysuria, frequency or hematuria  NEUROLOGICAL: No numbness or weakness  SKIN: See physical examination.  All other review of systems is negative unless indicated above    Physical Exam:   Constitutional: NAD, alert;  Lower Extremity Focus  Derm:  Skin warm, dry and supple bilateral.    Left Foot:  No open wound bilaterally, small dark lesion filled with fluid consistent with bloody blister, no erythema, no periwound edema, no active discharge, no malodor, no crepitus   Vascular: Dorsalis Pedis is 2/4 bilaterally and Posterior Tibial pulses 0/4 due to severe edema.  +3 pitting edema on bilateral feet  Neuro: Protective sensation diminished to the level of the digits bilateral.  MSK: Reduced muscle bilateral, minimal ambulatory,  No tenderness        Date of consult: 9/16/2024     HPI:  72-year-old male with past medical history of Afib, HTN, hypothyroidism, and pre-DM presents with complaints on abdominal pain. Patient initially presented with abdominal pain on 9/8/24 and was discharged. In the interim, he developed high grade fever and worsening abdominal pain and came back to ED. In the ED the patient BP soft, otherwise stable, WBC noted for 21.55, H/H 15/44, Plt 148. CMP noted for BUN/Cr 28/1.36, Lactate 2.3 > 2, T. bili 3.3, UA cloudy, with proteinuria, ketonuria, small leuks. COVID/Flu swab negative, CT abdomen noted for new severe gallbladder wall thickening and pericholecystic stranding in the setting of cholelithiasis is suspicious for acute cholecystitis. RUQ US also noted for cholecystitis. Patient seen by surgery, and wanted some time to consider the option for surgery. Patient admitted to  for medical management.  (11 Sep 2024 04:07)  Podiatry was consulted for "small discolored lesion on the left foot". Patient states he does not remember how it started but has no pain from it. Patient states he noticed it few days ago. Patient admits to having poor lower extremity sensation. Patient also states he follows an outpatient podiatrist.             PMH: Dyslipidemia    Hypertension    Afib    Chronic Gout    Anxiety    History of Transient Ischemic Attack (TIA)    History of Obesity    Obstructive Sleep Apnea    Gout    Hypothyroid    BPH (benign prostatic hyperplasia)    H/O cholangitis    Multiple falls    Borderline diabetes    DM (diabetes mellitus)      PSH: History of Tonsillectomy    S/P ERCP        Allergies: metformin (Unknown)      Labs:                          13.5   5.90  )-----------( 141      ( 16 Sep 2024 06:44 )             39.8     WBC Trend  5.90 Date (09-16 @ 06:44)  5.04 Date (09-15 @ 05:16)  5.63 Date (09-14 @ 05:45)  10.73<H> Date (09-13 @ 05:30)  14.97<H> Date (09-12 @ 22:20)  18.83<H> Date (09-12 @ 13:15)  38.55<H> Date (09-12 @ 05:35)  32.11<H> Date (09-12 @ 02:25)  16.13<H> Date (09-11 @ 17:26)  15.27<H> Date (09-11 @ 07:13)  21.55<H> Date (09-10 @ 16:16)  13.98<H> Date (09-08 @ 06:24)      Chem  09-16    137  |  104  |  7   ----------------------------<  116<H>  3.7   |  28  |  0.72    Ca    8.7      16 Sep 2024 06:44  Phos  2.6     09-16  Mg     1.9     09-16    TPro  5.9<L>  /  Alb  2.0<L>  /  TBili  0.9  /  DBili  x   /  AST  28  /  ALT  29  /  AlkPhos  85  09-16          T(F): 98.2 (09-16-24 @ 08:10), Max: 98.2 (09-15-24 @ 21:12)  HR: 92 (09-16-24 @ 13:26) (82 - 92)  BP: 115/99 (09-16-24 @ 13:26) (115/88 - 124/79)  RR: 18 (09-16-24 @ 08:10) (18 - 18)  SpO2: 98% (09-16-24 @ 08:10) (97% - 98%)  Wt(kg): --    REVIEW OF SYSTEMS:    CONSTITUTIONAL: No weakness, fevers or chills  EYES: No visual changes  RESPIRATORY: No cough, wheezing; No shortness of breath  CARDIOVASCULAR: No chest pain or palpitations  GASTROINTESTINAL: No abdominal or epigastric pain. No nausea, vomiting; No diarrhea or constipation.   GENITOURINARY: No dysuria, frequency or hematuria  NEUROLOGICAL: No numbness or weakness  SKIN: See physical examination.  All other review of systems is negative unless indicated above    Physical Exam:   Constitutional: NAD, alert;  Lower Extremity Focus  Derm:  Skin warm, dry and supple bilateral.    Left Foot:  No open wound bilaterally, small dark fluid-filled lesion consistent with bloody blister, no erythema, no periwound edema, no active discharge, no malodor, no crepitus   Vascular: Dorsalis Pedis is 2/4 bilaterally and Posterior Tibial pulses 0/4 due to severe edema.  +3 pitting edema on bilateral feet  Neuro: Protective sensation diminished to the level of the digits bilateral.  MSK: Reduced muscle bilateral, minimal ambulatory,  No tenderness

## 2024-09-16 NOTE — PROGRESS NOTE ADULT - SUBJECTIVE AND OBJECTIVE BOX
Date of Service:09-16-24 @ 12:22  Interval History/ROS: Afebrile overnight. Patient assessed at bedside this morning. Denied any acute complaints.      REVIEW OF SYSTEMS  [  ] ROS unobtainable because:    [ x ] All other systems negative except as noted below    Constitutional:  [ ] fever [ ] chills  [ ] weight loss  [ ]night sweat  [ ]poor appetite/PO intake [ ]fatigue   Skin:  [ ] rash [ ] phlebitis	  Eyes: [ ] icterus [ ] pain  [ ] discharge	  ENMT: [ ] sore throat  [ ] thrush [ ] ulcers [ ] exudates [ ]anosmia  Respiratory: [ ] dyspnea [ ] hemoptysis [ ] cough [ ] sputum	  Cardiovascular:  [ ] chest pain [ ] palpitations [ ] edema	  Gastrointestinal:  [ ] nausea [ ] vomiting [ ] diarrhea [ ] constipation [ ] pain	  Genitourinary:  [ ] dysuria [ ] frequency [ ] hematuria [ ] discharge [ ] flank pain  [ ] incontinence  Musculoskeletal:  [ ] myalgias [ ] arthralgias [ ] arthritis  [ ] back pain  Neurological:  [ ] headache [ ] weakness [ ] seizures  [ ] confusion/altered mental status    Allergies  metformin (Unknown)        ANTIMICROBIALS:    cefTRIAXone Injectable. 2000 every 24 hours  metroNIDAZOLE    Tablet 500 every 8 hours        OTHER MEDS: MEDICATIONS  (STANDING):  aluminum hydroxide/magnesium hydroxide/simethicone Suspension 30 every 4 hours PRN  apixaban 5 every 12 hours  aspirin enteric coated 81 daily  influenza  Vaccine (HIGH DOSE) 0.5 once  levothyroxine 112 daily  melatonin 3 at bedtime PRN  metoprolol tartrate 25 two times a day  midodrine 10 every 8 hours  ondansetron Injectable 4 once PRN  traZODone 150 <User Schedule> PRN  ursodiol Capsule 300 two times a day      Vital Signs Last 24 Hrs  T(F): 98.2 (09-16-24 @ 08:10), Max: 102.8 (09-11-24 @ 20:00)    Vital Signs Last 24 Hrs  HR: 82 (09-16-24 @ 08:10) (82 - 89)  BP: 124/79 (09-16-24 @ 08:10) (115/88 - 124/79)  RR: 18 (09-16-24 @ 08:10)  SpO2: 98% (09-16-24 @ 08:10) (97% - 98%)  Wt(kg): --    EXAM:    Constitutional:  morbidly obese, NAD  Head/Eyes: no icterus  LUNGS:  CTA  CVS:  regular rhythm  Abd:  soft, mild epigastric-tender; non-distended improved  Ext:  no edema  Vascular:  IV site no erythema tenderness or discharge  Neuro: AAO X 3, non- focal    Labs:                        13.5   5.90  )-----------( 141      ( 16 Sep 2024 06:44 )             39.8     09-16    137  |  104  |  7   ----------------------------<  116<H>  3.7   |  28  |  0.72    Ca    8.7      16 Sep 2024 06:44  Phos  2.6     09-16  Mg     1.9     09-16    TPro  5.9<L>  /  Alb  2.0<L>  /  TBili  0.9  /  DBili  x   /  AST  28  /  ALT  29  /  AlkPhos  85  09-16      WBC Trend:  WBC Count: 5.90 (09-16-24 @ 06:44)  WBC Count: 5.04 (09-15-24 @ 05:16)  WBC Count: 5.63 (09-14-24 @ 05:45)  WBC Count: 10.73 (09-13-24 @ 05:30)      Creatine Trend:  Creatinine: 0.72 (09-16)  Creatinine: 0.68 (09-15)  Creatinine: 0.63 (09-14)  Creatinine: 0.67 (09-13)      Liver Biochemical Testing Trend:  Alanine Aminotransferase (ALT/SGPT): 29 (09-16)  Alanine Aminotransferase (ALT/SGPT): 34 (09-15)  Alanine Aminotransferase (ALT/SGPT): 34 (09-14)  Alanine Aminotransferase (ALT/SGPT): 32 (09-13)  Alanine Aminotransferase (ALT/SGPT): 44 (09-12)  Aspartate Aminotransferase (AST/SGOT): 28 (09-16-24 @ 06:44)  Aspartate Aminotransferase (AST/SGOT): 32 (09-15-24 @ 05:16)  Aspartate Aminotransferase (AST/SGOT): 35 (09-14-24 @ 05:45)  Aspartate Aminotransferase (AST/SGOT): 31 (09-13-24 @ 05:30)  Aspartate Aminotransferase (AST/SGOT): 52 (09-12-24 @ 05:35)  Bilirubin Total: 0.9 (09-16)  Bilirubin Total: 1.1 (09-15)  Bilirubin Total: 1.6 (09-14)  Bilirubin Direct: 1.3 (09-13)  Bilirubin Total: 1.9 (09-13)      Trend LDH      Urinalysis Basic - ( 16 Sep 2024 06:44 )    Color: x / Appearance: x / SG: x / pH: x  Gluc: 116 mg/dL / Ketone: x  / Bili: x / Urobili: x   Blood: x / Protein: x / Nitrite: x   Leuk Esterase: x / RBC: x / WBC x   Sq Epi: x / Non Sq Epi: x / Bacteria: x        MICROBIOLOGY:    MRSA PCR Result.: NotDetec (09-12-24 @ 05:00)      Culture - Blood (collected 12 Sep 2024 13:50)  Source: .Blood None  Preliminary Report:    No growth at 72 Hours    Culture - Blood (collected 12 Sep 2024 13:49)  Source: .Blood None  Preliminary Report:    No growth at 72 Hours    Culture - Body Fluid with Gram Stain (collected 12 Sep 2024 11:27)  Source: Bile Bile Fluid  Final Report:    Numerous Escherichia coli    Numerous Klebsiella pneumoniae    Numerous Bacteroides ovatus group "Susceptibilities not performed"    Moderate Streptococcus constellatus "Susceptibilities not performed"  Organism: Escherichia coli  Klebsiella pneumoniae  Organism: Klebsiella pneumoniae    Sensitivities:      -  Ceftriaxone: S <=1      Method Type: [place]      -  Amoxicillin/Clavulanic Acid: S <=8/4      -  Ampicillin: R 16 These ampicillin results predict results for amoxicillin      -  Ampicillin/Sulbactam: S <=4/2      -  Aztreonam: S <=4      -  Cefazolin: S <=2      -  Cefepime: S <=2      -  Cefoxitin: S <=8      -  Ciprofloxacin: S <=0.25      -  Ertapenem: S <=0.5      -  Gentamicin: S <=2      -  Imipenem: S <=1      -  Levofloxacin: S <=0.5      -  Meropenem: S <=1      -  Piperacillin/Tazobactam: S <=8      -  Tobramycin: S <=2      -  Trimethoprim/Sulfamethoxazole: S <=0.5/9.5  Organism: Escherichia coli    Sensitivities:      Method Type: [place]      -  Amoxicillin/Clavulanic Acid: S <=8/4      -  Ampicillin: S <=8 These ampicillin results predict results for amoxicillin      -  Ampicillin/Sulbactam: S <=4/2      -  Aztreonam: S <=4      -  Cefazolin: S <=2      -  Cefepime: S <=2      -  Cefoxitin: S <=8      -  Ceftriaxone: S <=1      -  Ciprofloxacin: S <=0.25      -  Ertapenem: S <=0.5      -  Gentamicin: S <=2      -  Imipenem: S <=1      -  Levofloxacin: S <=0.5      -  Meropenem: S <=1      -  Piperacillin/Tazobactam: S <=8      -  Tobramycin: S <=2      -  Trimethoprim/Sulfamethoxazole: S <=0.5/9.5    Culture - Blood (collected 12 Sep 2024 02:25)  Source: .Blood Blood-Arterial  Preliminary Report:    Growth in anaerobic bottle: Gram Positive Cocci in Pairs and Chains    Direct identification is available within approximately 3-5    hours either by Blood Panel Multiplexed PCR or Direct    MALDI-TOF. Details: [email or web address]  Organism: Blood Culture PCR  Organism: Blood Culture PCR    Sensitivities:      Method Type: PCR      -  Streptococcus sp. (Not Grp A, B or S pneumoniae): Detec    Culture - Urine (collected 10 Sep 2024 17:25)  Source: .Urine None  Final Report:    <10,000 CFU/mL Normal Urogenital Ira    Urinalysis with Rflx Culture (collected 10 Sep 2024 17:25)    Culture - Blood (collected 10 Sep 2024 16:42)  Source: .Blood None  Final Report:    No growth at 5 days    Culture - Blood (collected 10 Sep 2024 16:17)  Source: .Blood None  Final Report:    No growth at 5 days    Urinalysis with Rflx Culture (collected 08 Sep 2024 08:44)    Culture - Blood (collected 09 Dec 2022 09:15)  Source: .Blood Blood-Peripheral  Final Report:    No Growth Final      RADIOLOGY:  imaging below personally reviewed

## 2024-09-16 NOTE — PROGRESS NOTE ADULT - ASSESSMENT
Assessment:  72M with Afib, HTN, hypothyroidism, and pre-DM presents 9/11 with abdominal pain associated with fevers  Febrile 102F on admission  Cr 1.3  Bili 2.7  UA negative  CTAP and US RUQ suggestive of acute abdi, CBD normal   BCx 9/10 negative  RVP negative  Prior BCx 2022, with Oakley S Ecoli  Course complicated by worsening tachycardia, increase work of breathing and fever, now on BIPAP  Leukocytosis worsen 21 > 38  Zosyn broaden to Meropenem  Bile Cx 9/12 with Ecoli, Kleb Pan S, Strep and Bacteriodes species  BCx 9/12 with Strep species (1 out of 6 bottles)  TTE reassuring    Antimicrobials:  s/p Zosyn (9/10 - 9/11)  meropenem Injectable 1000 every 8 hours (9/12 --- 9/14)   cefTRIAXone 2000 every 24 hours (9/14 --- )  metroNIDAZOLE  500 every 8 hours (9/14 --- )     Impression:   #Septic Shock 2/2 Acute Cholecystitis  #Strep bacteremia  #Acute hypoxic respiratory failure  #Fever  #Leukocytosis  #Transaminitis with elevated Bili  - s/p cholecystostomy 9/12  - bacteremia source from cholecystitis   - remains afebrile, WBC normalized, clinically well overall without acute complaints     Recommendations:  - continue CTX 2G q24  - continue Flagyl 500 q8  - monitor temperature curve  - trend WBC  - side effects of antibiotic discussed, tolerating abx well so far  - follow up repeat BCx x2 (9/16)  - if negative repeat BCx, plan to complete with PO Augmentin 875mg q12 for 14-day course  - Surgery following    Clinical team may change from intravenous to oral antibiotics when the following criteria are met:   1. Patient is clinically improving/stable       a)	Improved signs and symptoms of infection from initial presentation       b)	Afebrile for 24 hours       c)	Leukocytosis trending towards normal range   2. Patient is tolerating oral intake   3. Initial/repeat blood cultures are negative OR do not need to wait for preliminary blood cultures to result    When above criteria met may change iv antibiotics to an oral agent as above Assessment:  72M with Afib, HTN, hypothyroidism, and pre-DM presents 9/11 with abdominal pain associated with fevers  Febrile 102F on admission  Cr 1.3  Bili 2.7  UA negative  CTAP and US RUQ suggestive of acute abdi, CBD normal   BCx 9/10 negative  RVP negative  Prior BCx 2022, with Oakley S Ecoli  Course complicated by worsening tachycardia, increase work of breathing and fever, now on BIPAP  Leukocytosis worsen 21 > 38  Zosyn broaden to Meropenem  Bile Cx 9/12 with Ecoli, Kleb Pan S, Strep and Bacteriodes species  BCx 9/12 with Strep species (1 out of 6 bottles)  TTE reassuring    Antimicrobials:  s/p Zosyn (9/10 - 9/11)  meropenem Injectable 1000 every 8 hours (9/12 --- 9/14)   cefTRIAXone 2000 every 24 hours (9/14 --- )  metroNIDAZOLE  500 every 8 hours (9/14 --- )     Impression:   #Septic Shock 2/2 Acute Cholecystitis  #Strep bacteremia  #Acute hypoxic respiratory failure  #Fever  #Leukocytosis  #Transaminitis with elevated Bili  - s/p cholecystostomy 9/12  - bacteremia source from cholecystitis   - remains afebrile, WBC normalized, clinically well overall without acute complaints   - doubt persistent bacteremia, patient has remained afebrile, and leukocytosis resolved since 9/14, however will repeat BCx to document clearance    Recommendations:  - continue CTX 2G q24  - continue Flagyl 500 q8  - monitor temperature curve  - trend WBC  - side effects of antibiotic discussed, tolerating abx well so far  - follow up repeat BCx x2 (9/16)  - if negative repeat BCx, can transition to PO antibiotic once ready for discharge; with PO Augmentin 875mg q12 for 14-day course  - Surgery following    Clinical team may change from intravenous to oral antibiotics when the following criteria are met:   1. Patient is clinically improving/stable       a)	Improved signs and symptoms of infection from initial presentation       b)	Afebrile for 24 hours       c)	Leukocytosis trending towards normal range   2. Patient is tolerating oral intake   3. Initial/repeat blood cultures are negative OR do not need to wait for preliminary blood cultures to result    When above criteria met may change iv antibiotics to an oral agent as above

## 2024-09-16 NOTE — PROGRESS NOTE ADULT - SUBJECTIVE AND OBJECTIVE BOX
HPI: 74M with h/o Afib, HTN, hypothyroidism, and pre-DM presents with complaints on abdominal pain. Patient initially presented with abdominal pain on 9/8/24 and was discharged, in the interim he developed high grade fever and worsening abdominal pain and came back to ED. CT abdomen noted for new severe gallbladder wall thickening and pericholecystic stranding in the setting of cholelithiasis is suspicious for acute cholecystitis. Pt now s/p IR cholecystostomy placement.     Subjective: pt seen this AM in ICU, awake, alert, feels much better now, no overnight issues reported, HR better controlled. Being transferred to  today.     REVIEW OF SYSTEMS:    CONSTITUTIONAL: No weakness, fevers or chills  EYES/ENT: No visual changes;  No vertigo or throat pain   NECK: No pain or stiffness  RESPIRATORY: No cough, wheezing, hemoptysis; No shortness of breath  CARDIOVASCULAR: No chest pain or palpitations  GASTROINTESTINAL: No abdominal or epigastric pain. No nausea, vomiting, or hematemesis; No diarrhea or constipation. No melena or hematochezia.  GENITOURINARY: No dysuria, frequency or hematuria  NEUROLOGICAL: No numbness or weakness  SKIN: No itching, rashes    MEDICATIONS  (STANDING):  apixaban 5 milliGRAM(s) Oral every 12 hours  aspirin enteric coated 81 milliGRAM(s) Oral daily  cefTRIAXone Injectable. 2000 milliGRAM(s) IV Push every 24 hours  chlorhexidine 4% Liquid 1 Application(s) Topical daily  influenza  Vaccine (HIGH DOSE) 0.5 milliLiter(s) IntraMuscular once  levothyroxine 112 MICROGram(s) Oral daily  metoprolol tartrate 25 milliGRAM(s) Oral two times a day  metroNIDAZOLE    Tablet 500 milliGRAM(s) Oral every 8 hours  midodrine 10 milliGRAM(s) Oral every 8 hours  nystatin Powder 1 Application(s) Topical two times a day  potassium phosphate / sodium phosphate Powder (PHOS-NaK) 1 Packet(s) Oral four times a day  ursodiol Capsule 300 milliGRAM(s) Oral two times a day    MEDICATIONS  (PRN):  aluminum hydroxide/magnesium hydroxide/simethicone Suspension 30 milliLiter(s) Oral every 4 hours PRN Dyspepsia  melatonin 3 milliGRAM(s) Oral at bedtime PRN Insomnia  ondansetron Injectable 4 milliGRAM(s) IV Push once PRN Nausea and/or Vomiting  traZODone 150 milliGRAM(s) Oral <User Schedule> PRN insomnia      Vital Signs Last 24 Hrs  T(C): 36.8 (16 Sep 2024 08:10), Max: 36.8 (15 Sep 2024 21:12)  T(F): 98.2 (16 Sep 2024 08:10), Max: 98.2 (15 Sep 2024 21:12)  HR: 92 (16 Sep 2024 13:26) (82 - 92)  BP: 115/99 (16 Sep 2024 13:26) (115/88 - 124/79)  BP(mean): 106 (16 Sep 2024 13:26) (106 - 106)  RR: 18 (16 Sep 2024 08:10) (18 - 18)  SpO2: 98% (16 Sep 2024 08:10) (97% - 98%)    Parameters below as of 16 Sep 2024 08:10  Patient On (Oxygen Delivery Method): room air      PHYSICAL EXAM:  GENERAL: NAD, lying in bed comfortably  HEAD:  Atraumatic, Normocephalic  EYES: conjunctiva and sclera clear  ENT: Moist mucous membranes  NECK: Supple, No JVD  CHEST/LUNG: Clear to auscultation bilaterally; No rales, rhonchi, wheezing. Unlabored respirations  HEART: Regular rate and rhythm; No murmurs  ABDOMEN: Bowel sounds present; Soft, Nontender, Nondistended, cholysystomy in place draining yellow flui  EXTREMITIES:  2+ Peripheral Pulses, brisk capillary refill. No clubbing, cyanosis, or edema  NERVOUS SYSTEM:  Alert & Oriented X3, speech clear. No deficits           LABS:                                     13.5   5.90  )-----------( 141      ( 16 Sep 2024 06:44 )             39.8   09-16    137  |  104  |  7   ----------------------------<  116<H>  3.7   |  28  |  0.72    Ca    8.7      16 Sep 2024 06:44  Phos  2.6     09-16  Mg     1.9     09-16    TPro  5.9<L>  /  Alb  2.0<L>  /  TBili  0.9  /  DBili  x   /  AST  28  /  ALT  29  /  AlkPhos  85  09-16                Urinalysis Basic - ( 15 Sep 2024 05:16 )    Color: x / Appearance: x / SG: x / pH: x  Gluc: 120 mg/dL / Ketone: x  / Bili: x / Urobili: x   Blood: x / Protein: x / Nitrite: x   Leuk Esterase: x / RBC: x / WBC x   Sq Epi: x / Non Sq Epi: x / Bacteria: x        RADIOLOGY:      < from: CT Abdomen and Pelvis w/ IV Cont (09.10.24 @ 17:56) >  IMPRESSION: New severe gallbladder wall thickening and pericholecystic   stranding in the setting of cholelithiasis is suspicious for acute   cholecystitis. Recommend clinical correlation and consider right upper   quadrant ultrasound.     HPI: 74M with h/o Afib, HTN, hypothyroidism, and pre-DM presents with complaints on abdominal pain. Patient initially presented with abdominal pain on 9/8/24 and was discharged, in the interim he developed high grade fever and worsening abdominal pain and came back to ED. CT abdomen noted for new severe gallbladder wall thickening and pericholecystic stranding in the setting of cholelithiasis is suspicious for acute cholecystitis. Pt now s/p IR cholecystostomy placement.     Subjective: pt seen this AM, awake, alert, doing well, no overnight issues, continues BIPAP qhs.         REVIEW OF SYSTEMS:    CONSTITUTIONAL: No weakness, fevers or chills  EYES/ENT: No visual changes;  No vertigo or throat pain   NECK: No pain or stiffness  RESPIRATORY: No cough, wheezing, hemoptysis; No shortness of breath  CARDIOVASCULAR: No chest pain or palpitations  GASTROINTESTINAL: No abdominal or epigastric pain. No nausea, vomiting, or hematemesis; No diarrhea or constipation. No melena or hematochezia.  GENITOURINARY: No dysuria, frequency or hematuria  NEUROLOGICAL: No numbness or weakness  SKIN: No itching, rashes    MEDICATIONS  (STANDING):  apixaban 5 milliGRAM(s) Oral every 12 hours  aspirin enteric coated 81 milliGRAM(s) Oral daily  cefTRIAXone Injectable. 2000 milliGRAM(s) IV Push every 24 hours  chlorhexidine 4% Liquid 1 Application(s) Topical daily  influenza  Vaccine (HIGH DOSE) 0.5 milliLiter(s) IntraMuscular once  levothyroxine 112 MICROGram(s) Oral daily  metoprolol tartrate 25 milliGRAM(s) Oral two times a day  metroNIDAZOLE    Tablet 500 milliGRAM(s) Oral every 8 hours  midodrine 10 milliGRAM(s) Oral every 8 hours  nystatin Powder 1 Application(s) Topical two times a day  potassium phosphate / sodium phosphate Powder (PHOS-NaK) 1 Packet(s) Oral four times a day  ursodiol Capsule 300 milliGRAM(s) Oral two times a day    MEDICATIONS  (PRN):  aluminum hydroxide/magnesium hydroxide/simethicone Suspension 30 milliLiter(s) Oral every 4 hours PRN Dyspepsia  melatonin 3 milliGRAM(s) Oral at bedtime PRN Insomnia  ondansetron Injectable 4 milliGRAM(s) IV Push once PRN Nausea and/or Vomiting  traZODone 150 milliGRAM(s) Oral <User Schedule> PRN insomnia      Vital Signs Last 24 Hrs  T(C): 36.8 (16 Sep 2024 08:10), Max: 36.8 (15 Sep 2024 21:12)  T(F): 98.2 (16 Sep 2024 08:10), Max: 98.2 (15 Sep 2024 21:12)  HR: 92 (16 Sep 2024 13:26) (82 - 92)  BP: 115/99 (16 Sep 2024 13:26) (115/88 - 124/79)  BP(mean): 106 (16 Sep 2024 13:26) (106 - 106)  RR: 18 (16 Sep 2024 08:10) (18 - 18)  SpO2: 98% (16 Sep 2024 08:10) (97% - 98%)    Parameters below as of 16 Sep 2024 08:10  Patient On (Oxygen Delivery Method): room air      PHYSICAL EXAM:  GENERAL: NAD, lying in bed comfortably  HEAD:  Atraumatic, Normocephalic  EYES: conjunctiva and sclera clear  ENT: Moist mucous membranes  NECK: Supple, No JVD  CHEST/LUNG: Clear to auscultation bilaterally; No rales, rhonchi, wheezing. Unlabored respirations  HEART: Regular rate and rhythm; No murmurs  ABDOMEN: Bowel sounds present; Soft, Nontender, Nondistended, cholecystomy in place draining yellow fluid  EXTREMITIES:  2+ Peripheral Pulses, brisk capillary refill. No clubbing, cyanosis, or edema  NERVOUS SYSTEM:  Alert & Oriented X3, speech clear. No deficits           LABS:                                     13.5   5.90  )-----------( 141      ( 16 Sep 2024 06:44 )             39.8   09-16    137  |  104  |  7   ----------------------------<  116<H>  3.7   |  28  |  0.72    Ca    8.7      16 Sep 2024 06:44  Phos  2.6     09-16  Mg     1.9     09-16    TPro  5.9<L>  /  Alb  2.0<L>  /  TBili  0.9  /  DBili  x   /  AST  28  /  ALT  29  /  AlkPhos  85  09-16                Urinalysis Basic - ( 15 Sep 2024 05:16 )    Color: x / Appearance: x / SG: x / pH: x  Gluc: 120 mg/dL / Ketone: x  / Bili: x / Urobili: x   Blood: x / Protein: x / Nitrite: x   Leuk Esterase: x / RBC: x / WBC x   Sq Epi: x / Non Sq Epi: x / Bacteria: x        RADIOLOGY:      < from: CT Abdomen and Pelvis w/ IV Cont (09.10.24 @ 17:56) >  IMPRESSION: New severe gallbladder wall thickening and pericholecystic   stranding in the setting of cholelithiasis is suspicious for acute   cholecystitis. Recommend clinical correlation and consider right upper   quadrant ultrasound.

## 2024-09-16 NOTE — CONSULT NOTE ADULT - CONSULT REQUESTED DATE/TIME
12-Sep-2024 09:55
11-Sep-2024 19:01
10-Sep-2024 20:23
11-Sep-2024 16:24
11-Sep-2024 15:15
16-Sep-2024 12:36

## 2024-09-16 NOTE — PROGRESS NOTE ADULT - ASSESSMENT
72M with h/o Afib, HTN, hypothyroidism, and pre-DM presented with complaints on abdominal pain. Patient initially presented with abdominal pain on 9/8/24 and was discharged, in the interim he developed high grade fever and worsening abdominal pain and came back to ED. In ED pts BP soft, otherwise stable, WBC noted for 21.55, H/H 15/44, Plt 148. CMP noted for BUN/Cr 28/1.36, Lactate 2.3 > 2, T. bili 3.3, UA cloudy, with proteinuria, ketonuria, small leuks. COVID/Flu swab negative, CT abdomen noted for new severe gallbladder wall thickening and pericholecystic stranding in the setting of cholelithiasis is suspicious for acute cholecystitis. RUQ US also noted for cholecystitis. Patient seen by surgery, and wanted some time to consider the option for surgery. RRT called 9/11 for fever, tachycardia, Afib with RVR, hypoxia, transferred to ICU, Pt s/p IR cholecystostomy placement. Now downgraded to medicine 9/14.     Sepsis poa due to acute cholecystitis s/p cholecystostomy placement  Cholelithiasis   -CT: New severe gallbladder wall thickening and pericholecystic   stranding in the setting of cholelithiasis  -Leukocytosis improved   -lactate 2.3 initially, now <2  -Initial blood and urine cx from admission NGTD  -Blood cx: 9/12: one bottle with streptococcus species, then 2 samples that are NGTD (same day 9/12)  -s/p cholecystostomy placement with IR, bile culture with E Coli, KLPN, Bacteroides   -S/p Zosyn, was on meropenem and now abx deescalated to ceftriaxone+ flagyl (9/14)  -Trend and replete electrolytes   -General sx, GI, ID consults appreciated   -IR recommendations noted, tube to stay in place for likely 6-8 weeks    Hx Chronic Afib  -[id number]: 5 ( age, HTN, Hx TIA)  -On Eliquis at home, last dose 9/9 as per pt  -s/p heparin gtt  -restarted Eliquis, continue metoprolol - on 25mg BID for now  -Continue tele monitoring     #Hypotension   -c/w midodrine 10mg q8hr    #thrombocytopenia - improving   -likely 2/2 sepsis  -monitor     HTN  -On ramipril 5mg, metoprolol tartrate 200mg BID, Nifedipine 60mg daily, Furosemide 40mg daily at home   -BP borderline, will reinstate BP meds as BP allows     SUKUMAR - resolved   -Cr baseline 0.8-0.9 (noted in july, august)    Hx Diabetes Mellitus, ? diet controlled   -Last A1C was 5.6% - 8/14/24  -Noted to be 7.5% in 2018    Hypothyroidism   -Continue levothyroxine 112mcg daily     DANIELLE  -Continue BIPAP qhs     VTE ppx: on eliquis     Pt would like to be d/c to [facility], CM/SW aware, will need auth    72M with h/o Afib, HTN, hypothyroidism, and pre-DM presented with complaints on abdominal pain. Patient initially presented with abdominal pain on 9/8/24 and was discharged, in the interim he developed high grade fever and worsening abdominal pain and came back to ED. In ED pts BP soft, otherwise stable, WBC noted for 21.55, H/H 15/44, Plt 148. CMP noted for BUN/Cr 28/1.36, Lactate 2.3 > 2, T. bili 3.3, UA cloudy, with proteinuria, ketonuria, small leuks. COVID/Flu swab negative, CT abdomen noted for new severe gallbladder wall thickening and pericholecystic stranding in the setting of cholelithiasis is suspicious for acute cholecystitis. RUQ US also noted for cholecystitis. Patient seen by surgery, and wanted some time to consider the option for surgery. RRT called 9/11 for fever, tachycardia, Afib with RVR, hypoxia, transferred to ICU, Pt s/p IR cholecystostomy placement. Now downgraded to medicine 9/14.     Sepsis poa due to acute cholecystitis s/p cholecystostomy placement  Cholelithiasis   -CT: New severe gallbladder wall thickening and pericholecystic   stranding in the setting of cholelithiasis  -Leukocytosis improved   -lactate 2.3 initially, now <2  -Initial blood and urine cx from admission NGTD  -Blood cx: 9/12: one bottle with streptococcus species, then 2 samples that are NGTD (same day 9/12)  -s/p cholecystostomy placement with IR, bile culture with E Coli, KLPN, Bacteroides   -S/p Zosyn, was on meropenem and now abx deescalated to ceftriaxone+ flagyl (9/14)  -Trend and replete electrolytes   -General sx, GI, ID consults appreciated   -IR recommendations noted, tube to stay in place for likely 6-8 weeks    Hx Chronic Afib  -[id number]: 5 ( age, HTN, Hx TIA)  -On Eliquis at home, last dose 9/9 as per pt  -s/p heparin gtt  -restarted Eliquis, continue metoprolol - on 25mg BID for now  -Continue tele monitoring     #Hypotension   -c/w midodrine 10mg q8hr    #thrombocytopenia - improving   -likely 2/2 sepsis  -monitor     HTN  -On ramipril 5mg, metoprolol tartrate 200mg BID, Nifedipine 60mg daily, Furosemide 40mg daily at home   -BP borderline, will reinstate BP meds as BP allows     SUKUMAR - resolved   -Cr baseline 0.8-0.9 (noted in july, august)    Hx Diabetes Mellitus, ? diet controlled   -Last A1C was 5.6% - 8/14/24  -Noted to be 7.5% in 2018    Hypothyroidism   -Continue levothyroxine 112mcg daily     DANIELLE  -Continue BIPAP qhs     VTE ppx: on eliquis     DISPO: f/u repeat blood cultures, d/c to [facility] eventually, likely 1-2 days

## 2024-09-16 NOTE — PROGRESS NOTE ADULT - SUBJECTIVE AND OBJECTIVE BOX
Interventional Radiology Follow-Up Note.      This is a 74y Male s/p Cholecystostomy drain placement on 9/12 in Interventional Radiology with Dr. Plunkett.       Medication:     apixaban: (09-16)  aspirin enteric coated: (09-16)  cefTRIAXone Injectable.: (09-15)  meropenem Injectable: (09-14)  metoprolol tartrate: (09-16)  metroNIDAZOLE    Tablet: (09-16)    Vitals:   T(F): 98.2, Max: 98.2 (21:12)  HR: 92  BP: 115/99  RR: 18  SpO2: 98%    Physical Exam:  General: Nontoxic, in NAD.  Abdomen: soft, NT. Obese.    Drain Device: Drain intact attached to gravity drain bag. Drain with orange colored out put. Purulence noted.  Flushed with 10 cc NS w/o difficulty. Dressing clean, dry, intact.   24hr Drain output: 150cc    LABS:  WBC 5.90 / Hgb 13.5 / Hct 39.8 / Plt 141  Na 137 / K 3.7 / CO2 28 / Cl 104 / BUN 7 / Cr 0.72 / Glucose 116  ALT 29 / AST 28 / Alk Phos 85 / Tbili 0.9  Ptt -- / Pt -- / INR --    Culture - Body Fluid with Gram Stain (09.12.24 @ 11:27)    Gram Stain:   Rare polymorphonuclear leukocytes seen per low power field  Moderate Gram Variable Rods seen per oil power field  Moderate Gram positive cocci in pairs per oil power field   -  Amoxicillin/Clavulanic Acid: S <=8/4   -  Amoxicillin/Clavulanic Acid: S <=8/4   -  Ampicillin: R 16 These ampicillin results predict results for amoxicillin   -  Ampicillin: S <=8 These ampicillin results predict results for amoxicillin   -  Ampicillin/Sulbactam: S <=4/2   -  Ampicillin/Sulbactam: S <=4/2   -  Aztreonam: S <=4   -  Aztreonam: S <=4   -  Cefazolin: S <=2   -  Cefazolin: S <=2   -  Cefepime: S <=2   -  Cefepime: S <=2   -  Cefoxitin: S <=8   -  Cefoxitin: S <=8   -  Ceftriaxone: S <=1   -  Ceftriaxone: S <=1   -  Ciprofloxacin: S <=0.25   -  Ciprofloxacin: S <=0.25   -  Ertapenem: S <=0.5   -  Ertapenem: S <=0.5   -  Gentamicin: S <=2   -  Gentamicin: S <=2   -  Imipenem: S <=1   -  Imipenem: S <=1   -  Levofloxacin: S <=0.5   -  Levofloxacin: S <=0.5   -  Meropenem: S <=1   -  Meropenem: S <=1   -  Piperacillin/Tazobactam: S <=8   -  Piperacillin/Tazobactam: S <=8   -  Tobramycin: S <=2   -  Tobramycin: S <=2   -  Trimethoprim/Sulfamethoxazole: S <=0.5/9.5   -  Trimethoprim/Sulfamethoxazole: S <=0.5/9.5   Specimen Source: Bile Bile Fluid   Culture Results:   Numerous Escherichia coli  Numerous Klebsiella pneumoniae  Numerous Bacteroides ovatus group "Susceptibilities not performed"  Moderate Streptococcus constellatus "Susceptibilities not performed"   Organism Identification: Escherichia coli  Klebsiella pneumoniae   Organism: Escherichia coli   Organism: Klebsiella pneumoniae   Method Type: CHARLOTTE   Method Type: CHARLOTTE        Assessment/Plan:  74y Male admitted with Acute cholecystitis s/p Cholecystostomy drain placement on 9/12.     - Flush drain as ordered  - Change dressing q3 days or when dressing is saturated.  -  Monitor h/h; transfuse as needed  - Trend vs/labs  - Continue global management per primary team  - Recommend follow up with IR in 6 weeks for cholecystomy drain evaluation and possible capping trial. Also, follow up with a surgeon to determine surgical candidacy for cholecystectomy.   - Pt will benefit from VNS service to help with drainage catheter care; Pt should continue same drainage catheter care as an outpatient.

## 2024-09-16 NOTE — CONSULT NOTE ADULT - ASSESSMENT
A: -- seen for the following:       P:   Chart reviewed and Patient evaluated;  Discussed diagnosis and treatment with patient. Discussed importance of daily foot examinations, proper shoe gear, importance of tight glycemic control.   X-rays reviewed : on wet read showing --  Wound flush with normal saline  Wound cx taken  Applied --- with dry sterile dressing  WBAT  to ------------  Offload bilateral heels while bedbound  Continue antibiotics as per ID  All additional care per Med appreciated  Patient demonstrated verbal understanding of all interventions and tolerated interventions well without any complications.   Podiatry will follow while in house      Case D/W attending  ---     A: 72-year-old male  seen for the followin. Left Hallux Blood Blister, stable without signs of infection          P:   Chart reviewed and Patient evaluated;  Discussed diagnosis and treatment with patient. Discussed importance of daily foot examinations, proper shoe gear, importance of tight glycemic control.   De-roofed sanguinous blood blister using sterile instrument  Applied Betadine-painted and small Allevyn pad   WBAT  to the left foot   All additional care per Med appreciated  No acute podiatric intervention warranted at this time  Podiatry will sign off with nursing order of bacitracin and band aid/ Allevyn pad   Patient to follow Dr Beasley within one week after discharge.   Patient demonstrated verbal understanding of all interventions and tolerated interventions well without any complications.     Case D/W attending Dr. Beasley      A: 72-year-old male  seen for the following:      Left Hallux Blood Blister, stable without signs of infection          P:   Chart reviewed and Patient evaluated;  Discussed diagnosis and treatment with patient. Discussed importance of daily foot examinations, proper shoe gear, importance of tight glycemic control.   De-roofed sanguinous blood blister using sterile instruments.  Wound flushed with copious amount of saline.  Applied bacitracin and band-aid   WBAT  to the left foot   All additional care per Med appreciated  No acute podiatric intervention warranted at this time  Podiatry will sign off with nursing order of bacitracin and band aid  Patient to follow Dr Beasley or his podiatrist within 1 - 3 weeks after discharge  Patient demonstrated verbal understanding of all interventions and tolerated interventions well without any complications.     Case D/W attending Dr. Beasley       Wound Care Orders for Left big toe wound every other day  Please gently remove the band-aid  Clean the wound site of any debris with 4x4 gauze  Apply bacitracin and new band-aid    Thank you.     A: 72-year-old male  seen for the following:      Left Hallux Blood Blister, stable without signs of infection          P:   Chart reviewed and Patient evaluated;  Discussed diagnosis and treatment with patient. Discussed importance of daily foot examinations, proper shoe gear, importance of tight glycemic control.   De-roofed sanguinous blood blister using sterile instruments.  Wound flushed with copious amount of saline.  Applied bacitracin and band-aid   WBAT  to the left foot   All additional care per Med appreciated  No acute podiatric intervention warranted at this time  Podiatry will sign off with nursing order of bacitracin and band aid  Patient to follow Dr Beasley or his podiatrist within 1 week after discharge  Patient demonstrated verbal understanding of all interventions and tolerated interventions well without any complications.     Case D/W attending Dr. Beasley       Wound Care Orders for Left big toe wound every other day  Please gently remove the band-aid  Clean the wound site of any debris with 4x4 gauze  Apply bacitracin and new band-aid    Thank you.

## 2024-09-17 PROCEDURE — 99231 SBSQ HOSP IP/OBS SF/LOW 25: CPT

## 2024-09-17 PROCEDURE — 99232 SBSQ HOSP IP/OBS MODERATE 35: CPT

## 2024-09-17 RX ADMIN — URSODIOL 300 MILLIGRAM(S): 500 TABLET, FILM COATED ORAL at 21:57

## 2024-09-17 RX ADMIN — Medication 500 MILLIGRAM(S): at 16:28

## 2024-09-17 RX ADMIN — Medication 500 MILLIGRAM(S): at 21:56

## 2024-09-17 RX ADMIN — URSODIOL 300 MILLIGRAM(S): 500 TABLET, FILM COATED ORAL at 10:33

## 2024-09-17 RX ADMIN — METOPROLOL TARTRATE 25 MILLIGRAM(S): 100 TABLET ORAL at 21:56

## 2024-09-17 RX ADMIN — Medication 1 APPLICATION(S): at 10:00

## 2024-09-17 RX ADMIN — Medication 2000 MILLIGRAM(S): at 21:55

## 2024-09-17 RX ADMIN — Medication 112 MICROGRAM(S): at 06:03

## 2024-09-17 RX ADMIN — Medication 500 MILLIGRAM(S): at 06:01

## 2024-09-17 RX ADMIN — Medication 150 MILLIGRAM(S): at 21:55

## 2024-09-17 RX ADMIN — Medication 81 MILLIGRAM(S): at 10:33

## 2024-09-17 RX ADMIN — APIXABAN 5 MILLIGRAM(S): 5 TABLET, FILM COATED ORAL at 18:57

## 2024-09-17 RX ADMIN — MIDODRINE HYDROCHLORIDE 5 MILLIGRAM(S): 5 TABLET ORAL at 16:28

## 2024-09-17 RX ADMIN — CHLORHEXIDINE GLUCONATE 1 APPLICATION(S): 40 SOLUTION TOPICAL at 06:03

## 2024-09-17 RX ADMIN — APIXABAN 5 MILLIGRAM(S): 5 TABLET, FILM COATED ORAL at 06:01

## 2024-09-17 RX ADMIN — METOPROLOL TARTRATE 25 MILLIGRAM(S): 100 TABLET ORAL at 10:33

## 2024-09-17 RX ADMIN — Medication 1 APPLICATION(S): at 22:01

## 2024-09-17 NOTE — PROGRESS NOTE ADULT - PROVIDER SPECIALTY LIST ADULT
Critical Care
Critical Care
Gastroenterology
Hospitalist
Intervent Radiology
Infectious Disease
Intervent Radiology
Hospitalist
Infectious Disease
Surgery
Surgery
Critical Care
Intervent Radiology
Critical Care
Critical Care

## 2024-09-17 NOTE — PROGRESS NOTE ADULT - ASSESSMENT
73yo M with septic shock 2/2 cholecystitis/cholelithiasis now s/p IR choelcystostomy  - WBC WNL  - afebrile  - 90cc clear bile output overnight

## 2024-09-17 NOTE — PROGRESS NOTE ADULT - PROBLEM SELECTOR PLAN 1
- Trend bilirubin, MRCP pending  - GI consult Dr. Mercer  - C/W Abx  - On heparin ggt  - If patient would like surgery will need clearance from medical & cardiac standpoint  Case d/w Dr. Solis
- Continue to monitor outputs  - Flush with 10cc steriles saline every 12 hrs  - Can consider IR tube check in 6-8 weeks
- Flush drain as ordered  - Change dressing q3 days or when dressing is saturated.  -  Monitor h/h; transfuse as needed  - Trend vs/labs  - Continue global management per primary team  - Recommend follow up with IR in 6 weeks for cholecystomy drain evaluation and possible capping trial. Also, follow up with a surgeon to determine surgical candidacy for cholecystectomy.   - Pt will benefit from VNS service to help with drainage catheter care; Pt should continue same drainage catheter care as an outpatient.

## 2024-09-17 NOTE — PROGRESS NOTE ADULT - SUBJECTIVE AND OBJECTIVE BOX
HPI: 74M with h/o Afib, HTN, hypothyroidism, and pre-DM presents with complaints on abdominal pain. Patient initially presented with abdominal pain on 9/8/24 and was discharged, in the interim he developed high grade fever and worsening abdominal pain and came back to ED. CT abdomen noted for new severe gallbladder wall thickening and pericholecystic stranding in the setting of cholelithiasis is suspicious for acute cholecystitis. Pt now s/p IR cholecystostomy placement.     9/16: pt seen this AM, awake, alert, doing well, no overnight issues, continues BIPAP qhs.   9/17:       REVIEW OF SYSTEMS:    CONSTITUTIONAL: No weakness, fevers or chills  EYES/ENT: No visual changes;  No vertigo or throat pain   NECK: No pain or stiffness  RESPIRATORY: No cough, wheezing, hemoptysis; No shortness of breath  CARDIOVASCULAR: No chest pain or palpitations  GASTROINTESTINAL: No abdominal or epigastric pain. No nausea, vomiting, or hematemesis; No diarrhea or constipation. No melena or hematochezia.  GENITOURINARY: No dysuria, frequency or hematuria  NEUROLOGICAL: No numbness or weakness  SKIN: No itching, rashes    MEDICATIONS  (STANDING):  apixaban 5 milliGRAM(s) Oral every 12 hours  aspirin enteric coated 81 milliGRAM(s) Oral daily  cefTRIAXone Injectable. 2000 milliGRAM(s) IV Push every 24 hours  chlorhexidine 4% Liquid 1 Application(s) Topical daily  influenza  Vaccine (HIGH DOSE) 0.5 milliLiter(s) IntraMuscular once  levothyroxine 112 MICROGram(s) Oral daily  metoprolol tartrate 25 milliGRAM(s) Oral two times a day  metroNIDAZOLE    Tablet 500 milliGRAM(s) Oral every 8 hours  midodrine 10 milliGRAM(s) Oral every 8 hours  nystatin Powder 1 Application(s) Topical two times a day  potassium phosphate / sodium phosphate Powder (PHOS-NaK) 1 Packet(s) Oral four times a day  ursodiol Capsule 300 milliGRAM(s) Oral two times a day    MEDICATIONS  (PRN):  aluminum hydroxide/magnesium hydroxide/simethicone Suspension 30 milliLiter(s) Oral every 4 hours PRN Dyspepsia  melatonin 3 milliGRAM(s) Oral at bedtime PRN Insomnia  ondansetron Injectable 4 milliGRAM(s) IV Push once PRN Nausea and/or Vomiting  traZODone 150 milliGRAM(s) Oral <User Schedule> PRN insomnia      Vital Signs Last 24 Hrs  T(C): 36.5 (17 Sep 2024 07:43), Max: 36.8 (16 Sep 2024 20:19)  T(F): 97.7 (17 Sep 2024 07:43), Max: 98.2 (16 Sep 2024 20:19)  HR: 86 (17 Sep 2024 07:43) (86 - 100)  BP: 135/84 (17 Sep 2024 07:43) (135/84 - 153/65)  RR: 18 (17 Sep 2024 07:43) (18 - 18)  SpO2: 97% (17 Sep 2024 07:43) (96% - 97%)    Parameters below as of 17 Sep 2024 07:43  Patient On (Oxygen Delivery Method): room air          PHYSICAL EXAM:  GENERAL: NAD, lying in bed comfortably  HEAD:  Atraumatic, Normocephalic  EYES: conjunctiva and sclera clear  ENT: Moist mucous membranes  NECK: Supple, No JVD  CHEST/LUNG: Clear to auscultation bilaterally; No rales, rhonchi, wheezing. Unlabored respirations  HEART: Regular rate and rhythm; No murmurs  ABDOMEN: Bowel sounds present; Soft, Nontender, Nondistended, cholecystomy in place draining yellow fluid  EXTREMITIES:  2+ Peripheral Pulses, brisk capillary refill. No clubbing, cyanosis, or edema  NERVOUS SYSTEM:  Alert & Oriented X3, speech clear. No deficits           LABS:                                     13.5   5.90  )-----------( 141      ( 16 Sep 2024 06:44 )             39.8   09-16    137  |  104  |  7   ----------------------------<  116<H>  3.7   |  28  |  0.72    Ca    8.7      16 Sep 2024 06:44  Phos  2.6     09-16  Mg     1.9     09-16    TPro  5.9<L>  /  Alb  2.0<L>  /  TBili  0.9  /  DBili  x   /  AST  28  /  ALT  29  /  AlkPhos  85  09-16                Urinalysis Basic - ( 15 Sep 2024 05:16 )    Color: x / Appearance: x / SG: x / pH: x  Gluc: 120 mg/dL / Ketone: x  / Bili: x / Urobili: x   Blood: x / Protein: x / Nitrite: x   Leuk Esterase: x / RBC: x / WBC x   Sq Epi: x / Non Sq Epi: x / Bacteria: x        RADIOLOGY:      < from: CT Abdomen and Pelvis w/ IV Cont (09.10.24 @ 17:56) >  IMPRESSION: New severe gallbladder wall thickening and pericholecystic   stranding in the setting of cholelithiasis is suspicious for acute   cholecystitis. Recommend clinical correlation and consider right upper   quadrant ultrasound.     HPI: 74M with h/o Afib, HTN, hypothyroidism, and pre-DM presents with complaints on abdominal pain. Patient initially presented with abdominal pain on 9/8/24 and was discharged, in the interim he developed high grade fever and worsening abdominal pain and came back to ED. CT abdomen noted for new severe gallbladder wall thickening and pericholecystic stranding in the setting of cholelithiasis is suspicious for acute cholecystitis. Pt now s/p IR cholecystostomy placement.     9/16: pt seen this AM, awake, alert, doing well, no overnight issues, continues BIPAP qhs.   9/17: pt seen and examined today, no overnight issues reported, feels well, no complaints to offer       REVIEW OF SYSTEMS:    CONSTITUTIONAL: No weakness, fevers or chills  EYES/ENT: No visual changes;  No vertigo or throat pain   NECK: No pain or stiffness  RESPIRATORY: No cough, wheezing, hemoptysis; No shortness of breath  CARDIOVASCULAR: No chest pain or palpitations  GASTROINTESTINAL: No abdominal or epigastric pain. No nausea, vomiting, or hematemesis; No diarrhea or constipation. No melena or hematochezia.  GENITOURINARY: No dysuria, frequency or hematuria  NEUROLOGICAL: No numbness or weakness  SKIN: No itching, rashes    MEDICATIONS  (STANDING):  apixaban 5 milliGRAM(s) Oral every 12 hours  aspirin enteric coated 81 milliGRAM(s) Oral daily  cefTRIAXone Injectable. 2000 milliGRAM(s) IV Push every 24 hours  chlorhexidine 4% Liquid 1 Application(s) Topical daily  influenza  Vaccine (HIGH DOSE) 0.5 milliLiter(s) IntraMuscular once  levothyroxine 112 MICROGram(s) Oral daily  metoprolol tartrate 25 milliGRAM(s) Oral two times a day  metroNIDAZOLE    Tablet 500 milliGRAM(s) Oral every 8 hours  midodrine 10 milliGRAM(s) Oral every 8 hours  nystatin Powder 1 Application(s) Topical two times a day  potassium phosphate / sodium phosphate Powder (PHOS-NaK) 1 Packet(s) Oral four times a day  ursodiol Capsule 300 milliGRAM(s) Oral two times a day    MEDICATIONS  (PRN):  aluminum hydroxide/magnesium hydroxide/simethicone Suspension 30 milliLiter(s) Oral every 4 hours PRN Dyspepsia  melatonin 3 milliGRAM(s) Oral at bedtime PRN Insomnia  ondansetron Injectable 4 milliGRAM(s) IV Push once PRN Nausea and/or Vomiting  traZODone 150 milliGRAM(s) Oral <User Schedule> PRN insomnia      Vital Signs Last 24 Hrs  T(C): 36.5 (17 Sep 2024 07:43), Max: 36.8 (16 Sep 2024 20:19)  T(F): 97.7 (17 Sep 2024 07:43), Max: 98.2 (16 Sep 2024 20:19)  HR: 86 (17 Sep 2024 07:43) (86 - 100)  BP: 135/84 (17 Sep 2024 07:43) (135/84 - 153/65)  RR: 18 (17 Sep 2024 07:43) (18 - 18)  SpO2: 97% (17 Sep 2024 07:43) (96% - 97%)    Parameters below as of 17 Sep 2024 07:43  Patient On (Oxygen Delivery Method): room air          PHYSICAL EXAM:  GENERAL: NAD, lying in bed comfortably  HEAD:  Atraumatic, Normocephalic  EYES: conjunctiva and sclera clear  ENT: Moist mucous membranes  NECK: Supple, No JVD  CHEST/LUNG: Clear to auscultation bilaterally; No rales, rhonchi, wheezing. Unlabored respirations  HEART: Regular rate and rhythm; No murmurs  ABDOMEN: Bowel sounds present; Soft, Nontender, Nondistended, cholecystomy in place draining yellow fluid  EXTREMITIES:  2+ Peripheral Pulses, brisk capillary refill. No clubbing, cyanosis, or edema  NERVOUS SYSTEM:  Alert & Oriented X3, speech clear. No deficits           LABS:                                     13.5   5.90  )-----------( 141      ( 16 Sep 2024 06:44 )             39.8   09-16    137  |  104  |  7   ----------------------------<  116<H>  3.7   |  28  |  0.72    Ca    8.7      16 Sep 2024 06:44  Phos  2.6     09-16  Mg     1.9     09-16    TPro  5.9<L>  /  Alb  2.0<L>  /  TBili  0.9  /  DBili  x   /  AST  28  /  ALT  29  /  AlkPhos  85  09-16                Urinalysis Basic - ( 15 Sep 2024 05:16 )    Color: x / Appearance: x / SG: x / pH: x  Gluc: 120 mg/dL / Ketone: x  / Bili: x / Urobili: x   Blood: x / Protein: x / Nitrite: x   Leuk Esterase: x / RBC: x / WBC x   Sq Epi: x / Non Sq Epi: x / Bacteria: x        RADIOLOGY:      < from: CT Abdomen and Pelvis w/ IV Cont (09.10.24 @ 17:56) >  IMPRESSION: New severe gallbladder wall thickening and pericholecystic   stranding in the setting of cholelithiasis is suspicious for acute   cholecystitis. Recommend clinical correlation and consider right upper   quadrant ultrasound.

## 2024-09-17 NOTE — PROGRESS NOTE ADULT - SUBJECTIVE AND OBJECTIVE BOX
72M with h/o Afib, HTN, hypothyroidism, and pre-DM presents with complaints on abdominal pain. Patient initially presented with abdominal pain on 9/8/24 and was discharged, in the interim he developed high grade fever and worsening abdominal pain and came back to ED. In ED pts BP soft, otherwise stable, WBC noted for 21.55, H/H 15/44, Plt 148. CMP noted for BUN/Cr 28/1.36, Lactate 2.3 > 2, T. bili 3.3, UA cloudy, with proteinuria, ketonuria, small leuks. COVID/Flu swab negative, CT abdomen noted for new severe gallbladder wall thickening and pericholecystic stranding in the setting of cholelithiasis is suspicious for acute cholecystitis. Pt now s/p IR cholecystostomy placement.     Vital Signs Last 24 Hrs  T(C): 36.5 (17 Sep 2024 07:43), Max: 36.8 (16 Sep 2024 20:19)  T(F): 97.7 (17 Sep 2024 07:43), Max: 98.2 (16 Sep 2024 20:19)  HR: 86 (17 Sep 2024 07:43) (86 - 100)  BP: 135/84 (17 Sep 2024 07:43) (135/84 - 153/65)  BP(mean): --  RR: 18 (17 Sep 2024 07:43) (18 - 18)  SpO2: 97% (17 Sep 2024 07:43) (96% - 97%)    Parameters below as of 17 Sep 2024 07:43  Patient On (Oxygen Delivery Method): room air        GENERAL APPEARANCE: Well developed, in no acute distress.    LUNGS: Auscultation of the lungs revealed normal breath sounds without any other adventitious sounds or rubs.    CARDIOVASCULAR: There was a regular rate and rhythm    ABDOMEN: Soft and nontender with normal bowel sounds.     NEUROLOGIC: Alert and oriented x 3. Normal affect.       CBC                        13.5   5.90  )-----------( 141      ( 16 Sep 2024 06:44 )             39.8       Chemistry  09-16    137  |  104  |  7   ----------------------------<  116[H]  3.7   |  28  |  0.72    Ca    8.7      16 Sep 2024 06:44  Phos  2.6     09-16  Mg     1.9     09-16    TPro  5.9[L]  /  Alb  2.0[L]  /  TBili  0.9  /  DBili  x   /  AST  28  /  ALT  29  /  AlkPhos  85  09-16

## 2024-09-17 NOTE — PROGRESS NOTE ADULT - ASSESSMENT
72M with h/o Afib, HTN, hypothyroidism, and pre-DM presented with complaints on abdominal pain. Patient initially presented with abdominal pain on 9/8/24 and was discharged, in the interim he developed high grade fever and worsening abdominal pain and came back to ED. In ED pts BP soft, otherwise stable, WBC noted for 21.55, H/H 15/44, Plt 148. CMP noted for BUN/Cr 28/1.36, Lactate 2.3 > 2, T. bili 3.3, UA cloudy, with proteinuria, ketonuria, small leuks. COVID/Flu swab negative, CT abdomen noted for new severe gallbladder wall thickening and pericholecystic stranding in the setting of cholelithiasis is suspicious for acute cholecystitis. RUQ US also noted for cholecystitis. Patient seen by surgery, and wanted some time to consider the option for surgery. RRT called 9/11 for fever, tachycardia, Afib with RVR, hypoxia, transferred to ICU, Pt s/p IR cholecystostomy placement. Now downgraded to medicine 9/14.     Sepsis poa due to acute cholecystitis s/p cholecystostomy placement  Cholelithiasis   -CT: New severe gallbladder wall thickening and pericholecystic   stranding in the setting of cholelithiasis  -Leukocytosis improved   -lactate 2.3 initially, now <2  -Initial blood and urine cx from admission NGTD  -Blood cx: 9/12: one bottle with streptococcus species, then 2 samples that are NGTD (same day 9/12)  -s/p cholecystostomy placement with IR, bile culture with E Coli, KLPN, Bacteroides   -S/p Zosyn, was on meropenem and now abx deescalated to ceftriaxone+ flagyl (9/14)  -Trend and replete electrolytes   -General sx, GI, ID consults appreciated   -IR recommendations noted, tube to stay in place for likely 6-8 weeks    Hx Chronic Afib  -[id number]: 5 ( age, HTN, Hx TIA)  -On Eliquis at home, last dose 9/9 as per pt  -s/p heparin gtt  -restarted Eliquis, continue metoprolol - on 25mg BID for now  -Continue tele monitoring     #Hypotension   -c/w midodrine 10mg q8hr    #thrombocytopenia - improving   -likely 2/2 sepsis  -monitor     HTN  -On ramipril 5mg, metoprolol tartrate 200mg BID, Nifedipine 60mg daily, Furosemide 40mg daily at home   -BP borderline, will reinstate BP meds as BP allows     SUKUMAR - resolved   -Cr baseline 0.8-0.9 (noted in july, august)    Hx Diabetes Mellitus, ? diet controlled   -Last A1C was 5.6% - 8/14/24  -Noted to be 7.5% in 2018    Hypothyroidism   -Continue levothyroxine 112mcg daily     DANIELLE  -Continue BIPAP qhs     VTE ppx: on eliquis     DISPO: f/u repeat blood cultures, d/c to [facility] eventually, likely 1-2 days    72M with h/o Afib, HTN, hypothyroidism, and pre-DM presented with complaints on abdominal pain. Patient initially presented with abdominal pain on 9/8/24 and was discharged, in the interim he developed high grade fever and worsening abdominal pain and came back to ED. In ED pts BP soft, otherwise stable, WBC noted for 21.55, H/H 15/44, Plt 148. CMP noted for BUN/Cr 28/1.36, Lactate 2.3 > 2, T. bili 3.3, UA cloudy, with proteinuria, ketonuria, small leuks. COVID/Flu swab negative, CT abdomen noted for new severe gallbladder wall thickening and pericholecystic stranding in the setting of cholelithiasis is suspicious for acute cholecystitis. RUQ US also noted for cholecystitis. Patient seen by surgery, and wanted some time to consider the option for surgery. RRT called 9/11 for fever, tachycardia, Afib with RVR, hypoxia, transferred to ICU, Pt s/p IR cholecystostomy placement. Now downgraded to medicine 9/14.     Sepsis poa due to acute cholecystitis s/p cholecystostomy placement  Cholelithiasis   -CT: New severe gallbladder wall thickening and pericholecystic   stranding in the setting of cholelithiasis  -Leukocytosis improved   -lactate 2.3 initially, now <2  -Initial blood and urine cx from admission NGTD  -Blood cx: 9/12: one bottle with streptococcus species, then 2 samples that are NGTD (same day 9/12)  -s/p cholecystostomy placement with IR, bile culture with E Coli, KLPN, Bacteroides   -S/p Zosyn, was on meropenem and now abx deescalated to ceftriaxone+ flagyl (9/14)  -Trend and replete electrolytes   -General sx, GI, ID consults appreciated   -IR recommendations noted, tube to stay in place for likely 6-8 weeks    Hx Chronic Afib  -[id number]: 5 ( age, HTN, Hx TIA)  -On Eliquis at home, last dose 9/9 as per pt  -s/p heparin gtt  -restarted Eliquis, continue metoprolol - on 25mg BID for now  -Continue tele monitoring     #Hypotension   -c/w midodrine 10mg q8hr    #thrombocytopenia - improving   -likely 2/2 sepsis  -monitor     HTN  -On ramipril 5mg, metoprolol tartrate 200mg BID, Nifedipine 60mg daily, Furosemide 40mg daily at home   -BP borderline, will reinstate BP meds as BP allows     SUKUMAR - resolved   -Cr baseline 0.8-0.9 (noted in july, august)    Hx Diabetes Mellitus, ? diet controlled   -Last A1C was 5.6% - 8/14/24  -Noted to be 7.5% in 2018    Hypothyroidism   -Continue levothyroxine 112mcg daily     DANIELLE  -Continue BIPAP qhs     VTE ppx: on eliquis     DISPO: f/u repeat blood cultures, d/c to [facility] eventually

## 2024-09-18 ENCOUNTER — TRANSCRIPTION ENCOUNTER (OUTPATIENT)
Age: 74
End: 2024-09-18

## 2024-09-18 VITALS — HEART RATE: 70 BPM | SYSTOLIC BLOOD PRESSURE: 133 MMHG | DIASTOLIC BLOOD PRESSURE: 79 MMHG

## 2024-09-18 PROCEDURE — 99239 HOSP IP/OBS DSCHRG MGMT >30: CPT

## 2024-09-18 RX ORDER — RAMIPRIL 10 MG
1 CAPSULE ORAL
Refills: 0 | DISCHARGE

## 2024-09-18 RX ORDER — TIRZEPATIDE 5 MG/.5ML
7.5 INJECTION, SOLUTION SUBCUTANEOUS
Refills: 0 | DISCHARGE

## 2024-09-18 RX ORDER — AMOXICILLIN AND CLAVULANATE POTASSIUM 250; 125 MG/1; MG/1
875 TABLET, FILM COATED ORAL
Qty: 14 | Refills: 0
Start: 2024-09-18 | End: 2024-09-24

## 2024-09-18 RX ORDER — URSODIOL 500 MG/1
1 TABLET, FILM COATED ORAL
Qty: 0 | Refills: 0 | DISCHARGE
Start: 2024-09-18

## 2024-09-18 RX ORDER — ERGOCALCIFEROL (VITAMIN D2) 200 MCG/ML
1 DROPS ORAL
Refills: 0 | DISCHARGE

## 2024-09-18 RX ORDER — URSODIOL 500 MG/1
1 TABLET, FILM COATED ORAL
Refills: 0 | DISCHARGE

## 2024-09-18 RX ORDER — METOPROLOL TARTRATE 100 MG/1
1 TABLET ORAL
Qty: 0 | Refills: 0 | DISCHARGE
Start: 2024-09-18

## 2024-09-18 RX ORDER — LEVOTHYROXINE SODIUM 100 MCG
1 TABLET ORAL
Qty: 0 | Refills: 0 | DISCHARGE
Start: 2024-09-18

## 2024-09-18 RX ADMIN — URSODIOL 300 MILLIGRAM(S): 500 TABLET, FILM COATED ORAL at 11:16

## 2024-09-18 RX ADMIN — Medication 1 APPLICATION(S): at 10:45

## 2024-09-18 RX ADMIN — CHLORHEXIDINE GLUCONATE 1 APPLICATION(S): 40 SOLUTION TOPICAL at 05:41

## 2024-09-18 RX ADMIN — APIXABAN 5 MILLIGRAM(S): 5 TABLET, FILM COATED ORAL at 05:39

## 2024-09-18 RX ADMIN — Medication 500 MILLIGRAM(S): at 14:07

## 2024-09-18 RX ADMIN — Medication 500 MILLIGRAM(S): at 05:39

## 2024-09-18 RX ADMIN — Medication 112 MICROGRAM(S): at 05:39

## 2024-09-18 RX ADMIN — METOPROLOL TARTRATE 25 MILLIGRAM(S): 100 TABLET ORAL at 11:16

## 2024-09-18 RX ADMIN — Medication 81 MILLIGRAM(S): at 11:16

## 2024-09-18 NOTE — DISCHARGE NOTE PROVIDER - HOSPITAL COURSE
72M with h/o Afib, HTN, hypothyroidism, and pre-DM presented with complaints on abdominal pain. Patient initially presented with abdominal pain on 9/8/24 and was discharged, in the interim he developed high grade fever and worsening abdominal pain and came back to ED. In ED pts BP soft, otherwise stable, WBC noted for 21.55, H/H 15/44, Plt 148. CMP noted for BUN/Cr 28/1.36, Lactate 2.3 > 2, T. bili 3.3, UA cloudy, with proteinuria, ketonuria, small leuks. COVID/Flu swab negative, CT abdomen noted for new severe gallbladder wall thickening and pericholecystic stranding in the setting of cholelithiasis is suspicious for acute cholecystitis. RUQ US also noted for cholecystitis. Patient seen by surgery, and wanted some time to consider the option for surgery. RRT called 9/11 for fever, tachycardia, Afib with RVR, hypoxia, transferred to ICU, Pt s/p IR cholecystostomy placement. Now downgraded to medicine 9/14.     Sepsis poa due to acute cholecystitis s/p cholecystostomy placement  Cholelithiasis   -CT: New severe gallbladder wall thickening and pericholecystic   stranding in the setting of cholelithiasis  -Leukocytosis improved   -lactate 2.3 initially, now <2  -Initial blood and urine cx from admission NGTD  -Blood cx: 9/12: one bottle with streptococcus species, then 2 samples that are NGTD (same day 9/12)  -s/p cholecystostomy placement with IR, bile culture with E Coli, KLPN, Bacteroides   -S/p Zosyn, was on meropenem and now abx deescalated to ceftriaxone+ flagyl (9/14)  -Trend and replete electrolytes   -General sx, GI, ID consults appreciated   -IR recommendations noted, tube to stay in place for likely 6-8 weeks  -repeat blood cx prelim - NGTD    Hx Chronic Afib  -[id number]: 5 ( age, HTN, Hx TIA)  -On Eliquis at home, last dose 9/9 as per pt  -s/p heparin gtt  -restarted Eliquis, continue metoprolol - on 25mg BID for now  -Continue tele monitoring     #Hypotension   -c/w midodrine 10mg q8hr    #thrombocytopenia - improving   -likely 2/2 sepsis  -monitor     HTN  -On ramipril 5mg, metoprolol tartrate 200mg BID, Nifedipine 60mg daily, Furosemide 40mg daily at home   -BP borderline, will reinstate BP meds as BP allows     SUKUMAR - resolved   -Cr baseline 0.8-0.9 (noted in july, august)    Hx Diabetes Mellitus, ? diet controlled   -Last A1C was 5.6% - 8/14/24  -Noted to be 7.5% in 2018    Hypothyroidism   -Continue levothyroxine 112mcg daily     DANIELLE  -Continue BIPAP qhs     VTE ppx: on eliquis

## 2024-09-18 NOTE — DISCHARGE NOTE PROVIDER - NSDCCPCAREPLAN_GEN_ALL_CORE_FT
PRINCIPAL DISCHARGE DIAGNOSIS  Diagnosis: Sepsis  Assessment and Plan of Treatment: Admitted for sepsis due to acute cholecystitis, requiring percutaneous cholecystostomy tube placement. Continue with augmentin for 7 more days.      SECONDARY DISCHARGE DIAGNOSES  Diagnosis: Cholecystolithiasis  Assessment and Plan of Treatment: Follow up with interventional radiology, Dr. Plunkett in 6 weeks and with general surgeon, Dr. Glynn for continued management.    Diagnosis: Cholecystostomy care  Assessment and Plan of Treatment: Please irrigate perc choley drain with 10cc sterile saline every 12 hours    Diagnosis: SUKUMAR (acute kidney injury)  Assessment and Plan of Treatment: Due to sepsis, now resolved    Diagnosis: Bilateral renal masses  Assessment and Plan of Treatment: Continue to monitor with your primary care physician    Diagnosis: Afib  Assessment and Plan of Treatment: Continue eliquis twice daily, metoprolol 25mg twice daily.    Diagnosis: Blister  Assessment and Plan of Treatment: Please gently remove the bandaid  Clean the wound site of any debris with 4x4 gauze  Apply bacitracin and new bandaid

## 2024-09-18 NOTE — DISCHARGE NOTE PROVIDER - NSDCFUSCHEDAPPT_GEN_ALL_CORE_FT
Edith Scott  [facility] Physician [facility]  CARDIOLOGY [address]  Scheduled Appointment: 09/26/2024

## 2024-09-18 NOTE — DISCHARGE NOTE PROVIDER - CARE PROVIDERS DIRECT ADDRESSES
[email or web address],DirectAddress_Unknown,DirectAddress_Unknown

## 2024-09-18 NOTE — DISCHARGE NOTE PROVIDER - CARE PROVIDER_API CALL
Lorne Fields  Cardiovascular Disease  [address], [address]  [place] [address]  Phone: [phone]  Fax: [phone]  Scheduled Appointment: 09/26/2024    Raleigh Plunkett  Interventional Radiology and Diagnostic Radiology  [address] [place] [address]  Phone: [phone]  Fax: [phone]  Follow Up Time:     Sekou Glynn  Surgery  [address], [address]  [place] [address]  Phone: [phone]  Fax: [phone]  Follow Up Time:     Lorne Keller  Internal Medicine  [address] [place] [address]  Phone: [phone]  Fax: [phone]  Follow Up Time:

## 2024-09-18 NOTE — DISCHARGE NOTE PROVIDER - PROVIDER TOKENS
PROVIDER:[TOKEN:[[id number]],SCHEDULEDAPPT:[09/26/2024]],PROVIDER:[TOKEN:[[id number]]],PROVIDER:[TOKEN:[[id number]]],PROVIDER:[TOKEN:[[id number]]]

## 2024-09-18 NOTE — DISCHARGE NOTE PROVIDER - NSDCMRMEDTOKEN_GEN_ALL_CORE_FT
amoxicillin-clavulanate 875 mg-125 mg oral tablet: 875 milligram(s) orally 2 times a day  apixaban 5 mg oral tablet: 1 tab(s) orally every 12 hours MDD:2 tabs  aspirin 81 mg oral delayed release tablet: 1 tab(s) orally once a day  atorvastatin 20 mg oral tablet: 1 tab(s) orally once a day  cholecalciferol 25 mcg (1000 intl units) oral tablet: 1 tab(s) orally once a day  ciclopirox 8% topical solution: Apply topically to affected area once a day to nails  ketoconazole 2% topical cream: Apply topically to affected area 2 times a day to feet  levothyroxine 112 mcg (0.112 mg) oral tablet: 1 tab(s) orally once a day  metoprolol tartrate 25 mg oral tablet: 1 tab(s) orally 2 times a day  traZODone 150 mg oral tablet: 2 tab(s) orally once a day (at bedtime) as needed for  insomnia  ursodiol 300 mg oral capsule: 1 cap(s) orally 2 times a day

## 2024-09-18 NOTE — DISCHARGE NOTE NURSING/CASE MANAGEMENT/SOCIAL WORK - PATIENT PORTAL LINK FT
You can access the FollowMyHealth Patient Portal offered by [facility] by registering at the following website: [email or web address]. By joining [employer]’s FollowMyHealth portal, you will also be able to view your health information using other applications (apps) compatible with our system.

## 2024-09-23 ENCOUNTER — INPATIENT (INPATIENT)
Facility: HOSPITAL | Age: 74
LOS: 3 days | Discharge: HOME | DRG: 920 | End: 2024-09-27
Attending: FAMILY MEDICINE | Admitting: FAMILY MEDICINE
Payer: COMMERCIAL

## 2024-09-23 VITALS
SYSTOLIC BLOOD PRESSURE: 132 MMHG | HEIGHT: 71 IN | RESPIRATION RATE: 18 BRPM | OXYGEN SATURATION: 95 % | WEIGHT: 315 LBS | HEART RATE: 83 BPM | TEMPERATURE: 98 F | DIASTOLIC BLOOD PRESSURE: 91 MMHG

## 2024-09-23 DIAGNOSIS — Z98.890 OTHER SPECIFIED POSTPROCEDURAL STATES: Chronic | ICD-10-CM

## 2024-09-23 LAB
ALBUMIN SERPL ELPH-MCNC: 2.7 G/DL — LOW (ref 3.3–5)
ALP SERPL-CCNC: 59 U/L — SIGNIFICANT CHANGE UP (ref 40–120)
ALT FLD-CCNC: 21 U/L — SIGNIFICANT CHANGE UP (ref 12–78)
ANION GAP SERPL CALC-SCNC: 5 MMOL/L — SIGNIFICANT CHANGE UP (ref 5–17)
AST SERPL-CCNC: 27 U/L — SIGNIFICANT CHANGE UP (ref 15–37)
BASOPHILS # BLD AUTO: 0.06 K/UL — SIGNIFICANT CHANGE UP (ref 0–0.2)
BASOPHILS NFR BLD AUTO: 0.9 % — SIGNIFICANT CHANGE UP (ref 0–2)
BILIRUB SERPL-MCNC: 0.6 MG/DL — SIGNIFICANT CHANGE UP (ref 0.2–1.2)
BLD GP AB SCN SERPL QL: SIGNIFICANT CHANGE UP
BUN SERPL-MCNC: 6 MG/DL — LOW (ref 7–23)
CALCIUM SERPL-MCNC: 9.4 MG/DL — SIGNIFICANT CHANGE UP (ref 8.5–10.1)
CHLORIDE SERPL-SCNC: 112 MMOL/L — HIGH (ref 96–108)
CO2 SERPL-SCNC: 23 MMOL/L — SIGNIFICANT CHANGE UP (ref 22–31)
CREAT SERPL-MCNC: 1.06 MG/DL — SIGNIFICANT CHANGE UP (ref 0.5–1.3)
EGFR: 73 ML/MIN/1.73M2 — SIGNIFICANT CHANGE UP
EOSINOPHIL # BLD AUTO: 0.05 K/UL — SIGNIFICANT CHANGE UP (ref 0–0.5)
EOSINOPHIL NFR BLD AUTO: 0.7 % — SIGNIFICANT CHANGE UP (ref 0–6)
GLUCOSE SERPL-MCNC: 143 MG/DL — HIGH (ref 70–99)
HCT VFR BLD CALC: 41.2 % — SIGNIFICANT CHANGE UP (ref 39–50)
HGB BLD-MCNC: 13.8 G/DL — SIGNIFICANT CHANGE UP (ref 13–17)
IMM GRANULOCYTES NFR BLD AUTO: 1 % — HIGH (ref 0–0.9)
LACTATE SERPL-SCNC: 2 MMOL/L — SIGNIFICANT CHANGE UP (ref 0.7–2)
LIDOCAIN IGE QN: 36 U/L — SIGNIFICANT CHANGE UP (ref 13–75)
LYMPHOCYTES # BLD AUTO: 1.59 K/UL — SIGNIFICANT CHANGE UP (ref 1–3.3)
LYMPHOCYTES # BLD AUTO: 23 % — SIGNIFICANT CHANGE UP (ref 13–44)
MAGNESIUM SERPL-MCNC: 1.6 MG/DL — SIGNIFICANT CHANGE UP (ref 1.6–2.6)
MCHC RBC-ENTMCNC: 28.8 PG — SIGNIFICANT CHANGE UP (ref 27–34)
MCHC RBC-ENTMCNC: 33.5 GM/DL — SIGNIFICANT CHANGE UP (ref 32–36)
MCV RBC AUTO: 85.8 FL — SIGNIFICANT CHANGE UP (ref 80–100)
MONOCYTES # BLD AUTO: 0.45 K/UL — SIGNIFICANT CHANGE UP (ref 0–0.9)
MONOCYTES NFR BLD AUTO: 6.5 % — SIGNIFICANT CHANGE UP (ref 2–14)
NEUTROPHILS # BLD AUTO: 4.69 K/UL — SIGNIFICANT CHANGE UP (ref 1.8–7.4)
NEUTROPHILS NFR BLD AUTO: 67.9 % — SIGNIFICANT CHANGE UP (ref 43–77)
PLATELET # BLD AUTO: 248 K/UL — SIGNIFICANT CHANGE UP (ref 150–400)
POTASSIUM SERPL-MCNC: 4 MMOL/L — SIGNIFICANT CHANGE UP (ref 3.5–5.3)
POTASSIUM SERPL-SCNC: 4 MMOL/L — SIGNIFICANT CHANGE UP (ref 3.5–5.3)
PROT SERPL-MCNC: 6.5 GM/DL — SIGNIFICANT CHANGE UP (ref 6–8.3)
RBC # BLD: 4.8 M/UL — SIGNIFICANT CHANGE UP (ref 4.2–5.8)
RBC # FLD: 15.1 % — HIGH (ref 10.3–14.5)
SODIUM SERPL-SCNC: 140 MMOL/L — SIGNIFICANT CHANGE UP (ref 135–145)
WBC # BLD: 6.91 K/UL — SIGNIFICANT CHANGE UP (ref 3.8–10.5)
WBC # FLD AUTO: 6.91 K/UL — SIGNIFICANT CHANGE UP (ref 3.8–10.5)

## 2024-09-23 PROCEDURE — 99285 EMERGENCY DEPT VISIT HI MDM: CPT

## 2024-09-23 NOTE — ED ADULT NURSE NOTE - NSFALLHARMRISKINTERV_ED_ALL_ED

## 2024-09-23 NOTE — ED ADULT TRIAGE NOTE - CHIEF COMPLAINT QUOTE
Pt bibems from [facility] c/o blood in abdi drain since today. HX dm2. Pt A&ox4, ambulatory, no s/s of distress. VS stable.

## 2024-09-23 NOTE — ED PROVIDER NOTE - PHYSICAL EXAMINATION
GEN: Patient awake alert NAD, obese  HEENT: normocephalic, atraumatic, EOMI, no scleral icterus, moist MM  CARDIAC: RRR, S1, S2, no murmur.   PULM: CTA B/L no wheeze, rhonchi, rales.   ABD: soft NT, ND, no rebound no guarding, no CVA tenderness. LUQ percutaneous cholecystostomy tube with bloody output, no surrounding erythema or purulent drainage from the tube site  MSK: Moving all extremities, no edema. 5/5 strength and full ROM in all extremities.     NEURO: A&Ox3, no focal neurological deficits  SKIN: warm, dry, no rash.

## 2024-09-23 NOTE — ED PROVIDER NOTE - PROGRESS NOTE DETAILS
Jhony: Received sign out pending CT, showing no acute findings. Tube is in place. RUQ US also confirmed. Surgery evaluated and recommend no acute interventions. Placed in observation for IR evaluation tomorrow. Placed in obs in stable condition. discussed with hospitalist Dr. Velez.

## 2024-09-23 NOTE — ED PROVIDER NOTE - OBJECTIVE STATEMENT
73 y/o M with PMHx of DM2, cholangitis, BPH, hypothyroid, DANIELLE on CPAP, TIA, anxiety, gout, afib on Eliquis, HTN, dyslipidemia, ERCP, recently dc'd from [facility] on 9/18 after being admitted for acute cholecystitis s/p cholecystectomy presents to the ED BIBA from [facility] c/o blood in his abdi drain starting yesterday. Friend at bedside states that the tube was flushed multiple times yesterday and today. Pt endorses fatigue. Denies fevers, lightheadedness. Pt did not receive his dose of Eliquis last night, but took his morning dose this morning. Allergic to Metformin. 73 y/o M with PMHx of DM2, cholangitis, BPH, hypothyroid, DANIELLE on CPAP, TIA, anxiety, gout, afib on Eliquis, HTN, dyslipidemia, ERCP, recently dc'd from [facility] on 9/18 after being admitted for acute cholecystitis s/p cholecystectomy presents to the ED BIBA from [facility] c/o blood in his abdi drain starting yesterday. Friend at bedside states that the tube was flushed multiple times yesterday and today. Pt endorses fatigue. Denies fevers, lightheadedness. Pt did not receive his dose of Eliquis last night, but took his morning dose this morning.

## 2024-09-23 NOTE — ED PROVIDER NOTE - CLINICAL SUMMARY MEDICAL DECISION MAKING FREE TEXT BOX
Justice Merino DO (Attending): 75 y/o M with PMHx of DM2, cholangitis, BPH, hypothyroid, DANIELLE on CPAP, TIA, anxiety, gout, afib on Eliquis, HTN, dyslipidemia, ERCP, recently dc'd from [place] on 9/18 after being admitted for acute cholecystitis s/p cholecystectomy presents to the ED BIBA from [facility] c/o blood in his abdi drain starting yesterday. Friend at bedside states that the tube was flushed multiple times yesterday and today. Pt endorses fatigue. Denies fevers, lightheadedness. Pt did not receive his dose of Eliquis last night, but took his morning dose this morning. Well-appearing, hemodynamically stable, nontender abdomen but does have bright red blood within the cholecystostomy bag.  Concern for dislodgment, hematoma versus other intra-abdominal pathology.  Plan for labs, CT, IR consult.  Reassess

## 2024-09-23 NOTE — ED ADULT NURSE NOTE - CHIEF COMPLAINT QUOTE
Pt bibems from [facility] c/o blood in abdi drain since today. HX dm2. Pt A&ox4, ambulatory, no s/s of distress. VS stable.

## 2024-09-23 NOTE — ED ADULT NURSE NOTE - OBJECTIVE STATEMENT
Pt arrived to ED with c/o blood in abdi drain x1 day, Pt states that the nursing home he is at noticed blood in his drain and stopped his Eliquis (1 dose). Pt states he has continued to fill his drain 3 times since stopping the Eliquis so they sent him in to get it checked. Pt AOx4 denies any complaints. No signs of distress.

## 2024-09-24 DIAGNOSIS — R65.21 SEVERE SEPSIS WITH SEPTIC SHOCK: ICD-10-CM

## 2024-09-24 DIAGNOSIS — G47.33 OBSTRUCTIVE SLEEP APNEA (ADULT) (PEDIATRIC): ICD-10-CM

## 2024-09-24 DIAGNOSIS — N17.9 ACUTE KIDNEY FAILURE, UNSPECIFIED: ICD-10-CM

## 2024-09-24 DIAGNOSIS — Z88.8 ALLERGY STATUS TO OTHER DRUGS, MEDICAMENTS AND BIOLOGICAL SUBSTANCES: ICD-10-CM

## 2024-09-24 DIAGNOSIS — I50.30 UNSPECIFIED DIASTOLIC (CONGESTIVE) HEART FAILURE: ICD-10-CM

## 2024-09-24 DIAGNOSIS — J96.01 ACUTE RESPIRATORY FAILURE WITH HYPOXIA: ICD-10-CM

## 2024-09-24 DIAGNOSIS — N40.0 BENIGN PROSTATIC HYPERPLASIA WITHOUT LOWER URINARY TRACT SYMPTOMS: ICD-10-CM

## 2024-09-24 DIAGNOSIS — K80.42 CALCULUS OF BILE DUCT WITH ACUTE CHOLECYSTITIS WITHOUT OBSTRUCTION: ICD-10-CM

## 2024-09-24 DIAGNOSIS — E87.20 ACIDOSIS, UNSPECIFIED: ICD-10-CM

## 2024-09-24 DIAGNOSIS — E11.9 TYPE 2 DIABETES MELLITUS WITHOUT COMPLICATIONS: ICD-10-CM

## 2024-09-24 DIAGNOSIS — I48.20 CHRONIC ATRIAL FIBRILLATION, UNSPECIFIED: ICD-10-CM

## 2024-09-24 DIAGNOSIS — D69.6 THROMBOCYTOPENIA, UNSPECIFIED: ICD-10-CM

## 2024-09-24 DIAGNOSIS — Z86.73 PERSONAL HISTORY OF TRANSIENT ISCHEMIC ATTACK (TIA), AND CEREBRAL INFARCTION WITHOUT RESIDUAL DEFICITS: ICD-10-CM

## 2024-09-24 DIAGNOSIS — E03.9 HYPOTHYROIDISM, UNSPECIFIED: ICD-10-CM

## 2024-09-24 DIAGNOSIS — Z79.890 HORMONE REPLACEMENT THERAPY: ICD-10-CM

## 2024-09-24 DIAGNOSIS — E83.42 HYPOMAGNESEMIA: ICD-10-CM

## 2024-09-24 DIAGNOSIS — I11.0 HYPERTENSIVE HEART DISEASE WITH HEART FAILURE: ICD-10-CM

## 2024-09-24 DIAGNOSIS — Z79.85 LONG-TERM (CURRENT) USE OF INJECTABLE NON-INSULIN ANTIDIABETIC DRUGS: ICD-10-CM

## 2024-09-24 DIAGNOSIS — E83.39 OTHER DISORDERS OF PHOSPHORUS METABOLISM: ICD-10-CM

## 2024-09-24 DIAGNOSIS — R74.01 ELEVATION OF LEVELS OF LIVER TRANSAMINASE LEVELS: ICD-10-CM

## 2024-09-24 DIAGNOSIS — A41.9 SEPSIS, UNSPECIFIED ORGANISM: ICD-10-CM

## 2024-09-24 DIAGNOSIS — I95.9 HYPOTENSION, UNSPECIFIED: ICD-10-CM

## 2024-09-24 LAB
ANION GAP SERPL CALC-SCNC: 5 MMOL/L — SIGNIFICANT CHANGE UP (ref 5–17)
APTT BLD: 33.7 SEC — SIGNIFICANT CHANGE UP (ref 24.5–35.6)
BASOPHILS # BLD AUTO: 0.06 K/UL — SIGNIFICANT CHANGE UP (ref 0–0.2)
BASOPHILS NFR BLD AUTO: 1 % — SIGNIFICANT CHANGE UP (ref 0–2)
BUN SERPL-MCNC: 5 MG/DL — LOW (ref 7–23)
CALCIUM SERPL-MCNC: 9.6 MG/DL — SIGNIFICANT CHANGE UP (ref 8.5–10.1)
CHLORIDE SERPL-SCNC: 111 MMOL/L — HIGH (ref 96–108)
CO2 SERPL-SCNC: 25 MMOL/L — SIGNIFICANT CHANGE UP (ref 22–31)
CREAT SERPL-MCNC: 0.87 MG/DL — SIGNIFICANT CHANGE UP (ref 0.5–1.3)
EGFR: 90 ML/MIN/1.73M2 — SIGNIFICANT CHANGE UP
EOSINOPHIL # BLD AUTO: 0.03 K/UL — SIGNIFICANT CHANGE UP (ref 0–0.5)
EOSINOPHIL NFR BLD AUTO: 0.5 % — SIGNIFICANT CHANGE UP (ref 0–6)
GLUCOSE BLDC GLUCOMTR-MCNC: 113 MG/DL — HIGH (ref 70–99)
GLUCOSE BLDC GLUCOMTR-MCNC: 121 MG/DL — HIGH (ref 70–99)
GLUCOSE BLDC GLUCOMTR-MCNC: 123 MG/DL — HIGH (ref 70–99)
GLUCOSE BLDC GLUCOMTR-MCNC: 99 MG/DL — SIGNIFICANT CHANGE UP (ref 70–99)
GLUCOSE SERPL-MCNC: 121 MG/DL — HIGH (ref 70–99)
HCT VFR BLD CALC: 40.2 % — SIGNIFICANT CHANGE UP (ref 39–50)
HGB BLD-MCNC: 13.3 G/DL — SIGNIFICANT CHANGE UP (ref 13–17)
IMM GRANULOCYTES NFR BLD AUTO: 1 % — HIGH (ref 0–0.9)
INR BLD: 1.06 RATIO — SIGNIFICANT CHANGE UP (ref 0.85–1.16)
LYMPHOCYTES # BLD AUTO: 1.78 K/UL — SIGNIFICANT CHANGE UP (ref 1–3.3)
LYMPHOCYTES # BLD AUTO: 29 % — SIGNIFICANT CHANGE UP (ref 13–44)
MCHC RBC-ENTMCNC: 28.7 PG — SIGNIFICANT CHANGE UP (ref 27–34)
MCHC RBC-ENTMCNC: 33.1 GM/DL — SIGNIFICANT CHANGE UP (ref 32–36)
MCV RBC AUTO: 86.6 FL — SIGNIFICANT CHANGE UP (ref 80–100)
MONOCYTES # BLD AUTO: 0.39 K/UL — SIGNIFICANT CHANGE UP (ref 0–0.9)
MONOCYTES NFR BLD AUTO: 6.4 % — SIGNIFICANT CHANGE UP (ref 2–14)
NEUTROPHILS # BLD AUTO: 3.82 K/UL — SIGNIFICANT CHANGE UP (ref 1.8–7.4)
NEUTROPHILS NFR BLD AUTO: 62.1 % — SIGNIFICANT CHANGE UP (ref 43–77)
PLATELET # BLD AUTO: 229 K/UL — SIGNIFICANT CHANGE UP (ref 150–400)
POTASSIUM SERPL-MCNC: 4.1 MMOL/L — SIGNIFICANT CHANGE UP (ref 3.5–5.3)
POTASSIUM SERPL-SCNC: 4.1 MMOL/L — SIGNIFICANT CHANGE UP (ref 3.5–5.3)
PROTHROM AB SERPL-ACNC: 12.2 SEC — SIGNIFICANT CHANGE UP (ref 9.9–13.4)
RBC # BLD: 4.64 M/UL — SIGNIFICANT CHANGE UP (ref 4.2–5.8)
RBC # FLD: 15 % — HIGH (ref 10.3–14.5)
SODIUM SERPL-SCNC: 141 MMOL/L — SIGNIFICANT CHANGE UP (ref 135–145)
WBC # BLD: 6.14 K/UL — SIGNIFICANT CHANGE UP (ref 3.8–10.5)
WBC # FLD AUTO: 6.14 K/UL — SIGNIFICANT CHANGE UP (ref 3.8–10.5)

## 2024-09-24 PROCEDURE — 76705 ECHO EXAM OF ABDOMEN: CPT | Mod: 26

## 2024-09-24 RX ORDER — ATORVASTATIN CALCIUM 10 MG/1
20 TABLET, FILM COATED ORAL AT BEDTIME
Refills: 0 | Status: DISCONTINUED | OUTPATIENT
Start: 2024-09-24 | End: 2024-09-27

## 2024-09-24 RX ORDER — 5-HYDROXYTRYPTOPHAN (5-HTP) 100 MG
3 TABLET,DISINTEGRATING ORAL AT BEDTIME
Refills: 0 | Status: DISCONTINUED | OUTPATIENT
Start: 2024-09-24 | End: 2024-09-27

## 2024-09-24 RX ORDER — SODIUM CHLORIDE IRRIG SOLUTION 0.9 %
1000 SOLUTION, IRRIGATION IRRIGATION
Refills: 0 | Status: DISCONTINUED | OUTPATIENT
Start: 2024-09-24 | End: 2024-09-27

## 2024-09-24 RX ORDER — GLUCAGON INJECTION, SOLUTION 0.5 MG/.1ML
1 INJECTION, SOLUTION SUBCUTANEOUS ONCE
Refills: 0 | Status: DISCONTINUED | OUTPATIENT
Start: 2024-09-24 | End: 2024-09-27

## 2024-09-24 RX ORDER — ONDANSETRON HCL/PF 4 MG/2 ML
4 VIAL (ML) INJECTION EVERY 8 HOURS
Refills: 0 | Status: DISCONTINUED | OUTPATIENT
Start: 2024-09-24 | End: 2024-09-27

## 2024-09-24 RX ORDER — INSULIN LISPRO 100/ML
VIAL (ML) SUBCUTANEOUS
Refills: 0 | Status: DISCONTINUED | OUTPATIENT
Start: 2024-09-24 | End: 2024-09-27

## 2024-09-24 RX ORDER — ASPIRIN 325 MG
81 TABLET ORAL DAILY
Refills: 0 | Status: DISCONTINUED | OUTPATIENT
Start: 2024-09-24 | End: 2024-09-27

## 2024-09-24 RX ORDER — KETOCONAZOLE 20 MG/G
1 CREAM TOPICAL
Refills: 0 | DISCHARGE

## 2024-09-24 RX ORDER — ALCOHOL ANTISEPTIC PADS
12.5 PADS, MEDICATED (EA) TOPICAL ONCE
Refills: 0 | Status: DISCONTINUED | OUTPATIENT
Start: 2024-09-24 | End: 2024-09-27

## 2024-09-24 RX ORDER — ALCOHOL ANTISEPTIC PADS
25 PADS, MEDICATED (EA) TOPICAL ONCE
Refills: 0 | Status: DISCONTINUED | OUTPATIENT
Start: 2024-09-24 | End: 2024-09-27

## 2024-09-24 RX ORDER — ASPIRIN 81 MG
1 TABLET, DELAYED RELEASE (ENTERIC COATED) ORAL
Refills: 0 | DISCHARGE

## 2024-09-24 RX ORDER — CICLOPIROX OLAMINE 7.7 MG/G
0 CREAM TOPICAL
Refills: 0 | DISCHARGE

## 2024-09-24 RX ORDER — ALCOHOL ANTISEPTIC PADS
15 PADS, MEDICATED (EA) TOPICAL ONCE
Refills: 0 | Status: DISCONTINUED | OUTPATIENT
Start: 2024-09-24 | End: 2024-09-27

## 2024-09-24 RX ORDER — CRANBERRY FRUIT EXTRACT 650 MG
1000 CAPSULE ORAL DAILY
Refills: 0 | Status: DISCONTINUED | OUTPATIENT
Start: 2024-09-24 | End: 2024-09-27

## 2024-09-24 RX ORDER — METOPROLOL TARTRATE 50 MG
25 TABLET ORAL
Refills: 0 | Status: DISCONTINUED | OUTPATIENT
Start: 2024-09-24 | End: 2024-09-27

## 2024-09-24 RX ORDER — INSULIN LISPRO 100/ML
VIAL (ML) SUBCUTANEOUS AT BEDTIME
Refills: 0 | Status: DISCONTINUED | OUTPATIENT
Start: 2024-09-24 | End: 2024-09-27

## 2024-09-24 RX ORDER — FOLIC ACID/MULTIVIT,IRON,MINER 0.4MG-18MG
1 TABLET,CHEWABLE ORAL
Refills: 0 | DISCHARGE

## 2024-09-24 RX ORDER — PREDNISONE 5 MG/1
1 TABLET ORAL
Qty: 5 | Refills: 0
Start: 2024-09-24 | End: 2024-09-28

## 2024-09-24 RX ORDER — MAG HYDROX/ALUMINUM HYD/SIMETH 200-200-20
30 SUSPENSION, ORAL (FINAL DOSE FORM) ORAL EVERY 4 HOURS
Refills: 0 | Status: DISCONTINUED | OUTPATIENT
Start: 2024-09-24 | End: 2024-09-27

## 2024-09-24 RX ORDER — ACETAMINOPHEN 325 MG
650 TABLET ORAL EVERY 6 HOURS
Refills: 0 | Status: DISCONTINUED | OUTPATIENT
Start: 2024-09-24 | End: 2024-09-27

## 2024-09-24 RX ORDER — URSODIOL 500 MG/1
300 TABLET, FILM COATED ORAL
Refills: 0 | Status: DISCONTINUED | OUTPATIENT
Start: 2024-09-24 | End: 2024-09-27

## 2024-09-24 RX ORDER — TRAZODONE HCL 50 MG
2 TABLET ORAL
Refills: 0 | DISCHARGE

## 2024-09-24 RX ADMIN — ATORVASTATIN CALCIUM 20 MILLIGRAM(S): 10 TABLET, FILM COATED ORAL at 22:39

## 2024-09-24 RX ADMIN — Medication 112 MICROGRAM(S): at 05:06

## 2024-09-24 RX ADMIN — Medication 25 MILLIGRAM(S): at 22:39

## 2024-09-24 RX ADMIN — Medication 1 TABLET(S): at 23:14

## 2024-09-24 RX ADMIN — Medication 25 MILLIGRAM(S): at 09:51

## 2024-09-24 RX ADMIN — Medication 1 TABLET(S): at 09:51

## 2024-09-24 RX ADMIN — Medication 300 MILLIGRAM(S): at 23:13

## 2024-09-24 RX ADMIN — URSODIOL 300 MILLIGRAM(S): 500 TABLET, FILM COATED ORAL at 13:31

## 2024-09-24 RX ADMIN — Medication 1000 UNIT(S): at 09:51

## 2024-09-24 RX ADMIN — Medication 81 MILLIGRAM(S): at 09:51

## 2024-09-24 RX ADMIN — URSODIOL 300 MILLIGRAM(S): 500 TABLET, FILM COATED ORAL at 23:14

## 2024-09-24 NOTE — PHARMACOTHERAPY INTERVENTION NOTE - COMMENTS
Medication history complete, patient is from [facility], reviewed and confirmed medication with list provided by facility.

## 2024-09-24 NOTE — PATIENT PROFILE ADULT - FUNCTIONAL ASSESSMENT - BASIC MOBILITY 6.
1-calculated by average/Not able to assess (calculate score using [facility] averaging method)

## 2024-09-24 NOTE — PROGRESS NOTE ADULT - ASSESSMENT
74 year old male w DM II, cholangitis, BPH, hypothyroid, DANIELLE on CPAP, TIA, anxiety, gout, afib on Eliquis, HTN, dyslipidemia, ERCP, recently dc'd from [facility] on 9/18 after being admitted for acute cholecystitis s/p cholecystectomy BIBEMS to ED BIBA from [facility] c/o blood in cholecystostomy drain starting yesterday.     # Cholecystostomy Tube dysfunction  - See by ER and flushed  - No active bleed in bag  - Hold eliquis till tomorrow  - Flush tube BID  - Trend H/H  - Continue augmentin as per ID  - Continue ursodiol     # P. Afib  - Stable  - Hold AC till am  - Continue metoprolol    # CAD  - Stable  - Continue asprin   - Continue atorvastatin    # Hypothyroid  - Continue levothyroxine    # DM  - ISS  - Check A1c    # DVT prophylaxis  - SCDs for now  - Continue aspirin  - Hold AC due to above

## 2024-09-24 NOTE — H&P ADULT - HISTORY OF PRESENT ILLNESS
74 year old male w DM II, cholangitis, BPH, hypothyroid, DANIELLE on CPAP, TIA, anxiety, gout, afib on Eliquis, HTN, dyslipidemia, ERCP, recently dc'd from [facility] on 9/18 after being admitted for acute cholecystitis s/p cholecystectomyBIBEMS to ED BIBA from [facility] c/o blood in his abdi drain starting yesterday.     Friend at bedside states that  tube was flushed multiple times yesterday and today  + fatigue  Denies fevers, lightheadedness  did not receive his dose of Eliquis last night, but took his morning dose this morning         In ED /91   HR 83   RR 18   T 98.5   95% sat RA  CT Abd + tube in good position  General surgery consult    Placed on OBS to hospitalist for IR consult in AM      PAST MEDICAL HX  Afib on AC  Anxiety   BPH (benign prostatic hyperplasia)   Chronic Gout   DM (diabetes mellitus)   Dyslipidemia   H/O cholangitis   History of Obesity   History of Transient Ischemic Attack (TIA) approx 1994  Hypertension   Hypothyroid   Multiple falls now wheelchair bound  Obstructive Sleep Apnea CPAP currentlty.     PAST SURGICAL HX  Cholecystostomy tube  History of Tonsillectomy   S/P ERCP     FAMILY HX  myocardial infarction : Father 75 year old male w DM II, cholangitis, BPH, hypothyroid, DANIELLE on CPAP, TIA, anxiety, gout, afib on Eliquis, HTN, dyslipidemia, ERCP, recently dc'd from [facility] on 9/18 after being admitted for acute cholecystitis s/p cholecystectomy BIBEMS to ED BIBA from [facility] c/o blood in cholecystostomy drain starting yesterday.     Blood was noted in tube 1 day prior and eliquis held last night  Bag was clear on rounds 09-23 then in evening blood was noted again.  Sent to hospital for evaluation  + fatigue  Denies fevers, lightheadedness  did not receive his dose of Eliquis last night, but took his morning dose this morning     Pt reported vigorous PT session day prior and wondered if that could have caused the tube to bleed  denied N, V; able to eat      In ED /91   HR 83   RR 18   T 98.5   95% sat RA  CT Abd + tube in good position  General surgery consult    Placed on OBS to hospitalist for IR consult in AM      PAST MEDICAL HX  Afib on AC  Anxiety   BPH (benign prostatic hyperplasia)   Chronic Gout   DM (diabetes mellitus)   Dyslipidemia   H/O cholangitis   History of Obesity   History of Transient Ischemic Attack (TIA) approx 1994  Hypertension   Hypothyroid   Multiple falls now wheelchair bound  Obstructive Sleep Apnea CPAP currentlty.     PAST SURGICAL HX  Cholecystostomy tube  History of Tonsillectomy   S/P ERCP     FAMILY HX  myocardial infarction : Father      SOCIAL HX  at [facility]  unable to ambulate yet

## 2024-09-24 NOTE — ED ADULT NURSE REASSESSMENT NOTE - NS ED NURSE REASSESS COMMENT FT1
Pt rcvd A&Ox4, currently resting comfortably in stretcher. Pt denies any pain/discomfort. VSS. Pt with left sided abdi drain, putting out bloody drainage. Insertion site is clean dry and intact. Pt denies pain at site. Plan of care reviewed with pt. Safety maintained. Call bell within reach.

## 2024-09-24 NOTE — H&P ADULT - ASSESSMENT
74 year old male w DM II, cholangitis, BPH, hypothyroid, DANIELLE on CPAP, TIA, anxiety, gout, afib on Eliquis, HTN, dyslipidemia, ERCP, recently dc'd from [facility] on 9/18 after being admitted for acute cholecystitis s/p cholecystectomy BIBEMS to ED BIBA from [facility] c/o blood in cholecystostomy drain starting yesterday.      #Cholecystostomy tube dysfunction  #Gross blood  last adm bile culture with E Coli, KLPN, Bacteroides   -S/p Zosyn, was on meropenem and now abx deescalated to ceftriaxone+ flagyl (9/14)  1. place on med OBS  2. I/O  3. CBC in AM  4. PT/INR/PTT in AM  5. held eliquis  6. IR consult  7. continue augmentin  8. ursodiol    #Afib  1. holding AC  2. BB BID      #DM II  diet controlled  5.6% - 8/14/24  1. BGM  2. ISS      #DM foot ulcer R dorsal  blister L 1st MTP  1. local care      #HLD  1. statin      #Hypothyroid  1. continue levothyroxine      #Insomnia  1. trazodone 300 mg      #DANIELLE  1. CPAP      #VTE  held due to active bleeding        #GOC  full code        #80 minutes 74 year old male w DM II, cholangitis, BPH, hypothyroid, DANIELLE on CPAP, TIA, anxiety, gout, afib on Eliquis, HTN, dyslipidemia, ERCP, recently dc'd from [facility] on 9/18 after being admitted for acute cholecystitis s/p cholecystectomy BIBEMS to ED BIBA from [facility] c/o blood in cholecystostomy drain starting yesterday.      #Cholecystostomy tube dysfunction  #Gross blood  last adm bile culture with E Coli, KLPN, Bacteroides   -S/p Zosyn, was on meropenem and now abx deescalated to ceftriaxone+ flagyl (9/14)  1. place on med OBS  2. I/O  3. CBC in AM  4. PT/INR/PTT in AM  5. held eliquis  6. IR consult  7. continue augmentin  8. ursodiol    #Afib  1. holding AC  2. BB BID      #DM II  diet controlled  5.6% - 8/14/24  1. BGM  2. ISS      #DM foot ulcer R dorsal  blister L 1st MTP  1. local care      #HLD  1. statin      #Hypothyroid  1. continue levothyroxine      #Insomnia  1. trazodone 300 mg      #DANIELLE  1. BiPAP      #VTE  held due to active bleeding        #GOC  full code        #80 minutes

## 2024-09-24 NOTE — CONSULT NOTE ADULT - ASSESSMENT
Interventional Radiology    Evaluate for Procedure: cholecystostomy drain with bloody output    HPI: 74y Male with hx of cholecystitis s/p cholecystostomy drain on 9/12 readmitted with bloody output from the drain. Pt states that he was getting PT at rehab and was on Eliquis.  Denies abdominal pain.     Allergies: metformin (Unknown)    Medications (Abx/Cardiac/Anticoagulation/Blood Products)      Data:  180.3  181.4  T(C): 36.6  HR: 76  BP: 147/79  RR: 18  SpO2: 96%    -WBC 6.91 / HgB 13.8 / Hct 41.2 / Plt 248  -Na 140 / Cl 112 / BUN 6 / Glucose 143  -K 4.0 / CO2 23 / Cr 1.06  -ALT 21 / Alk Phos 59 / T.Bili 0.6  -INR -- / PTT 67.0      General: AOx3.  Abdomen: soft. NT. Cholecystostomy drain flushed with 10cc NS. No resistance. Bloody output in the bag. Dressing intact. No leaking noted.     Radiology: < from: CT Abdomen and Pelvis w/ IV Cont (09.23.24 @ 22:38) >  Percutaneous cholecystostomy tube in place. Linear high attenuation in   the decompressed gallbladder lumen is indeterminate, possibly sloughed   membranes. Correlation with gallbladder sonogram is recommended. Limited   assessment for active bleeding on a single phase study.    < end of copied text >      Assessment/Plan: 74y Male with hx of cholecystitis s/p cholecystostomy drain on 9/12 readmitted with bloody output from the drain. Pt states that he was getting PT at rehab and was on Eliquis.  Denies abdominal pain.     - Bloody output likely due to irritation in the gallbladder lumen while on a/c.  -  H&H and VS stable.   - CT demonstrates a cholecystostomy drain in place.   -  Recommend BID flushes with 10cc NS until it clears.   - Recommend Holding Eliquis if possible until it clears.   - D/w Dr. Plunkett and Dr. elmore
75 y/o M with PMHx of DM2, cholangitis, BPH, hypothyroid, DANIELLE on CPAP, TIA, anxiety, gout, afib on Eliquis, HTN, dyslipidemia, ERCP, recently dc'd from [facility] on 9/18 after being admitted for acute cholecystitis s/p cholecystectomy presents to the ED BIBA from [facility] c/o bloody output in his abdi drain x1day. The tube was flushed multiple times yesterday and today. Pt endorses fatigue. Denies fevers, lightheadedness n/v or abdominal pain. Pt did not receive his dose of Eliquis last night, but took his morning dose this morning.    General surgery consulted for blood tinge output in the setting of AC use  H/H stable  Hemodynamically stable  CT and US show abdi tube in place    P:  - Possible bloody output due to irritation of GB wall in the setting of elliquis  - No acute surgical intervention needed a this time  - Dispo per ED    Plan discussed with Dr Batista

## 2024-09-24 NOTE — CONSULT NOTE ADULT - SUBJECTIVE AND OBJECTIVE BOX
HPI:  75 y/o M with PMHx of DM2, cholangitis, BPH, hypothyroid, DANIELLE on CPAP, TIA, anxiety, gout, afib on Eliquis, HTN, dyslipidemia, ERCP, recently dc'd from [facility] on 9/18 after being admitted for acute cholecystitis s/p cholecystectomy presents to the ED BIBA from [facility] c/o bloody output in his abdi drain x1day. The tube was flushed multiple times yesterday and today. Pt endorses fatigue. Denies fevers, lightheadedness n/v or abdominal pain. Pt did not receive his dose of Eliquis last night, but took his morning dose this morning.    General surgery consulted for blood tinge output and evaluation of abdi tube      PAST MEDICAL & SURGICAL HISTORY:  Dyslipidemia      Hypertension      Afib  on coumadin      Chronic Gout      Anxiety      History of Transient Ischemic Attack (TIA)  approx 1994      History of Obesity      Obstructive Sleep Apnea  CPAP currenlty      Hypothyroid      BPH (benign prostatic hyperplasia)      H/O cholangitis      Multiple falls  now wheelchair bound      DM (diabetes mellitus)      History of Tonsillectomy      S/P ERCP          MEDICATIONS  (STANDING):    MEDICATIONS  (PRN):      Allergies    metformin (Unknown)    Intolerances        SOCIAL HISTORY:    FAMILY HISTORY:  FH: myocardial infarction (Father)        Physical Exam:  General: NAD, resting comfortably, morbidly obese  HEENT: NC/AT, EOMI, normal hearing, no oral lesions, no LAD, neck supple  Pulmonary: normal resp effort, CTA-B  Cardiovascular: NSR, no murmurs  Abdominal: soft, NT, no organomegaly, abdi tube in place, sutures in place, no drainage around tube, blood tinged bile in bag  Extremities: WWP, normal strength, no clubbing/cyanosis/edema  Neuro: A/O x 3, CNs II-XII grossly intact, normal sensation, no focal deficits      Vital Signs Last 24 Hrs  T(C): 36.9 (23 Sep 2024 21:19), Max: 36.9 (23 Sep 2024 21:19)  T(F): 98.5 (23 Sep 2024 21:19), Max: 98.5 (23 Sep 2024 21:19)  HR: 83 (23 Sep 2024 21:19) (83 - 83)  BP: 132/91 (23 Sep 2024 21:19) (132/91 - 132/91)  BP(mean): --  RR: 18 (23 Sep 2024 21:19) (18 - 18)  SpO2: 95% (23 Sep 2024 21:19) (95% - 95%)    Parameters below as of 23 Sep 2024 21:19  Patient On (Oxygen Delivery Method): room air      LABS:                        13.8   6.91  )-----------( 248      ( 23 Sep 2024 22:06 )             41.2     09-23    140  |  112[H]  |  6[L]  ----------------------------<  143[H]  4.0   |  23  |  1.06    Ca    9.4      23 Sep 2024 22:06  Mg     1.6     09-23    TPro  6.5  /  Alb  2.7[L]  /  TBili  0.6  /  DBili  x   /  AST  27  /  ALT  21  /  AlkPhos  59  09-23      Urinalysis Basic - ( 23 Sep 2024 22:06 )    Color: x / Appearance: x / SG: x / pH: x  Gluc: 143 mg/dL / Ketone: x  / Bili: x / Urobili: x   Blood: x / Protein: x / Nitrite: x   Leuk Esterase: x / RBC: x / WBC x   Sq Epi: x / Non Sq Epi: x / Bacteria: x      CAPILLARY BLOOD GLUCOSE        LIVER FUNCTIONS - ( 23 Sep 2024 22:06 )  Alb: 2.7 g/dL / Pro: 6.5 gm/dL / ALK PHOS: 59 U/L / ALT: 21 U/L / AST: 27 U/L / GGT: x

## 2024-09-24 NOTE — H&P ADULT - NSHPPHYSICALEXAM_GEN_ALL_CORE
Vital Signs Last 24 Hrs  T(C): 36.9 (23 Sep 2024 21:19), Max: 36.9 (23 Sep 2024 21:19)  T(F): 98.5 (23 Sep 2024 21:19), Max: 98.5 (23 Sep 2024 21:19)  HR: 83 (23 Sep 2024 21:19) (83 - 83)  BP: 132/91 (23 Sep 2024 21:19) (132/91 - 132/91)  BP(mean): --  RR: 18 (23 Sep 2024 21:19) (18 - 18)  SpO2: 95% (23 Sep 2024 21:19) (95% - 95%)    Parameters below as of 23 Sep 2024 21:19  Patient On (Oxygen Delivery Method): room air

## 2024-09-24 NOTE — PATIENT PROFILE ADULT - FALL HARM RISK - HARM RISK INTERVENTIONS

## 2024-09-24 NOTE — H&P ADULT - NSHPLABSRESULTS_GEN_ALL_CORE
FINDINGS:  LOWER CHEST: Trace to small left pleural effusion and partial compressive   left lung base atelectasis, unchanged. Cardiomegaly.    LIVER: Within normal limits.  BILE DUCTS: Normal caliber.  GALLBLADDER: Interval placement of a percutaneous cholecystectomy tube   with pigtail portion within the gallbladder lumen. Several calcified   gallstones are demonstrated as well as gallbladder wall thickening. There   appears to be new linear high attenuation foci within the lumen. Limited   assessment for active bleeding on this single phase study.  SPLEEN: Within normal limits.  PANCREAS: Within normal limits.  ADRENALS: Within normal limits.  KIDNEYS/URETERS: No hydronephrosis. Symmetric enhancement.    Bilateral renal cysts and nonobstructing left intrarenal calculi,   unchanged.    BLADDER: Underdistended which limits evaluation of the wall thickness.  REPRODUCTIVE ORGANS: Prostate gland within normal limits.    BOWEL: No bowel obstruction. Appendiceal stump is normal in size.   Underdistended left-sided colon which limits evaluation of the wall thickness. Scattered colonic diverticulosis. No focal inflammatory changes.  PERITONEUM/RETROPERITONEUM: Within normal limits.  VESSELS: Within normal limits.  LYMPH NODES: No lymphadenopathy.  ABDOMINAL WALL: Within normal limits.  BONES: Degenerative changes..    IMPRESSION:  Percutaneous cholecystostomy tube in place. Linear high attenuation in the decompressed gallbladder lumen is indeterminate, possibly sloughed   membranes. Correlation with gallbladder sonogram is recommended. Limited assessment for active bleeding on a single phase study.      US  COMPARISON: 9/10/2024    TECHNIQUE: Sonography of the right upper quadrant.    FINDINGS:  Liver: Increased echogenicity. Hepatomegaly.  Bile ducts: Limited evaluation.. Common bile duct measures 6 mm.  Gallbladder: Cholelithiasis. There is a cholecystostomy tube. There is   mild wall thickening. Questionable trace pericholecystic fluid.   Sonographic Das's sign is negative as per the ultrasound technologist.  Pancreas: Not visualized secondary to overlying bowel gas.  Right kidney: 14.1 cm. No hydronephrosis. There is a upper pole cyst   measuring up to 2.7 cm.  Ascites: None.  IVC: Not visualized secondary to bowel gas.    IMPRESSION:  Cholecystomy tube in place. There is cholelithiasis. Nonspecific   gallbladder wall thickening and trace pericholecystic fluid.    Steatosis and hepatomegaly. FINDINGS:  LOWER CHEST: Trace to small left pleural effusion and partial compressive   left lung base atelectasis, unchanged. Cardiomegaly.    LIVER: Within normal limits.  BILE DUCTS: Normal caliber.  GALLBLADDER: Interval placement of a percutaneous cholecystectomy tube   with pigtail portion within the gallbladder lumen. Several calcified   gallstones are demonstrated as well as gallbladder wall thickening. There appears to be new linear high attenuation foci within the lumen. Limited   assessment for active bleeding on this single phase study.  SPLEEN: Within normal limits.  PANCREAS: Within normal limits.  ADRENALS: Within normal limits.  KIDNEYS/URETERS: No hydronephrosis. Symmetric enhancement.    Bilateral renal cysts and nonobstructing left intrarenal calculi, unchanged.    BLADDER: Underdistended which limits evaluation of the wall thickness.  REPRODUCTIVE ORGANS: Prostate gland within normal limits.    BOWEL: No bowel obstruction. Appendiceal stump is normal in size.   Underdistended left-sided colon which limits evaluation of the wall thickness. Scattered colonic diverticulosis. No focal inflammatory changes.  PERITONEUM/RETROPERITONEUM: Within normal limits.  VESSELS: Within normal limits.  LYMPH NODES: No lymphadenopathy.  ABDOMINAL WALL: Within normal limits.  BONES: Degenerative changes..    IMPRESSION:  Percutaneous cholecystostomy tube in place. Linear high attenuation in the decompressed gallbladder lumen is indeterminate, possibly sloughed   membranes. Correlation with gallbladder sonogram is recommended. Limited assessment for active bleeding on a single phase study.      US  COMPARISON: 9/10/2024    TECHNIQUE: Sonography of the right upper quadrant.    FINDINGS:  Liver: Increased echogenicity. Hepatomegaly.  Bile ducts: Limited evaluation.. Common bile duct measures 6 mm.  Gallbladder: Cholelithiasis. There is a cholecystostomy tube. There is   mild wall thickening. Questionable trace pericholecystic fluid.   Sonographic Das's sign is negative as per the ultrasound technologist.  Pancreas: Not visualized secondary to overlying bowel gas.  Right kidney: 14.1 cm. No hydronephrosis. There is a upper pole cyst   measuring up to 2.7 cm.  Ascites: None.  IVC: Not visualized secondary to bowel gas.    IMPRESSION:  Cholecystomy tube in place. There is cholelithiasis. Nonspecific gallbladder wall thickening and trace pericholecystic fluid.    Steatosis and hepatomegaly.

## 2024-09-25 DIAGNOSIS — Z43.4 ENCOUNTER FOR ATTENTION TO OTHER ARTIFICIAL OPENINGS OF DIGESTIVE TRACT: ICD-10-CM

## 2024-09-25 DIAGNOSIS — T85.518A BREAKDOWN (MECHANICAL) OF OTHER GASTROINTESTINAL PROSTHETIC DEVICES, IMPLANTS AND GRAFTS, INITIAL ENCOUNTER: ICD-10-CM

## 2024-09-25 LAB
ANION GAP SERPL CALC-SCNC: 5 MMOL/L — SIGNIFICANT CHANGE UP (ref 5–17)
BUN SERPL-MCNC: 5 MG/DL — LOW (ref 7–23)
CALCIUM SERPL-MCNC: 9.6 MG/DL — SIGNIFICANT CHANGE UP (ref 8.5–10.1)
CHLORIDE SERPL-SCNC: 109 MMOL/L — HIGH (ref 96–108)
CO2 SERPL-SCNC: 26 MMOL/L — SIGNIFICANT CHANGE UP (ref 22–31)
CREAT SERPL-MCNC: 0.88 MG/DL — SIGNIFICANT CHANGE UP (ref 0.5–1.3)
EGFR: 90 ML/MIN/1.73M2 — SIGNIFICANT CHANGE UP
GLUCOSE BLDC GLUCOMTR-MCNC: 107 MG/DL — HIGH (ref 70–99)
GLUCOSE BLDC GLUCOMTR-MCNC: 129 MG/DL — HIGH (ref 70–99)
GLUCOSE BLDC GLUCOMTR-MCNC: 133 MG/DL — HIGH (ref 70–99)
GLUCOSE BLDC GLUCOMTR-MCNC: 142 MG/DL — HIGH (ref 70–99)
GLUCOSE SERPL-MCNC: 114 MG/DL — HIGH (ref 70–99)
HCT VFR BLD CALC: 39.7 % — SIGNIFICANT CHANGE UP (ref 39–50)
HGB BLD-MCNC: 13.3 G/DL — SIGNIFICANT CHANGE UP (ref 13–17)
MCHC RBC-ENTMCNC: 28.7 PG — SIGNIFICANT CHANGE UP (ref 27–34)
MCHC RBC-ENTMCNC: 33.5 GM/DL — SIGNIFICANT CHANGE UP (ref 32–36)
MCV RBC AUTO: 85.7 FL — SIGNIFICANT CHANGE UP (ref 80–100)
PLATELET # BLD AUTO: 227 K/UL — SIGNIFICANT CHANGE UP (ref 150–400)
POTASSIUM SERPL-MCNC: 4.3 MMOL/L — SIGNIFICANT CHANGE UP (ref 3.5–5.3)
POTASSIUM SERPL-SCNC: 4.3 MMOL/L — SIGNIFICANT CHANGE UP (ref 3.5–5.3)
RBC # BLD: 4.63 M/UL — SIGNIFICANT CHANGE UP (ref 4.2–5.8)
RBC # FLD: 14.9 % — HIGH (ref 10.3–14.5)
SODIUM SERPL-SCNC: 140 MMOL/L — SIGNIFICANT CHANGE UP (ref 135–145)
WBC # BLD: 5.72 K/UL — SIGNIFICANT CHANGE UP (ref 3.8–10.5)
WBC # FLD AUTO: 5.72 K/UL — SIGNIFICANT CHANGE UP (ref 3.8–10.5)

## 2024-09-25 PROCEDURE — 97116 GAIT TRAINING THERAPY: CPT | Mod: GP

## 2024-09-25 PROCEDURE — 85027 COMPLETE CBC AUTOMATED: CPT

## 2024-09-25 PROCEDURE — 80048 BASIC METABOLIC PNL TOTAL CA: CPT

## 2024-09-25 PROCEDURE — 99231 SBSQ HOSP IP/OBS SF/LOW 25: CPT

## 2024-09-25 PROCEDURE — 82962 GLUCOSE BLOOD TEST: CPT

## 2024-09-25 PROCEDURE — 36415 COLL VENOUS BLD VENIPUNCTURE: CPT

## 2024-09-25 PROCEDURE — 97162 PT EVAL MOD COMPLEX 30 MIN: CPT | Mod: GP

## 2024-09-25 PROCEDURE — 97530 THERAPEUTIC ACTIVITIES: CPT | Mod: GP

## 2024-09-25 PROCEDURE — 80053 COMPREHEN METABOLIC PANEL: CPT

## 2024-09-25 RX ORDER — PEDIATRIC MULTIVIT 61/D3/VIT K 1500-800
1 CAPSULE ORAL
Refills: 0 | DISCHARGE

## 2024-09-25 RX ADMIN — URSODIOL 300 MILLIGRAM(S): 500 TABLET, FILM COATED ORAL at 09:59

## 2024-09-25 RX ADMIN — Medication 1 TABLET(S): at 09:58

## 2024-09-25 RX ADMIN — Medication 1000 UNIT(S): at 09:58

## 2024-09-25 RX ADMIN — Medication 300 MILLIGRAM(S): at 23:23

## 2024-09-25 RX ADMIN — Medication 81 MILLIGRAM(S): at 09:58

## 2024-09-25 RX ADMIN — Medication 25 MILLIGRAM(S): at 09:58

## 2024-09-25 RX ADMIN — URSODIOL 300 MILLIGRAM(S): 500 TABLET, FILM COATED ORAL at 22:14

## 2024-09-25 RX ADMIN — ATORVASTATIN CALCIUM 20 MILLIGRAM(S): 10 TABLET, FILM COATED ORAL at 22:14

## 2024-09-25 RX ADMIN — Medication 25 MILLIGRAM(S): at 22:14

## 2024-09-25 RX ADMIN — Medication 112 MICROGRAM(S): at 05:56

## 2024-09-25 RX ADMIN — Medication 1 TABLET(S): at 22:13

## 2024-09-25 NOTE — PROGRESS NOTE ADULT - ASSESSMENT
74 year old male w DM II, cholangitis, BPH, hypothyroid, DANIELLE on CPAP, TIA, anxiety, gout, afib on Eliquis, HTN, dyslipidemia, ERCP, recently dc'd from [facility] on 9/18 after being admitted for acute cholecystitis s/p cholecystectomy BIBEMS to ED BIBA from [facility] c/o blood in cholecystostomy drain starting yesterday.     # Cholecystostomy Tube dysfunction  - See by ER and flushed  - No active bleed in bag  - Restart eliquis 9/26  - Flush tube BID  - Trend H/H  - Continue augmentin as per ID  - Continue ursodiol  - Surgery FU ? Lap abdi    # P. Afib  - Stable  - Hold AC till am  - Continue metoprolol    # CAD  - Stable  - Continue asprin   - Continue atorvastatin    # Hypothyroid  - Continue levothyroxine    # DM  - ISS  - Check A1c    # DVT prophylaxis  - SCDs for now  - Continue aspirin  - Hold AC due to above 74 year old male w DM II, cholangitis, BPH, hypothyroid, DANIELLE on CPAP, TIA, anxiety, gout, afib on Eliquis, HTN, dyslipidemia, ERCP, recently dc'd from [facility] on 9/18 after being admitted for acute cholecystitis s/p cholecystectomy BIBEMS to ED BIBA from [facility] c/o blood in cholecystostomy drain starting yesterday.     # Cholecystostomy Tube dysfunction  - See by ER and flushed  - No active bleed in bag  - Restart eliquis 9/26  - Flush tube BID  - Trend H/H  - Continue augmentin as per ID  - Continue ursodiol  - Surgery, no surgical intervention    # P. Afib  - Stable  - Hold AC till am  - Continue metoprolol    # CAD  - Stable  - Continue asprin   - Continue atorvastatin    # Hypothyroid  - Continue levothyroxine    # DM  - ISS  - Check A1c    # DVT prophylaxis  - SCDs for now  - Continue aspirin  - Hold AC due to above

## 2024-09-25 NOTE — PROGRESS NOTE ADULT - ASSESSMENT
74y Male with hx of cholecystitis s/p cholecystostomy drain on 9/12 readmitted with bloody output from the drain.   - H/H stable  - Cholecystostomy drain output clearing up and improving off eliquis 75M with persistent afib on coumadin s/p 2 failed DCCV in the past, bladder cancer s/p urostomy, HTN, HLD, chronic bronchitis, hypothyroidism who presented to his PCP with c/o SOB and palpitations and was sent to the [facility] ED when found to be in Afib with RVR. Patient had WCT at [facility] ED, and was transferred to [facility] for further evaluation.  TTE: LVEF 20-25% and moderate MR. EP consulted - plan for VT ablation +/- secondary prevention ICD on Monday 6/21. Cardiac cath 6/18 demonstrating multivessel CAD and CT surgery consulted for CABG evaluation.

## 2024-09-25 NOTE — PROGRESS NOTE ADULT - PROBLEM SELECTOR PLAN 1
- Bloody output likely due to irritation in the gallbladder lumen while on a/c.  -  H&H and VS stable.   - CT demonstrates a cholecystostomy drain in place.   -  Recommend BID flushes with 10cc NS until it clears.   - Recommend Holding Eliquis if possible until it clears.

## 2024-09-26 ENCOUNTER — APPOINTMENT (OUTPATIENT)
Dept: CARDIOLOGY | Facility: CLINIC | Age: 74
End: 2024-09-26

## 2024-09-26 LAB
ALBUMIN SERPL ELPH-MCNC: 2.6 G/DL — LOW (ref 3.3–5)
ALP SERPL-CCNC: 53 U/L — SIGNIFICANT CHANGE UP (ref 40–120)
ALT FLD-CCNC: 25 U/L — SIGNIFICANT CHANGE UP (ref 12–78)
ANION GAP SERPL CALC-SCNC: 5 MMOL/L — SIGNIFICANT CHANGE UP (ref 5–17)
AST SERPL-CCNC: 31 U/L — SIGNIFICANT CHANGE UP (ref 15–37)
BILIRUB SERPL-MCNC: 0.9 MG/DL — SIGNIFICANT CHANGE UP (ref 0.2–1.2)
BUN SERPL-MCNC: 6 MG/DL — LOW (ref 7–23)
CALCIUM SERPL-MCNC: 9.5 MG/DL — SIGNIFICANT CHANGE UP (ref 8.5–10.1)
CHLORIDE SERPL-SCNC: 110 MMOL/L — HIGH (ref 96–108)
CO2 SERPL-SCNC: 26 MMOL/L — SIGNIFICANT CHANGE UP (ref 22–31)
CREAT SERPL-MCNC: 0.83 MG/DL — SIGNIFICANT CHANGE UP (ref 0.5–1.3)
EGFR: 91 ML/MIN/1.73M2 — SIGNIFICANT CHANGE UP
GLUCOSE BLDC GLUCOMTR-MCNC: 113 MG/DL — HIGH (ref 70–99)
GLUCOSE BLDC GLUCOMTR-MCNC: 122 MG/DL — HIGH (ref 70–99)
GLUCOSE BLDC GLUCOMTR-MCNC: 138 MG/DL — HIGH (ref 70–99)
GLUCOSE BLDC GLUCOMTR-MCNC: 174 MG/DL — HIGH (ref 70–99)
GLUCOSE SERPL-MCNC: 117 MG/DL — HIGH (ref 70–99)
HCT VFR BLD CALC: 41.2 % — SIGNIFICANT CHANGE UP (ref 39–50)
HGB BLD-MCNC: 13.6 G/DL — SIGNIFICANT CHANGE UP (ref 13–17)
MCHC RBC-ENTMCNC: 28.4 PG — SIGNIFICANT CHANGE UP (ref 27–34)
MCHC RBC-ENTMCNC: 33 GM/DL — SIGNIFICANT CHANGE UP (ref 32–36)
MCV RBC AUTO: 86 FL — SIGNIFICANT CHANGE UP (ref 80–100)
PLATELET # BLD AUTO: 226 K/UL — SIGNIFICANT CHANGE UP (ref 150–400)
POTASSIUM SERPL-MCNC: 4 MMOL/L — SIGNIFICANT CHANGE UP (ref 3.5–5.3)
POTASSIUM SERPL-SCNC: 4 MMOL/L — SIGNIFICANT CHANGE UP (ref 3.5–5.3)
PROT SERPL-MCNC: 6.5 GM/DL — SIGNIFICANT CHANGE UP (ref 6–8.3)
RBC # BLD: 4.79 M/UL — SIGNIFICANT CHANGE UP (ref 4.2–5.8)
RBC # FLD: 14.8 % — HIGH (ref 10.3–14.5)
SODIUM SERPL-SCNC: 141 MMOL/L — SIGNIFICANT CHANGE UP (ref 135–145)
WBC # BLD: 6.4 K/UL — SIGNIFICANT CHANGE UP (ref 3.8–10.5)
WBC # FLD AUTO: 6.4 K/UL — SIGNIFICANT CHANGE UP (ref 3.8–10.5)

## 2024-09-26 PROCEDURE — 99231 SBSQ HOSP IP/OBS SF/LOW 25: CPT

## 2024-09-26 PROCEDURE — 99232 SBSQ HOSP IP/OBS MODERATE 35: CPT

## 2024-09-26 RX ORDER — APIXABAN 5 MG/1
5 TABLET, FILM COATED ORAL EVERY 12 HOURS
Refills: 0 | Status: DISCONTINUED | OUTPATIENT
Start: 2024-09-26 | End: 2024-09-27

## 2024-09-26 RX ADMIN — Medication 1 TABLET(S): at 09:21

## 2024-09-26 RX ADMIN — Medication 112 MICROGRAM(S): at 05:30

## 2024-09-26 RX ADMIN — Medication 25 MILLIGRAM(S): at 22:20

## 2024-09-26 RX ADMIN — URSODIOL 300 MILLIGRAM(S): 500 TABLET, FILM COATED ORAL at 09:22

## 2024-09-26 RX ADMIN — Medication 25 MILLIGRAM(S): at 09:21

## 2024-09-26 RX ADMIN — APIXABAN 5 MILLIGRAM(S): 5 TABLET, FILM COATED ORAL at 10:42

## 2024-09-26 RX ADMIN — Medication 1 TABLET(S): at 22:19

## 2024-09-26 RX ADMIN — Medication 300 MILLIGRAM(S): at 22:20

## 2024-09-26 RX ADMIN — URSODIOL 300 MILLIGRAM(S): 500 TABLET, FILM COATED ORAL at 22:20

## 2024-09-26 RX ADMIN — APIXABAN 5 MILLIGRAM(S): 5 TABLET, FILM COATED ORAL at 22:19

## 2024-09-26 RX ADMIN — Medication 1000 UNIT(S): at 09:21

## 2024-09-26 RX ADMIN — Medication 81 MILLIGRAM(S): at 09:24

## 2024-09-26 RX ADMIN — ATORVASTATIN CALCIUM 20 MILLIGRAM(S): 10 TABLET, FILM COATED ORAL at 22:19

## 2024-09-26 RX ADMIN — Medication 2: at 17:22

## 2024-09-26 NOTE — PHYSICAL THERAPY INITIAL EVALUATION ADULT - GENERAL OBSERVATIONS, REHAB EVAL
Pre and post session: sidelying in bed with bed alarm on. Call bell and phone in reach. No c/o pain. Wife present. Cholecystostomy bag intact. Stated fearful of exacerbating bllod collection in bag with activity. Given increased encouragement. Tolerated well and would benefit from further skilled PT for functional mobility training.

## 2024-09-26 NOTE — PROGRESS NOTE ADULT - ASSESSMENT
{\rtf1\tjnguk17350\ansi\nyjgcfh5902\ftnbj\uc1\deff0  {\fonttbl{\f0 \fnil Segoe UI;}{\f1 \fnil \fcharset0 Segoe UI;}{\f2 \fnil Times New Maximo;}}  {\colortbl ;\pws093\shksp704\sgon114 ;\red0\green0\blue0 ;\red0\green0\blue0 ;}  {\stylesheet{\f0\fs20 Normal;}{\cs1 Default Paragraph Font;}{\cs2\f0\fs20 Line Number;}{\cs3\f2\fs24 Hyperlink;}}  {\*\revtbl{Unknown;}}  \qgshdh27599\sfbxbk28728\coble0306\ougih5709\bhvzt0753\gtrvi6373\maidyja527\jvrrumr192\nogrowautofit\uihvsr860\formshade\nofeaturethrottle1\dntblnsbdb\fet4\aendnotes\aftnnrlc\pgbrdrhead\pgbrdrfoot  \sectd\bycwsj94773\atjkpl09651\guttersxn0\tmjgzxzg1356\whnluxkh5222\pexiqzfr6051\odgkqokm9490\cinnwqt944\dvgvnda366\sbkpage\pgncont\pgndec  \plain\plain\f0\fs24\ql\plain\f0\fs24\plain\f0\fs20\iocg6871\hich\f0\dbch\f0\loch\f0\fs20 74 year old male w DM II, cholangitis, BPH, hypothyroid, DANIELLE on CPAP, TIA, anxiety, gout, afib on Eliquis, HTN, dyslipidemia, ERCP, recently dc'd from [facility] on 9/18   after being admitted for acute cholecystitis s/p cholecystectomy BIBEMS to ED BIBA from [facility] c/o blood in cholecystostomy drain starting yesterday. \par  \par  # Cholecystostomy Tube dysfunction\par  - See by ER and flushed\par  - No active bleed in bag\par  - Restart eliquis 9/26\par  - Flush tube BID\par  - Trend H/H\par  - Continue augmentin as per ID\par  - Continue ursodiol\par  - Surgery, no surgical intervention\par  - Monitor output, if stable DC 9/27\par  \par  # P. Afib\par  - Stable\par  - Restart eliquis\par  - Continue metoprolol\par  \par  # CAD\par  - Stable\par  - Continue asprin \par  - Continue atorvastatin\par  \par  # Hypothyroid\par  - Continue levothyroxine\par  \par  # DM\line - ISS\par  - Check A1c\par  \par  # DVT prophylaxis\par  - SCDs for now\par  - Continue aspirin\par  - Restart eliquis\par  \par  \plain\f1\fs20\aypx9741\hich\f1\dbch\f1\loch\f1\cf2\fs20\strike\plain\f1\fs20\ewhc2608\hich\f1\dbch\f1\loch\f1\cf2\fs20\plain\f0\fs20\uetl8360\hich\f0\dbch\f0\loch\f0\fs20\par  }   74 year old male w DM II, cholangitis, BPH, hypothyroid, DANIELLE on CPAP, TIA, anxiety, gout, afib on Eliquis, HTN, dyslipidemia, ERCP, recently dc'd from [facility] on 9/18 after being admitted for acute cholecystitis s/p cholecystectomy BIBEMS to ED BIBA from [facility] c/o blood in cholecystostomy drain starting yesterday.     # Cholecystostomy Tube dysfunction  - Seen in  ER and flushed  - No active bleed in bag  - Restart eliquis 9/26  - Flush tube BID  - Trend H/H  - Continue augmentin as per ID  - Continue ursodiol  - Surgery, no surgical intervention  - Monitor output, if stable DC 9/27    # P. Afib  - Stable  - Restart eliquis  - Continue metoprolol    # CAD  - Stable  - Continue asprin   - Continue atorvastatin    # Hypothyroid  - Continue levothyroxine    # DM  - ISS  - Check A1c    # DVT prophylaxis  - SCDs   - Continue aspirin  - Restart eliquis

## 2024-09-26 NOTE — PHYSICAL THERAPY INITIAL EVALUATION ADULT - ADDITIONAL COMMENTS
LIves in house with sons and their families and wife. Apt inside home has bathroom but pt states uses bedpan and spongebaths. States uses walker to transfer to and from . Has manual and electric wc.

## 2024-09-26 NOTE — PHYSICAL THERAPY INITIAL EVALUATION ADULT - PERTINENT HX OF CURRENT PROBLEM, REHAB EVAL
74 year old male w DM II, cholangitis, BPH, hypothyroid, DANIELLE on CPAP, TIA, anxiety, gout, afib on Eliquis, HTN, dyslipidemia, ERCP, recently dc'd from [facility] on 9/18 after being admitted for acute cholecystitis s/p cholecystectomy BIBEMS to ED BIBA from [facility] c/o blood in cholecystostomy drain starting yesterday.     Blood was noted in tube 1 day prior and eliquis held last night  Bag was clear on rounds 09-23 then in evening blood was noted again.  Sent to hospital for evaluation  + fatigue  Denies fevers, lightheadedness  did not receive his dose of Eliquis last night, but took his morning dose this morning

## 2024-09-27 ENCOUNTER — TRANSCRIPTION ENCOUNTER (OUTPATIENT)
Age: 74
End: 2024-09-27

## 2024-09-27 VITALS
RESPIRATION RATE: 18 BRPM | OXYGEN SATURATION: 96 % | SYSTOLIC BLOOD PRESSURE: 134 MMHG | TEMPERATURE: 98 F | DIASTOLIC BLOOD PRESSURE: 70 MMHG | HEART RATE: 78 BPM

## 2024-09-27 LAB
ANION GAP SERPL CALC-SCNC: 5 MMOL/L — SIGNIFICANT CHANGE UP (ref 5–17)
BUN SERPL-MCNC: 6 MG/DL — LOW (ref 7–23)
CALCIUM SERPL-MCNC: 9.7 MG/DL — SIGNIFICANT CHANGE UP (ref 8.5–10.1)
CHLORIDE SERPL-SCNC: 108 MMOL/L — SIGNIFICANT CHANGE UP (ref 96–108)
CO2 SERPL-SCNC: 27 MMOL/L — SIGNIFICANT CHANGE UP (ref 22–31)
CREAT SERPL-MCNC: 0.93 MG/DL — SIGNIFICANT CHANGE UP (ref 0.5–1.3)
EGFR: 86 ML/MIN/1.73M2 — SIGNIFICANT CHANGE UP
GLUCOSE BLDC GLUCOMTR-MCNC: 115 MG/DL — HIGH (ref 70–99)
GLUCOSE BLDC GLUCOMTR-MCNC: 131 MG/DL — HIGH (ref 70–99)
GLUCOSE BLDC GLUCOMTR-MCNC: 134 MG/DL — HIGH (ref 70–99)
GLUCOSE SERPL-MCNC: 124 MG/DL — HIGH (ref 70–99)
HCT VFR BLD CALC: 42.4 % — SIGNIFICANT CHANGE UP (ref 39–50)
HGB BLD-MCNC: 13.8 G/DL — SIGNIFICANT CHANGE UP (ref 13–17)
MCHC RBC-ENTMCNC: 28.1 PG — SIGNIFICANT CHANGE UP (ref 27–34)
MCHC RBC-ENTMCNC: 32.5 GM/DL — SIGNIFICANT CHANGE UP (ref 32–36)
MCV RBC AUTO: 86.4 FL — SIGNIFICANT CHANGE UP (ref 80–100)
PLATELET # BLD AUTO: 219 K/UL — SIGNIFICANT CHANGE UP (ref 150–400)
POTASSIUM SERPL-MCNC: 4.2 MMOL/L — SIGNIFICANT CHANGE UP (ref 3.5–5.3)
POTASSIUM SERPL-SCNC: 4.2 MMOL/L — SIGNIFICANT CHANGE UP (ref 3.5–5.3)
RBC # BLD: 4.91 M/UL — SIGNIFICANT CHANGE UP (ref 4.2–5.8)
RBC # FLD: 14.8 % — HIGH (ref 10.3–14.5)
SODIUM SERPL-SCNC: 140 MMOL/L — SIGNIFICANT CHANGE UP (ref 135–145)
WBC # BLD: 5.46 K/UL — SIGNIFICANT CHANGE UP (ref 3.8–10.5)
WBC # FLD AUTO: 5.46 K/UL — SIGNIFICANT CHANGE UP (ref 3.8–10.5)

## 2024-09-27 PROCEDURE — 99232 SBSQ HOSP IP/OBS MODERATE 35: CPT

## 2024-09-27 RX ORDER — ACETAMINOPHEN 325 MG
2 TABLET ORAL
Refills: 0 | DISCHARGE

## 2024-09-27 RX ORDER — ACETAMINOPHEN 325 MG
2 TABLET ORAL
Qty: 0 | Refills: 0 | DISCHARGE
Start: 2024-09-27

## 2024-09-27 RX ADMIN — Medication 81 MILLIGRAM(S): at 09:19

## 2024-09-27 RX ADMIN — Medication 25 MILLIGRAM(S): at 09:19

## 2024-09-27 RX ADMIN — Medication 1 TABLET(S): at 09:18

## 2024-09-27 RX ADMIN — APIXABAN 5 MILLIGRAM(S): 5 TABLET, FILM COATED ORAL at 09:20

## 2024-09-27 RX ADMIN — URSODIOL 300 MILLIGRAM(S): 500 TABLET, FILM COATED ORAL at 09:18

## 2024-09-27 RX ADMIN — Medication 1000 UNIT(S): at 09:19

## 2024-09-27 RX ADMIN — Medication 112 MICROGRAM(S): at 05:25

## 2024-09-27 NOTE — DISCHARGE NOTE NURSING/CASE MANAGEMENT/SOCIAL WORK - NSDCPEFALRISK_GEN_ALL_CORE
For information on Fall & Injury Prevention, visit: [email or web address] OR  [email or web address] OR  https://www.cdc.gov/steadi/patient.html

## 2024-09-27 NOTE — DISCHARGE NOTE PROVIDER - NSDCCPCAREPLAN_GEN_ALL_CORE_FT
PRINCIPAL DISCHARGE DIAGNOSIS  Diagnosis: Cholecystostomy tube dysfunction  Assessment and Plan of Treatment: You had a tube dysfucntion, you are doing better. Please continue medications as prescribed. Please fu with Dr Plunkett in 2-3 weeks for cholecystosmy tube check.     PRINCIPAL DISCHARGE DIAGNOSIS  Diagnosis: Cholecystostomy tube dysfunction  Assessment and Plan of Treatment: You had a tube dysfucntion, you are doing better. Please continue medications as prescribed. Please fu with Dr Plunkett in 2-3 weeks for cholecystosmy tube check. Please flush tube BID with 10 cc NS. Ok to see some blood in bag. If worsening bleeding please send back to ER

## 2024-09-27 NOTE — PROGRESS NOTE ADULT - REASON FOR ADMISSION
Cholecystostomy tube dysfunction

## 2024-09-27 NOTE — PROGRESS NOTE ADULT - SUBJECTIVE AND OBJECTIVE BOX
74y Male with hx of cholecystitis s/p cholecystostomy drain on 9/12 readmitted with bloody output from the drain. Pt states that he was getting PT at rehab and was on Eliquis.  Denies abdominal pain.     Vital Signs Last 24 Hrs  T(C): 36.4 (24 Sep 2024 22:35), Max: 36.6 (24 Sep 2024 15:35)  T(F): 97.6 (24 Sep 2024 22:35), Max: 97.9 (24 Sep 2024 15:35)  HR: 64 (24 Sep 2024 22:35) (64 - 64)  BP: 138/69 (24 Sep 2024 22:35) (138/69 - 140/58)  BP(mean): --  RR: 17 (24 Sep 2024 22:35) (17 - 17)  SpO2: 96% (24 Sep 2024 22:35) (96% - 99%)    Parameters below as of 24 Sep 2024 22:35  Patient On (Oxygen Delivery Method): room air      GENERAL APPEARANCE: Well developed, in no acute distress.    ABDOMEN: Soft and nontender with normal bowel sounds.     NEUROLOGIC: Alert and oriented x 3. Normal affect.       CBC                        13.3   5.72  )-----------( 227      ( 25 Sep 2024 07:03 )             39.7       Chemistry  09-25    140  |  109[H]  |  5[L]  ----------------------------<  114[H]  4.3   |  26  |  0.88    Ca    9.6      25 Sep 2024 07:03  Mg     1.6     09-23    TPro  6.5  /  Alb  2.7[L]  /  TBili  0.6  /  DBili  x   /  AST  27  /  ALT  21  /  AlkPhos  59  09-23      PT/INR - ( 24 Sep 2024 08:55 )   PT: 12.2 sec;   INR: 1.06 ratio         PTT - ( 24 Sep 2024 08:55 )  PTT:33.7 sec    
HOSPITALIST ATTENDING PROGRESS NOTE    Chart and meds reviewed.  Patient seen and examined.    CC: Tube malfunction     Subjective: No acute issues overnight. No fever.     All other systems reviewed and found to be negative with the exception of what has been described above.    MEDICATIONS  (STANDING):  amoxicillin  875 milliGRAM(s)/clavulanate 1 Tablet(s) Oral two times a day  aspirin enteric coated 81 milliGRAM(s) Oral daily  atorvastatin 20 milliGRAM(s) Oral at bedtime  cholecalciferol 1000 Unit(s) Oral daily  dextrose 5%. 1000 milliLiter(s) (50 mL/Hr) IV Continuous <Continuous>  dextrose 5%. 1000 milliLiter(s) (100 mL/Hr) IV Continuous <Continuous>  dextrose 50% Injectable 25 Gram(s) IV Push once  dextrose 50% Injectable 25 Gram(s) IV Push once  dextrose 50% Injectable 12.5 Gram(s) IV Push once  glucagon  Injectable 1 milliGRAM(s) IntraMuscular once  insulin lispro (ADMELOG) corrective regimen sliding scale   SubCutaneous three times a day before meals  insulin lispro (ADMELOG) corrective regimen sliding scale   SubCutaneous at bedtime  levothyroxine 112 MICROGram(s) Oral daily  metoprolol tartrate 25 milliGRAM(s) Oral two times a day  ursodiol Capsule 300 milliGRAM(s) Oral two times a day    MEDICATIONS  (PRN):  acetaminophen     Tablet .. 650 milliGRAM(s) Oral every 6 hours PRN Temp greater or equal to 38C (100.4F), Mild Pain (1 - 3)  aluminum hydroxide/magnesium hydroxide/simethicone Suspension 30 milliLiter(s) Oral every 4 hours PRN Dyspepsia  dextrose Oral Gel 15 Gram(s) Oral once PRN Blood Glucose LESS THAN 70 milliGRAM(s)/deciliter  melatonin 3 milliGRAM(s) Oral at bedtime PRN Insomnia  ondansetron Injectable 4 milliGRAM(s) IV Push every 8 hours PRN Nausea and/or Vomiting  traZODone 300 milliGRAM(s) Oral at bedtime PRN for insomnia    Vital Signs Last 24 Hrs  T(C): 36.6 (24 Sep 2024 09:01), Max: 36.9 (23 Sep 2024 21:19)  T(F): 97.9 (24 Sep 2024 09:01), Max: 98.5 (23 Sep 2024 21:19)  HR: 64 (24 Sep 2024 09:01) (64 - 83)  BP: 148/82 (24 Sep 2024 09:01) (132/74 - 148/82)  RR: 16 (24 Sep 2024 09:01) (16 - 18)  SpO2: 99% (24 Sep 2024 09:01) (95% - 99%)    Parameters below as of 24 Sep 2024 09:01  Patient On (Oxygen Delivery Method): room air        GEN: Morbid obesity   HEENT:  pupils equal and reactive, EOMI, no oropharyngeal lesions, erythema, exudates, oral thrush  NECK:   supple, no carotid bruits  CV:  +S1, +S2, regular, no murmurs  RESP:   lungs clear to auscultation bilaterally, no wheezing, rales, rhonchi, good air entry bilaterally  GI:  abdomen soft, non-tender, non-distended, normal BS+ LUIS   EXT:  no clubbing, no cyanosis, no edema, no calf pain, swelling or erythema  NEURO:  AAOX3, no focal neurological deficits, follows all commands, able to move extremities spontaneously  SKIN:  no ulcers, lesions or rashes    LABS:                          13.3   6.14  )-----------( 229      ( 24 Sep 2024 08:55 )             40.2     09-24    141  |  111[H]  |  5[L]  ----------------------------<  121[H]  4.1   |  25  |  0.87    Ca    9.6      24 Sep 2024 08:55  Mg     1.6     09-23    TPro  6.5  /  Alb  2.7[L]  /  TBili  0.6  /  DBili  x   /  AST  27  /  ALT  21  /  AlkPhos  59  09-23      LIVER FUNCTIONS - ( 23 Sep 2024 22:06 )  Alb: 2.7 g/dL / Pro: 6.5 gm/dL / ALK PHOS: 59 U/L / ALT: 21 U/L / AST: 27 U/L / GGT: x           PT/INR - ( 24 Sep 2024 08:55 )   PT: 12.2 sec;   INR: 1.06 ratio   PTT - ( 24 Sep 2024 08:55 )  PTT:33.7 sec    Urinalysis Basic - ( 24 Sep 2024 08:55 )  Color: x / Appearance: x / SG: x / pH: x  Gluc: 121 mg/dL / Ketone: x  / Bili: x / Urobili: x   Blood: x / Protein: x / Nitrite: x   Leuk Esterase: x / RBC: x / WBC x   Sq Epi: x / Non Sq Epi: x / Bacteria: x    Lactate, Blood: 2.0 mmol/L (09-23 @ 22:06)     US Abdomen Upper Quadrant Right (09.24.24 @ 00:43) >    IMPRESSION:  Cholecystomy tube in place. There is cholelithiasis. Nonspecific   gallbladder wall thickening and trace pericholecystic fluid.    Steatosis and hepatomegaly.     CT Abdomen and Pelvis w/ IV Cont (09.23.24 @ 22:38) >  IMPRESSION:  Percutaneous cholecystostomy tube in place. Linear high attenuation in   the decompressed gallbladder lumen is indeterminate, possibly sloughed   membranes. Correlation with gallbladder sonogram is recommended. Limited   assessment for active bleeding on a single phase study.      
Interventional Radiology Follow-Up Note.    74y Male with hx of cholecystitis s/p cholecystostomy drain on 9/12 readmitted with bloody output from the drain. Pt states that he was getting PT at rehab and was on Eliquis.  Denies abdominal pain.         Medication:     amoxicillin  875 milliGRAM(s)/clavulanate: (09-26)  apixaban: (09-26)  aspirin enteric coated: (09-26)  metoprolol tartrate: (09-26)    Vitals:   T(F): 97.4, Max: 97.8 (09:15)  HR: 87  BP: 134/87  RR: 18  SpO2: 98%    Physical Exam:  General: Nontoxic, in NAD.  Abdomen: soft, NTND.   Drain Device: Drain intact attached to gravity drain bag. Drain with brown blood tinged bilious fluid.  Flushed with 5cc NS w/o difficulty. Dressing clean, dry, intact.   24hr Drain output: 120cc    LABS:  WBC 6.40 / Hgb 13.6 / Hct 41.2 / Plt 226  Na 141 / K 4.0 / CO2 26 / Cl 110 / BUN 6 / Cr 0.83 / Glucose 117  ALT 25 / AST 31 / Alk Phos 53 / Tbili 0.9  Ptt -- / Pt -- / INR --      Assessment/Plan:   74y Male with hx of cholecystitis s/p cholecystostomy drain on 9/12 readmitted with bloody output from the drain.   Bloody output likely due to irritation in the gallbladder lumen while on a/c.    -  H&H and VS stable.   - CT demonstrates a cholecystostomy drain in place.   -  Recommend BID flushes with 10cc NS until it clears.   - Pt restarted on eliquis today.  - IR will sign off.        
HOSPITALIST ATTENDING PROGRESS NOTE    Chart and meds reviewed.  Patient seen and examined.    CC: Tube dysfunction     Subjective: Feels well, no fever. No further bleeding from t tube.     All other systems reviewed and found to be negative with the exception of what has been described above.    MEDICATIONS  (STANDING):  amoxicillin  875 milliGRAM(s)/clavulanate 1 Tablet(s) Oral two times a day  apixaban 5 milliGRAM(s) Oral every 12 hours  aspirin enteric coated 81 milliGRAM(s) Oral daily  atorvastatin 20 milliGRAM(s) Oral at bedtime  cholecalciferol 1000 Unit(s) Oral daily  dextrose 5%. 1000 milliLiter(s) (50 mL/Hr) IV Continuous <Continuous>  dextrose 5%. 1000 milliLiter(s) (100 mL/Hr) IV Continuous <Continuous>  dextrose 50% Injectable 25 Gram(s) IV Push once  dextrose 50% Injectable 25 Gram(s) IV Push once  dextrose 50% Injectable 12.5 Gram(s) IV Push once  glucagon  Injectable 1 milliGRAM(s) IntraMuscular once  insulin lispro (ADMELOG) corrective regimen sliding scale   SubCutaneous at bedtime  insulin lispro (ADMELOG) corrective regimen sliding scale   SubCutaneous three times a day before meals  levothyroxine 112 MICROGram(s) Oral daily  metoprolol tartrate 25 milliGRAM(s) Oral two times a day  ursodiol Capsule 300 milliGRAM(s) Oral two times a day    MEDICATIONS  (PRN):  acetaminophen     Tablet .. 650 milliGRAM(s) Oral every 6 hours PRN Temp greater or equal to 38C (100.4F), Mild Pain (1 - 3)  aluminum hydroxide/magnesium hydroxide/simethicone Suspension 30 milliLiter(s) Oral every 4 hours PRN Dyspepsia  dextrose Oral Gel 15 Gram(s) Oral once PRN Blood Glucose LESS THAN 70 milliGRAM(s)/deciliter  melatonin 3 milliGRAM(s) Oral at bedtime PRN Insomnia  ondansetron Injectable 4 milliGRAM(s) IV Push every 8 hours PRN Nausea and/or Vomiting  traZODone 300 milliGRAM(s) Oral at bedtime PRN for insomnia    Vital Signs Last 24 Hrs  T(C): 36.3 (27 Sep 2024 08:31), Max: 36.7 (26 Sep 2024 21:13)  T(F): 97.3 (27 Sep 2024 08:31), Max: 98 (26 Sep 2024 21:13)  HR: 73 (27 Sep 2024 08:31) (73 - 87)  BP: 142/85 (27 Sep 2024 08:31) (133/79 - 142/85)  RR: 18 (27 Sep 2024 08:31) (18 - 18)  SpO2: 99% (27 Sep 2024 08:31) (97% - 99%)    Parameters below as of 27 Sep 2024 08:31  Patient On (Oxygen Delivery Method): room air      GEN; Morbid obesity   HEENT:  pupils equal and reactive, EOMI, no oropharyngeal lesions, erythema, exudates, oral thrush  NECK:   supple, no carotid bruits  CV:  +S1, +S2, regular, no murmurs  RESP:   lungs clear to auscultation bilaterally, no wheezing, rales, rhonchi, good air entry bilaterally  GI:  abdomen soft, non-tender, non-distended, normal BS  EXT:  no clubbing, no cyanosis, no edema, no calf pain, swelling or erythema  NEURO:  AAOX3, no focal neurological deficits, follows all commands, able to move extremities spontaneously  SKIN:  no ulcers, lesions or rashes    LABS:                          13.8   5.46  )-----------( 219      ( 27 Sep 2024 06:45 )             42.4     09-27    140  |  108  |  6[L]  ----------------------------<  124[H]  4.2   |  27  |  0.93    Ca    9.7      27 Sep 2024 06:45    TPro  6.5  /  Alb  2.6[L]  /  TBili  0.9  /  DBili  x   /  AST  31  /  ALT  25  /  AlkPhos  53  09-26    LIVER FUNCTIONS - ( 26 Sep 2024 07:26 )  Alb: 2.6 g/dL / Pro: 6.5 gm/dL / ALK PHOS: 53 U/L / ALT: 25 U/L / AST: 31 U/L / GGT: x           Urinalysis Basic - ( 27 Sep 2024 06:45 )  Color: x / Appearance: x / SG: x / pH: x  Gluc: 124 mg/dL / Ketone: x  / Bili: x / Urobili: x   Blood: x / Protein: x / Nitrite: x   Leuk Esterase: x / RBC: x / WBC x   Sq Epi: x / Non Sq Epi: x / Bacteria: x    : CT Abdomen and Pelvis w/ IV Cont (09.23.24 @ 22:38) >  IMPRESSION:  Percutaneous cholecystostomy tube in place. Linear high attenuation in   the decompressed gallbladder lumen is indeterminate, possibly sloughed   membranes. Correlation with gallbladder sonogram is recommended. Limited   assessment for active bleeding on a single phase study.    Abdomen Upper Quadrant Right (09.24.24 @ 00:43) >  IMPRESSION:  Cholecystomy tube in place. There is cholelithiasis. Nonspecific   gallbladder wall thickening and trace pericholecystic fluid.    Steatosis and hepatomegaly.      
HOSPITALIST ATTENDING PROGRESS NOTE    Chart and meds reviewed.  Patient seen and examined.    CC: Tube dysfunction     Subjective: No acute issues overnight. Less blood in bag. No fever.     All other systems reviewed and found to be negative with the exception of what has been described above.    MEDICATIONS  (STANDING):  amoxicillin  875 milliGRAM(s)/clavulanate 1 Tablet(s) Oral two times a day  aspirin enteric coated 81 milliGRAM(s) Oral daily  atorvastatin 20 milliGRAM(s) Oral at bedtime  cholecalciferol 1000 Unit(s) Oral daily  dextrose 5%. 1000 milliLiter(s) (50 mL/Hr) IV Continuous <Continuous>  dextrose 5%. 1000 milliLiter(s) (100 mL/Hr) IV Continuous <Continuous>  dextrose 50% Injectable 25 Gram(s) IV Push once  dextrose 50% Injectable 25 Gram(s) IV Push once  dextrose 50% Injectable 12.5 Gram(s) IV Push once  glucagon  Injectable 1 milliGRAM(s) IntraMuscular once  insulin lispro (ADMELOG) corrective regimen sliding scale   SubCutaneous at bedtime  insulin lispro (ADMELOG) corrective regimen sliding scale   SubCutaneous three times a day before meals  levothyroxine 112 MICROGram(s) Oral daily  metoprolol tartrate 25 milliGRAM(s) Oral two times a day  ursodiol Capsule 300 milliGRAM(s) Oral two times a day    MEDICATIONS  (PRN):  acetaminophen     Tablet .. 650 milliGRAM(s) Oral every 6 hours PRN Temp greater or equal to 38C (100.4F), Mild Pain (1 - 3)  aluminum hydroxide/magnesium hydroxide/simethicone Suspension 30 milliLiter(s) Oral every 4 hours PRN Dyspepsia  dextrose Oral Gel 15 Gram(s) Oral once PRN Blood Glucose LESS THAN 70 milliGRAM(s)/deciliter  melatonin 3 milliGRAM(s) Oral at bedtime PRN Insomnia  ondansetron Injectable 4 milliGRAM(s) IV Push every 8 hours PRN Nausea and/or Vomiting  traZODone 300 milliGRAM(s) Oral at bedtime PRN for insomnia    Vital Signs Last 24 Hrs  T(C): 36.4 (24 Sep 2024 22:35), Max: 36.6 (24 Sep 2024 15:35)  T(F): 97.6 (24 Sep 2024 22:35), Max: 97.9 (24 Sep 2024 15:35)  HR: 64 (24 Sep 2024 22:35) (64 - 64)  BP: 138/69 (24 Sep 2024 22:35) (138/69 - 140/58)  RR: 17 (24 Sep 2024 22:35) (17 - 17)  SpO2: 96% (24 Sep 2024 22:35) (96% - 99%)    Parameters below as of 24 Sep 2024 22:35  Patient On (Oxygen Delivery Method): room air    GEN: Obese  HEENT:  pupils equal and reactive, EOMI, no oropharyngeal lesions, erythema, exudates, oral thrush  NECK:   supple, no carotid bruits  CV:  +S1, +S2, regular, no murmurs  RESP:   lungs clear to auscultation bilaterally, no wheezing, rales, rhonchi, good air entry bilaterally  GI:  abdomen soft, non-tender, non-distended, normal BS  EXT:  no clubbing, no cyanosis, no edema, no calf pain, swelling or erythema  NEURO:  AAOX3, no focal neurological deficits, follows all commands, able to move extremities spontaneously  SKIN:  no ulcers, lesions or rashes    LABS:                          13.3   5.72  )-----------( 227      ( 25 Sep 2024 07:03 )             39.7     09-25    140  |  109[H]  |  5[L]  ----------------------------<  114[H]  4.3   |  26  |  0.88    Ca    9.6      25 Sep 2024 07:03  Mg     1.6     09-23    TPro  6.5  /  Alb  2.7[L]  /  TBili  0.6  /  DBili  x   /  AST  27  /  ALT  21  /  AlkPhos  59  09-23    LIVER FUNCTIONS - ( 23 Sep 2024 22:06 )  Alb: 2.7 g/dL / Pro: 6.5 gm/dL / ALK PHOS: 59 U/L / ALT: 21 U/L / AST: 27 U/L / GGT: x           PT/INR - ( 24 Sep 2024 08:55 )   PT: 12.2 sec;   INR: 1.06 ratio    PTT - ( 24 Sep 2024 08:55 )  PTT:33.7 sec    Urinalysis Basic - ( 25 Sep 2024 07:03 )  Color: x / Appearance: x / SG: x / pH: x  Gluc: 114 mg/dL / Ketone: x  / Bili: x / Urobili: x   Blood: x / Protein: x / Nitrite: x   Leuk Esterase: x / RBC: x / WBC x   Sq Epi: x / Non Sq Epi: x / Bacteria: x      : US Abdomen Upper Quadrant Right (09.24.24 @ 00:43) >    IMPRESSION:  Cholecystomy tube in place. There is cholelithiasis. Nonspecific   gallbladder wall thickening and trace pericholecystic fluid.    Steatosis and hepatomegaly.        
HOSPITALIST ATTENDING PROGRESS NOTE    Chart and meds reviewed.  Patient seen and examined.    CC: Tube dysfunction     Subjective: No acute issues overnight. No fever. Tolerating po. Still with slight blood in tube.     All other systems reviewed and found to be negative with the exception of what has been described above.    MEDICATIONS  (STANDING):  amoxicillin  875 milliGRAM(s)/clavulanate 1 Tablet(s) Oral two times a day  apixaban 5 milliGRAM(s) Oral every 12 hours  aspirin enteric coated 81 milliGRAM(s) Oral daily  atorvastatin 20 milliGRAM(s) Oral at bedtime  cholecalciferol 1000 Unit(s) Oral daily  dextrose 5%. 1000 milliLiter(s) (50 mL/Hr) IV Continuous <Continuous>  dextrose 5%. 1000 milliLiter(s) (100 mL/Hr) IV Continuous <Continuous>  dextrose 50% Injectable 25 Gram(s) IV Push once  dextrose 50% Injectable 25 Gram(s) IV Push once  dextrose 50% Injectable 12.5 Gram(s) IV Push once  glucagon  Injectable 1 milliGRAM(s) IntraMuscular once  insulin lispro (ADMELOG) corrective regimen sliding scale   SubCutaneous at bedtime  insulin lispro (ADMELOG) corrective regimen sliding scale   SubCutaneous three times a day before meals  levothyroxine 112 MICROGram(s) Oral daily  metoprolol tartrate 25 milliGRAM(s) Oral two times a day  ursodiol Capsule 300 milliGRAM(s) Oral two times a day    MEDICATIONS  (PRN):  acetaminophen     Tablet .. 650 milliGRAM(s) Oral every 6 hours PRN Temp greater or equal to 38C (100.4F), Mild Pain (1 - 3)  aluminum hydroxide/magnesium hydroxide/simethicone Suspension 30 milliLiter(s) Oral every 4 hours PRN Dyspepsia  dextrose Oral Gel 15 Gram(s) Oral once PRN Blood Glucose LESS THAN 70 milliGRAM(s)/deciliter  melatonin 3 milliGRAM(s) Oral at bedtime PRN Insomnia  ondansetron Injectable 4 milliGRAM(s) IV Push every 8 hours PRN Nausea and/or Vomiting  traZODone 300 milliGRAM(s) Oral at bedtime PRN for insomnia    Vital Signs Last 24 Hrs  T(C): 36.6 (26 Sep 2024 09:15), Max: 36.6 (26 Sep 2024 09:15)  T(F): 97.8 (26 Sep 2024 09:15), Max: 97.8 (26 Sep 2024 09:15)  HR: 90 (26 Sep 2024 09:15) (68 - 90)  BP: 139/95 (26 Sep 2024 09:15) (130/72 - 139/95)  RR: 17 (26 Sep 2024 09:15) (17 - 18)  SpO2: 97% (26 Sep 2024 09:15) (96% - 97%)    Parameters below as of 26 Sep 2024 09:15  Patient On (Oxygen Delivery Method): room air    GEN: Obese  HEENT:  pupils equal and reactive, EOMI, no oropharyngeal lesions, erythema, exudates, oral thrush  NECK:   supple, no carotid bruits  CV:  +S1, +S2, regular, no murmurs  RESP:   lungs clear to auscultation bilaterally, no wheezing, rales, rhonchi, good air entry bilaterally  GI:  abdomen soft, non-tender, non-distended, normal BS + LUIS  EXT:  no clubbing, no cyanosis, no edema, no calf pain, swelling or erythema  NEURO:  AAOX3, no focal neurological deficits, follows all commands, able to move extremities spontaneously  SKIN:  no ulcers, lesions or rashes    LABS:                          13.6   6.40  )-----------( 226      ( 26 Sep 2024 07:26 )             41.2     09-26    141  |  110[H]  |  6[L]  ----------------------------<  117[H]  4.0   |  26  |  0.83    Ca    9.5      26 Sep 2024 07:26    TPro  6.5  /  Alb  2.6[L]  /  TBili  0.9  /  DBili  x   /  AST  31  /  ALT  25  /  AlkPhos  53  09-26    LIVER FUNCTIONS - ( 26 Sep 2024 07:26 )  Alb: 2.6 g/dL / Pro: 6.5 gm/dL / ALK PHOS: 53 U/L / ALT: 25 U/L / AST: 31 U/L / GGT: x           Urinalysis Basic - ( 26 Sep 2024 07:26 )  Color: x / Appearance: x / SG: x / pH: x  Gluc: 117 mg/dL / Ketone: x  / Bili: x / Urobili: x   Blood: x / Protein: x / Nitrite: x   Leuk Esterase: x / RBC: x / WBC x   Sq Epi: x / Non Sq Epi: x / Bacteria: x

## 2024-09-27 NOTE — PROGRESS NOTE ADULT - ASSESSMENT
74 year old male w DM II, cholangitis, BPH, hypothyroid, DANIELLE on CPAP, TIA, anxiety, gout, afib on Eliquis, HTN, dyslipidemia, ERCP, recently dc'd from [facility] on 9/18 after being admitted for acute cholecystitis s/p cholecystectomy BIBEMS to ED BIBA from [facility] c/o blood in cholecystostomy drain starting yesterday.     # Cholecystostomy Tube dysfunction  - Seen in  ER and flushed  - No active bleed in bag  - Restart eliquis 9/26  - Flush tube BID  - Trend H/H  - Continue augmentin as per ID  - Continue ursodiol  - Surgery, no surgical intervention  - Monitor output  - No further bleeding from t tube    # P. Afib  - Stable  - continue eliquis  - Continue metoprolol    # CAD  - Stable  - Continue asprin   - Continue atorvastatin    # Hypothyroid  - Continue levothyroxine    # DM  - ISS  - Check A1c    # DVT prophylaxis  - SCDs   - Continue aspirin  - eliquis    # Disposition Ines

## 2024-09-27 NOTE — DISCHARGE NOTE PROVIDER - HOSPITAL COURSE
74 year old male w DM II, cholangitis, BPH, hypothyroid, DANIELLE on CPAP, TIA, anxiety, gout, afib on Eliquis, HTN, dyslipidemia, ERCP, recently dc'd from [facility] on 9/18 after being admitted for acute cholecystitis s/p cholecystectomy BIBEMS to ED BIBA from [facility] c/o blood in cholecystostomy drain starting yesterday.     # Cholecystostomy Tube dysfunction  - Seen in  ER and flushed  - No active bleed in bag  - Restart eliquis 9/26  - Flush tube BID  - Trend H/H  - Continue augmentin as per ID  - Continue ursodiol  - Surgery, no surgical intervention  - Monitor output  - No further bleeding from t tube    # P. Afib  - Stable  - continue eliquis  - Continue metoprolol    # CAD  - Stable  - Continue asprin   - Continue atorvastatin    # Hypothyroid  - Continue levothyroxine    # DM  - ISS  - Check A1c    # DVT prophylaxis  - SCDs   - Continue aspirin  - eliquis    # Disposition Ines

## 2024-09-27 NOTE — DISCHARGE NOTE PROVIDER - CARE PROVIDER_API CALL
Raleigh Plunkett  Interventional Radiology and Diagnostic Radiology  [address] [place] [id number]  Phone: [phone]  Fax: [phone]  Follow Up Time:

## 2024-09-27 NOTE — DISCHARGE NOTE PROVIDER - NSDCMRMEDTOKEN_GEN_ALL_CORE_FT
acetaminophen 325 mg oral tablet: 2 tab(s) orally every 6 hours As needed Temp greater or equal to 38C (100.4F), Mild Pain (1 - 3)  apixaban 5 mg oral tablet: 1 tab(s) orally every 12 hours MDD:2 tabs  aspirin 81 mg oral delayed release tablet: 1 tab(s) orally once a day  atorvastatin 20 mg oral tablet: 1 tab(s) orally once a day  cholecalciferol 25 mcg (1000 intl units) oral tablet: 1 tab(s) orally once a day  ciclopirox 8% topical solution: Apply topically to affected area once a day to nails  ketoconazole 2% topical cream: Apply topically to affected area 2 times a day to feet  levothyroxine 112 mcg (0.112 mg) oral tablet: 1 tab(s) orally once a day  metoprolol tartrate 25 mg oral tablet: 1 tab(s) orally 2 times a day  Multivitamin with Minerals: 1 tab(s) orally once a day  traZODone 150 mg oral tablet: 2 tab(s) orally once a day (at bedtime) as needed for  insomnia  ursodiol 300 mg oral capsule: 1 cap(s) orally 2 times a day

## 2024-10-06 DIAGNOSIS — Z79.82 LONG TERM (CURRENT) USE OF ASPIRIN: ICD-10-CM

## 2024-10-06 DIAGNOSIS — E11.9 TYPE 2 DIABETES MELLITUS WITHOUT COMPLICATIONS: ICD-10-CM

## 2024-10-06 DIAGNOSIS — Z79.01 LONG TERM (CURRENT) USE OF ANTICOAGULANTS: ICD-10-CM
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
## 2024-10-06 DIAGNOSIS — T85.838A HEMORRHAGE DUE TO OTHER INTERNAL PROSTHETIC DEVICES, IMPLANTS AND GRAFTS, INITIAL ENCOUNTER: ICD-10-CM

## 2024-10-06 DIAGNOSIS — Z79.899 OTHER LONG TERM (CURRENT) DRUG THERAPY: ICD-10-CM

## 2024-10-06 DIAGNOSIS — Z20.822 CONTACT WITH AND (SUSPECTED) EXPOSURE TO COVID-19: ICD-10-CM

## 2024-10-06 DIAGNOSIS — E78.5 HYPERLIPIDEMIA, UNSPECIFIED: ICD-10-CM

## 2024-10-06 DIAGNOSIS — E66.9 OBESITY, UNSPECIFIED: ICD-10-CM

## 2024-10-06 DIAGNOSIS — I48.91 UNSPECIFIED ATRIAL FIBRILLATION: ICD-10-CM

## 2024-10-06 DIAGNOSIS — M10.9 GOUT, UNSPECIFIED: ICD-10-CM

## 2024-10-06 DIAGNOSIS — N40.0 BENIGN PROSTATIC HYPERPLASIA WITHOUT LOWER URINARY TRACT SYMPTOMS: ICD-10-CM

## 2024-10-06 DIAGNOSIS — T85.518A BREAKDOWN (MECHANICAL) OF OTHER GASTROINTESTINAL PROSTHETIC DEVICES, IMPLANTS AND GRAFTS, INITIAL ENCOUNTER: ICD-10-CM

## 2024-10-06 DIAGNOSIS — E03.9 HYPOTHYROIDISM, UNSPECIFIED: ICD-10-CM

## 2024-10-29 ENCOUNTER — TRANSCRIPTION ENCOUNTER (OUTPATIENT)
Age: 74
End: 2024-10-29

## 2024-10-29 ENCOUNTER — RESULT REVIEW (OUTPATIENT)
Age: 74
End: 2024-10-29

## 2024-10-29 ENCOUNTER — OUTPATIENT (OUTPATIENT)
Dept: INPATIENT UNIT | Facility: HOSPITAL | Age: 74
LOS: 1 days | Discharge: HOME | End: 2024-10-29
Payer: COMMERCIAL

## 2024-10-29 VITALS
DIASTOLIC BLOOD PRESSURE: 78 MMHG | OXYGEN SATURATION: 98 % | TEMPERATURE: 98 F | HEART RATE: 84 BPM | RESPIRATION RATE: 16 BRPM | SYSTOLIC BLOOD PRESSURE: 128 MMHG

## 2024-10-29 VITALS
WEIGHT: 315 LBS | HEART RATE: 86 BPM | TEMPERATURE: 97 F | DIASTOLIC BLOOD PRESSURE: 80 MMHG | SYSTOLIC BLOOD PRESSURE: 112 MMHG | OXYGEN SATURATION: 98 % | HEIGHT: 73 IN | RESPIRATION RATE: 16 BRPM

## 2024-10-29 DIAGNOSIS — K83.09 OTHER CHOLANGITIS: ICD-10-CM

## 2024-10-29 DIAGNOSIS — Z98.890 OTHER SPECIFIED POSTPROCEDURAL STATES: Chronic | ICD-10-CM

## 2024-10-29 PROCEDURE — 47531 INJECTION FOR CHOLANGIOGRAM: CPT | Mod: 26

## 2024-10-29 PROCEDURE — 47531 INJECTION FOR CHOLANGIOGRAM: CPT

## 2024-10-29 NOTE — ASU DISCHARGE PLAN (ADULT/PEDIATRIC) - NS MD DC FALL RISK RISK
For information on Fall & Injury Prevention, visit: [email or web address] OR  [email or web address] OR  https://www.cdc.gov/steadi/patient.html

## 2024-10-29 NOTE — ASU PATIENT PROFILE, ADULT - FALL HARM RISK - HARM RISK INTERVENTIONS

## 2024-10-29 NOTE — ASU DISCHARGE PLAN (ADULT/PEDIATRIC) - FINANCIAL ASSISTANCE
[facility] provides services at a reduced cost to those who are determined to be eligible through [facility]’s financial assistance program. Information regarding [facility]’s financial assistance program can be found by going to [email or web address] or by calling [phone].

## 2024-10-29 NOTE — ASU PATIENT PROFILE, ADULT - TEACHING/LEARNING OTHER LEARNERS
"Derm referral scheduled with mom: 6/2023  [facility]. Staff message asking for sooner appointment as mom says "itching is really bothering" the patient.  " family

## 2024-10-29 NOTE — ASU DISCHARGE PLAN (ADULT/PEDIATRIC) - ASU DC SPECIAL INSTRUCTIONSFT
- Cholecystostomy check revealing multiple filling defects, possibly stones. Cystic duct was not seen. Recommend surgical eval for possible cholecystectomy vs. keeping cholecystostomy to drainage bag

## 2024-11-06 DIAGNOSIS — Z43.4 ENCOUNTER FOR ATTENTION TO OTHER ARTIFICIAL OPENINGS OF DIGESTIVE TRACT: ICD-10-CM

## 2024-11-06 DIAGNOSIS — K81.0 ACUTE CHOLECYSTITIS: ICD-10-CM

## 2025-02-10 NOTE — ED ADULT NURSE NOTE - NS ED NURSE RECORD ANOTHER HT AND WT
----- Message from Fatimah YOUNGBLOOD sent at 2/10/2025  6:20 AM EST -----  Regarding: hlf  Pts hlf is above threshold @ 16. Pt takes demadex, klor-con, amiodarone, metoprolol succ, warfarin. Ef: 55% (echo 11/18/24).  Thanks,  ~[facility]  NON-BILLABLE LATITUDE TRANSMISSION: BATTERY VOLTAGE ADEQUATE (9.5 YRS). : 69% (>40%~AVB). ALL AVAILABLE LEAD PARAMETERS WITHIN NORMAL LIMITS. NO SIGNIFICANT HIGH RATE EPISODES. HEARTLOGIC HEART FAILURE INDEX ABOVE THRESHOLD @ 16. PT TAKES DEMADEX, KLOR-CON, AMIODARONE, METOPROLOL SUCC, WARFARIN. EF: 55% (ECHO 11/18/24). TASK TO HF TEAM. NORMAL DEVICE FUNCTION. CH   Yes

## 2025-05-27 NOTE — ASU PREOP CHECKLIST - VERIFY SURGICAL SITE/SIDE WITH PATIENT
05/27/25 12:20 PM        The office's request has been received, reviewed, and the patient chart updated. The PCP has successfully been removed with a patient attribution note. This message will now be completed.        Thank you  Mariely Staotn  
[place]     Please remove Radha as PCP   
done

## (undated) DEVICE — LOK DVC RX AND BIOPSY

## (undated) DEVICE — WARMING BLANKET FULL ADULT

## (undated) DEVICE — SNARE POLYP SENS SM 13MM 240CM

## (undated) DEVICE — BIOPSY FORCEP RADIAL JAW 4 STANDARD WITH NEEDLE

## (undated) DEVICE — DENTURE CUP PINK

## (undated) DEVICE — BITE BLOCK ADULT 20 X 27MM (GREEN)

## (undated) DEVICE — LUBRICATING JELLY HR ONE SHOT 3G

## (undated) DEVICE — INJ SYS RAP REFIL

## (undated) DEVICE — PACK IV START WITH CHG

## (undated) DEVICE — TUBING MEDI-VAC W MAXIGRIP CONNECTORS 1/4"X6'

## (undated) DEVICE — TUBING SUCTION NONCONDUCTIVE 6MM X 12FT

## (undated) DEVICE — SYR LUER LOK 3CC

## (undated) DEVICE — SALIVA EJECTOR (BLUE)

## (undated) DEVICE — OMNIPAQUE 300  30ML

## (undated) DEVICE — TUBING IV SET GRAVITY 3Y 100" MACRO

## (undated) DEVICE — LINE BREATHE SAMPLNG

## (undated) DEVICE — ELCTR GROUNDING PAD ADULT COVIDIEN

## (undated) DEVICE — ELCTR ECG CONDUCTIVE ADHESIVE

## (undated) DEVICE — POSITIONER FOAM HEAD CRADLE (PINK)

## (undated) DEVICE — UNDERPAD LINEN SAVER 17 X 24"

## (undated) DEVICE — SOL IRR POUR NS 0.9% 500ML

## (undated) DEVICE — CATH IV SAFE BC 22G X 1" (BLUE)

## (undated) DEVICE — ORGANIZER MIO MEDICAL DEVICE DISP

## (undated) DEVICE — DRSG CURITY GAUZE SPONGE 4 X 4" 12-PLY NON-STERILE

## (undated) DEVICE — GOWN LG

## (undated) DEVICE — BASIN EMESIS 10IN GRADUATED MAUVE

## (undated) DEVICE — PAPIL BILRTH II 6-5FRX0.035IN

## (undated) DEVICE — LITHOTRIPY BASKET TRAPEZOID 2.5CM

## (undated) DEVICE — TUBING SUCTION 20FT

## (undated) DEVICE — DRSG BANDAID 0.75X3"

## (undated) DEVICE — SENSOR O2 FINGER ADULT

## (undated) DEVICE — NDL HYPO SAFE 22G X 1" (BLACK)

## (undated) DEVICE — SUCTION YANKAUER NO CONTROL VENT

## (undated) DEVICE — ESU ERBE 044850: Type: DURABLE MEDICAL EQUIPMENT

## (undated) DEVICE — BRUSH COLONOSCOPY CYTOLOGY

## (undated) DEVICE — FOLEY HOLDER STATLOCK 2 WAY ADULT

## (undated) DEVICE — BIOPSY FORCEP COLD DISP

## (undated) DEVICE — SNARE POLYP MINI ACCUSNR 1.5 X 3CM

## (undated) DEVICE — SYR LUER LOK 10CC

## (undated) DEVICE — NDL INJ SCLERO INTERJECT 25G

## (undated) DEVICE — BRUSH CYTO RAP EXCHG 3MM

## (undated) DEVICE — SOL INJ NS 0.9% 500ML 2 PORT

## (undated) DEVICE — CONTAINER FORMALIN 80ML YELLOW

## (undated) DEVICE — DRSG 2X2

## (undated) DEVICE — SPHINCTEROTOME CLEVERCUT WIRE 25MM  2.8MM X 170CM

## (undated) DEVICE — CATH IV SAFE BC 20G X 1.16" (PINK)

## (undated) DEVICE — CLAMP BX HOT RAD JAW 3

## (undated) DEVICE — NDL INJ SCLERO INTERJECT 23G

## (undated) DEVICE — SOL INJ NS 0.9% 500ML 1-PORT

## (undated) DEVICE — OLYMPUS DISTAL COVER ENDOSCOPE

## (undated) DEVICE — SNARE DISP